# Patient Record
Sex: FEMALE | Race: WHITE | Employment: OTHER | URBAN - METROPOLITAN AREA
[De-identification: names, ages, dates, MRNs, and addresses within clinical notes are randomized per-mention and may not be internally consistent; named-entity substitution may affect disease eponyms.]

---

## 2017-01-04 ENCOUNTER — APPOINTMENT (OUTPATIENT)
Dept: GENERAL RADIOLOGY | Age: 82
End: 2017-01-04
Attending: FAMILY MEDICINE

## 2017-01-04 ENCOUNTER — HOSPITAL ENCOUNTER (EMERGENCY)
Age: 82
Discharge: HOME | End: 2017-01-04
Attending: FAMILY MEDICINE

## 2017-01-04 VITALS
DIASTOLIC BLOOD PRESSURE: 75 MMHG | HEIGHT: 62 IN | WEIGHT: 137 LBS | RESPIRATION RATE: 20 BRPM | BODY MASS INDEX: 25.21 KG/M2 | OXYGEN SATURATION: 97 % | HEART RATE: 90 BPM | SYSTOLIC BLOOD PRESSURE: 141 MMHG | TEMPERATURE: 97.9 F

## 2017-01-04 DIAGNOSIS — K59.00 CONSTIPATION, UNSPECIFIED CONSTIPATION TYPE: ICD-10-CM

## 2017-01-04 DIAGNOSIS — R14.0 ABDOMINAL DISTENSION: Primary | ICD-10-CM

## 2017-01-04 RX ORDER — AMOXICILLIN 250 MG
1 CAPSULE ORAL
Qty: 30 TAB | Refills: 0 | Status: SHIPPED | OUTPATIENT
Start: 2017-01-04 | End: 2017-08-28 | Stop reason: ALTCHOICE

## 2017-01-04 NOTE — UC PROVIDER NOTE
Patient is a 80 y.o. female presenting with abdominal pain. The history is provided by the patient. Abdominal Pain    This is a recurrent problem. The current episode started more than 1 week ago. The problem occurs daily. The problem has not changed since onset. Associated with: poor bowel habit  Pain location: generalized. The pain is mild. Associated symptoms include constipation. Pertinent negatives include no belching, no nausea and no vomiting. Associated symptoms comments: Weight gain . Her past medical history does not include GERD, irritable bowel syndrome or diverticulitis. Past Medical History   Diagnosis Date    Anxiety state, unspecified     CKD (chronic kidney disease) stage 3, GFR 30-59 ml/min 4/6/2016    Dementia 6/12     Landa/npysch    Generalized osteoarthrosis, unspecified site     Lumbar compression fracture (HCC) 4/08     epidurals x 2  Umang/os    Osteopenia     Other and unspecified hyperlipidemia     Other and unspecified hyperlipidemia     Psoriasis     Unspecified essential hypertension     Unspecified hypothyroidism      thyroid irradiated i131    Unspecified hypothyroidism         Past Surgical History   Procedure Laterality Date    Pr total knee arthroplasty  1992     left    Pr total knee arthroplasty  1992     right    Colonoscopy  10/10     [place]/gi         Family History   Problem Relation Age of Onset    Cancer Mother      stomach tumors    Cancer Brother      prostate        Social History     Social History    Marital status:      Spouse name: N/A    Number of children: N/A    Years of education: N/A     Occupational History    Not on file.      Social History Main Topics    Smoking status: Former Smoker     Packs/day: 0.25     Years: 60.00     Types: Cigarettes    Smokeless tobacco: Never Used    Alcohol use No    Drug use: No    Sexual activity: No     Other Topics Concern    Exercise Yes     enjoys working in the yard     Social History Narrative                ALLERGIES: Keflex [cephalexin]; Pcn [penicillins]; and Sulfa (sulfonamide antibiotics)    Review of Systems   Gastrointestinal: Positive for abdominal pain and constipation. Negative for nausea and vomiting. Vitals:    01/04/17 1656   BP: 141/75   Pulse: 90   Resp: 20   Temp: 97.9 °F (36.6 °C)   SpO2: 97%   Weight: 62.1 kg (137 lb)   Height: 5' 2\" (1.575 m)       Physical Exam   Constitutional: No distress. HENT:   Mouth/Throat: No oropharyngeal exudate. Eyes: No scleral icterus. Abdominal: Soft. Bowel sounds are normal. She exhibits distension. She exhibits no mass. There is no tenderness. There is no rebound and no guarding. Diffuse fullness with palpable bowel loop   Skin: No rash noted. Nursing note and vitals reviewed. MDM     Differential Diagnosis; Clinical Impression; Plan:     CLINICAL IMPRESSION:  Abdominal distension  (primary encounter diagnosis)  Constipation, unspecified constipation type      DDX    Plan:    KUB- moderate to severe constipation - large fecal retention   Fleets enema  pericolace x 2 weeks followed by colace 3/ day    Follow with GI    May need manual disimpaction   Amount and/or Complexity of Data Reviewed:   Tests in the radiology section of CPT®:  Ordered and reviewed   Review and summarize past medical records:  Yes  Risk of Significant Complications, Morbidity, and/or Mortality:   Presenting problems: Moderate  Diagnostic procedures: Moderate  Management options:   Moderate  Progress:   Patient progress:  Stable      Procedures

## 2017-01-04 NOTE — DISCHARGE INSTRUCTIONS
Constipation: Care Instructions  Your Care Instructions  Constipation means that you have a hard time passing stools (bowel movements). People pass stools from 3 times a day to once every 3 days. What is normal for you may be different. Constipation may occur with pain in the rectum and cramping. The pain may get worse when you try to pass stools. Sometimes there are small amounts of bright red blood on toilet paper or the surface of stools. This is because of enlarged veins near the rectum (hemorrhoids). A few changes in your diet and lifestyle may help you avoid ongoing constipation. Your doctor may also prescribe medicine to help loosen your stool. Some medicines can cause constipation. These include pain medicines and antidepressants. Tell your doctor about all the medicines you take. Your doctor may want to make a medicine change to ease your symptoms. Follow-up care is a key part of your treatment and safety. Be sure to make and go to all appointments, and call your doctor if you are having problems. It's also a good idea to know your test results and keep a list of the medicines you take. How can you care for yourself at home? · Drink plenty of fluids, enough so that your urine is light yellow or clear like water. If you have kidney, heart, or liver disease and have to limit fluids, talk with your doctor before you increase the amount of fluids you drink. · Include high-fiber foods in your diet each day. These include fruits, vegetables, beans, and whole grains. · Get at least 30 minutes of exercise on most days of the week. Walking is a good choice. You also may want to do other activities, such as running, swimming, cycling, or playing tennis or team sports. · Take a fiber supplement, such as Citrucel or Metamucil, every day. Read and follow all instructions on the label. · Schedule time each day for a bowel movement. A daily routine may help.  Take your time having your bowel movement. · Support your feet with a small step stool when you sit on the toilet. This helps flex your hips and places your pelvis in a squatting position. · Your doctor may recommend an over-the-counter laxative to relieve your constipation. Examples are Milk of Magnesia and MiraLax. Read and follow all instructions on the label. Do not use laxatives on a long-term basis. When should you call for help? Call your doctor now or seek immediate medical care if:  · You have new or worse belly pain. · You have new or worse nausea or vomiting. · You have blood in your stools. Watch closely for changes in your health, and be sure to contact your doctor if:  · Your constipation is getting worse. · You do not get better as expected. Where can you learn more? Go to http://asya-chad.info/. Enter 21 [phone] in the search box to learn more about \"Constipation: Care Instructions. \"  Current as of: May 27, 2016  Content Version: 11.1  © 2914-7345 [employer]. Care instructions adapted under license by [employer] (which disclaims liability or warranty for this information). If you have questions about a medical condition or this instruction, always ask your healthcare professional. [address] any warranty or liability for your use of this information.

## 2017-01-06 ENCOUNTER — PATIENT OUTREACH (OUTPATIENT)
Dept: FAMILY MEDICINE CLINIC | Age: 82
End: 2017-01-06

## 2017-01-06 NOTE — PATIENT INSTRUCTIONS
Constipation: Care Instructions  Your Care Instructions  Constipation means that you have a hard time passing stools (bowel movements). People pass stools from 3 times a day to once every 3 days. What is normal for you may be different. Constipation may occur with pain in the rectum and cramping. The pain may get worse when you try to pass stools. Sometimes there are small amounts of bright red blood on toilet paper or the surface of stools. This is because of enlarged veins near the rectum (hemorrhoids). A few changes in your diet and lifestyle may help you avoid ongoing constipation. Your doctor may also prescribe medicine to help loosen your stool. Some medicines can cause constipation. These include pain medicines and antidepressants. Tell your doctor about all the medicines you take. Your doctor may want to make a medicine change to ease your symptoms. Follow-up care is a key part of your treatment and safety. Be sure to make and go to all appointments, and call your doctor if you are having problems. It's also a good idea to know your test results and keep a list of the medicines you take. How can you care for yourself at home? · Drink plenty of fluids, enough so that your urine is light yellow or clear like water. If you have kidney, heart, or liver disease and have to limit fluids, talk with your doctor before you increase the amount of fluids you drink. · Include high-fiber foods in your diet each day. These include fruits, vegetables, beans, and whole grains. · Get at least 30 minutes of exercise on most days of the week. Walking is a good choice. You also may want to do other activities, such as running, swimming, cycling, or playing tennis or team sports. · Take a fiber supplement, such as Citrucel or Metamucil, every day. Read and follow all instructions on the label. · Schedule time each day for a bowel movement. A daily routine may help.  Take your time having your bowel movement. · Support your feet with a small step stool when you sit on the toilet. This helps flex your hips and places your pelvis in a squatting position. · Your doctor may recommend an over-the-counter laxative to relieve your constipation. Examples are Milk of Magnesia and MiraLax. Read and follow all instructions on the label. Do not use laxatives on a long-term basis. When should you call for help? Call your doctor now or seek immediate medical care if:  · You have new or worse belly pain. · You have new or worse nausea or vomiting. · You have blood in your stools. Watch closely for changes in your health, and be sure to contact your doctor if:  · Your constipation is getting worse. · You do not get better as expected. Where can you learn more? Go to http://asya-chad.info/. Enter 21 [phone] in the search box to learn more about \"Constipation: Care Instructions. \"  Current as of: May 27, 2016  Content Version: 11.1  © 8399-0361 [employer]. Care instructions adapted under license by [employer] (which disclaims liability or warranty for this information). If you have questions about a medical condition or this instruction, always ask your healthcare professional. [address] any warranty or liability for your use of this information.

## 2017-01-06 NOTE — LETTER
1/6/2017 4:24 PM 
 
[phone] [id number] Dear Ms. Concepcionforrest Ely am a Nurse Navigator working with your physician's office. Part of my job is to follow up with patients who have been in the emergency department,urgent care or hospital to see how they are feeling, answer any questions they may have about their visit and also make sure they have a follow-up appointment to see their primary care doctor. I saw that you had gone to the [address] [place] [address] urgent care on 1/4/17 for constipation. If you are not feeling any better, please call our office to schedule an appointment with  or one of our other providers. If you have any questions or concerns, do not hesitate to call me at the number listed above. Thank you for allowing us to participate in your care!  
 
 
Sincerely, 
 
 
Molly Mcleod RN

## 2017-01-06 NOTE — PROGRESS NOTES
Patient seen at [facility] 1/4/17 for constipation. Letter sent advising f/u if no better. Call NN if any questions or concerns.

## 2017-01-11 ENCOUNTER — OFFICE VISIT (OUTPATIENT)
Dept: FAMILY MEDICINE CLINIC | Age: 82
End: 2017-01-11

## 2017-01-11 VITALS
DIASTOLIC BLOOD PRESSURE: 71 MMHG | TEMPERATURE: 97.6 F | BODY MASS INDEX: 25.58 KG/M2 | RESPIRATION RATE: 19 BRPM | WEIGHT: 139 LBS | HEART RATE: 83 BPM | OXYGEN SATURATION: 98 % | HEIGHT: 62 IN | SYSTOLIC BLOOD PRESSURE: 148 MMHG

## 2017-01-11 DIAGNOSIS — J44.1 CHRONIC OBSTRUCTIVE PULMONARY DISEASE WITH ACUTE EXACERBATION (HCC): ICD-10-CM

## 2017-01-11 DIAGNOSIS — K59.09 OTHER CONSTIPATION: ICD-10-CM

## 2017-01-11 DIAGNOSIS — F02.80 LATE ONSET ALZHEIMER'S DISEASE WITHOUT BEHAVIORAL DISTURBANCE (HCC): ICD-10-CM

## 2017-01-11 DIAGNOSIS — N18.30 CKD (CHRONIC KIDNEY DISEASE) STAGE 3, GFR 30-59 ML/MIN (HCC): ICD-10-CM

## 2017-01-11 DIAGNOSIS — R10.13 EPIGASTRIC PAIN: Primary | ICD-10-CM

## 2017-01-11 DIAGNOSIS — I10 ESSENTIAL HYPERTENSION: ICD-10-CM

## 2017-01-11 DIAGNOSIS — G30.1 LATE ONSET ALZHEIMER'S DISEASE WITHOUT BEHAVIORAL DISTURBANCE (HCC): ICD-10-CM

## 2017-01-11 RX ORDER — IPRATROPIUM BROMIDE AND ALBUTEROL SULFATE 2.5; .5 MG/3ML; MG/3ML
3 SOLUTION RESPIRATORY (INHALATION)
Qty: 1 NEBULE | Refills: 1 | Status: SHIPPED | OUTPATIENT
Start: 2017-01-11 | End: 2017-01-11

## 2017-01-11 NOTE — PATIENT INSTRUCTIONS
Constipation: Care Instructions  Your Care Instructions  Constipation means that you have a hard time passing stools (bowel movements). People pass stools from 3 times a day to once every 3 days. What is normal for you may be different. Constipation may occur with pain in the rectum and cramping. The pain may get worse when you try to pass stools. Sometimes there are small amounts of bright red blood on toilet paper or the surface of stools. This is because of enlarged veins near the rectum (hemorrhoids). A few changes in your diet and lifestyle may help you avoid ongoing constipation. Your doctor may also prescribe medicine to help loosen your stool. Some medicines can cause constipation. These include pain medicines and antidepressants. Tell your doctor about all the medicines you take. Your doctor may want to make a medicine change to ease your symptoms. Follow-up care is a key part of your treatment and safety. Be sure to make and go to all appointments, and call your doctor if you are having problems. It's also a good idea to know your test results and keep a list of the medicines you take. How can you care for yourself at home? · Drink plenty of fluids, enough so that your urine is light yellow or clear like water. If you have kidney, heart, or liver disease and have to limit fluids, talk with your doctor before you increase the amount of fluids you drink. · Include high-fiber foods in your diet each day. These include fruits, vegetables, beans, and whole grains. · Get at least 30 minutes of exercise on most days of the week. Walking is a good choice. You also may want to do other activities, such as running, swimming, cycling, or playing tennis or team sports. · Take a fiber supplement, such as Citrucel or Metamucil, every day. Read and follow all instructions on the label. · Schedule time each day for a bowel movement. A daily routine may help.  Take your time having your bowel movement. · Support your feet with a small step stool when you sit on the toilet. This helps flex your hips and places your pelvis in a squatting position. · Your doctor may recommend an over-the-counter laxative to relieve your constipation. Examples are Milk of Magnesia and MiraLax. Read and follow all instructions on the label. Do not use laxatives on a long-term basis. When should you call for help? Call your doctor now or seek immediate medical care if:  · You have new or worse belly pain. · You have new or worse nausea or vomiting. · You have blood in your stools. Watch closely for changes in your health, and be sure to contact your doctor if:  · Your constipation is getting worse. · You do not get better as expected. Where can you learn more? Go to http://asya-chad.info/. Enter 21 [phone] in the search box to learn more about \"Constipation: Care Instructions. \"  Current as of: May 27, 2016  Content Version: 11.1  © 9211-5753 [employer]. Care instructions adapted under license by [employer] (which disclaims liability or warranty for this information). If you have questions about a medical condition or this instruction, always ask your healthcare professional. [address] any warranty or liability for your use of this information.

## 2017-01-11 NOTE — PROGRESS NOTES
Chief Complaint   Patient presents with    Constipation     x 1 month, seen at Urgent Care 1/4/17 started on laxative and stool softener, 1/10/17 [address]     Abdominal Pain     Patients daughter states that her breath has a foul order of stool, and that she has had an distended abdomen and complaining of abdominal pain for over a month   Med list reviewed  1. Have you been to the ER, urgent care clinic since your last visit? Hospitalized since your last visit? Yes When: 1/10/16    2. Have you seen or consulted any other health care providers outside of the [address] [place] [address] since your last visit? Include any pap smears or colon screening.  Yes When: 1/10/16  \"REVIEWED RECORD IN PREPARATION FOR VISIT AND HAVE OBTAINED THE NECESSARY DOCUMENTATION\"

## 2017-01-11 NOTE — PROGRESS NOTES
HISTORY OF PRESENT ILLNESS  Lucho Colon is a 80 y.o. female. HPI  Abdominal Pain  Patient in for follow up of abdominal pain. The pain is described as sharp, stabbing and colicky, and is 4/43 in intensity. Pain is located in the generalized without radiation. Onset was several months ago. Symptoms have been gradually worsening since. Aggravating factors: movement. Alleviating factors: stool softener and laxative. Associated symptoms: weight gain, abdominal distention, anorexia, belching and constipation. The patient denies diarrhea, fever, nausea and vomiting. Patient seen at Urgent Care and ED for symptoms. Weight is up 8 lbs since August.     XR Results (most recent):    Results from Appointment encounter on 01/11/17   XR CHEST PA LAT   Narrative Chest PA and lateral    History: Dyspnea. COPD. Comparison: 9/9/2015    Findings: There is an increased AP diameter of the chest with flattening of the  diaphragm. No focal consolidation, pleural effusion, or pneumothorax. The heart  is normal size. The aorta is tortuous. There is increased kyphosis of the  thoracic spine with moderate multilevel spondylosis. Impression Impression:  No significant change. Upper Respiratory Infection  Patient complains of symptoms of a URI. Symptoms include congestion and cough. Onset of symptoms was 3 days ago, gradually worsening since that time. She also c/o congestion, cough described as productive of clear sputum and wheezing for the past 3 days. She is drinking plenty of fluids. . Evaluation to date: none. Treatment to date: none. Review of Systems   Constitutional: Positive for malaise/fatigue. Negative for chills and fever. HENT: Positive for congestion and sore throat. Negative for ear pain. Respiratory: Positive for cough and sputum production. Negative for shortness of breath and wheezing. Gastrointestinal: Positive for abdominal pain and constipation. Neurological: Negative for headaches. Physical Exam   Constitutional: She is oriented to person, place, and time. She appears well-developed and well-nourished. No distress. HENT:   Right Ear: Tympanic membrane and ear canal normal.   Left Ear: Tympanic membrane and ear canal normal.   Nose: Mucosal edema present. Right sinus exhibits no maxillary sinus tenderness and no frontal sinus tenderness. Left sinus exhibits no maxillary sinus tenderness and no frontal sinus tenderness. Mouth/Throat: Oropharynx is clear and moist.   Cardiovascular: Normal rate and regular rhythm. Murmur heard. Systolic murmur is present with a grade of 2/6   Pulmonary/Chest: Effort normal. She has no decreased breath sounds. She has wheezes (expiratory). She has no rhonchi. Abdominal: Normal appearance. She exhibits distension. Bowel sounds are decreased. There is tenderness in the epigastric area. There is no rigidity and no guarding. Lymphadenopathy:     She has no cervical adenopathy. Neurological: She is alert and oriented to person, place, and time. Psychiatric: She has a normal mood and affect. Her behavior is normal.   Nursing note and vitals reviewed. ASSESSMENT and PLAN  Pricilla Barr was seen today for constipation and abdominal pain. Diagnoses and all orders for this visit:    Epigastric pain  Given weight gain and abdominal distention, will r/o intraabdominal neoplasm.   -     CT ABD PELV WO CONT; Future    Other constipation  Miralax or prune juice PRN  -     CT ABD PELV WO CONT; Future    Chronic obstructive pulmonary disease with acute exacerbation (HCC)  Breathing treated given in clinic with relief. Continue home medications.   -     ALBUTEROL IPRATROP NON-COMP  -     CA INHAL RX, AIRWAY OBST/DX SPUTUM INDUCT  -     albuterol-ipratropium (DUO-NEB) 2.5 mg-0.5 mg/3 ml nebu; 3 mL by Nebulization route now for 1 dose. -     XR CHEST PA LAT;  Future    Essential hypertension  Stable, no changes to current therapy    Late onset Alzheimer'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 behavioral disturbance  No changes    CKD (chronic kidney disease) stage 3, GFR 30-59 ml/min  No CT contrast given kidney function. Managed by nephrology. I have discussed the diagnosis with the patient and the intended plan as seen in the above orders. The patient has received an after-visit summary and questions were answered concerning future plans. I have discussed medication side effects and warnings with the patient as well. Follow-up Disposition:  Return in about 4 months (around 5/11/2017) for fasting labs.

## 2017-01-11 NOTE — MR AVS SNAPSHOT
Visit Information Date & Time Provider Department Dept. Phone Encounter #  
 1/11/2017  5:45 PM Lora Burkitt,  [facility] [address] [phone] [id number] Follow-up Instructions Return in about 4 months (around 5/11/2017) for fasting labs. Your Appointments 3/15/2017 10:15 AM  
ROUTINE CARE with Lida Lock MD  
[facility]) Appt Note: 6 month follow up,  HTN/Cholsterol [address] [phone]  
[phone]  
  
   
 [address] [address] Upcoming Health Maintenance Date Due  
 GLAUCOMA SCREENING Q2Y 8/9/1994 MEDICARE YEARLY EXAM 9/8/2017 DTaP/Tdap/Td series (2 - Td) 9/7/2026 Allergies as of 1/11/2017  Review Complete On: 1/4/2017 By: Rashel Roberts RN Severity Noted Reaction Type Reactions Keflex [Cephalexin]  04/23/2010    Rash Pcn [Penicillins]  04/23/2010    Rash  
 Sulfa (Sulfonamide Antibiotics)  05/31/2011    Rash Current Immunizations  Reviewed on 9/7/2016 Name Date Influenza High Dose Vaccine PF 9/30/2015 Influenza Vaccine 10/16/2013 Pneumococcal Conjugate (PCV-13) 3/2/2016 Pneumococcal Polysaccharide (PPSV-23) 1/29/2014 Tdap 9/7/2016 Zoster Vaccine, Live 10/15/2014 Not reviewed this visit You Were Diagnosed With   
  
 Codes Comments Epigastric pain    -  Primary ICD-10-CM: R10.13 ICD-9-CM: 789.06 Other constipation     ICD-10-CM: K59.09 
ICD-9-CM: 564.09 Chronic obstructive pulmonary disease with acute exacerbation (HCC)     ICD-10-CM: J44.1 ICD-9-CM: 491.21 Essential hypertension     ICD-10-CM: I10 
ICD-9-CM: 401.9 Late onset Alzheimer'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 behavioral disturbance     ICD-10-CM: G30.1, F02.80 ICD-9-CM: 331.0, 294.10 CKD (chronic kidney disease) stage 3, GFR 30-59 ml/min     ICD-10-CM: N18.3 ICD-9-CM: 607. 3 Vitals BP Pulse Temp Resp Height(growth percentile) Weight(growth percentile) 148/71 (BP 1 Location: Left arm, BP Patient Position: Sitting) 83 97.6 °F (36.4 °C) (Oral) 19 5' 2\" (1.575 m) 139 lb (63 kg) SpO2 BMI OB Status Smoking Status 98% 25.42 kg/m2 Postmenopausal Former Smoker BMI and BSA Data Body Mass Index Body Surface Area  
 25.42 kg/m 2 1.66 m 2 Preferred Pharmacy Pharmacy Name Phone [facility] [address], [address] [place] [address] [phone] Your Updated Medication List  
  
   
This list is accurate as of: 1/11/17  7:00 PM.  Always use your most recent med list.  
  
  
  
  
 albuterol 90 mcg/actuation inhaler Commonly known as:  PROVENTIL HFA, VENTOLIN HFA, PROAIR HFA Take 2 Puffs by inhalation every six (6) hours as needed for Wheezing. albuterol-ipratropium 2.5 mg-0.5 mg/3 ml Nebu Commonly known as:  DUO-NEB  
3 mL by Nebulization route now for 1 dose. amLODIPine 5 mg tablet Commonly known as:  [place] Billie TAKE 1 TABLET DAILY FOR BLOOD PRESSURE  
  
 atorvastatin 20 mg tablet Commonly known as:  LIPITOR  
TAKE 1 TABLET DAILY FOR CHOLESTEROL CENTRUM SILVER PO Take  by mouth. clindamycin 300 mg capsule Commonly known as:  CLEOCIN Take 2 caps PO for 1 dose 1 hour before dental procedure. donepezil 10 mg tablet Commonly known as:  ARICEPT  
TAKE 1 TABLET NIGHTLY  
  
 fluticasone-salmeterol 115-21 mcg/actuation inhaler Commonly known as:  ADVAIR HFA Take 2 Puffs by inhalation two (2) times a day. imipramine 25 mg tablet Commonly known as:  TOFRANIL  
TAKE 2 TABLETS NIGHTLY  
  
 ipratropium 0.03 % nasal spray Commonly known as:  ATROVENT  
2 Sprays by Both Nostrils route every twelve (12) hours. lisinopril 10 mg tablet Commonly known as:  PRINIVIL, ZESTRIL  
TAKE 1 TABLET DAILY FOR BLOOD PRESSURE  
  
 memantine 5 mg tablet Commonly known as:  Kunal Canavan  
 TAKE 1 TABLET TWICE A DAY  
  
 senna-docusate 8.6-50 mg per tablet Commonly known as:  Marlyn Vick Take 1 Tab by mouth two (2) times daily as needed for Constipation. SYNTHROID 75 mcg tablet Generic drug:  levothyroxine Take 1 Tab by mouth Daily (before breakfast). VITAMIN D2 50,000 unit capsule Generic drug:  ergocalciferol Take 50,000 Units by mouth every seven (7) days. Prescriptions Sent to Pharmacy Refills  
 albuterol-ipratropium (DUO-NEB) 2.5 mg-0.5 mg/3 ml nebu 1 Sig: 3 mL by Nebulization route now for 1 dose. Class: Normal  
 Pharmacy: [facility] [phone] [facility] Ph #: [phone] Route: Nebulization We Performed the Following ALBUTEROL IPRATROP NON-COMP [id number] HCPCS] AK INHAL RX, AIRWAY OBST/DX SPUTUM INDUCT [id number] CPT(R)] Follow-up Instructions Return in about 4 months (around 5/11/2017) for fasting labs. To-Do List   
 01/11/2017 Imaging:  CT ABD PELV WO CONT Around 01/11/2017 Imaging:  XR CHEST PA LAT Referral Information Referral ID Referred By Referred To  
  
 [id number] Nazanin Martinez Not Available Visits Status Start Date End Date 1 New Request 1/11/17 1/11/18 If your referral has a status of pending review or denied, additional information will be sent to support the outcome of this decision. Patient Instructions Constipation: Care Instructions Your Care Instructions Constipation means that you have a hard time passing stools (bowel movements). People pass stools from 3 times a day to once every 3 days. What is normal for you may be different. Constipation may occur with pain in the rectum and cramping. The pain may get worse when you try to pass stools. Sometimes there are small amounts of bright red blood on toilet paper or the surface of stools. This is because of enlarged veins near the rectum (hemorrhoids). A few changes in your diet and lifestyle may help you avoid ongoing constipation. Your doctor may also prescribe medicine to help loosen your stool. Some medicines can cause constipation. These include pain medicines and antidepressants. Tell your doctor about all the medicines you take. Your doctor may want to make a medicine change to ease your symptoms. Follow-up care is a key part of your treatment and safety. Be sure to make and go to all appointments, and call your doctor if you are having problems. It's also a good idea to know your test results and keep a list of the medicines you take. How can you care for yourself at home? · Drink plenty of fluids, enough so that your urine is light yellow or clear like water. If you have kidney, heart, or liver disease and have to limit fluids, talk with your doctor before you increase the amount of fluids you drink. · Include high-fiber foods in your diet each day. These include fruits, vegetables, beans, and whole grains. · Get at least 30 minutes of exercise on most days of the week. Walking is a good choice. You also may want to do other activities, such as running, swimming, cycling, or playing tennis or team sports. · Take a fiber supplement, such as Citrucel or Metamucil, every day. Read and follow all instructions on the label. · Schedule time each day for a bowel movement. A daily routine may help. Take your time having your bowel movement. · Support your feet with a small step stool when you sit on the toilet. This helps flex your hips and places your pelvis in a squatting position. · Your doctor may recommend an over-the-counter laxative to relieve your constipation. Examples are Milk of Magnesia and MiraLax. Read and follow all instructions on the label. Do not use laxatives on a long-term basis. When should you call for help? Call your doctor now or seek immediate medical care if: 
· You have new or worse belly pain. · You have new or worse nausea or vomiting. · You have blood in your stools. Watch closely for changes in your health, and be sure to contact your doctor if: 
· Your constipation is getting worse. · You do not get better as expected. Where can you learn more? Go to http://asya-chad.info/. Enter 21  in the search box to learn more about \"Constipation: Care Instructions. \" Current as of: May 27, 2016 Content Version: 11.1 © 8497-6042 [employer]!. Care instructions adapted under license by [employer] (which disclaims liability or warranty for this information). If you have questions about a medical condition or this instruction, always ask your healthcare professional. [address] any warranty or liability for your use of this information. Introducing [facility]! Dear Lidya Santiago: Thank you for requesting a [employer] account. Our records indicate that you already have an active [employer] account. You can access your account anytime at [email or web address] Did you know that you can access your hospital and ER discharge instructions at any time in [employer]? You can also review all of your test results from your hospital stay or ER visit. Additional Information If you have questions, please visit the Frequently Asked Questions section of the [employer] website at [email or web address] Remember, [employer] is NOT to be used for urgent needs. For medical emergencies, dial 911. Now available from your iPhone and Android! Please provide this summary of care documentation to your next provider. Your primary care clinician is listed as Ahmet Pedraza. If you have any questions after today's visit, please call [phone].

## 2017-01-12 ENCOUNTER — PATIENT OUTREACH (OUTPATIENT)
Dept: FAMILY MEDICINE CLINIC | Age: 82
End: 2017-01-12

## 2017-01-12 NOTE — PROGRESS NOTES
per [employer]port report of 1/12, pt. in [facility] ED for constipation 1/10. NNTOCED    Pt. Seen in office 1/11 by NP. NN will follow up and print the [facility] EMERGENCY [facility] ED notes. Per Office notes, ordered Miralax and follow up in 4 mths. Call to home, left generic message. Akua Hernandes.  LUCIO Mcqueen ArmN

## 2017-01-17 ENCOUNTER — HOSPITAL ENCOUNTER (OUTPATIENT)
Dept: CT IMAGING | Age: 82
Discharge: HOME | End: 2017-01-17
Payer: MEDICARE

## 2017-01-17 ENCOUNTER — PATIENT OUTREACH (OUTPATIENT)
Dept: FAMILY MEDICINE CLINIC | Age: 82
End: 2017-01-17

## 2017-01-17 DIAGNOSIS — R10.13 EPIGASTRIC PAIN: ICD-10-CM

## 2017-01-17 DIAGNOSIS — K59.09 OTHER CONSTIPATION: ICD-10-CM

## 2017-01-17 PROCEDURE — 74176 CT ABD & PELVIS W/O CONTRAST: CPT

## 2017-01-17 NOTE — LETTER
1/17/2017 10:42 AM 
 
[address] [phone] Dear Ms. Chloé Zafar: 
 
I am a Nurse Navigator working with your physician's office. Part of my job is to follow-up with patients who have been in the emergency department or hospital to see how they are feeling, answer any questions they may have about their visit and also make sure they have a follow-up appointment to see their primary care doctor. I can also provide resources to patient that have other needs. Please call me if you need assistance with affording food, medications, if you need transportation to physician appointments or if there are other needs you might have. Thank you for allowing me to participate in your care. Sincerely, 
 
 
 
Sarah Callahan. Berenice Middleton, LUCION RNC Nurse Navigator Sincerely,

## 2017-01-25 ENCOUNTER — TELEPHONE (OUTPATIENT)
Dept: FAMILY MEDICINE CLINIC | Age: 82
End: 2017-01-25

## 2017-01-25 ENCOUNTER — OFFICE VISIT (OUTPATIENT)
Dept: FAMILY MEDICINE CLINIC | Age: 82
End: 2017-01-25

## 2017-01-25 VITALS
OXYGEN SATURATION: 99 % | RESPIRATION RATE: 14 BRPM | TEMPERATURE: 98.2 F | HEIGHT: 62 IN | HEART RATE: 78 BPM | SYSTOLIC BLOOD PRESSURE: 130 MMHG | WEIGHT: 138.4 LBS | DIASTOLIC BLOOD PRESSURE: 70 MMHG | BODY MASS INDEX: 25.47 KG/M2

## 2017-01-25 DIAGNOSIS — J44.1 COPD WITH EXACERBATION (HCC): Primary | ICD-10-CM

## 2017-01-25 RX ORDER — PREDNISONE 20 MG/1
TABLET ORAL
Qty: 20 TAB | Refills: 0 | Status: SHIPPED | OUTPATIENT
Start: 2017-01-25 | End: 2017-06-06 | Stop reason: ALTCHOICE

## 2017-01-25 RX ORDER — AZITHROMYCIN 250 MG/1
TABLET, FILM COATED ORAL
Qty: 6 TAB | Refills: 0 | Status: SHIPPED | OUTPATIENT
Start: 2017-01-25 | End: 2017-01-30

## 2017-01-25 RX ORDER — IPRATROPIUM BROMIDE AND ALBUTEROL SULFATE 2.5; .5 MG/3ML; MG/3ML
3 SOLUTION RESPIRATORY (INHALATION)
Qty: 1 NEBULE | Refills: 0
Start: 2017-01-25 | End: 2017-01-25 | Stop reason: SDUPTHER

## 2017-01-25 RX ORDER — DOXYCYCLINE 100 MG/1
100 TABLET ORAL 2 TIMES DAILY
Qty: 20 TAB | Refills: 0 | Status: SHIPPED | OUTPATIENT
Start: 2017-01-25 | End: 2017-01-25 | Stop reason: CLARIF

## 2017-01-25 RX ORDER — IPRATROPIUM BROMIDE AND ALBUTEROL SULFATE 2.5; .5 MG/3ML; MG/3ML
3 SOLUTION RESPIRATORY (INHALATION)
Qty: 90 NEBULE | Refills: 3 | Status: SHIPPED | OUTPATIENT
Start: 2017-01-25 | End: 2017-10-15

## 2017-01-25 NOTE — TELEPHONE ENCOUNTER
Received call from pt's daughter Marisol Finnegan who is requesting an appointment with BRITTANY Cerna this afternoon. She states pt \"is not acting like herself\" and she has been \"sleeping all day\". Virginia Postin reports that yesterday pt stumbled while trying to get out of bed, pt did not hit her head but may have injured her arm. Virginia Postin states she is concerned that pt condition has changed/worsened. BRITTANY Reina made aware, states ok to schedule appt at 4:00pm today.

## 2017-01-25 NOTE — PROGRESS NOTES
1. Have you been to the ER, urgent care clinic since your last visit? Hospitalized since your last visit? No    2. Have you seen or consulted any other health care providers outside of the [address] [place] [address] since your last visit? Include any pap smears or colon screening.  No     Chief Complaint   Patient presents with   Jeanna Elvia     has not been herself, slept almost all day- not her self    Fall     got dizzy and fell as soon as she got out of bed

## 2017-01-25 NOTE — PATIENT INSTRUCTIONS
Chronic Obstructive Pulmonary Disease (COPD): Care Instructions  Your Care Instructions    Chronic obstructive pulmonary disease (COPD) is a general term for a group of lung diseases, including emphysema and chronic bronchitis. People with COPD have decreased airflow in and out of the lungs, which makes it hard to breathe. The airways also can get clogged with thick mucus. Cigarette smoking is a major cause of COPD. Although there is no cure for COPD, you can slow its progress. Following your treatment plan and taking care of yourself can help you feel better and live longer. Follow-up care is a key part of your treatment and safety. Be sure to make and go to all appointments, and call your doctor if you are having problems. It's also a good idea to know your test results and keep a list of the medicines you take. How can you care for yourself at home? Staying healthy  · Do not smoke. This is the most important step you can take to prevent more damage to your lungs. If you need help quitting, talk to your doctor about stop-smoking programs and medicines. These can increase your chances of quitting for good. · Avoid colds and flu. Get a pneumococcal vaccine shot. If you have had one before, ask your doctor whether you need a second dose. Get the flu vaccine every fall. If you must be around people with colds or the flu, wash your hands often. · Avoid secondhand smoke, air pollution, and high altitudes. Also avoid cold, dry air and hot, humid air. Stay at home with your windows closed when air pollution is bad. Medicines and oxygen therapy  · Take your medicines exactly as prescribed. Call your doctor if you think you are having a problem with your medicine. · You may be taking medicines such as:  ¨ Bronchodilators. These help open your airways and make breathing easier. Bronchodilators are either short-acting (work for 6 to 9 hours) or long-acting (work for 24 hours).  You inhale most bronchodilators, so they start to act quickly. Always carry your quick-relief inhaler with you in case you need it while you are away from home. ¨ Corticosteroids (prednisone, budesonide). These reduce airway inflammation. They come in pill or inhaled form. You must take these medicines every day for them to work well. · A spacer may help you get more inhaled medicine to your lungs. Ask your doctor or pharmacist if a spacer is right for you. If it is, ask how to use it properly. · Do not take any vitamins, over-the-counter medicine, or herbal products without talking to your doctor first.  · If your doctor prescribed antibiotics, take them as directed. Do not stop taking them just because you feel better. You need to take the full course of antibiotics. · Oxygen therapy boosts the amount of oxygen in your blood and helps you breathe easier. Use the flow rate your doctor has recommended, and do not change it without talking to your doctor first.  Activity  · Get regular exercise. Walking is an easy way to get exercise. Start out slowly, and walk a little more each day. · Pay attention to your breathing. You are exercising too hard if you cannot talk while you are exercising. · Take short rest breaks when doing household chores and other activities. · Learn breathing methodssuch as breathing through pursed lipsto help you become less short of breath. · If your doctor has not set you up with a pulmonary rehabilitation program, talk to him or her about whether rehab is right for you. Rehab includes exercise programs, education about your disease and how to manage it, help with diet and other changes, and emotional support. Diet  · Eat regular, healthy meals. Use bronchodilators about 1 hour before you eat to make it easier to eat. Eat several small meals instead of three large ones. Drink beverages at the end of the meal. Avoid foods that are hard to chew.   · Eat foods that contain protein so that you do not lose muscle mass.  Mental health  · Talk to your family, friends, or a therapist about your feelings. It is normal to feel frightened, angry, hopeless, helpless, and even guilty. Talking openly about bad feelings can help you cope. If these feelings last, talk to your doctor. When should you call for help? Call 911 anytime you think you may need emergency care. For example, call if:  · You have severe trouble breathing. Call your doctor now or seek immediate medical care if:  · You have new or worse trouble breathing. · You cough up blood. · You have a fever. Watch closely for changes in your health, and be sure to contact your doctor if:  · You cough more deeply or more often, especially if you notice more mucus or a change in the color of your mucus. · You have new or worse swelling in your legs or belly. · You are not getting better as expected. Where can you learn more? Go to http://asya-chad.info/. Primitivo Martinez in the search box to learn more about \"Chronic Obstructive Pulmonary Disease (COPD): Care Instructions. \"  Current as of: May 23, 2016  Content Version: 11.1  © 3172-2427 [employer]. Care instructions adapted under license by [employer] (which disclaims liability or warranty for this information). If you have questions about a medical condition or this instruction, always ask your healthcare professional. [address] any warranty or liability for your use of this information.

## 2017-01-25 NOTE — PROGRESS NOTES
HISTORY OF PRESENT ILLNESS  Ethan Cline is a 80 y.o. female. HPI  COPD  Patient complains of dyspnea, cough, wheezing and fatigue. Symptoms began 2 weeks ago. Deneis productive cough or fever. Patient uses 1 pillows at night. Patient can walk 30 feet before resting. Patient using Advair BID and DuoNeb before bed. Daughter reports increased fatigue, confusion and falls with patient. SpO2 79% on initial evaluation but no acute distress noted. Review of Systems   Unable to perform ROS: Dementia       Physical Exam   Constitutional: She is oriented to person, place, and time. She appears well-developed and well-nourished. Neck: Normal range of motion. Neck supple. No JVD present. Carotid bruit is not present. No thyromegaly present. Cardiovascular: Normal rate, regular rhythm and intact distal pulses. Exam reveals no gallop and no friction rub. Murmur heard. Pulmonary/Chest: Effort normal. No respiratory distress. She has wheezes (expiratory throughout). Musculoskeletal: She exhibits no edema. Lymphadenopathy:     She has no cervical adenopathy. Neurological: She is alert and oriented to person, place, and time. Psychiatric: She has a normal mood and affect. Her behavior is normal.   Nursing note and vitals reviewed. XR Results (most recent):    Results from Appointment encounter on 01/25/17   XR CHEST PA LAT   Narrative EXAM:  XR CHEST PA LAT. INDICATION: Hypoxia and bronchitis. COMPARISON: 1/11/2017. FINDINGS:   PA and lateral radiographs of the chest were obtained. Lungs: The lungs are clear of mass, nodule, airspace disease or edema. Pleura: There is no pleural effusion or pneumothorax. Mediastinum: The cardiac and mediastinal contours and pulmonary vascularity are  normal.  The aorta is atherosclerotic and ectatic but unchanged. Bones and soft tissues: There is kyphosis and degenerative change of the spine.          Impression IMPRESSION: No airspace disease or other acute abnormality and no change. ASSESSMENT and PLAN  Yvette Sanchez was seen today for lethargy and fall. Diagnoses and all orders for this visit:    COPD with exacerbation ([address]  Symptoms improved with nebulizer with SpO2 increased to 95%. Will begin steroid taper and cover with Zithromax. Resume nebulizer treatment Q6H PRN. Go to ER for new or worsening symptoms  -     albuterol-ipratropium (DUO-NEB) 2.5 mg-0.5 mg/3 ml nebu; 3 mL by Nebulization route now for 1 dose. -     XR CHEST PA LAT; Future  -     predniSONE (DELTASONE) 20 mg tablet; Take 3 tablets daily x 3 days, then 2 tablets daily x 3 days, the 1 tablet daily x 3 days then 1/2 talbet daily x 4 days  -     albuterol-ipratropium (DUO-NEB) 2.5 mg-0.5 mg/3 ml nebu; 3 mL by Nebulization route every four (4) hours as needed. -     azithromycin (ZITHROMAX) 250 mg tablet; Take 2 tablets today, then take 1 tablet daily      I have discussed the diagnosis with the patient and the intended plan as seen in the above orders. The patient has received an after-visit summary along with patient information handout. I have discussed medication side effects and warnings with the patient as well. Follow-up Disposition:  Return in about 1 week (around 2/1/2017) for COPD.

## 2017-01-25 NOTE — MR AVS SNAPSHOT
Visit Information Date & Time Provider Department Dept. Phone Encounter #  
 1/25/2017  4:00 PM Aquilino Hidalgo  [facility] [address] [phone] [id number] Follow-up Instructions Return in about 1 week (around 2/1/2017) for COPD. Your Appointments 3/15/2017 10:15 AM  
ROUTINE CARE with Humza Smith MD  
[facility] Appt Note: 6 month follow up,  HTN/Cholsterol [address] [phone]  
[phone]  
  
   
 [address] [address] Upcoming Health Maintenance Date Due  
 GLAUCOMA SCREENING Q2Y 8/9/1994 MEDICARE YEARLY EXAM 9/8/2017 DTaP/Tdap/Td series (2 - Td) 9/7/2026 Allergies as of 1/25/2017  Review Complete On: 1/25/2017 By: Aquilino Hidalgo NP Severity Noted Reaction Type Reactions Keflex [Cephalexin]  04/23/2010    Rash Pcn [Penicillins]  04/23/2010    Rash  
 Sulfa (Sulfonamide Antibiotics)  05/31/2011    Rash Current Immunizations  Reviewed on 9/7/2016 Name Date Influenza High Dose Vaccine PF 9/30/2015 Influenza Vaccine 10/16/2013 Pneumococcal Conjugate (PCV-13) 3/2/2016 Pneumococcal Polysaccharide (PPSV-23) 1/29/2014 Tdap 9/7/2016 Zoster Vaccine, Live 10/15/2014 Not reviewed this visit You Were Diagnosed With   
  
 Codes Comments [facility] with exacerbation ([facility]    -  Primary ICD-10-CM: J44.1 ICD-9-CM: 491.21 Vitals BP Pulse Temp Resp Height(growth percentile) Weight(growth percentile) 130/70 (BP 1 Location: Left arm, BP Patient Position: Sitting) 78 98.2 °F (36.8 °C) (Oral) 14 5' 2\" (1.575 m) 138 lb 6.4 oz (62.8 kg) SpO2 BMI OB Status Smoking Status 99% 25.31 kg/m2 Postmenopausal Former Smoker Vitals History BMI and BSA Data Body Mass Index Body Surface Area  
 25.31 kg/m 2 1.66 m 2 Preferred Pharmacy Pharmacy Name Phone [facility] [address] [facility], [address] [facility] [phone] Your Updated Medication List  
  
   
This list is accurate as of: 1/25/17  5:01 PM.  Always use your most recent med list.  
  
  
  
  
 albuterol 90 mcg/actuation inhaler Commonly known as:  PROVENTIL HFA, VENTOLIN HFA, PROAIR HFA Take 2 Puffs by inhalation every six (6) hours as needed for Wheezing. albuterol-ipratropium 2.5 mg-0.5 mg/3 ml Nebu Commonly known as:  DUO-NEB  
3 mL by Nebulization route now for 1 dose. amLODIPine 5 mg tablet Commonly known as:  Sumeet Mend TAKE 1 TABLET DAILY FOR BLOOD PRESSURE  
  
 atorvastatin 20 mg tablet Commonly known as:  LIPITOR  
TAKE 1 TABLET DAILY FOR CHOLESTEROL  
  
 azithromycin 250 mg tablet Commonly known as:  Juwan Pine Take 2 tablets today, then take 1 tablet daily CENTRUM SILVER PO Take  by mouth. clindamycin 300 mg capsule Commonly known as:  CLEOCIN Take 2 caps PO for 1 dose 1 hour before dental procedure. donepezil 10 mg tablet Commonly known as:  ARICEPT  
TAKE 1 TABLET NIGHTLY  
  
 fluticasone-salmeterol 115-21 mcg/actuation inhaler Commonly known as:  ADVAIR HFA Take 2 Puffs by inhalation two (2) times a day. imipramine 25 mg tablet Commonly known as:  TOFRANIL  
TAKE 2 TABLETS NIGHTLY  
  
 ipratropium 0.03 % nasal spray Commonly known as:  ATROVENT  
2 Sprays by Both Nostrils route every twelve (12) hours. lisinopril 10 mg tablet Commonly known as:  PRINIVIL, ZESTRIL  
TAKE 1 TABLET DAILY FOR BLOOD PRESSURE  
  
 memantine 5 mg tablet Commonly known as:  Vik Crawford TAKE 1 TABLET TWICE A DAY  
  
 predniSONE 20 mg tablet Commonly known as:  Damon Stair Take 3 tablets daily x 3 days, then 2 tablets daily x 3 days, the 1 tablet daily x 3 days then 1/2 talbet daily x 4 days  
  
 senna-docusate 8.6-50 mg per tablet Commonly known as:  Hima Machado  
 Take 1 Tab by mouth two (2) times daily as needed for Constipation. SYNTHROID 75 mcg tablet Generic drug:  levothyroxine Take 1 Tab by mouth Daily (before breakfast). VITAMIN D2 50,000 unit capsule Generic drug:  ergocalciferol Take 50,000 Units by mouth every seven (7) days. Prescriptions Sent to Pharmacy Refills  
 predniSONE (DELTASONE) 20 mg tablet 0 Sig: Take 3 tablets daily x 3 days, then 2 tablets daily x 3 days, the 1 tablet daily x 3 days then 1/2 talbet daily x 4 days Class: Normal  
 Pharmacy: [facility] [place] Ph #: [phone]  
 [facility]) 250 mg tablet 0 Sig: Take 2 tablets today, then take 1 tablet daily Class: Normal  
 Pharmacy: [facility] [place], [address] [facility] [address]. [address] [facility] Ph #: [phone] We Performed the Following ALBUTEROL IPRATROP NON-COMP [id number] \[facility]\""] [place] INHAL RX, AIRWAY OBST/DX SPUTUM INDUCT [id number] CPT(R)] Follow-up Instructions Return in about 1 week (around 2/1/2017) for COPD. To-Do List   
 01/25/2017 Imaging:  XR CHEST PA LAT Patient Instructions Chronic Obstructive Pulmonary Disease (COPD): Care Instructions Your Care Instructions Chronic obstructive pulmonary disease (COPD) is a general term for a group of lung diseases, including emphysema and chronic bronchitis. People with COPD have decreased airflow in and out of the lungs, which makes it hard to breathe. The airways also can get clogged with thick mucus. Cigarette smoking is a major cause of COPD. Although there is no cure for COPD, you can slow its progress. Following your treatment plan and taking care of yourself can help you feel better and live longer. Follow-up care is a key part of your treatment and safety.  Be sure to make and go to all appointments, and call your doctor if you are having problems. It's also a good idea to know your test results and keep a list of the medicines you take. How can you care for yourself at home? Staying healthy · Do not smoke. This is the most important step you can take to prevent more damage to your lungs. If you need help quitting, talk to your doctor about stop-smoking programs and medicines. These can increase your chances of quitting for good. · Avoid colds and flu. Get a pneumococcal vaccine shot. If you have had one before, ask your doctor whether you need a second dose. Get the flu vaccine every fall. If you must be around people with colds or the flu, wash your hands often. · Avoid secondhand smoke, air pollution, and high altitudes. Also avoid cold, dry air and hot, humid air. Stay at home with your windows closed when air pollution is bad. Medicines and oxygen therapy · Take your medicines exactly as prescribed. Call your doctor if you think you are having a problem with your medicine. · You may be taking medicines such as: ¨ Bronchodilators. These help open your airways and make breathing easier. Bronchodilators are either short-acting (work for 6 to 9 hours) or long-acting (work for 24 hours). You inhale most bronchodilators, so they start to act quickly. Always carry your quick-relief inhaler with you in case you need it while you are away from home. ¨ Corticosteroids (prednisone, budesonide). These reduce airway inflammation. They come in pill or inhaled form. You must take these medicines every day for them to work well. · A spacer may help you get more inhaled medicine to your lungs. Ask your doctor or pharmacist if a spacer is right for you. If it is, ask how to use it properly. · Do not take any vitamins, over-the-counter medicine, or herbal products without talking to your doctor first. 
· If your doctor prescribed antibiotics, take them as directed. Do not stop taking them just because you feel better.  You need to take the full course of antibiotics. · Oxygen therapy boosts the amount of oxygen in your blood and helps you breathe easier. Use the flow rate your doctor has recommended, and do not change it without talking to your doctor first. 
Activity · Get regular exercise. Walking is an easy way to get exercise. Start out slowly, and walk a little more each day. · Pay attention to your breathing. You are exercising too hard if you cannot talk while you are exercising. · Take short rest breaks when doing household chores and other activities. · Learn breathing methodssuch as breathing through pursed lipsto help you become less short of breath. · If your doctor has not set you up with a pulmonary rehabilitation program, talk to him or her about whether rehab is right for you. Rehab includes exercise programs, education about your disease and how to manage it, help with diet and other changes, and emotional support. Diet · Eat regular, healthy meals. Use bronchodilators about 1 hour before you eat to make it easier to eat. Eat several small meals instead of three large ones. Drink beverages at the end of the meal. Avoid foods that are hard to chew. · Eat foods that contain protein so that you do not lose muscle mass. Mental health · Talk to your family, friends, or a therapist about your feelings. It is normal to feel frightened, angry, hopeless, helpless, and even guilty. Talking openly about bad feelings can help you cope. If these feelings last, talk to your doctor. When should you call for help? Call 911 anytime you think you may need emergency care. For example, call if: 
· You have severe trouble breathing. Call your doctor now or seek immediate medical care if: 
· You have new or worse trouble breathing. · You cough up blood. · You have a fever.  
Watch closely for changes in your health, and be sure to contact your doctor if: 
· You cough more deeply or more often, especially if you notice more mucus or a change in the color of your mucus. · You have new or worse swelling in your legs or belly. · You are not getting better as expected. Where can you learn more? Go to http://asya-chad.info/. Adria Wasserman in the search box to learn more about \"Chronic Obstructive Pulmonary Disease (COPD): Care Instructions. \" Current as of: May 23, 2016 Content Version: 11.1 © 6215-4243 [employer]. Care instructions adapted under license by [employer] (which disclaims liability or warranty for this information). If you have questions about a medical condition or this instruction, always ask your healthcare professional. [address] any warranty or liability for your use of this information. Introducing [facility] & HEALTH SERVICES! Dear Elmira Habermann: Thank you for requesting a [employer] account. Our records indicate that you already have an active Pick[employer] account. You can access your account anytime at [email or web address] [email or web address] Network Did you know that you can access your hospital and ER discharge instructions at any time in Picklive? You can also review all of your test results from your hospital stay or ER visit. Additional Information If you have questions, please visit the Frequently Asked Questions section of the Pick[employer] website at [email or web address] [email or web address]/. Remember, Picklive is NOT to be used for urgent needs. For medical emergencies, dial 911. Now available from your iPhone and Android! Please provide this summary of care documentation to your next provider. Your primary care clinician is listed as Nicholas Rivera. If you have any questions after today's visit, please call [phone].

## 2017-03-12 DIAGNOSIS — E55.9 VITAMIN D DEFICIENCY: ICD-10-CM

## 2017-03-12 RX ORDER — ERGOCALCIFEROL 1.25 MG/1
CAPSULE ORAL
Qty: 12 CAP | Refills: 2 | Status: SHIPPED | OUTPATIENT
Start: 2017-03-12 | End: 2017-11-19 | Stop reason: SDUPTHER

## 2017-03-15 ENCOUNTER — HOSPITAL ENCOUNTER (OUTPATIENT)
Dept: LAB | Age: 82
Discharge: HOME | End: 2017-03-15
Payer: MEDICARE

## 2017-03-15 ENCOUNTER — OFFICE VISIT (OUTPATIENT)
Dept: FAMILY MEDICINE CLINIC | Age: 82
End: 2017-03-15

## 2017-03-15 VITALS
SYSTOLIC BLOOD PRESSURE: 130 MMHG | WEIGHT: 135 LBS | HEART RATE: 50 BPM | DIASTOLIC BLOOD PRESSURE: 80 MMHG | OXYGEN SATURATION: 88 % | TEMPERATURE: 98 F | HEIGHT: 62 IN | BODY MASS INDEX: 24.84 KG/M2 | RESPIRATION RATE: 17 BRPM

## 2017-03-15 DIAGNOSIS — E78.2 MIXED HYPERLIPIDEMIA: Primary | ICD-10-CM

## 2017-03-15 DIAGNOSIS — I10 ESSENTIAL HYPERTENSION: ICD-10-CM

## 2017-03-15 DIAGNOSIS — E03.9 ACQUIRED HYPOTHYROIDISM: ICD-10-CM

## 2017-03-15 PROCEDURE — 84443 ASSAY THYROID STIM HORMONE: CPT

## 2017-03-15 PROCEDURE — 80061 LIPID PANEL: CPT

## 2017-03-15 PROCEDURE — 80053 COMPREHEN METABOLIC PANEL: CPT

## 2017-03-15 PROCEDURE — 85025 COMPLETE CBC W/AUTO DIFF WBC: CPT

## 2017-03-15 PROCEDURE — 84439 ASSAY OF FREE THYROXINE: CPT

## 2017-03-15 PROCEDURE — 36415 COLL VENOUS BLD VENIPUNCTURE: CPT

## 2017-03-15 NOTE — PROGRESS NOTES
Chief Complaint   Patient presents with    Hypertension    Cholesterol Problem       1. Have you been to the ER, urgent care clinic since your last visit? Hospitalized since your last visit? No    2. Have you seen or consulted any other health care providers outside of the [address] [place] [address] since your last visit? Include any pap smears or colon screening. No    Body mass index is 25.31 kg/(m^2).

## 2017-03-15 NOTE — MR AVS SNAPSHOT
Visit Information Date & Time Provider Department Dept. Phone Encounter #  
 3/15/2017 10:15 AM Sam Fritz  [facility] [address] [phone] [id number] Follow-up Instructions Return in about 6 months (around 9/15/2017) for hypertension follow up, hyperlipidemia follow up. Upcoming Health Maintenance Date Due  
 GLAUCOMA SCREENING Q2Y 8/9/1994 MEDICARE YEARLY EXAM 9/8/2017 DTaP/Tdap/Td series (2 - Td) 9/7/2026 Allergies as of 3/15/2017  Review Complete On: 3/15/2017 By: Khadijah Agarwal LPN Severity Noted Reaction Type Reactions Keflex [Cephalexin]  04/23/2010    Rash Pcn [Penicillins]  04/23/2010    Rash  
 Sulfa (Sulfonamide Antibiotics)  05/31/2011    Rash Current Immunizations  Reviewed on 9/7/2016 Name Date Influenza High Dose Vaccine PF 9/30/2015 Influenza Vaccine 10/16/2013 Pneumococcal Conjugate (PCV-13) 3/2/2016 Pneumococcal Polysaccharide (PPSV-23) 1/29/2014 Tdap 9/7/2016 Zoster Vaccine, Live 10/15/2014 Not reviewed this visit You Were Diagnosed With   
  
 Codes Comments Mixed hyperlipidemia    -  Primary ICD-10-CM: G04.6 ICD-9-CM: 272.2 Essential hypertension     ICD-10-CM: I10 
ICD-9-CM: 401.9 Acquired hypothyroidism     ICD-10-CM: E03.9 ICD-9-CM: 191. 9 Vitals BP Pulse Temp Resp Height(growth percentile) Weight(growth percentile) 130/80 (!) 50 98 °F (36.7 °C) (Oral) 17 5' 2\" (1.575 m) 135 lb (61.2 kg) SpO2 BMI OB Status Smoking Status (!) 88% 24.69 kg/m2 Postmenopausal Former Smoker Vitals History BMI and BSA Data Body Mass Index Body Surface Area  
 24.69 kg/m 2 1.64 m 2 Preferred Pharmacy Pharmacy Name Phone [address] [facility] [phone] Your Updated Medication List  
  
   
This list is accurate as of: 3/15/17 11:04 AM.  Always use your most recent med list.  
  
 albuterol 90 mcg/actuation inhaler Commonly known as:  PROVENTIL HFA, VENTOLIN HFA, PROAIR HFA Take 2 Puffs by inhalation every six (6) hours as needed for Wheezing. albuterol-ipratropium 2.5 mg-0.5 mg/3 ml Nebu Commonly known as:  DUO-NEB  
3 mL by Nebulization route every four (4) hours as needed. amLODIPine 5 mg tablet Commonly known as:  Lennis Eagles TAKE 1 TABLET DAILY FOR BLOOD PRESSURE  
  
 atorvastatin 20 mg tablet Commonly known as:  LIPITOR  
TAKE 1 TABLET DAILY FOR CHOLESTEROL CENTRUM SILVER PO Take  by mouth. clindamycin 300 mg capsule Commonly known as:  CLEOCIN Take 2 caps PO for 1 dose 1 hour before dental procedure. donepezil 10 mg tablet Commonly known as:  ARICEPT  
TAKE 1 TABLET NIGHTLY  
  
 fluticasone-salmeterol 115-21 mcg/actuation inhaler Commonly known as:  ADVAIR HFA Take 2 Puffs by inhalation two (2) times a day. imipramine 25 mg tablet Commonly known as:  TOFRANIL  
TAKE 2 TABLETS NIGHTLY  
  
 ipratropium 0.03 % nasal spray Commonly known as:  ATROVENT  
2 Sprays by Both Nostrils route every twelve (12) hours. lisinopril 10 mg tablet Commonly known as:  PRINIVIL, ZESTRIL  
TAKE 1 TABLET DAILY FOR BLOOD PRESSURE  
  
 memantine 5 mg tablet Commonly known as:  Baylee Jorge Alberto TAKE 1 TABLET TWICE A DAY  
  
 predniSONE 20 mg tablet Commonly known as:  Reino Nascimento Take 3 tablets daily x 3 days, then 2 tablets daily x 3 days, the 1 tablet daily x 3 days then 1/2 talbet daily x 4 days  
  
 senna-docusate 8.6-50 mg per tablet Commonly known as:  Leellen Cancel Take 1 Tab by mouth two (2) times daily as needed for Constipation. SYNTHROID 75 mcg tablet Generic drug:  levothyroxine Take 1 Tab by mouth Daily (before breakfast). VITAMIN D2 50,000 unit capsule Generic drug:  ergocalciferol TAKE 1 CAPSULE EVERY 7 DAYS We Performed the Following CBC WITH AUTOMATED DIFF [31299 CPT(R)] LIPID PANEL [50661 CPT(R)] METABOLIC PANEL, COMPREHENSIVE [11306 CPT(R)] T4, FREE [id number] CPT(R)] TSH 3RD GENERATION [45505 CPT(R)] Follow-up Instructions Return in about 6 months (around 9/15/2017) for hypertension follow up, hyperlipidemia follow up. Introducing hospitals & HEALTH SERVICES! Dear Robert Rowe: Thank you for requesting a [employer] account. Our records indicate that you already have an active [employer] account. You can access your account anytime at [email or web address] Did you know that you can access your hospital and ER discharge instructions at any time in [employer]? You can also review all of your test results from your hospital stay or ER visit. Additional Information If you have questions, please visit the Frequently Asked Questions section of the [employer] website at [email or web address] Remember, [employer] is NOT to be used for urgent needs. For medical emergencies, dial 911. Now available from your iPhone and Android! Please provide this summary of care documentation to your next provider. Your primary care clinician is listed as Jadon Mittal. If you have any questions after today's visit, please call [phone].

## 2017-03-15 NOTE — PROGRESS NOTES
HISTORY OF PRESENT ILLNESS  Nader Short is a 80 y.o. female. Pulse (!) 50, temperature 98 °F (36.7 °C), temperature source Oral, resp. rate 17, height 5' 2\" (1.575 m), weight 135 lb (61.2 kg), SpO2 (!) 88 %. Body mass index is 24.69 kg/(m^2). Chief Complaint   Patient presents with    Hypertension    Cholesterol Problem      HPI   Nader Short 80 y.o. female  presents to the office today for a follow up on chronic conditions. Pt presents with daughter at bedside to assist in presenting history. Hypertension: Bp at office today 130/80. Pt continues with Norvasc 5 mg daily and Lisinopril 10 mg daily. Bp control stable, continue current regimen. Hyperlipidemia: Lipid panel on 09/07/16 notable for total cholesterol 221, , HDL 63, and triglycerides 202. Pt continues with Lipitor 20 mg daily. Cholesterol is presumed stable, will assess levels today. Hypothyroidism: Pt continues with Synthroid 75 mcg daily. Last TSH 1.880 and free T4 1.50 per labs on 09/07/16. Thyroid is presumed stable, will assess levels today. Current Outpatient Prescriptions   Medication Sig Dispense Refill    VITAMIN D2 50,000 unit capsule TAKE 1 CAPSULE EVERY 7 DAYS 12 Cap 2    albuterol-ipratropium (DUO-NEB) 2.5 mg-0.5 mg/3 ml nebu 3 mL by Nebulization route every four (4) hours as needed. 90 Nebule 3    clindamycin (CLEOCIN) 300 mg capsule Take 2 caps PO for 1 dose 1 hour before dental procedure. 30 Cap 0    ipratropium (ATROVENT) 0.03 % nasal spray 2 Sprays by Both Nostrils route every twelve (12) hours. 30 mL 5    SYNTHROID 75 mcg tablet Take 1 Tab by mouth Daily (before breakfast).  90 Tab 1    atorvastatin (LIPITOR) 20 mg tablet TAKE 1 TABLET DAILY FOR CHOLESTEROL 90 Tab 1    amLODIPine (NORVASC) 5 mg tablet TAKE 1 TABLET DAILY FOR BLOOD PRESSURE 90 Tab 1    donepezil (ARICEPT) 10 mg tablet TAKE 1 TABLET NIGHTLY 90 Tab 1    memantine (NAMENDA) 5 mg tablet TAKE 1 TABLET TWICE A  Tab 3    fluticasone-salmeterol (ADVAIR HFA) 115-21 mcg/actuation inhaler Take 2 Puffs by inhalation two (2) times a day. 3 Inhaler 4    FOLIC ACID/MULTIVIT-MIN/LUTEIN (CENTRUM SILVER PO) Take  by mouth.  albuterol (PROVENTIL HFA, VENTOLIN HFA, PROAIR HFA) 90 mcg/actuation inhaler Take 2 Puffs by inhalation every six (6) hours as needed for Wheezing. 1 Inhaler 0    imipramine (TOFRANIL) 25 mg tablet TAKE 2 TABLETS NIGHTLY 180 Tab 1    lisinopril (PRINIVIL, ZESTRIL) 10 mg tablet TAKE 1 TABLET DAILY FOR BLOOD PRESSURE 90 Tab 1    predniSONE (DELTASONE) 20 mg tablet Take 3 tablets daily x 3 days, then 2 tablets daily x 3 days, the 1 tablet daily x 3 days then 1/2 talbet daily x 4 days 20 Tab 0    senna-docusate (PERICOLACE) 8.6-50 mg per tablet Take 1 Tab by mouth two (2) times daily as needed for Constipation.  30 Tab 0     Allergies   Allergen Reactions    Keflex [Cephalexin] Rash    Pcn [Penicillins] Rash    Sulfa (Sulfonamide Antibiotics) Rash     Past Medical History:   Diagnosis Date    Anxiety state, unspecified     CKD (chronic kidney disease) stage 3, GFR 30-59 ml/min 4/6/2016    Dementia 6/12    Landa/npysch    Generalized osteoarthrosis, unspecified site     Lumbar compression fracture (HCC) 4/08    epidurals x 2  Umang/os    Osteopenia     Other and unspecified hyperlipidemia     Psoriasis     Unspecified essential hypertension     Unspecified hypothyroidism     thyroid irradiated i131     Past Surgical History:   Procedure Laterality Date    COLONOSCOPY  10/10    [place]/gi    TOTAL KNEE ARTHROPLASTY  1992    left    TOTAL KNEE ARTHROPLASTY  1992    right     Family History   Problem Relation Age of Onset    Cancer Mother      stomach tumors    Cancer Brother      prostate     Social History   Substance Use Topics    Smoking status: Former Smoker     Packs/day: 0.50     Years: 60.00     Types: Cigarettes     Quit date: 2014    Smokeless tobacco: Never Used    Alcohol use No Review of Systems   Constitutional: Negative. Negative for malaise/fatigue. Eyes: Negative for blurred vision. Respiratory: Negative for shortness of breath. Cardiovascular: Negative for chest pain and leg swelling. Musculoskeletal: Negative. Neurological: Negative. Negative for dizziness and headaches. All other systems reviewed and are negative. Physical Exam   Constitutional: She is oriented to person, place, and time. She appears well-developed and well-nourished. No distress. HENT:   Head: Normocephalic and atraumatic. Neck: Carotid bruit is not present. Cardiovascular: Normal rate, regular rhythm, normal heart sounds and intact distal pulses. Exam reveals no gallop and no friction rub. No murmur heard. Pulmonary/Chest: Effort normal and breath sounds normal. No respiratory distress. She has no wheezes. She has no rales. Musculoskeletal: She exhibits no edema. Neurological: She is alert and oriented to person, place, and time. Skin: She is not diaphoretic. Psychiatric: She has a normal mood and affect. Her behavior is normal. Judgment and thought content normal.   Nursing note and vitals reviewed. ASSESSMENT and Bereketjanet Vivas was seen today for hypertension and cholesterol problem. Diagnoses and all orders for this visit:    Mixed hyperlipidemia  -     METABOLIC PANEL, COMPREHENSIVE  -     LIPID PANEL  - Presumed stable, will assess levels today    Essential hypertension  -     CBC WITH AUTOMATED DIFF  -     METABOLIC PANEL, COMPREHENSIVE  - Bp control stable, continue current regimen. Acquired hypothyroidism  -     TSH 3RD GENERATION  -     T4, FREE  - Presumed stable, will assess levels today      Follow-up Disposition:  Return in about 6 months (around 9/15/2017) for hypertension follow up, hyperlipidemia follow up. Medication risks/benefits/costs/interactions/alternatives discussed with patient.   Advised patient to call back or return to office if symptoms worsen/change/persist.  If patient cannot reach us or should anything more severe/urgent arise he/she should proceed directly to the nearest emergency department. Discussed expected course/resolution/complications of diagnosis in detail with patient. Patient given a written after visit summary which includes her diagnoses, current medications and vitals. Patient expressed understanding with the diagnosis and plan. Written by prieto Albarran, as dictated by Suzanne Loo M.D.    I have reviewed and agree with the above note and have made corrections where appropriate, Dr. Corinne Buttery, MD

## 2017-03-16 LAB
ALBUMIN SERPL-MCNC: 4.5 G/DL (ref 3.5–4.7)
ALBUMIN/GLOB SERPL: 1.8 {RATIO} (ref 1.2–2.2)
ALP SERPL-CCNC: 122 IU/L (ref 39–117)
ALT SERPL-CCNC: 18 IU/L (ref 0–32)
AST SERPL-CCNC: 22 IU/L (ref 0–40)
BASOPHILS # BLD AUTO: 0 X10E3/UL (ref 0–0.2)
BASOPHILS NFR BLD AUTO: 0 %
BILIRUB SERPL-MCNC: 0.4 MG/DL (ref 0–1.2)
BUN SERPL-MCNC: 23 MG/DL (ref 8–27)
BUN/CREAT SERPL: 20 (ref 11–26)
CALCIUM SERPL-MCNC: 9.9 MG/DL (ref 8.7–10.3)
CHLORIDE SERPL-SCNC: 102 MMOL/L (ref 96–106)
CHOLEST SERPL-MCNC: 205 MG/DL (ref 100–199)
CO2 SERPL-SCNC: 24 MMOL/L (ref 18–29)
CREAT SERPL-MCNC: 1.16 MG/DL (ref 0.57–1)
EOSINOPHIL # BLD AUTO: 0.1 X10E3/UL (ref 0–0.4)
EOSINOPHIL NFR BLD AUTO: 1 %
ERYTHROCYTE [DISTWIDTH] IN BLOOD BY AUTOMATED COUNT: 15.4 % (ref 12.3–15.4)
GLOBULIN SER CALC-MCNC: 2.5 G/DL (ref 1.5–4.5)
GLUCOSE SERPL-MCNC: 106 MG/DL (ref 65–99)
HCT VFR BLD AUTO: 38.3 % (ref 34–46.6)
HDLC SERPL-MCNC: 61 MG/DL
HGB BLD-MCNC: 12.9 G/DL (ref 11.1–15.9)
IMM GRANULOCYTES # BLD: 0 X10E3/UL (ref 0–0.1)
IMM GRANULOCYTES NFR BLD: 0 %
INTERPRETATION, 910389: NORMAL
INTERPRETATION: NORMAL
LDLC SERPL CALC-MCNC: 114 MG/DL (ref 0–99)
LYMPHOCYTES # BLD AUTO: 2.9 X10E3/UL (ref 0.7–3.1)
LYMPHOCYTES NFR BLD AUTO: 34 %
MCH RBC QN AUTO: 29.2 PG (ref 26.6–33)
MCHC RBC AUTO-ENTMCNC: 33.7 G/DL (ref 31.5–35.7)
MCV RBC AUTO: 87 FL (ref 79–97)
MONOCYTES # BLD AUTO: 0.7 X10E3/UL (ref 0.1–0.9)
MONOCYTES NFR BLD AUTO: 9 %
NEUTROPHILS # BLD AUTO: 4.8 X10E3/UL (ref 1.4–7)
NEUTROPHILS NFR BLD AUTO: 56 %
PDF IMAGE, 910387: NORMAL
PLATELET # BLD AUTO: 233 X10E3/UL (ref 150–379)
POTASSIUM SERPL-SCNC: 4 MMOL/L (ref 3.5–5.2)
PROT SERPL-MCNC: 7 G/DL (ref 6–8.5)
RBC # BLD AUTO: 4.42 X10E6/UL (ref 3.77–5.28)
SODIUM SERPL-SCNC: 144 MMOL/L (ref 134–144)
T4 FREE SERPL-MCNC: 1.55 NG/DL (ref 0.82–1.77)
TRIGL SERPL-MCNC: 149 MG/DL (ref 0–149)
TSH SERPL DL<=0.005 MIU/L-ACNC: 1.19 UIU/ML (ref 0.45–4.5)
VLDLC SERPL CALC-MCNC: 30 MG/DL (ref 5–40)
WBC # BLD AUTO: 8.6 X10E3/UL (ref 3.4–10.8)

## 2017-03-21 ENCOUNTER — PATIENT MESSAGE (OUTPATIENT)
Dept: FAMILY MEDICINE CLINIC | Age: 82
End: 2017-03-21

## 2017-03-21 RX ORDER — DONEPEZIL HYDROCHLORIDE 10 MG/1
TABLET, FILM COATED ORAL
Qty: 90 TAB | Refills: 0 | Status: SHIPPED | OUTPATIENT
Start: 2017-03-21 | End: 2017-06-19 | Stop reason: SDUPTHER

## 2017-03-23 NOTE — TELEPHONE ENCOUNTER
From: Ramon Valencia  To: Humza Smith MD  Sent: 3/21/2017 4:00 PM EDT  Subject: Non-Urgent Medical Question    HI DR. 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 THAT I HAVE MOM IN DAY CARE THREE DAYS A WEEK AND THAT I NEED TO BE HOME WITH HER IN THE EVENINGS. I WORK FULL TIME AND TAKE CARE OF MY MOTHER FULL TIME . MOTHER HAS DEMENTIA AND I LIVE WITH MOM .  [address] YOU SO VERY MUCH FOR YOUR HELP ALWAYS      Mary Kate Mi

## 2017-03-29 NOTE — PROGRESS NOTES
Inform pt to go to my chart to see results and recommendations    Labs look good  Keep up the good work  See you in 6 months

## 2017-03-30 ENCOUNTER — TELEPHONE (OUTPATIENT)
Dept: FAMILY MEDICINE CLINIC | Age: 82
End: 2017-03-30

## 2017-03-30 DIAGNOSIS — I10 ESSENTIAL HYPERTENSION WITH GOAL BLOOD PRESSURE LESS THAN 140/90: ICD-10-CM

## 2017-03-30 DIAGNOSIS — E78.5 HYPERLIPIDEMIA, UNSPECIFIED HYPERLIPIDEMIA TYPE: ICD-10-CM

## 2017-03-30 RX ORDER — ATORVASTATIN CALCIUM 20 MG/1
TABLET, FILM COATED ORAL
Qty: 90 TAB | Refills: 0 | Status: SHIPPED | OUTPATIENT
Start: 2017-03-30 | End: 2017-06-28 | Stop reason: SDUPTHER

## 2017-03-30 RX ORDER — AMLODIPINE BESYLATE 5 MG/1
TABLET ORAL
Qty: 90 TAB | Refills: 0 | Status: SHIPPED | OUTPATIENT
Start: 2017-03-30 | End: 2017-06-28 | Stop reason: SDUPTHER

## 2017-03-30 NOTE — TELEPHONE ENCOUNTER
Maira [facility]     [phone]    -  Need status of letter for her employer-

## 2017-04-08 DIAGNOSIS — E03.9 ACQUIRED HYPOTHYROIDISM: ICD-10-CM

## 2017-04-10 DIAGNOSIS — E03.9 ACQUIRED HYPOTHYROIDISM: ICD-10-CM

## 2017-04-10 NOTE — TELEPHONE ENCOUNTER
From: Eileen Stevenson  To:  Lluvai Michaels MD  Sent: 4/8/2017 8:59 PM EDT  Subject: Medication Renewal Request    Original authorizing provider: MD Eileen Jamil would like a refill of the following medications:  imipramine (TOFRANIL) 25 mg tablet Lluvia Michaels MD]  SYNTHROID 75 mcg tablet Lluvia Michaels MD]    Preferred pharmacy: [facility]:

## 2017-04-11 RX ORDER — LEVOTHYROXINE SODIUM 75 UG/1
TABLET ORAL
Qty: 90 TAB | Refills: 0 | Status: SHIPPED | OUTPATIENT
Start: 2017-04-11 | End: 2017-06-06 | Stop reason: SDUPTHER

## 2017-04-11 RX ORDER — IMIPRAMINE HYDROCHLORIDE 25 MG/1
50 TABLET ORAL
Qty: 180 TAB | Refills: 1 | Status: SHIPPED | OUTPATIENT
Start: 2017-04-11 | End: 2017-09-20 | Stop reason: SDUPTHER

## 2017-04-11 RX ORDER — LEVOTHYROXINE SODIUM 75 UG/1
75 TABLET ORAL
Qty: 90 TAB | Refills: 1 | Status: SHIPPED | OUTPATIENT
Start: 2017-04-11 | End: 2017-08-11 | Stop reason: SDUPTHER

## 2017-04-27 ENCOUNTER — TELEPHONE (OUTPATIENT)
Dept: RHEUMATOLOGY | Age: 82
End: 2017-04-27

## 2017-05-04 ENCOUNTER — TELEPHONE (OUTPATIENT)
Dept: RHEUMATOLOGY | Age: 82
End: 2017-05-04

## 2017-05-04 NOTE — TELEPHONE ENCOUNTER
Prolia letter mailed today with benefit investigation. Also called daughter Evelyn Jefferson (on HIPPA), and left message she can schedule Prolia injection, and Prolia letter will be mailed today.

## 2017-05-04 NOTE — LETTER
5/4/2017 11:06 AM 
 
Ms[address] [phone] Dear Jamin Bhatia Enclosed you will find a copy of your Prolia insurance investigation. Please review this form. To schedule your Prolia injection, and an office visit to see Dr. Tammi Dan at the same time,( due to her last appointment was last June), call our office at [phone]. Please ask the  to have the nurse order the Prolia for your injection. Thank you.  
 
 
 
 
 
Sincerely, 
 
 
Ricky Hernandez MD

## 2017-06-06 ENCOUNTER — OFFICE VISIT (OUTPATIENT)
Dept: RHEUMATOLOGY | Age: 82
End: 2017-06-06

## 2017-06-06 VITALS
TEMPERATURE: 95.5 F | HEART RATE: 83 BPM | WEIGHT: 134 LBS | SYSTOLIC BLOOD PRESSURE: 125 MMHG | RESPIRATION RATE: 18 BRPM | DIASTOLIC BLOOD PRESSURE: 68 MMHG | OXYGEN SATURATION: 98 % | BODY MASS INDEX: 24.66 KG/M2 | HEIGHT: 62 IN

## 2017-06-06 DIAGNOSIS — M81.0 SENILE OSTEOPOROSIS: ICD-10-CM

## 2017-06-06 DIAGNOSIS — M19.041 PRIMARY OSTEOARTHRITIS OF BOTH HANDS: ICD-10-CM

## 2017-06-06 DIAGNOSIS — E55.9 VITAMIN D DEFICIENCY: ICD-10-CM

## 2017-06-06 DIAGNOSIS — S32.000D LUMBAR COMPRESSION FRACTURE, WITH ROUTINE HEALING, SUBSEQUENT ENCOUNTER: ICD-10-CM

## 2017-06-06 DIAGNOSIS — M19.042 PRIMARY OSTEOARTHRITIS OF BOTH HANDS: ICD-10-CM

## 2017-06-06 DIAGNOSIS — M81.0 AGE-RELATED OSTEOPOROSIS WITHOUT CURRENT PATHOLOGICAL FRACTURE: Primary | ICD-10-CM

## 2017-06-06 DIAGNOSIS — N18.30 CKD (CHRONIC KIDNEY DISEASE) STAGE 3, GFR 30-59 ML/MIN (HCC): ICD-10-CM

## 2017-06-06 NOTE — PROGRESS NOTES
Patient received schedule Prolia injection in upper left arm. Patient was accompanied by daughter, Emily VazquezHoda's POA. Patient's tolerated injection. Patient observed for 15 minutes after receiving injection. No adverse reaction noted while patient was being observed. Patient left in the care with daughter, Emily Vazquez. Patient received written and verbal instructions for post injections of Prolia. MD made aware.

## 2017-06-06 NOTE — MR AVS SNAPSHOT
Visit Information Date & Time Provider Department Dept. Phone Encounter #  
 6/6/2017  2:40 PM Barrett Horne MD [facility] [id number] Follow-up Instructions Return in about 6 months (around 12/6/2017). Your Appointments 9/20/2017  8:30 AM  
ROUTINE CARE with Zain Pearson MD  
[facility]) Appt Note: 6 months follow up visit [address] [id number]  
[phone]  
  
   
 [address] [id number] Upcoming Health Maintenance Date Due INFLUENZA AGE 9 TO ADULT 8/1/2017 MEDICARE YEARLY EXAM 9/8/2017 GLAUCOMA SCREENING Q2Y 3/21/2019 DTaP/Tdap/Td series (2 - Td) 9/7/2026 Allergies as of 6/6/2017  Review Complete On: 6/6/2017 By: Kong Sexton RN Severity Noted Reaction Type Reactions Keflex [Cephalexin]  04/23/2010    Rash Pcn [Penicillins]  04/23/2010    Rash  
 Sulfa (Sulfonamide Antibiotics)  05/31/2011    Rash Current Immunizations  Reviewed on 9/7/2016 Name Date Influenza High Dose Vaccine PF 9/30/2015 Influenza Vaccine 10/16/2013 Pneumococcal Conjugate (PCV-13) 3/2/2016 Pneumococcal Polysaccharide (PPSV-23) 1/29/2014 Tdap 9/7/2016 Zoster Vaccine, Live 10/15/2014 Not reviewed this visit You Were Diagnosed With   
  
 Codes Comments Age-related osteoporosis without current pathological fracture    -  Primary ICD-10-CM: M81.0 ICD-9-CM: 733.01 Senile osteoporosis     ICD-10-CM: M81.0 ICD-9-CM: 733.01 Vitals BP Pulse Temp Resp Height(growth percentile) Weight(growth percentile) 125/68 (BP 1 Location: Right arm, BP Patient Position: Sitting) 83 95.5 °F (35.3 °C) 18 5' 2\" (1.575 m) 134 lb (60.8 kg) SpO2 BMI OB Status Smoking Status 98% 24.51 kg/m2 Postmenopausal Former Smoker BMI and BSA Data Body Mass Index Body Surface Area 24.51 kg/m 2 1.63 m 2 Preferred Pharmacy Pharmacy Name Phone [address] [phone] Your Updated Medication List  
  
   
This list is accurate as of: 6/6/17  3:19 PM.  Always use your most recent med list.  
  
  
  
  
 albuterol 90 mcg/actuation inhaler Commonly known as:  PROVENTIL HFA, VENTOLIN HFA, PROAIR HFA Take 2 Puffs by inhalation every six (6) hours as needed for Wheezing. albuterol-ipratropium 2.5 mg-0.5 mg/3 ml Nebu Commonly known as:  DUO-NEB  
3 mL by Nebulization route every four (4) hours as needed. amLODIPine 5 mg tablet Commonly known as:  Analy Million TAKE 1 TABLET DAILY FOR BLOOD PRESSURE  
  
 atorvastatin 20 mg tablet Commonly known as:  LIPITOR  
TAKE 1 TABLET DAILY FOR CHOLESTEROL CENTRUM SILVER PO Take  by mouth. clindamycin 300 mg capsule Commonly known as:  CLEOCIN Take 2 caps PO for 1 dose 1 hour before dental procedure. donepezil 10 mg tablet Commonly known as:  ARICEPT  
TAKE 1 TABLET NIGHTLY  
  
 fluticasone-salmeterol 115-21 mcg/actuation inhaler Commonly known as:  ADVAIR HFA Take 2 Puffs by inhalation two (2) times a day. imipramine 25 mg tablet Commonly known as:  TOFRANIL Take 2 Tabs by mouth nightly. ipratropium 0.03 % nasal spray Commonly known as:  ATROVENT  
2 Sprays by Both Nostrils route every twelve (12) hours. lisinopril 10 mg tablet Commonly known as:  PRINIVIL, ZESTRIL  
TAKE 1 TABLET DAILY FOR BLOOD PRESSURE  
  
 memantine 5 mg tablet Commonly known as:  Hiwot Music TAKE 1 TABLET TWICE A DAY  
  
 * PROLIA 60 mg/mL injection Generic drug:  denosumab 60 mg by SubCUTAneous route every 6 months. * denosumab 60 mg/mL injection Commonly known as:  Eri Balo 1 mL by SubCUTAneous route once for 1 dose. senna-docusate 8.6-50 mg per tablet Commonly known as:  Yaz Bailey  
 Take 1 Tab by mouth two (2) times daily as needed for Constipation. SYNTHROID 75 mcg tablet Generic drug:  levothyroxine Take 1 Tab by mouth Daily (before breakfast). VITAMIN D2 50,000 unit capsule Generic drug:  ergocalciferol TAKE 1 CAPSULE EVERY 7 DAYS * Notice: This list has 2 medication(s) that are the same as other medications prescribed for you. Read the directions carefully, and ask your doctor or other care provider to review them with you. We Performed the Following AR DENOSUMAB INJECTION [ [facility]] AR THER/PROPH/DIAG INJECTION, SUBCUT/IM [id number] CPT(R)] Follow-up Instructions Return in about 6 months (around 12/6/2017). To-Do List   
 06/16/2017 Lab:  RENAL FUNCTION PANEL Introducing [facility]! Dear Mandeep Screws: Thank you for requesting a [employer] account. Our records indicate that you already have an active 9flats account. You can access your account anytime at [email or web address] Did you know that you can access your hospital and ER discharge instructions at any time in 9flats? You can also review all of your test results from your hospital stay or ER visit. Additional Information If you have questions, please visit the Frequently Asked Questions section of the 9flats website at [email or web address] Remember, 9flats is NOT to be used for urgent needs. For medical emergencies, dial 911. Now available from your iPhone and Android! Please provide this summary of care documentation to your next provider. Your primary care clinician is listed as Vidhi Cadena. If you have any questions after today's visit, please call [phone].

## 2017-06-06 NOTE — PROGRESS NOTES
REASON FOR VISIT    This is the a follow up visit for Ms. Cherry Postal for osteoporosis. Osteoporosis Historical Synopsis    Height loss since age 27 (at least two inches): 2 inches  Fracture history includes: yes (multiple compression deformities in upper thoracic spine)  Family history of hip fracture: no  Fall Risk: no    Daily calcium intake is unknown mg  Weekly vitamin D intake is 50,000 IU    Smoking history: none currently, quit 12/2015 smoked 1 pack per day for many years   Alcohol consumption: no  Prednisone history: not longer term (recently was hospitalized for COPD exacerbation and had course of steroids, completed yesterday)    Exercise: yes does walking daily, no weights     Previous work-up for osteoporosis includes the following:  DEXA Scan: 3/02/2016  Vitamin 25OH D level: 30.6  (5/03/2016)  PTH: 49 (5/03/2016)  TSH: 1.190 (3/16/2017)    Therapy History includes:    Current osteoporosis therapy includes: Prolia (6/02/2016)  Prior osteoporosis therapy includes:  alendronate (more than ten years ago)  The following osteoporosis therapy have been ineffective: none  The following osteoporosis therapy were stopped because of side effects:  alendronate (jaw and tooth pain)     Chana Doan      Ms. Agustin Maldonado presents for follow up. She received her Prolia injection today. The patient has not had any interval hospital admissions, infections, or surgeries. REVIEW OF SYSTEMS    A comprehensive review of systems was performed and pertinent results are documented in the HPI, review of systems is otherwise non-contributory. PAST MEDICAL HISTORY    She has a past medical history of Anxiety state, unspecified; CKD (chronic kidney disease) stage 3, GFR 30-59 ml/min (4/6/2016); Dementia (6/12); Generalized osteoarthrosis, unspecified site; Lumbar compression fracture ([address] (4/08); Osteopenia; Other and unspecified hyperlipidemia; Psoriasis;  Unspecified essential hypertension; and Unspecified hypothyroidism. She also has no past medical history of Abuse; Anemia NEC; Arrhythmia; Asthma; Autoimmune disease ([facility] CAD (coronary artery disease); Calculus of kidney; Cancer ([facility] Chronic pain; Congestive heart failure, unspecified; COPD; Depression; Diabetes ([facility] GERD (gastroesophageal reflux disease); Headache; Liver disease; Psychotic disorder; PUD (peptic ulcer disease); Seizures ([facility] Stroke [facility]); Thromboembolus ([facility] or Trauma. FAMILY HISTORY    Her family history includes Cancer in her brother and mother. SOCIAL HISTORY    She reports that she quit smoking about 3 years ago. Her smoking use included Cigarettes. She has a 30.00 pack-year smoking history. She has never used smokeless tobacco. She reports that she does not drink alcohol or use illicit drugs. MEDICATIONS    Current Outpatient Prescriptions   Medication Sig Dispense Refill    denosumab (PROLIA) 60 mg/mL injection 60 mg by SubCUTAneous route every 6 months.  denosumab (PROLIA) 60 mg/mL injection 1 mL by SubCUTAneous route once for 1 dose. 1 mL 0    imipramine (TOFRANIL) 25 mg tablet Take 2 Tabs by mouth nightly. 180 Tab 1    SYNTHROID 75 mcg tablet Take 1 Tab by mouth Daily (before breakfast). 90 Tab 1    atorvastatin (LIPITOR) 20 mg tablet TAKE 1 TABLET DAILY FOR CHOLESTEROL 90 Tab 0    amLODIPine (NORVASC) 5 mg tablet TAKE 1 TABLET DAILY FOR BLOOD PRESSURE 90 Tab 0    donepezil (ARICEPT) 10 mg tablet TAKE 1 TABLET NIGHTLY 90 Tab 0    VITAMIN D2 50,000 unit capsule TAKE 1 CAPSULE EVERY 7 DAYS 12 Cap 2    albuterol-ipratropium (DUO-NEB) 2.5 mg-0.5 mg/3 ml nebu 3 mL by Nebulization route every four (4) hours as needed. 90 Nebule 3    memantine (NAMENDA) 5 mg tablet TAKE 1 TABLET TWICE A  Tab 3    fluticasone-salmeterol (ADVAIR HFA) 115-21 mcg/actuation inhaler Take 2 Puffs by inhalation two (2) times a day. 3 Inhaler 4    FOLIC ACID/MULTIVIT-MIN/LUTEIN (CENTRUM SILVER PO) Take  by mouth.       albuterol (PROVENTIL HFA, VENTOLIN HFA, PROAIR HFA) 90 mcg/actuation inhaler Take 2 Puffs by inhalation every six (6) hours as needed for Wheezing. 1 Inhaler 0    lisinopril (PRINIVIL, ZESTRIL) 10 mg tablet TAKE 1 TABLET DAILY FOR BLOOD PRESSURE 90 Tab 1    senna-docusate (PERICOLACE) 8.6-50 mg per tablet Take 1 Tab by mouth two (2) times daily as needed for Constipation. 30 Tab 0    clindamycin (CLEOCIN) 300 mg capsule Take 2 caps PO for 1 dose 1 hour before dental procedure. 30 Cap 0    ipratropium (ATROVENT) 0.03 % nasal spray 2 Sprays by Both Nostrils route every twelve (12) hours. 30 mL 5       ALLERGIES    Allergies   Allergen Reactions    Keflex [Cephalexin] Rash    Pcn [Penicillins] Rash    Sulfa (Sulfonamide Antibiotics) Rash       PHYSICAL EXAMINATION    Visit Vitals    /68 (BP 1 Location: Right arm, BP Patient Position: Sitting)    Pulse 83    Temp 95.5 °F (35.3 °C)    Resp 18    Ht 5' 2\" (1.575 m)    Wt 134 lb (60.8 kg)    SpO2 98%    BMI 24.51 kg/m2     Body mass index is 24.51 kg/(m^2). General: Patient is alert, oriented x 3, not in acute distress, daughter at bedside    HEENT:   Conjunctiva are not injected and appear moist, oral mucous membranes are moist, there are no ulcers present, there is no alopecia, neck is supple. Salivary glands are normal    Cardiovascular:  Heart is regular rate and rhythm, no murmurs. Chest:  Lungs are clear to auscultation bilaterally. Extremities:  Free of clubbing, cyanosis, edema, extremities well perfused. Neurological exam:  No focal sensory deficits, muscle strength is full in upper and lower extremities     Skin exam:  There are no rashes, no tophi, no psoriasis, no active Raynaud's, no livedo reticularis, no periungual erythema. Musculoskeletal exam:  A comprehensive musculoskeletal exam was performed for all joints of each upper and lower extremity and assessed for swelling, tenderness and range of motion. Chana Penn and Wanda nodes bilaterally in hands     DATA REVIEW    Laboratory    The following laboratory results were reviewed and discussed with the patient:    Office Visit on 03/15/2017   Component Date Value    WBC 03/15/2017 8.6     RBC 03/15/2017 4.42     HGB 03/15/2017 12.9     HCT 03/15/2017 38.3     MCV 03/15/2017 87     MCH 03/15/2017 29.2     MCHC 03/15/2017 33.7     RDW 03/15/2017 15.4     PLATELET 01/53/4087 425     NEUTROPHILS 03/15/2017 56     Lymphocytes 03/15/2017 34     MONOCYTES 03/15/2017 9     EOSINOPHILS 03/15/2017 1     BASOPHILS 03/15/2017 0     ABS. NEUTROPHILS 03/15/2017 4.8     Abs Lymphocytes 03/15/2017 2.9     ABS. MONOCYTES 03/15/2017 0.7     ABS. EOSINOPHILS 03/15/2017 0.1     ABS. BASOPHILS 03/15/2017 0.0     IMMATURE GRANULOCYTES 03/15/2017 0     ABS. IMM. GRANS. 03/15/2017 0.0     Glucose 03/15/2017 106*    BUN 03/15/2017 23     Creatinine 03/15/2017 1.16*    GFR est non-AA 03/15/2017 42*    GFR est AA 03/15/2017 49*    BUN/Creatinine ratio 03/15/2017 20     Sodium 03/15/2017 144     Potassium 03/15/2017 4.0     Chloride 03/15/2017 102     CO2 03/15/2017 24     Calcium 03/15/2017 9.9     Protein, total 03/15/2017 7.0     Albumin 03/15/2017 4.5     GLOBULIN, TOTAL 03/15/2017 2.5     A-G Ratio 03/15/2017 1.8     Bilirubin, total 03/15/2017 0.4     Alk. phosphatase 03/15/2017 122*    AST (SGOT) 03/15/2017 22     ALT (SGPT) 03/15/2017 18     Cholesterol, total 03/15/2017 205*    Triglyceride 03/15/2017 149     HDL Cholesterol 03/15/2017 61     VLDL, calculated 03/15/2017 30     LDL, calculated 03/15/2017 114*    TSH 03/15/2017 1.190     T4, Free 03/15/2017 1.55     INTERPRETATION 03/15/2017 NTAP     PDF IMAGE 93/70/2977 Not applicable     Interpretation 03/15/2017 Note        Imaging    Radiographs    Thoracic Spine 8/31/2015: There is a diffuse prominent kyphosis. There is diffuse prominent osteopenia.  There are multiple minimal to mild compression deformities in the upper thoracic spine which appear remote and show good alignment. The pedicles are visualized. There are also some prominent anterior spurring and disc space narrowing     DXA     DXA 3/02/2016: lumbar spine L1-L4 T score -2.5 (BMD 0.881g/cm2), left femoral neck T score: -2.3 (BMD 0.721 g/cm2), right femoral neck T score: not done and distal one third left radius T score -3.5 (BMD 0.566 g/cm2). FRAX score 14.7 % probability in 10 years for major osteoporotic fracture and 5.4 % 10 year probability of hip fracture. ASSESSMENT AND PLAN    1) Osteoporosis. Her most recent DXA scan showed a T-score of -3.6 in distal radius, -2.5 in the lumbar spine and -2.3 in the left femoral neck. FRAX score 14.7 % probability in 10 years for major osteoporotic fracture and 5.4 % 10 year probability of hip fracture. She received her first dose of Prolia on 6/02/2106 but was then lost to follow up. She received her second dose today. Future labs were given to her.     2) CKD Stage III. creatinine 1.16 mg/dL, eGFR 42.      3) Bilateral Hand Osteoarthritis. This was asymptomatic. 4) Vitamin D Deficiency. She is on ergocalciferol 50,000. The patient voiced understanding of the aforementioned assessment and plan. Summary of plan was provided in the After Visit Summary patient instructions.      TODAY'S ORDERS    Orders Placed This Encounter    RENAL FUNCTION PANEL    OH DENOSUMAB INJECTION - QTY 60    OH THER/PROPH/DIAG INJECTION, SUBCUT/IM    denosumab (PROLIA) 60 mg/mL injection    denosumab (PROLIA) 60 mg/mL injection       Future Appointments  Date Time Provider Machelle Charline   9/20/2017 8:30 AM Joe Marti MD [facility] HERNANDEZ   12/19/2017 9:40 AM MD Viry Coto MD, [facility]    Adult Rheumatology   Musculoskeletal Ultrasound Certified  [address]   [address] [facility], [facility], [address]   Phone [phone]  Fax [phone]

## 2017-06-14 LAB
ALBUMIN SERPL-MCNC: 4.7 G/DL (ref 3.5–4.7)
BUN SERPL-MCNC: 21 MG/DL (ref 8–27)
BUN/CREAT SERPL: 17 (ref 12–28)
CALCIUM SERPL-MCNC: 9.6 MG/DL (ref 8.7–10.3)
CHLORIDE SERPL-SCNC: 102 MMOL/L (ref 96–106)
CO2 SERPL-SCNC: 22 MMOL/L (ref 18–29)
CREAT SERPL-MCNC: 1.23 MG/DL (ref 0.57–1)
GLUCOSE SERPL-MCNC: 115 MG/DL (ref 65–99)
PHOSPHATE SERPL-MCNC: 3.2 MG/DL (ref 2.5–4.5)
POTASSIUM SERPL-SCNC: 4.4 MMOL/L (ref 3.5–5.2)
SODIUM SERPL-SCNC: 143 MMOL/L (ref 134–144)

## 2017-07-03 RX ORDER — MEMANTINE HYDROCHLORIDE 5 MG/1
TABLET ORAL
Qty: 180 TAB | Refills: 2 | Status: SHIPPED | OUTPATIENT
Start: 2017-07-03 | End: 2017-09-20 | Stop reason: DRUGHIGH

## 2017-07-09 RX ORDER — CLINDAMYCIN HYDROCHLORIDE 300 MG/1
CAPSULE ORAL
Qty: 10 CAP | Refills: 0 | Status: SHIPPED | OUTPATIENT
Start: 2017-07-09 | End: 2017-09-20 | Stop reason: ALTCHOICE

## 2017-07-26 ENCOUNTER — TELEPHONE (OUTPATIENT)
Dept: FAMILY MEDICINE CLINIC | Age: 82
End: 2017-07-26

## 2017-07-26 NOTE — TELEPHONE ENCOUNTER
Trupti Friends  [phone]    Samara's daughter, Radha Ballard, is asking if the forms that she dropped off on Monday from [address] [place] [address] are ready for ? She states that her mother needs for forms filled out in order to have her power turned on sooner.

## 2017-07-27 NOTE — TELEPHONE ENCOUNTER
[phone] spoke to [address] [facility] [address] notified forms ready for  at the  and scan

## 2017-08-11 DIAGNOSIS — E03.9 ACQUIRED HYPOTHYROIDISM: ICD-10-CM

## 2017-08-11 DIAGNOSIS — J30.81 ALLERGY TO ANIMAL DANDER: ICD-10-CM

## 2017-08-13 RX ORDER — LEVOTHYROXINE SODIUM 75 UG/1
TABLET ORAL
Qty: 90 TAB | Refills: 1 | Status: SHIPPED | OUTPATIENT
Start: 2017-08-13 | End: 2017-10-11 | Stop reason: DRUGHIGH

## 2017-08-13 RX ORDER — LORATADINE 10 MG/1
TABLET ORAL
Qty: 30 TAB | Refills: 4 | Status: SHIPPED | OUTPATIENT
Start: 2017-08-13 | End: 2018-07-05

## 2017-08-21 ENCOUNTER — PATIENT OUTREACH (OUTPATIENT)
Dept: FAMILY MEDICINE CLINIC | Age: 82
End: 2017-08-21

## 2017-08-21 RX ORDER — PREDNISONE 20 MG/1
60 TABLET ORAL
COMMUNITY
End: 2017-08-28 | Stop reason: ALTCHOICE

## 2017-08-21 RX ORDER — LEVOFLOXACIN 750 MG/1
750 TABLET ORAL DAILY
COMMUNITY
End: 2017-08-28 | Stop reason: ALTCHOICE

## 2017-08-21 RX ORDER — ALBUTEROL SULFATE 0.83 MG/ML
2.5 SOLUTION RESPIRATORY (INHALATION)
COMMUNITY
End: 2017-08-28 | Stop reason: ALTCHOICE

## 2017-08-21 NOTE — PROGRESS NOTES
Mikeal Bloch is a 80 y.o. female   This patient was received as a referral from the [employer]. Medium Risk            16       Total Score        16 Charlson Comorbidity Score (Age + Comorbid Conditions)        Criteria that do not apply:    Has Seen PCP in Last 6 Months (Yes=3, No=0)    . Living with Significant Other. Assisted Living. LTAC. SNF. or   Rehab    Patient Length of Stay (>5 days = 3)    IP Visits Last 12 Months (1-3=4, 4=9, >4=11)    Pt. Coverage (Medicare=5 , Medicaid, or Self-Pay=4)        Summary of patients top three medical problems:     Problem 1: COPD     Problem 2: dementia     Problem 3: CKD,stage 3    Patient's challenges to self management identified:  Dementia, dependent on daughter for care and adl's. Patients motivational level on a scale of 0-10: dementia-unable to assess  Medication Management:  Spoke to daughter today, getting ready to  3 new rxs now. Advance Care Planning:   Patient was offered the opportunity to discuss advance care planning: no   Does patient have an Advance Directive:  yes   If no, did you provide information on Advance Care Planning?  na     Goals      Establish PCP relationships and regularly scheduled appointments.  Knowledge and adherence to medication plan.  new meds promptly and take as directed       Reduce ED Utilization       Called and spoke to daughter today, she is on HIPPA. Gave 2 identifiers. Says mother is still somewhat disoriented. [facility] and dx with acute COPD and bad cough. Did seem better today, daughter took her to the [address] [place] [address] and told them to call her if she doesn't feel well. Getting ready to pick her up now and to p/u her new meds. Was given neb treatment x 4 in hospital, helped a lot. Has a nebulizer at home for her. New rxs- Prednisone 20mg, 3 qd x 4 days, Levaquin 750mg one q d x 5 days and Albuterol Sulfate neb sol- q 4 hours prn wheezing.  NN will check with  for appointment for follow up with BRITTANY Cerna and call daughter back. Called daughter back on 8/21-LM requesting she call me back to schedule f/u appt and complete review of meds. Called daughter again on 8/22- LM requesting she call NN back on my direct line. Will continue to reach out to her. Called patient 8/23- reached her daughter. Scheduled patient for 8/28 at 3:45pm with BRITTANY Cerna. Requested to review meds with her, daughter was getting ready to go to work and unable to talk, did say that she had picked up her new rxs and was taking them as well as others she is already on-reminded to call me with any concerns or issues in meantime. Plan of care- NN will check back with daughter in one week for status update and call prn as well. [employer], Referrals, and Durable Medical Equipment: goes to [facility] one day a week. Has cane and walker, also WC if needed. Follow up appointments:  BRITTANY Cerna 8/28 at 3:45pm.        Patient's daughter verbalized understanding of all information discussed. Patient has this Nurse Navigators contact information for any further questions, concerns, or needs.

## 2017-08-28 ENCOUNTER — OFFICE VISIT (OUTPATIENT)
Dept: FAMILY MEDICINE CLINIC | Age: 82
End: 2017-08-28

## 2017-08-28 VITALS
DIASTOLIC BLOOD PRESSURE: 67 MMHG | HEART RATE: 94 BPM | TEMPERATURE: 98.2 F | RESPIRATION RATE: 12 BRPM | WEIGHT: 132 LBS | OXYGEN SATURATION: 93 % | HEIGHT: 62 IN | SYSTOLIC BLOOD PRESSURE: 116 MMHG | BODY MASS INDEX: 24.29 KG/M2

## 2017-08-28 DIAGNOSIS — J44.1 CHRONIC OBSTRUCTIVE PULMONARY DISEASE WITH ACUTE EXACERBATION (HCC): Primary | ICD-10-CM

## 2017-08-28 DIAGNOSIS — G30.1 LATE ONSET ALZHEIMER'S DISEASE WITHOUT BEHAVIORAL DISTURBANCE (HCC): ICD-10-CM

## 2017-08-28 DIAGNOSIS — N18.30 CKD (CHRONIC KIDNEY DISEASE) STAGE 3, GFR 30-59 ML/MIN (HCC): ICD-10-CM

## 2017-08-28 DIAGNOSIS — I10 ESSENTIAL HYPERTENSION: ICD-10-CM

## 2017-08-28 DIAGNOSIS — F02.80 LATE ONSET ALZHEIMER'S DISEASE WITHOUT BEHAVIORAL DISTURBANCE (HCC): ICD-10-CM

## 2017-08-28 RX ORDER — PREDNISONE 20 MG/1
TABLET ORAL
Qty: 20 TAB | Refills: 0 | Status: SHIPPED | OUTPATIENT
Start: 2017-08-28 | End: 2017-09-20 | Stop reason: ALTCHOICE

## 2017-08-28 NOTE — PATIENT INSTRUCTIONS
Chronic Obstructive Pulmonary Disease (COPD): Care Instructions  Your Care Instructions    Chronic obstructive pulmonary disease (COPD) is a general term for a group of lung diseases, including emphysema and chronic bronchitis. People with COPD have decreased airflow in and out of the lungs, which makes it hard to breathe. The airways also can get clogged with thick mucus. Cigarette smoking is a major cause of COPD. Although there is no cure for COPD, you can slow its progress. Following your treatment plan and taking care of yourself can help you feel better and live longer. Follow-up care is a key part of your treatment and safety. Be sure to make and go to all appointments, and call your doctor if you are having problems. It's also a good idea to know your test results and keep a list of the medicines you take. How can you care for yourself at home? Staying healthy  · Do not smoke. This is the most important step you can take to prevent more damage to your lungs. If you need help quitting, talk to your doctor about stop-smoking programs and medicines. These can increase your chances of quitting for good. · Avoid colds and flu. Get a pneumococcal vaccine shot. If you have had one before, ask your doctor whether you need a second dose. Get the flu vaccine every fall. If you must be around people with colds or the flu, wash your hands often. · Avoid secondhand smoke, air pollution, and high altitudes. Also avoid cold, dry air and hot, humid air. Stay at home with your windows closed when air pollution is bad. Medicines and oxygen therapy  · Take your medicines exactly as prescribed. Call your doctor if you think you are having a problem with your medicine. · You may be taking medicines such as:  ¨ Bronchodilators. These help open your airways and make breathing easier. Bronchodilators are either short-acting (work for 6 to 9 hours) or long-acting (work for 24 hours).  You inhale most bronchodilators, so they start to act quickly. Always carry your quick-relief inhaler with you in case you need it while you are away from home. ¨ Corticosteroids (prednisone, budesonide). These reduce airway inflammation. They come in pill or inhaled form. You must take these medicines every day for them to work well. · A spacer may help you get more inhaled medicine to your lungs. Ask your doctor or pharmacist if a spacer is right for you. If it is, ask how to use it properly. · Do not take any vitamins, over-the-counter medicine, or herbal products without talking to your doctor first.  · If your doctor prescribed antibiotics, take them as directed. Do not stop taking them just because you feel better. You need to take the full course of antibiotics. · Oxygen therapy boosts the amount of oxygen in your blood and helps you breathe easier. Use the flow rate your doctor has recommended, and do not change it without talking to your doctor first.  Activity  · Get regular exercise. Walking is an easy way to get exercise. Start out slowly, and walk a little more each day. · Pay attention to your breathing. You are exercising too hard if you cannot talk while you are exercising. · Take short rest breaks when doing household chores and other activities. · Learn breathing methods--such as breathing through pursed lips--to help you become less short of breath. · If your doctor has not set you up with a pulmonary rehabilitation program, talk to him or her about whether rehab is right for you. Rehab includes exercise programs, education about your disease and how to manage it, help with diet and other changes, and emotional support. Diet  · Eat regular, healthy meals. Use bronchodilators about 1 hour before you eat to make it easier to eat. Eat several small meals instead of three large ones. Drink beverages at the end of the meal. Avoid foods that are hard to chew.   · Eat foods that contain protein so that you do not lose muscle mass.  · Talk with your doctor if you gain too much weight or if you lose weight without trying. Mental health  · Talk to your family, friends, or a therapist about your feelings. It is normal to feel frightened, angry, hopeless, helpless, and even guilty. Talking openly about bad feelings can help you cope. If these feelings last, talk to your doctor. When should you call for help? Call 911 anytime you think you may need emergency care. For example, call if:  · You have severe trouble breathing. Call your doctor now or seek immediate medical care if:  · You have new or worse trouble breathing. · You cough up blood. · You have a fever. Watch closely for changes in your health, and be sure to contact your doctor if:  · You cough more deeply or more often, especially if you notice more mucus or a change in the color of your mucus. · You have new or worse swelling in your legs or belly. · You are not getting better as expected. Where can you learn more? Go to http://asya-chad.info/. Garrett Muller in the search box to learn more about \"Chronic Obstructive Pulmonary Disease (COPD): Care Instructions. \"  Current as of: March 25, 2017  Content Version: 11.3  © 0041-1649 [employer]. Care instructions adapted under license by [employer] (which disclaims liability or warranty for this information). If you have questions about a medical condition or this instruction, always ask your healthcare professional. [place] [address] any warranty or liability for your use of this information.

## 2017-08-28 NOTE — PROGRESS NOTES
1. Have you been to the ER, urgent care clinic since your last visit? Hospitalized since your last visit? [address]- [place]- for a COPD exacerbation- 8/20/17    2. Have you seen or consulted any other health care providers outside of the [address] [place] [address] since your last visit? Include any pap smears or colon screening. Dentist- [facility] a [facility]     Chief Complaint   Patient presents with   [facility] Follow Up     [facility] 8/20/17- for  exacerbation    Medication Evaluation     should be taking 5 mg of amlodipine and 10 mg of lisinopril daily?

## 2017-08-28 NOTE — MR AVS SNAPSHOT
Visit Information Date & Time Provider Department Dept. Phone Encounter #  
 8/28/2017  3:45 PM Belen Moyer  [facility] [phone] [id number] Follow-up Instructions Return in about 2 weeks (around 9/11/2017) for COPD. Your Appointments 9/20/2017  8:30 AM  
ROUTINE CARE with Jennifer Rosenbaum MD  
[facility]) Appt Note: 6 months follow up visit [address] [facility] [address]  
[phone]  
  
   
 [address] [id number]  
  
    
 12/19/2017  9:40 AM  
ESTABLISHED PATIENT with Singh Culp MD  
[address] ([address]) Appt Note: Prolia Injection plus fu -kfb; . ... [address] [facility] [address] [facility] [address]  
[phone]  
  
   
 [address] [facility] [address] [id number] Upcoming Health Maintenance Date Due INFLUENZA AGE 9 TO ADULT 8/1/2017 MEDICARE YEARLY EXAM 9/8/2017 GLAUCOMA SCREENING Q2Y 3/21/2019 DTaP/Tdap/Td series (2 - Td) 9/7/2026 Allergies as of 8/28/2017  Review Complete On: 8/28/2017 By: Juan Treviño LPN Severity Noted Reaction Type Reactions Keflex [Cephalexin]  04/23/2010    Rash Pcn [Penicillins]  04/23/2010    Rash  
 Sulfa (Sulfonamide Antibiotics)  05/31/2011    Rash Current Immunizations  Reviewed on 9/7/2016 Name Date Influenza High Dose Vaccine PF 9/30/2015 Influenza Vaccine 10/16/2013 Pneumococcal Conjugate (PCV-13) 3/2/2016 Pneumococcal Polysaccharide (PPSV-23) 1/29/2014 Tdap 9/7/2016 Zoster Vaccine, Live 10/15/2014 Not reviewed this visit You Were Diagnosed With   
  
 Codes Comments Chronic obstructive pulmonary disease with acute exacerbation (HCC)    -  Primary ICD-10-CM: J44.1 ICD-9-CM: 491.21 Late onset Alzheimer'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 behavioral disturbance     ICD-10-CM: G30.1, F02.80 ICD-9-CM: 331.0, 294.10 Essential hypertension     ICD-10-CM: I10 
ICD-9-CM: 401.9 CKD (chronic kidney disease) stage 3, GFR 30-59 ml/min     ICD-10-CM: N18.3 ICD-9-CM: 511. 3 Vitals BP Pulse Temp Resp Height(growth percentile) Weight(growth percentile) 116/67 (BP 1 Location: Right arm, BP Patient Position: Sitting) 94 98.2 °F (36.8 °C) (Oral) 12 5' 2\" (1.575 m) 132 lb (59.9 kg) SpO2 BMI OB Status Smoking Status 93% 24.14 kg/m2 Postmenopausal Former Smoker BMI and BSA Data Body Mass Index Body Surface Area  
 24.14 kg/m 2 1.62 m 2 Preferred Pharmacy Pharmacy Name Phone [address] , [place] [address] [phone] Your Updated Medication List  
  
   
This list is accurate as of: 8/28/17  4:38 PM.  Always use your most recent med list.  
  
  
  
  
 albuterol 90 mcg/actuation inhaler Commonly known as:  PROVENTIL HFA, VENTOLIN HFA, PROAIR HFA Take 2 Puffs by inhalation every six (6) hours as needed for Wheezing. albuterol-ipratropium 2.5 mg-0.5 mg/3 ml Nebu Commonly known as:  DUO-NEB  
3 mL by Nebulization route every four (4) hours as needed. amLODIPine 5 mg tablet Commonly known as:  Gunjan Lobo TAKE 1 TABLET DAILY FOR BLOOD PRESSURE  
  
 atorvastatin 20 mg tablet Commonly known as:  LIPITOR  
TAKE 1 TABLET DAILY FOR CHOLESTEROL CENTRUM SILVER PO Take  by mouth. clindamycin 300 mg capsule Commonly known as:  CLEOCIN  
TAKE 2 CAPSULES FOR 1 DOSE 1 HOUR BEFORE DENTAL PROCEDURE  
  
 donepezil 10 mg tablet Commonly known as:  ARICEPT  
TAKE 1 TABLET NIGHTLY  
  
 fluticasone-salmeterol 230-21 mcg/actuation inhaler Commonly known as:  ADVAIR HFA Take 2 Puffs by inhalation two (2) times a day. imipramine 25 mg tablet Commonly known as:  TOFRANIL Take 2 Tabs by mouth nightly. loratadine 10 mg tablet Commonly known as:  CLARITIN  
TAKE 1 TABLET DAILY  
  
 memantine 5 mg tablet Commonly known as:  Arminda Barraza TAKE 1 TABLET TWICE A DAY  
  
 predniSONE 20 mg tablet Commonly known as:  Jazmin Varghese Take 3 tablets daily x 3 days, then 2 tablets daily x 3 days, the 1 tablet daily x 3 days then 1/2 talbet daily x 4 days PROLIA 60 mg/mL injection Generic drug:  denosumab 60 mg by SubCUTAneous route every 6 months. SYNTHROID 75 mcg tablet Generic drug:  levothyroxine TAKE 1 TABLET DAILY BEFORE BREAKFAST  
  
 tiotropium 18 mcg inhalation capsule Commonly known as:  [address] Take 1 Cap by inhalation daily. For COPD  
  
 VITAMIN D2 50,000 unit capsule Generic drug:  ergocalciferol TAKE 1 CAPSULE EVERY 7 DAYS Prescriptions Sent to Pharmacy Refills  
 tiotropium (SPIRIVA WITH HANDIHALER) 18 mcg inhalation capsule 3 Sig: Take 1 Cap by inhalation daily. For COPD Class: Normal  
 Pharmacy: [address], [address] Ph #: [phone] Route: Inhalation  
 fluticasone-salmeterol (ADVAIR HFA) 230-21 mcg/actuation inhaler 4 Sig: Take 2 Puffs by inhalation two (2) times a day. Class: Normal  
 Pharmacy: [address], [address] Ph #: [phone] Route: Inhalation  
 predniSONE (DELTASONE) 20 mg tablet 0 Sig: Take 3 tablets daily x 3 days, then 2 tablets daily x 3 days, the 1 tablet daily x 3 days then 1/2 talbet daily x 4 days Class: Normal  
 Pharmacy: [facility] [address] [place] [address] Ph #: [phone] We Performed the Following REFERRAL TO PULMONARY DISEASE [CVN37 Custom] Comments:  
 Please evaluate patient for COPD. Follow-up Instructions Return in about 2 weeks (around 9/11/2017) for COPD. Referral Information Referral ID Referred By Referred To  
  
 [id number] [facility] [address] [facility] [address] [facility], [address] Visits Status Start Date End Date 1 New Request 8/28/17 8/28/18 If your referral has a status of pending review or denied, additional information will be sent to support the outcome of this decision. Patient Instructions Chronic Obstructive Pulmonary Disease (COPD): Care Instructions Your Care Instructions Chronic obstructive pulmonary disease (COPD) is a general term for a group of lung diseases, including emphysema and chronic bronchitis. People with COPD have decreased airflow in and out of the lungs, which makes it hard to breathe. The airways also can get clogged with thick mucus. Cigarette smoking is a major cause of COPD. Although there is no cure for COPD, you can slow its progress. Following your treatment plan and taking care of yourself can help you feel better and live longer. Follow-up care is a key part of your treatment and safety. Be sure to make and go to all appointments, and call your doctor if you are having problems. It's also a good idea to know your test results and keep a list of the medicines you take. How can you care for yourself at home? Staying healthy · Do not smoke. This is the most important step you can take to prevent more damage to your lungs. If you need help quitting, talk to your doctor about stop-smoking programs and medicines. These can increase your chances of quitting for good. · Avoid colds and flu. Get a pneumococcal vaccine shot. If you have had one before, ask your doctor whether you need a second dose. Get the flu vaccine every fall. If you must be around people with colds or the flu, wash your hands often. · Avoid secondhand smoke, air pollution, and high altitudes. Also avoid cold, dry air and hot, humid air. Stay at home with your windows closed when air pollution is bad. Medicines and oxygen therapy · Take your medicines exactly as prescribed.  Call your doctor if you think you are having a problem with your medicine. · You may be taking medicines such as: ¨ Bronchodilators. These help open your airways and make breathing easier. Bronchodilators are either short-acting (work for 6 to 9 hours) or long-acting (work for 24 hours). You inhale most bronchodilators, so they start to act quickly. Always carry your quick-relief inhaler with you in case you need it while you are away from home. ¨ Corticosteroids (prednisone, budesonide). These reduce airway inflammation. They come in pill or inhaled form. You must take these medicines every day for them to work well. · A spacer may help you get more inhaled medicine to your lungs. Ask your doctor or pharmacist if a spacer is right for you. If it is, ask how to use it properly. · Do not take any vitamins, over-the-counter medicine, or herbal products without talking to your doctor first. 
· If your doctor prescribed antibiotics, take them as directed. Do not stop taking them just because you feel better. You need to take the full course of antibiotics. · Oxygen therapy boosts the amount of oxygen in your blood and helps you breathe easier. Use the flow rate your doctor has recommended, and do not change it without talking to your doctor first. 
Activity · Get regular exercise. Walking is an easy way to get exercise. Start out slowly, and walk a little more each day. · Pay attention to your breathing. You are exercising too hard if you cannot talk while you are exercising. · Take short rest breaks when doing household chores and other activities. · Learn breathing methodssuch as breathing through pursed lipsto help you become less short of breath. · If your doctor has not set you up with a pulmonary rehabilitation program, talk to him or her about whether rehab is right for you. Rehab includes exercise programs, education about your disease and how to manage it, help with diet and other changes, and emotional support. Diet · Eat regular, healthy meals. Use bronchodilators about 1 hour before you eat to make it easier to eat. Eat several small meals instead of three large ones. Drink beverages at the end of the meal. Avoid foods that are hard to chew. · Eat foods that contain protein so that you do not lose muscle mass. · Talk with your doctor if you gain too much weight or if you lose weight without trying. Mental health · Talk to your family, friends, or a therapist about your feelings. It is normal to feel frightened, angry, hopeless, helpless, and even guilty. Talking openly about bad feelings can help you cope. If these feelings last, talk to your doctor. When should you call for help? Call 911 anytime you think you may need emergency care. For example, call if: 
· You have severe trouble breathing. Call your doctor now or seek immediate medical care if: 
· You have new or worse trouble breathing. · You cough up blood. · You have a fever. Watch closely for changes in your health, and be sure to contact your doctor if: 
· You cough more deeply or more often, especially if you notice more mucus or a change in the color of your mucus. · You have new or worse swelling in your legs or belly. · You are not getting better as expected. Where can you learn more? Go to http://asya-chad.info/. Marivel Lynn in the search box to learn more about \"Chronic Obstructive Pulmonary Disease (COPD): Care Instructions. \" Current as of: March 25, 2017 Content Version: 11.3 © 3950-3141 [employer]. Care instructions adapted under license by [employer] (which disclaims liability or warranty for this information). If you have questions about a medical condition or this instruction, always ask your healthcare professional. [place] [address] any warranty or liability for your use of this information. Introducing [facility]! Dear Diane Crowder: Thank you for requesting a LocaMap account. Our records indicate that you already have an active LocaMap account. You can access your account anytime at [email or web address] "[email or web address] Did you know that you can access your hospital and ER discharge instructions at any time in LocaMap? You can also review all of your test results from your hospital stay or ER visit. Additional Information If you have questions, please visit the Frequently Asked Questions section of the LocaMap website at [email or web address] "[email or web address]/. Remember, LocaMap is NOT to be used for urgent needs. For medical emergencies, dial 911. Now available from your iPhone and Android! Please provide this summary of care documentation to your next provider. Your primary care clinician is listed as Parmjit Cleaning. If you have any questions after today's visit, please call [phone].

## 2017-08-28 NOTE — PROGRESS NOTES
Giuliano Sanford is a 80 y.o. female who was seen in clinic today (8/28/2017). Subjective:  COPD  Patient complains of dyspnea, cough and cough productive of yellow sputum in moderate amounts. Symptoms began 10 days ago. Patient seen at [facility] ED and treated with Levaquin and Prednisone. Using nebulizer treatments BID and Advair BID. Daughter reports increased fatigue, RINCON and drowsiness. Denies fever. Cardiovascular Review:  The patient has hypertension and CKD  Diet and Lifestyle: generally follows a low fat low cholesterol diet, generally follows a low sodium diet, sedentary, nonsmoker  Home BP Monitoring: is not measured at home. Pertinent ROS: taking medications as instructed, no medication side effects noted, no TIA's, no chest pain on exertion, no dyspnea on exertion, no swelling of ankles. Prior to Admission medications    Medication Sig Start Date End Date Taking? Authorizing Provider   tiotropium (SPIRIVA WITH HANDIHALER) 18 mcg inhalation capsule Take 1 Cap by inhalation daily. For COPD 8/28/17  Yes Sara Felder NP   fluticasone-salmeterol (ADVAIR HFA) 230-21 mcg/actuation inhaler Take 2 Puffs by inhalation two (2) times a day.  8/28/17  Yes Sara Felder NP   predniSONE (DELTASONE) 20 mg tablet Take 3 tablets daily x 3 days, then 2 tablets daily x 3 days, the 1 tablet daily x 3 days then 1/2 talbet daily x 4 days 8/28/17  Yes Sara Felder NP   SYNTHROID 75 mcg tablet TAKE 1 TABLET DAILY BEFORE BREAKFAST 8/13/17  Yes Jolly Wasserman MD   loratadine (CLARITIN) 10 mg tablet TAKE 1 TABLET DAILY 8/13/17  Yes Jolly Wasserman MD   clindamycin (CLEOCIN) 300 mg capsule TAKE 2 CAPSULES FOR 1 DOSE 1 HOUR BEFORE DENTAL PROCEDURE 7/9/17  Yes Ani Calle NP   memantine (NAMENDA) 5 mg tablet TAKE 1 TABLET TWICE A DAY 7/3/17  Yes Sara Felder NP   amLODIPine (NORVASC) 5 mg tablet TAKE 1 TABLET DAILY FOR BLOOD PRESSURE 6/28/17  Yes Sara Felder NP   atorvastatin (LIPITOR) 20 mg tablet TAKE 1 TABLET DAILY FOR CHOLESTEROL 6/28/17  Yes Rebecca Hutchison NP   donepezil (ARICEPT) 10 mg tablet TAKE 1 TABLET NIGHTLY 6/22/17  Yes Anju Baxter MD   denosumab (PROLIA) 60 mg/mL injection 60 mg by SubCUTAneous route every 6 months. Yes Historical Provider   imipramine (TOFRANIL) 25 mg tablet Take 2 Tabs by mouth nightly. 4/11/17  Yes Anju Baxter MD   VITAMIN D2 50,000 unit capsule TAKE 1 CAPSULE EVERY 7 DAYS 3/12/17  Yes Santa Baird MD   albuterol-ipratropium (DUO-NEB) 2.5 mg-0.5 mg/3 ml nebu 3 mL by Nebulization route every four (4) hours as needed. 1/25/17  Yes Rebecca Hutchison NP   FOLIC ACID/MULTIVIT-MIN/LUTEIN (CENTRUM SILVER PO) Take  by mouth. Yes Historical Provider   albuterol (PROVENTIL HFA, VENTOLIN HFA, PROAIR HFA) 90 mcg/actuation inhaler Take 2 Puffs by inhalation every six (6) hours as needed for Wheezing. 4/6/16  Yes Anju Baxter MD          Allergies   Allergen Reactions    Keflex [Cephalexin] Rash    Pcn [Penicillins] Rash    Sulfa (Sulfonamide Antibiotics) Rash           Review of Systems   Unable to perform ROS: Dementia   All other systems reviewed and are negative. Objective:   Physical Exam   Constitutional: She is oriented to person, place, and time. She appears well-developed and well-nourished. Neck: Normal range of motion. Neck supple. No JVD present. Carotid bruit is not present. No thyromegaly present. Cardiovascular: Normal rate, regular rhythm and intact distal pulses. Exam reveals no gallop and no friction rub. Murmur heard. Pulmonary/Chest: Effort normal. No respiratory distress. She has wheezes (expiratory). Musculoskeletal: She exhibits no edema. Lymphadenopathy:     She has no cervical adenopathy. Neurological: She is alert and oriented to person, place, and time. Psychiatric: She has a normal mood and affect. Her behavior is normal.   Nursing note and vitals reviewed.         Visit Vitals    /67 (BP 1 Location: Right arm, BP Patient Position: Sitting)    Pulse 94    Temp 98.2 °F (36.8 °C) (Oral)    Resp 12    Ht 5' 2\" (1.575 m)    Wt 132 lb (59.9 kg)    SpO2 93%    BMI 24.14 kg/m2       Assessment & Plan:  Diagnoses and all orders for this visit:    1. Chronic obstructive pulmonary disease with acute exacerbation (HCC)  Resume Prednisone taper. Add Spiriva and increase Advair. Request pulmonology evaluation.   -     REFERRAL TO PULMONARY DISEASE  -     tiotropium (SPIRIVA WITH HANDIHALER) 18 mcg inhalation capsule; Take 1 Cap by inhalation daily. For COPD  -     fluticasone-salmeterol (ADVAIR HFA) 230-21 mcg/actuation inhaler; Take 2 Puffs by inhalation two (2) times a day. -     predniSONE (DELTASONE) 20 mg tablet; Take 3 tablets daily x 3 days, then 2 tablets daily x 3 days, the 1 tablet daily x 3 days then 1/2 talbet daily x 4 days    2. 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 ACP on file. 3. Essential hypertension  Well controlled, no medication changes. 4. CKD (chronic kidney disease) stage 3, GFR 30-59 ml/min  Stable, no changes to current therapy      I have discussed the diagnosis with the patient and the intended plan as seen in the above orders. The patient has received an after-visit summary along with patient information handout. I have discussed medication side effects and warnings with the patient as well. Follow-up Disposition:  Return in about 2 weeks (around 9/11/2017) for COPD.         Maglay Ragsdale NP

## 2017-09-06 ENCOUNTER — TELEPHONE (OUTPATIENT)
Dept: FAMILY MEDICINE CLINIC | Age: 82
End: 2017-09-06

## 2017-09-06 NOTE — TELEPHONE ENCOUNTER
----- Message from Janay Sanchez sent at 9/6/2017  1:20 PM EDT -----  Regarding: /telephone  Rupali,pt's daughter returning a call to the practice. Best contact number is [phone].

## 2017-09-06 NOTE — TELEPHONE ENCOUNTER
[phone] spoke to [address] [place] [address] advised of how to use Spiriva Inhaler.  Advised as well when patient sees pulmonary needs to discuss with them if patient is good candidate for oxygen Rupali cook

## 2017-09-06 NOTE — TELEPHONE ENCOUNTER
Patient's daughter Maple Myron called to speak with someone about her mom's inhalers that came in the mail yesterday. She is a little confused about how to use them. Her mother is also having shortness of breath and would like to speak with someone about potentially getting her on oxygen. She can be reached at [phone]. Thanks!

## 2017-09-06 NOTE — TELEPHONE ENCOUNTER
[phone] attempted to call Pete Badillo no answer left message to call me back in regards to her message

## 2017-09-12 ENCOUNTER — TELEPHONE (OUTPATIENT)
Dept: FAMILY MEDICINE CLINIC | Age: 82
End: 2017-09-12

## 2017-09-12 ENCOUNTER — PATIENT OUTREACH (OUTPATIENT)
Dept: FAMILY MEDICINE CLINIC | Age: 82
End: 2017-09-12

## 2017-09-12 NOTE — TELEPHONE ENCOUNTER
Taina Caba from [facility], PT, is calling he would like to request that Dr. Asuncion Panda send an order to medical supply home for the patient to have a rolling walker (2 wheeled walker), he is also requesting a verbal order for the OT to come to the home and evaluate and treat the patient for bathing and dressing as the patients primary care giver (daughter) just recently had rotator cuff surgery and is unable to do these things at the time.      Best call back # for Dustin: [phone]

## 2017-09-12 NOTE — TELEPHONE ENCOUNTER
[facility]     -    [phone]  -    Patient will be having Physical Therapy done-  FYI-

## 2017-09-12 NOTE — PROGRESS NOTES
Rafiq Wallace is a 80 y.o. female   This patient was received as a referral from the [employer]. Medium Risk            16       Total Score        16 Charlson Comorbidity Score (Age + Comorbid Conditions)        Criteria that do not apply:    Has Seen PCP in Last 6 Months (Yes=3, No=0)    . Living with Significant Other. Assisted Living. LTAC. SNF. or   Rehab    Patient Length of Stay (>5 days = 3)    IP Visits Last 12 Months (1-3=4, 4=9, >4=11)    Pt. Coverage (Medicare=5 , Medicaid, or Self-Pay=4)        Summary of patients top three medical problems:     Problem 1: CKD, Stage 3     Problem 2: dementia     Problem 3: HTN    Patient's challenges to self management identified:    Patients motivational level on a scale of 0-10:   Medication Management:    Advance Care Planning:   Patient was offered the opportunity to discuss advance care planning: NA   Does patient have an Advance Directive:  yes   If no, did you provide information on Advance Care Planning? NA     [employer], Referrals, and Durable Medical Equipment:     Follow up appointments:    Goals      Establish PCP relationships and regularly scheduled appointments.  Knowledge and adherence to medication plan.  new meds promptly and take as directed       Reduce ED Utilization         Called daughter, Chandana Coto to check on patient. Daughter says there is a nurse from Medicare there going over her meds. Advised I will call back in about an hour. Daughter agreed to that plan. Called daughter back at 4:10pm- for her requesting she call me back on my direct line. Will continue to try and reach. Patient's daughter verbalized understanding of all information discussed. Patient has this Nurse Navigators contact information for any further questions, concerns, or needs.

## 2017-09-12 NOTE — TELEPHONE ENCOUNTER
[phone] spoke to Violette Crowley per Violette Crowley need to faxed prescription to [facility] and notified him verbal ok for OT per Kaity Thornton     Faxed prescription for walker to [id number] [place] [address]

## 2017-09-14 ENCOUNTER — TELEPHONE (OUTPATIENT)
Dept: FAMILY MEDICINE CLINIC | Age: 82
End: 2017-09-14

## 2017-09-14 NOTE — TELEPHONE ENCOUNTER
Ludmila Brice from [facility] is calling, she is requesting a call back from the nurse, she would like to know if Dr. Dakota Grimes is willing to follow the patient for home health.      Best call back # for Ludmila Brice: [phone]

## 2017-09-14 NOTE — TELEPHONE ENCOUNTER
[phone] spoke to Ludmila Brice notified her Per Dr Dakota Grimes he will follow patient for home health

## 2017-09-20 ENCOUNTER — HOSPITAL ENCOUNTER (OUTPATIENT)
Dept: LAB | Age: 82
Discharge: HOME | End: 2017-09-20
Payer: MEDICARE

## 2017-09-20 ENCOUNTER — OFFICE VISIT (OUTPATIENT)
Dept: FAMILY MEDICINE CLINIC | Age: 82
End: 2017-09-20

## 2017-09-20 VITALS
TEMPERATURE: 96.5 F | SYSTOLIC BLOOD PRESSURE: 138 MMHG | HEART RATE: 70 BPM | OXYGEN SATURATION: 96 % | DIASTOLIC BLOOD PRESSURE: 80 MMHG | BODY MASS INDEX: 24.73 KG/M2 | RESPIRATION RATE: 18 BRPM | HEIGHT: 62 IN | WEIGHT: 134.4 LBS

## 2017-09-20 DIAGNOSIS — J44.9 CHRONIC OBSTRUCTIVE PULMONARY DISEASE, UNSPECIFIED COPD TYPE (HCC): ICD-10-CM

## 2017-09-20 DIAGNOSIS — R73.9 ELEVATED BLOOD SUGAR LEVEL: ICD-10-CM

## 2017-09-20 DIAGNOSIS — G30.1 LATE ONSET ALZHEIMER'S DISEASE WITHOUT BEHAVIORAL DISTURBANCE (HCC): ICD-10-CM

## 2017-09-20 DIAGNOSIS — Z71.89 ADVANCED CARE PLANNING/COUNSELING DISCUSSION: ICD-10-CM

## 2017-09-20 DIAGNOSIS — I10 ESSENTIAL HYPERTENSION: ICD-10-CM

## 2017-09-20 DIAGNOSIS — E78.2 MIXED HYPERLIPIDEMIA: Primary | ICD-10-CM

## 2017-09-20 DIAGNOSIS — E03.9 ACQUIRED HYPOTHYROIDISM: ICD-10-CM

## 2017-09-20 DIAGNOSIS — Z00.00 ENCOUNTER FOR MEDICARE ANNUAL WELLNESS EXAM: ICD-10-CM

## 2017-09-20 DIAGNOSIS — E55.9 VITAMIN D DEFICIENCY: ICD-10-CM

## 2017-09-20 DIAGNOSIS — Z23 ENCOUNTER FOR IMMUNIZATION: ICD-10-CM

## 2017-09-20 DIAGNOSIS — Z13.31 SCREENING FOR DEPRESSION: ICD-10-CM

## 2017-09-20 DIAGNOSIS — F02.80 LATE ONSET ALZHEIMER'S DISEASE WITHOUT BEHAVIORAL DISTURBANCE (HCC): ICD-10-CM

## 2017-09-20 PROBLEM — F32.A DEPRESSION: Status: ACTIVE | Noted: 2017-09-20

## 2017-09-20 LAB — HBA1C MFR BLD HPLC: 6.4 %

## 2017-09-20 PROCEDURE — 80061 LIPID PANEL: CPT

## 2017-09-20 PROCEDURE — 82306 VITAMIN D 25 HYDROXY: CPT

## 2017-09-20 PROCEDURE — 36415 COLL VENOUS BLD VENIPUNCTURE: CPT

## 2017-09-20 PROCEDURE — 85025 COMPLETE CBC W/AUTO DIFF WBC: CPT

## 2017-09-20 PROCEDURE — 80053 COMPREHEN METABOLIC PANEL: CPT

## 2017-09-20 PROCEDURE — 84443 ASSAY THYROID STIM HORMONE: CPT

## 2017-09-20 RX ORDER — MEMANTINE HYDROCHLORIDE 10 MG/1
10 TABLET ORAL 2 TIMES DAILY
Qty: 180 TAB | Refills: 3 | Status: SHIPPED | OUTPATIENT
Start: 2017-09-20 | End: 2018-01-14 | Stop reason: SDUPTHER

## 2017-09-20 RX ORDER — IMIPRAMINE HYDROCHLORIDE 25 MG/1
25 TABLET ORAL
Qty: 180 TAB | Refills: 1
Start: 2017-09-20 | End: 2018-01-10

## 2017-09-20 NOTE — PROGRESS NOTES
Chief Complaint   Patient presents with    Hypertension    Cholesterol Problem     fasting        Reviewed Record in preparation for visit and have obtained necessary documentation. Identified pt with two pt identifiers (Name @ )    Health Maintenance Due   Topic    INFLUENZA AGE 9 TO ADULT     MEDICARE YEARLY EXAM          1. Have you been to the ER, urgent care clinic since your last visit? Hospitalized since your last visit? No    2. Have you seen or consulted any other health care providers outside of the [facility] since your last visit? Include any pap smears or colon screening.  No

## 2017-09-20 NOTE — PROGRESS NOTES
This is a Subsequent Medicare Annual Wellness Exam (AWV) (Performed 12 months after IPPE or effective date of Medicare Part B enrollment, Once in a lifetime)    I have reviewed the patient's medical history in detail and updated the computerized patient record. History     Past Medical History:   Diagnosis Date    Anxiety state, unspecified     CKD (chronic kidney disease) stage 3, GFR 30-59 ml/min 4/6/2016    Dementia 6/12    Landa/npysch    Generalized osteoarthrosis, unspecified site     Lumbar compression fracture (HCC) 4/08    epidurals x 2  Umang/os    Osteopenia     Other and unspecified hyperlipidemia     Psoriasis     Unspecified essential hypertension     Unspecified hypothyroidism     thyroid irradiated i131      Past Surgical History:   Procedure Laterality Date    COLONOSCOPY  10/10    [facility]/gi    TOTAL KNEE ARTHROPLASTY  1992    left    TOTAL KNEE ARTHROPLASTY  1992    right     Current Outpatient Prescriptions   Medication Sig Dispense Refill    tiotropium (SPIRIVA WITH HANDIHALER) 18 mcg inhalation capsule Take 1 Cap by inhalation daily. For COPD 90 Cap 3    fluticasone-salmeterol (ADVAIR HFA) 230-21 mcg/actuation inhaler Take 2 Puffs by inhalation two (2) times a day. 3 Each 4    SYNTHROID 75 mcg tablet TAKE 1 TABLET DAILY BEFORE BREAKFAST 90 Tab 1    loratadine (CLARITIN) 10 mg tablet TAKE 1 TABLET DAILY 30 Tab 4    clindamycin (CLEOCIN) 300 mg capsule TAKE 2 CAPSULES FOR 1 DOSE 1 HOUR BEFORE DENTAL PROCEDURE 10 Cap 0    memantine (NAMENDA) 5 mg tablet TAKE 1 TABLET TWICE A  Tab 2    amLODIPine (NORVASC) 5 mg tablet TAKE 1 TABLET DAILY FOR BLOOD PRESSURE 90 Tab 1    atorvastatin (LIPITOR) 20 mg tablet TAKE 1 TABLET DAILY FOR CHOLESTEROL 90 Tab 1    donepezil (ARICEPT) 10 mg tablet TAKE 1 TABLET NIGHTLY 90 Tab 1    denosumab (PROLIA) 60 mg/mL injection 60 mg by SubCUTAneous route every 6 months.       imipramine (TOFRANIL) 25 mg tablet Take 2 Tabs by mouth nightly. 180 Tab 1    VITAMIN D2 50,000 unit capsule TAKE 1 CAPSULE EVERY 7 DAYS 12 Cap 2    albuterol-ipratropium (DUO-NEB) 2.5 mg-0.5 mg/3 ml nebu 3 mL by Nebulization route every four (4) hours as needed. 90 Nebule 3    FOLIC ACID/MULTIVIT-MIN/LUTEIN (CENTRUM SILVER PO) Take  by mouth.  albuterol (PROVENTIL HFA, VENTOLIN HFA, PROAIR HFA) 90 mcg/actuation inhaler Take 2 Puffs by inhalation every six (6) hours as needed for Wheezing. 1 Inhaler 0     Allergies   Allergen Reactions    Keflex [Cephalexin] Rash    Pcn [Penicillins] Rash    Sulfa (Sulfonamide Antibiotics) Rash     Family History   Problem Relation Age of Onset    Cancer Mother      stomach tumors    Cancer Brother      prostate     Social History   Substance Use Topics    Smoking status: Former Smoker     Packs/day: 0.50     Years: 60.00     Types: Cigarettes     Quit date: 2014    Smokeless tobacco: Never Used    Alcohol use No     Patient Active Problem List   Diagnosis Code    Hypothyroidism E03.9    Essential hypertension I10    Anxiety state, unspecified F41.1    Generalized osteoarthrosis, unspecified site M15.9    Lumbar compression fracture (HCC) S32.000A    Hyperlipidemia E78.5    Psoriasis L40.9    Adverse reaction to NSAIDs; acute renal failure T39.395A    Dementia F03.90    Impaired mobility and ADLs Z74.09    Incontinence in female R32    CKD (chronic kidney disease) stage 3, GFR 30-59 ml/min N18.3    Osteoporosis M81.0    Primary osteoarthritis of both hands M19.041, M19.042    Advanced care planning/counseling discussion Z70.80    Late onset Alzheimer'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 behavioral disturbance G30.1, F02.80    Vitamin D deficiency E55.9       Depression Risk Factor Screening:     PHQ over the last two weeks 9/20/2017   PHQ Not Done -   Little interest or pleasure in doing things Not at all   Feeling down, depressed or hopeless Not at all   Total Score PHQ 2 0     Alcohol Risk Factor Screening:    You do not drink alcohol or very rarely. Functional Ability and Level of Safety:   Hearing Loss  Hearing is good. Activities of Daily Living  The home contains: handrails  Patient needs help with:  phone, transportation, shopping, preparing meals, laundry, housework, managing medications, managing money, dressing, bathing, hygiene, bathroom needs and walking    Fall RiskFall Risk Assessment, last 12 mths 9/20/2017   Able to walk? Yes   Fall in past 12 months? No   Fall with injury? -   Number of falls in past 12 months -   Fall Risk Score -       Abuse Screen  Patient is not abused    Cognitive Screening   Evaluation of Cognitive Function:  Has your family/caregiver stated any concerns about your memory: yes  Abnormal    Patient Care Team   Patient Care Team:  Santos Michelle MD as PCP - General (Family Practice)  Santos Michelle MD (Family Practice)  Sylvester Manjarrez RN as Ambulatory Care Navigator    Assessment/Plan   Education and counseling provided:  Are appropriate based on today's review and evaluation  Influenza Vaccine    Diagnoses and all orders for this visit:    1. Encounter for immunization  -     Influenza virus vaccine ([address]) 72 years and older    2.  Elevated blood sugar level  -     AMB POC HEMOGLOBIN A1C        Health Maintenance Due   Topic Date Due    MEDICARE YEARLY EXAM  09/08/2017

## 2017-09-20 NOTE — PATIENT INSTRUCTIONS

## 2017-09-20 NOTE — PROGRESS NOTES
HISTORY OF PRESENT ILLNESS  Claudia Ruvalcaba is a 80 y.o. female. Blood pressure 138/80, pulse 70, temperature 96.5 °F (35.8 °C), temperature source Oral, resp. rate 18, height 5' 2\" (1.575 m), weight 134 lb 6.4 oz (61 kg), SpO2 96 %. Body mass index is 24.58 kg/(m^2). Chief Complaint   Patient presents with    Hypertension    Cholesterol Problem     fasting     Immunization/Injection        HPI  Claudia Ruvalcaba 80 y.o. female  presents to the office today for follow up on chronic conditions. Pt presents with daughter at bedside to assist in presenting history. COPD: Pt's daughter reports that she is breathing okay, but had to go to the hospital for 4 days a couple weeks ago. Daughter states that she has been giving the pt Spiriva twice a day and it has been helping her a lot. I advised pt to only give the medication once a day because it is a steroid. I advised pt to follow up with Dr. Tammy Ruiz (Pulmonology) to see if there is a stronger medication, like Incruse, she can take. Daughter reports that they are seeing Dr. Jose Hardy next week. Hypertension: Bp at office today 138/80 by manual arm cuff recheck. Pt continues with amlodipine 5mg daily. Daughter reports that she checks pt's bp at home and it usually is around 135 and the bottom number is low. Bp control stable, continue current regimen. Hyperlipidemia: Lipid panel on 03/15/17 notable for total cholesterol 205, , HDL 61, and triglycerides 149. Pt continues with Atorvastatin 20mg daily. Presumed stable, will assess levels today. Elevated blood sugar level: A1c per POC today 6.6%, elevated from A1c of 6.4% on 04/06/16. I advised daughter to keep sweets and carbohydrates away. I will continue to monitor. Late onset Alzheimer'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 behavioral disturbance: Daughter reports that pt has been doing well. Daughter states that sometimes pt can be aggravated and is more tired than usual, but otherwise is doing well.  I advised pt that we will check pt's thyroid levels today. Daughter denies any signs of depression. Daughter states that she does not feel like the Donepezil 10mg daily and Memantine 5mg BID are doing much. I advised daughter that we increase pt's dosage of the Memantine. I advised daughter to give her 2 tablets of the Memantine 5mg BID, but keep the Donepezil the same. Advised pt to follow up in 1 month for Alzheimer's follow up. Vitamin-D Deficiency: Vitamin D levels were 30.6 on 07/18/16. Pt continues with 50,000 units of vitamin D once a week. Presumed stable, will assess levels today. Health maintenance: Daughter reports that they both got the influenza vaccine today. Pt has an ACP on file. Daughter reports that pt will see her nephrologist next month. I have asked pt a series of questions related to [employer]. Current Outpatient Prescriptions   Medication Sig Dispense Refill    imipramine (TOFRANIL) 25 mg tablet Take 1 Tab by mouth nightly. 180 Tab 1    umeclidinium (INCRUSE ELLIPTA) 62.5 mcg/actuation inhaler Take 1 Puff by inhalation daily. 1 Inhaler 0    memantine (NAMENDA) 10 mg tablet Take 1 Tab by mouth two (2) times a day. 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 3 Each 4    SYNTHROID 75 mcg tablet TAKE 1 TABLET DAILY BEFORE BREAKFAST 90 Tab 1    loratadine (CLARITIN) 10 mg tablet TAKE 1 TABLET DAILY 30 Tab 4    amLODIPine (NORVASC) 5 mg tablet TAKE 1 TABLET DAILY FOR BLOOD PRESSURE 90 Tab 1    atorvastatin (LIPITOR) 20 mg tablet TAKE 1 TABLET DAILY FOR CHOLESTEROL 90 Tab 1    donepezil (ARICEPT) 10 mg tablet TAKE 1 TABLET NIGHTLY 90 Tab 1    denosumab (PROLIA) 60 mg/mL injection 60 mg by SubCUTAneous route every 6 months.       VITAMIN D2 50,000 unit capsule TAKE 1 CAPSULE EVERY 7 DAYS 12 Cap 2    albuterol-ipratropium (DUO-NEB) 2.5 mg-0.5 mg/3 ml nebu 3 mL by Nebulization route every four (4) hours as needed. 90 Nebule 3    FOLIC ACID/MULTIVIT-MIN/LUTEIN (CENTRUM SILVER PO) Take  by mouth.  albuterol (PROVENTIL HFA, VENTOLIN HFA, PROAIR HFA) 90 mcg/actuation inhaler Take 2 Puffs by inhalation every six (6) hours as needed for Wheezing. 1 Inhaler 0     Allergies   Allergen Reactions    Keflex [Cephalexin] Rash    Pcn [Penicillins] Rash    Sulfa (Sulfonamide Antibiotics) Rash     Past Medical History:   Diagnosis Date    Anxiety state, unspecified     CKD (chronic kidney disease) stage 3, GFR 30-59 ml/min 4/6/2016    Dementia 6/12    Landa/npysch    Depression 9/20/2017    Generalized osteoarthrosis, unspecified site     Lumbar compression fracture ([facility] [address] 4/08    epidurals x 2  Umang/os    Osteopenia     Other and unspecified hyperlipidemia     Psoriasis     Unspecified essential hypertension     Unspecified hypothyroidism     thyroid irradiated i131     Past Surgical History:   Procedure Laterality Date    COLONOSCOPY  10/10    [facility]/gi    TOTAL KNEE ARTHROPLASTY  1992    left    TOTAL KNEE ARTHROPLASTY  1992    right     Family History   Problem Relation Age of Onset    Cancer Mother      stomach tumors    Cancer Brother      prostate     Social History   Substance Use Topics    Smoking status: Former Smoker     Packs/day: 0.50     Years: 60.00     Types: Cigarettes     Quit date: 2014    Smokeless tobacco: Never Used    Alcohol use No        Review of Systems   Unable to perform ROS: Dementia       Physical Exam   Constitutional: She is oriented to person, place, and time. She appears well-developed and well-nourished. No distress. HENT:   Head: Normocephalic and atraumatic. Neck: Carotid bruit is not present. Cardiovascular: Normal rate, regular rhythm, normal heart sounds and intact distal pulses. Exam reveals no gallop and no friction rub. No murmur heard.   Pulmonary/Chest: Effort normal and breath sounds normal. No respiratory distress. She has no wheezes. She has no rales. Musculoskeletal: She exhibits no edema. Neurological: She is alert and oriented to person, place, and time. Skin: She is not diaphoretic. Psychiatric: She has a normal mood and affect. Her behavior is normal. Judgment and thought content normal.   Nursing note and vitals reviewed. ASSESSMENT and PLAN  Diagnoses and all orders for this visit:    1. Mixed hyperlipidemia  -     METABOLIC PANEL, COMPREHENSIVE  -     LIPID PANEL    2. Elevated blood sugar level  -     AMB POC HEMOGLOBIN R6W  -     METABOLIC PANEL, COMPREHENSIVE  - A1c per POC today 6.6%, elevated from A1c of 6.4% on 04/06/16. - I advised daughter to keep sweets and carbohydrates away. I will continue to monitor. 3. Essential hypertension  -     METABOLIC PANEL, COMPREHENSIVE  -     CBC WITH AUTOMATED DIFF  - Bp control stable, continue current regimen. 4. Screening for depression  -     Depression Screen Annual    5. Encounter for Medicare annual wellness exam        - I have asked pt a series of questions related to [facility]. 6. Advanced care planning/counseling discussion        - Pt has an ACP on file. 7. Late onset Alzheimer'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 behavioral disturbance  -     imipramine (TOFRANIL) 25 mg tablet; Take 1 Tab by mouth nightly. -     memantine (NAMENDA) 10 mg tablet; Take 1 Tab by mouth two (2) times a day. Indications: MODERATE TO SEVERE ALZHEIMER'S TYPE DEMENTIA  - I advised daughter that we increase pt's dosage of the Memantine. I advised daughter to give her 2 tablets of the Memantine 5mg BID, but keep the Donepezil the same.   - Advised pt to follow up in 1 month for Alzheimer's follow up. 8. Chronic obstructive pulmonary disease, unspecified COPD type (HCC)  -     umeclidinium (INCRUSE ELLIPTA) 62.5 mcg/actuation inhaler;  Take 1 Puff by inhalation daily.        - Pt has appointment with Dr. Pastor Ruelas (Pulmonology). 9. Vitamin D deficiency  -     VITAMIN D, 25 HYDROXY  - Vitamin D levels were 30.6 on 07/18/16. Presumed stable, will assess levels today. 10. Acquired hypothyroidism  -     TSH 3RD GENERATION  - Will check TSH levels to rule out any thyroid problems. 11. Encounter for immunization  -     Influenza virus vaccine ([address]) 65 years and older      Follow-up Disposition:  Return in about 1 month (around 10/20/2017) for alzheimers follow up. Medication risks/benefits/costs/interactions/alternatives discussed with patient. Advised patient to call back or return to office if symptoms worsen/change/persist.  If patient cannot reach us or should anything more severe/urgent arise he/she should proceed directly to the nearest emergency department. Discussed expected course/resolution/complications of diagnosis in detail with patient. Patient given a written after visit summary which includes her diagnoses, current medications and vitals. Patient expressed understanding with the diagnosis and plan.   Written by prieto Patel, as dictated by, Dr. Tuan Oconnell MD.   I have reviewed and agree with the above note and have made corrections where appropriate, Dr. Tuan Oconnell MD

## 2017-09-20 NOTE — MR AVS SNAPSHOT
Visit Information Date & Time Provider Department Dept. Phone Encounter #  
 9/20/2017  8:30 AM Renee Edouard MD [address] [place] [address] [phone] [id number] Follow-up Instructions Return in about 1 month (around 10/20/2017) for alzheimers follow up. Your Appointments 12/19/2017  9:40 AM  
ESTABLISHED PATIENT with Yasmine Kothari MD  
[id number] ([facility]) Appt Note: Prolia Injection plus fu -kfb; . ... [address] [facility] [address]  
[phone]  
  
   
 [address] [facility] [address] [address] Upcoming Health Maintenance Date Due  
 MEDICARE YEARLY EXAM 9/8/2017 GLAUCOMA SCREENING Q2Y 3/21/2019 DTaP/Tdap/Td series (2 - Td) 9/7/2026 Allergies as of 9/20/2017  Review Complete On: 9/20/2017 By: Renee Edouard MD  
  
 Severity Noted Reaction Type Reactions Keflex [Cephalexin]  04/23/2010    Rash Pcn [Penicillins]  04/23/2010    Rash  
 Sulfa (Sulfonamide Antibiotics)  05/31/2011    Rash Current Immunizations  Reviewed on 9/7/2016 Name Date Influenza High Dose Vaccine PF 9/20/2017, 9/30/2015 Influenza Vaccine 10/16/2013 Pneumococcal Conjugate (PCV-13) 3/2/2016 Pneumococcal Polysaccharide (PPSV-23) 1/29/2014 Tdap 9/7/2016 Zoster Vaccine, Live 10/15/2014 Not reviewed this visit You Were Diagnosed With   
  
 Codes Comments Mixed hyperlipidemia    -  Primary ICD-10-CM: W68.1 ICD-9-CM: 272.2 Elevated blood sugar level     ICD-10-CM: R73.9 ICD-9-CM: 790.29 Essential hypertension     ICD-10-CM: I10 
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 ICD-10-CM: G30.1, F02.80 ICD-9-CM: 331.0, 294.10 Encounter for immunization     ICD-10-CM: A43 ICD-9-CM: V03.89 Chronic obstructive pulmonary disease, unspecified COPD type ([facility]     ICD-10-CM: J44.9 ICD-9-CM: 038 Vitals BP Pulse Temp Resp Height(growth percentile) Weight(growth percentile) 138/80 70 96.5 °F (35.8 °C) (Oral) 18 5' 2\" (1.575 m) 134 lb 6.4 oz (61 kg) SpO2 BMI OB Status Smoking Status 96% 24.58 kg/m2 Postmenopausal Former Smoker Vitals History BMI and BSA Data Body Mass Index Body Surface Area 24.58 kg/m 2 1.63 m 2 Preferred Pharmacy [facility] [address] , [address] [facility] [phone] Your Updated Medication List  
  
   
This list is accurate as of: 9/20/17  9:16 AM.  Always use your most recent med list.  
  
  
  
  
 albuterol 90 mcg/actuation inhaler Commonly known as:  PROVENTIL HFA, VENTOLIN HFA, PROAIR HFA Take 2 Puffs by inhalation every six (6) hours as needed for Wheezing. albuterol-ipratropium 2.5 mg-0.5 mg/3 ml Nebu Commonly known as:  DUO-NEB  
3 mL by Nebulization route every four (4) hours as needed. amLODIPine 5 mg tablet Commonly known as:  Nilson Asiya TAKE 1 TABLET DAILY FOR BLOOD PRESSURE  
  
 atorvastatin 20 mg tablet Commonly known as:  LIPITOR  
TAKE 1 TABLET DAILY FOR CHOLESTEROL CENTRUM SILVER PO Take  by mouth. donepezil 10 mg tablet Commonly known as:  ARICEPT  
TAKE 1 TABLET NIGHTLY  
  
 fluticasone-salmeterol 230-21 mcg/actuation inhaler Commonly known as:  ADVAIR HFA Take 2 Puffs by inhalation two (2) times a day. imipramine 25 mg tablet Commonly known as:  TOFRANIL Take 1 Tab by mouth nightly. loratadine 10 mg tablet Commonly known as:  CLARITIN  
TAKE 1 TABLET DAILY  
  
 memantine 10 mg tablet Commonly known as:  Poly Leonard  
 Take 1 Tab by mouth two (2) times a day. Indications: MODERATE TO SEVERE ALZHEIMER'S TYPE DEMENTIA PROLIA 60 mg/mL injection Generic drug:  denosumab 60 mg by SubCUTAneous route every 6 months. SYNTHROID 75 mcg tablet Generic drug:  levothyroxine TAKE 1 TABLET DAILY BEFORE BREAKFAST  
  
 umeclidinium 62.5 mcg/actuation inhaler Commonly known as:  INCRUSE ELLIPTA Take 1 Puff by inhalation daily. VITAMIN D2 50,000 unit capsule Generic drug:  ergocalciferol TAKE 1 CAPSULE EVERY 7 DAYS Prescriptions Sent to Pharmacy Refills  
 umeclidinium (INCRUSE ELLIPTA) 62.5 mcg/actuation inhaler 0 Sig: Take 1 Puff by inhalation daily. Class: Normal  
 Pharmacy: [facility] [address] Ph #: [phone] Route: Inhalation  
 memantine (NAMENDA) 10 mg tablet 3 Sig: Take 1 Tab by mouth two (2) times a day. Indications: MODERATE TO SEVERE ALZHEIMER'S TYPE DEMENTIA Class: Normal  
 Pharmacy: [place] [email or web address] [place], [address] Ph #: [phone] Route: Oral  
  
We Performed the Following AMB POC HEMOGLOBIN A1C [56257 CPT(R)] [address] [[id number] hospitals] INFLUENZA VIRUS VACCINE, HIGH DOSE SEASONAL, PRESERVATIVE FREE [19288 CPT(R)] Follow-up Instructions Return in about 1 month (around 10/20/2017) for alzheimers follow up. Patient Instructions Medicare Wellness Visit, Female The best way to live healthy is to have a healthy lifestyle by eating a well-balanced diet, exercising regularly, limiting alcohol and stopping smoking. Regular physical exams and screening tests are another way to keep healthy. Preventive exams provided by your health care provider can find health problems before they become diseases or illnesses. Preventive services including immunizations, screening tests, monitoring and exams can help you take care of your own health. All people over age 72 should have a pneumovax  and and a prevnar shot to prevent pneumonia. These are once in a lifetime unless you and your provider decide differently. All people over 65 should have a yearly flu shot and a tetanus vaccine every 10 years. A bone mass density to screen for osteoporosis or thinning of the bones should be done every 2 years after 65. Screening for diabetes mellitus with a blood sugar test should be done every year. Glaucoma is a disease of the eye due to increased ocular pressure that can lead to blindness and it should be done every year by an eye professional. 
 
Cardiovascular screening tests that check for elevated lipids (fatty part of blood) which can lead to heart disease and strokes should be done every 5 years. Colorectal screening that evaluates for blood or polyps in your colon should be done yearly as a stool test or every five years as a flexible sigmoidoscope or every 10 years as a colonoscopy up to age 76. Breast cancer screening with a mammogram is recommended biennially  for women age 54-69. Screening for cervical cancer with a pap smear and pelvic exam is recommended for women after age 72 years every 2 years up to age 79 or when the provider and patient decide to stop. If there is a history of cervical abnormalities or other increased risk for cancer then the test is recommended yearly. Hepatitis C screening is also recommended for anyone born between [birth year] through [address]. A shingles vaccine is also recommended once in a lifetime after age 61. Your Medicare Wellness Exam is recommended annually. Here is a list of your current Health Maintenance items with a due date: 
Health Maintenance Due Topic Date Due  
 Annual Well Visit  09/08/2017 Medicare Wellness Visit, Female The best way to live healthy is to have a healthy lifestyle by eating a well-balanced diet, exercising regularly, limiting alcohol and stopping smoking. Regular physical exams and screening tests are another way to keep healthy. Preventive exams provided by your health care provider can find health problems before they become diseases or illnesses. Preventive services including immunizations, screening tests, monitoring and exams can help you take care of your own health. All people over age 72 should have a pneumovax  and and a prevnar shot to prevent pneumonia. These are once in a lifetime unless you and your provider decide differently. All people over 65 should have a yearly flu shot and a tetanus vaccine every 10 years. A bone mass density to screen for osteoporosis or thinning of the bones should be done every 2 years after 65. Screening for diabetes mellitus with a blood sugar test should be done every year. Glaucoma is a disease of the eye due to increased ocular pressure that can lead to blindness and it should be done every year by an eye professional. 
 
Cardiovascular screening tests that check for elevated lipids (fatty part of blood) which can lead to heart disease and strokes should be done every 5 years. Colorectal screening that evaluates for blood or polyps in your colon should be done yearly as a stool test or every five years as a flexible sigmoidoscope or every 10 years as a colonoscopy up to age 76. Breast cancer screening with a mammogram is recommended biennially  for women age 54-69. Screening for cervical cancer with a pap smear and pelvic exam is recommended for women after age 72 years every 2 years up to age 79 or when the provider and patient decide to stop. If there is a history of cervical abnormalities or other increased risk for cancer then the test is recommended yearly. Hepatitis C screening is also recommended for anyone born between [birth year] [address]. A shingles vaccine is also recommended once in a lifetime after age 61. Your Medicare Wellness Exam is recommended annually. Here is a list of your current Health Maintenance items with a due date: 
Health Maintenance Due Topic Date Due  
 Annual Well Visit  09/08/2017 [facility]! Dear Nydia Short: Thank you for requesting a [employer] account. Our records indicate that you already have an active Kibin account. You can access your account anytime at [email or web address] Did you know that you can access your hospital and ER discharge instructions at any time in Kibin? You can also review all of your test results from your hospital stay or ER visit. Additional Information If you have questions, please visit the Frequently Asked Questions section of the [employer] website at [email or web address] Remember, [employer]in is NOT to be used for urgent needs. For medical emergencies, dial 911. Now available from your iPhone and Android! Please provide this summary of care documentation to your next provider. Your primary care clinician is listed as Louise Albright. If you have any questions after today's visit, please call [phone].

## 2017-09-21 ENCOUNTER — TELEPHONE (OUTPATIENT)
Dept: FAMILY MEDICINE CLINIC | Age: 82
End: 2017-09-21

## 2017-09-21 LAB
25(OH)D3+25(OH)D2 SERPL-MCNC: 59.8 NG/ML (ref 30–100)
ALBUMIN SERPL-MCNC: 4.2 G/DL (ref 3.5–4.7)
ALBUMIN/GLOB SERPL: 1.6 {RATIO} (ref 1.2–2.2)
ALP SERPL-CCNC: 101 IU/L (ref 39–117)
ALT SERPL-CCNC: 31 IU/L (ref 0–32)
AST SERPL-CCNC: 31 IU/L (ref 0–40)
BASOPHILS # BLD AUTO: 0 X10E3/UL (ref 0–0.2)
BASOPHILS NFR BLD AUTO: 0 %
BILIRUB SERPL-MCNC: 0.5 MG/DL (ref 0–1.2)
BUN SERPL-MCNC: 17 MG/DL (ref 8–27)
BUN/CREAT SERPL: 14 (ref 12–28)
CALCIUM SERPL-MCNC: 9.2 MG/DL (ref 8.7–10.3)
CHLORIDE SERPL-SCNC: 102 MMOL/L (ref 96–106)
CHOLEST SERPL-MCNC: 266 MG/DL (ref 100–199)
CO2 SERPL-SCNC: 24 MMOL/L (ref 18–29)
CREAT SERPL-MCNC: 1.23 MG/DL (ref 0.57–1)
EOSINOPHIL # BLD AUTO: 0.1 X10E3/UL (ref 0–0.4)
EOSINOPHIL NFR BLD AUTO: 2 %
ERYTHROCYTE [DISTWIDTH] IN BLOOD BY AUTOMATED COUNT: 16 % (ref 12.3–15.4)
GLOBULIN SER CALC-MCNC: 2.7 G/DL (ref 1.5–4.5)
GLUCOSE SERPL-MCNC: 112 MG/DL (ref 65–99)
HCT VFR BLD AUTO: 36.6 % (ref 34–46.6)
HDLC SERPL-MCNC: 70 MG/DL
HGB BLD-MCNC: 11.9 G/DL (ref 11.1–15.9)
IMM GRANULOCYTES # BLD: 0 X10E3/UL (ref 0–0.1)
IMM GRANULOCYTES NFR BLD: 0 %
INTERPRETATION, 910389: NORMAL
INTERPRETATION: NORMAL
LDLC SERPL CALC-MCNC: 156 MG/DL (ref 0–99)
LYMPHOCYTES # BLD AUTO: 2.3 X10E3/UL (ref 0.7–3.1)
LYMPHOCYTES NFR BLD AUTO: 44 %
MCH RBC QN AUTO: 28.9 PG (ref 26.6–33)
MCHC RBC AUTO-ENTMCNC: 32.5 G/DL (ref 31.5–35.7)
MCV RBC AUTO: 89 FL (ref 79–97)
MONOCYTES # BLD AUTO: 0.5 X10E3/UL (ref 0.1–0.9)
MONOCYTES NFR BLD AUTO: 9 %
NEUTROPHILS # BLD AUTO: 2.3 X10E3/UL (ref 1.4–7)
NEUTROPHILS NFR BLD AUTO: 45 %
PDF IMAGE, 910387: NORMAL
PLATELET # BLD AUTO: 232 X10E3/UL (ref 150–379)
POTASSIUM SERPL-SCNC: 4.2 MMOL/L (ref 3.5–5.2)
PROT SERPL-MCNC: 6.9 G/DL (ref 6–8.5)
RBC # BLD AUTO: 4.12 X10E6/UL (ref 3.77–5.28)
SODIUM SERPL-SCNC: 143 MMOL/L (ref 134–144)
TRIGL SERPL-MCNC: 198 MG/DL (ref 0–149)
TSH SERPL DL<=0.005 MIU/L-ACNC: 20.2 UIU/ML (ref 0.45–4.5)
VLDLC SERPL CALC-MCNC: 40 MG/DL (ref 5–40)
WBC # BLD AUTO: 5.2 X10E3/UL (ref 3.4–10.8)

## 2017-09-21 NOTE — TELEPHONE ENCOUNTER
[phone] attempted to call Shaji Mac no answer left message to call me back in regards to patient walker    Patient request walker

## 2017-09-21 NOTE — TELEPHONE ENCOUNTER
Phone#[phone]    Edson Jones is calling from [facility] statin that he is returning the nurse's call.

## 2017-09-26 ENCOUNTER — PATIENT OUTREACH (OUTPATIENT)
Dept: FAMILY MEDICINE CLINIC | Age: 82
End: 2017-09-26

## 2017-09-26 NOTE — PROGRESS NOTES
NN called daughter Chris Perry to check on mother as f/u to her [facility] 9/9-9/11 hospitalization for copd and weakness. LM requesting Rupali call me back on my direct number.  Will continue to try to reach by phone and will send My Chart message via Portal.

## 2017-09-26 NOTE — TELEPHONE ENCOUNTER
Got a call from [facility] [phone] in regards to patient walker per Hollie need to faxed prescription again to Jono notify her that its been faxed Carter Weaver understand

## 2017-10-01 NOTE — PROGRESS NOTES
Needs an OV with daughter   A lot going on with her labs.   Please call her and work her in this week

## 2017-10-05 NOTE — PROGRESS NOTES
[phone] Esperanza Motley spoke to her notified needs office visit to discuss labs patient has appointment 10/11/2017 at 4:45P

## 2017-10-09 ENCOUNTER — HOSPITAL ENCOUNTER (OUTPATIENT)
Dept: LAB | Age: 82
Discharge: HOME | End: 2017-10-09
Payer: MEDICARE

## 2017-10-09 DIAGNOSIS — E03.9 ACQUIRED HYPOTHYROIDISM: Primary | ICD-10-CM

## 2017-10-09 PROCEDURE — 36415 COLL VENOUS BLD VENIPUNCTURE: CPT

## 2017-10-09 PROCEDURE — 84443 ASSAY THYROID STIM HORMONE: CPT

## 2017-10-10 LAB — TSH SERPL DL<=0.005 MIU/L-ACNC: 4.72 UIU/ML (ref 0.45–4.5)

## 2017-10-11 ENCOUNTER — OFFICE VISIT (OUTPATIENT)
Dept: FAMILY MEDICINE CLINIC | Age: 82
End: 2017-10-11

## 2017-10-11 VITALS
DIASTOLIC BLOOD PRESSURE: 76 MMHG | RESPIRATION RATE: 18 BRPM | WEIGHT: 134 LBS | BODY MASS INDEX: 24.66 KG/M2 | OXYGEN SATURATION: 95 % | HEART RATE: 70 BPM | TEMPERATURE: 97.8 F | HEIGHT: 62 IN | SYSTOLIC BLOOD PRESSURE: 135 MMHG

## 2017-10-11 DIAGNOSIS — E78.5 HYPERLIPIDEMIA, UNSPECIFIED HYPERLIPIDEMIA TYPE: ICD-10-CM

## 2017-10-11 DIAGNOSIS — J06.9 UPPER RESPIRATORY TRACT INFECTION, UNSPECIFIED TYPE: ICD-10-CM

## 2017-10-11 DIAGNOSIS — I10 ESSENTIAL HYPERTENSION: ICD-10-CM

## 2017-10-11 DIAGNOSIS — K42.9 UMBILICAL HERNIA WITHOUT OBSTRUCTION AND WITHOUT GANGRENE: ICD-10-CM

## 2017-10-11 DIAGNOSIS — E03.9 ACQUIRED HYPOTHYROIDISM: Primary | ICD-10-CM

## 2017-10-11 DIAGNOSIS — E78.2 MIXED HYPERLIPIDEMIA: ICD-10-CM

## 2017-10-11 RX ORDER — ATORVASTATIN CALCIUM 40 MG/1
40 TABLET, FILM COATED ORAL DAILY
Qty: 30 TAB | Refills: 2 | Status: SHIPPED | OUTPATIENT
Start: 2017-10-11 | End: 2017-12-05 | Stop reason: SDUPTHER

## 2017-10-11 RX ORDER — LEVOTHYROXINE SODIUM 88 UG/1
88 TABLET ORAL
Qty: 30 TAB | Refills: 2 | Status: SHIPPED | OUTPATIENT
Start: 2017-10-11 | End: 2017-10-28 | Stop reason: SDUPTHER

## 2017-10-11 RX ORDER — TIOTROPIUM BROMIDE INHALATION SPRAY 3.12 UG/1
SPRAY, METERED RESPIRATORY (INHALATION)
COMMUNITY
Start: 2017-09-26 | End: 2018-01-10 | Stop reason: ALTCHOICE

## 2017-10-11 RX ORDER — ARFORMOTEROL TARTRATE 15 UG/2ML
SOLUTION RESPIRATORY (INHALATION)
COMMUNITY
Start: 2017-09-26 | End: 2018-07-05 | Stop reason: ALTCHOICE

## 2017-10-11 RX ORDER — BUDESONIDE 0.5 MG/2ML
INHALANT ORAL
COMMUNITY
Start: 2017-09-26 | End: 2018-07-05 | Stop reason: ALTCHOICE

## 2017-10-11 RX ORDER — DOXYCYCLINE 100 MG/1
100 TABLET ORAL 2 TIMES DAILY
Qty: 20 TAB | Refills: 0 | Status: SHIPPED | OUTPATIENT
Start: 2017-10-11 | End: 2017-10-21

## 2017-10-11 NOTE — PROGRESS NOTES
HISTORY OF PRESENT ILLNESS  Shanta Bryant is a 80 y.o. female. Blood pressure 135/76, pulse 70, temperature 97.8 °F (36.6 °C), temperature source Oral, resp. rate 18, height 5' 2\" (1.575 m), weight 134 lb (60.8 kg), SpO2 95 %. Body mass index is 24.51 kg/(m^2). Chief Complaint   Patient presents with    Abnormal Lab Results     follow up        HPI  Shanta Bryant 80 y.o. female  presents to the office today for abnormal lab results. Pt presents with daughter at bedside. Hypertension: Bp at office today 135/76. Pt continues with Amlodipine 5mg daily. Bp control stable, continue current regimen. Hypothryoidism: Pt's TSH levels were 4.720 on 10/09/17. Pt continues with Synthroid 75mcg daily. I advised pt that we can increase pt's dosage to 88mcg. I advised pt to follow up in 6 weeks to reassess. Hyperlipidemia: Lipid panel on 09/20/17 notable for total cholesterol 266, , HDL 70, and triglycerides 198. Pt continues with Atorvastatin 20mg daily. I advised pt that her levels are elevated and asked if pt has been taking her Atorvastatin. Pt's daughter states that pt has been taking her medication every day and diet has not changed. I advised pt to take the Atorvastatin 20mg BID. Vitamin D deficiency: Pt's vitamin D levels were 59.8 on 09/21/17. Pt continues with Vitamin D2 50,000 units every 7 days. Stable, continue current regimen. Umbilical hernia: Pt's daughter states that pt is developing a hernia. I advised pt that it is an umbilical hernia. I advised pt if it starts to hurt or continues to grow to follow up and I will refer to Dr. Margareth Markham. Lonny (General surgery) . Health maintenance: Pt's daughter states that pt had the influenza vaccine 3 weeks ago. Pt's daughter states that pt's feet are swollen. I advised pt's daughter to elevate pt's feet and have pt wear a compression sock. Pt's daughter asked for antibiotics as a preventative measure for the flu.  I advised pt's daughter that we can give her Doxycycline 100mg. Pt has an ACP on file. Current Outpatient Prescriptions   Medication Sig Dispense Refill    BROVANA 15 mcg/2 mL nebu neb solution       budesonide (PULMICORT) 0.5 mg/2 mL nbsp       SPIRIVA RESPIMAT 2.5 mcg/actuation inhaler       levothyroxine (SYNTHROID) 88 mcg tablet Take 1 Tab by mouth Daily (before breakfast). 30 Tab 2    doxycycline (ADOXA) 100 mg tablet Take 1 Tab by mouth two (2) times a day for 10 days. 20 Tab 0    atorvastatin (LIPITOR) 40 mg tablet Take 1 Tab by mouth daily. 30 Tab 2    imipramine (TOFRANIL) 25 mg tablet Take 1 Tab by mouth nightly. 180 Tab 1    umeclidinium (INCRUSE ELLIPTA) 62.5 mcg/actuation inhaler Take 1 Puff by inhalation daily. 1 Inhaler 0    memantine (NAMENDA) 10 mg tablet Take 1 Tab by mouth two (2) times a day. 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 3 Each 4    loratadine (CLARITIN) 10 mg tablet TAKE 1 TABLET DAILY 30 Tab 4    amLODIPine (NORVASC) 5 mg tablet TAKE 1 TABLET DAILY FOR BLOOD PRESSURE 90 Tab 1    donepezil (ARICEPT) 10 mg tablet TAKE 1 TABLET NIGHTLY 90 Tab 1    denosumab (PROLIA) 60 mg/mL injection 60 mg by SubCUTAneous route every 6 months.  VITAMIN D2 50,000 unit capsule TAKE 1 CAPSULE EVERY 7 DAYS 12 Cap 2    FOLIC ACID/MULTIVIT-MIN/LUTEIN (CENTRUM SILVER PO) Take  by mouth.  albuterol-ipratropium (DUO-NEB) 2.5 mg-0.5 mg/3 ml nebu 3 mL by Nebulization route every four (4) hours as needed. 90 Nebule 3    albuterol (PROVENTIL HFA, VENTOLIN HFA, PROAIR HFA) 90 mcg/actuation inhaler Take 2 Puffs by inhalation every six (6) hours as needed for Wheezing.  1 Inhaler 0     Allergies   Allergen Reactions    Keflex [Cephalexin] Rash    Pcn [Penicillins] Rash    Sulfa (Sulfonamide Antibiotics) Rash     Past Medical History:   Diagnosis Date    Anxiety state, unspecified     CKD (chronic kidney disease) stage 3, GFR 30-59 ml/min 4/6/2016    Dementia 6/12    Landa/npysch    Depression 9/20/2017    Generalized osteoarthrosis, unspecified site     Lumbar compression fracture ([address] 4/08    epidurals x 2  Umang/os    Osteopenia     Other and unspecified hyperlipidemia     Psoriasis     Unspecified essential hypertension     Unspecified hypothyroidism     thyroid irradiated i131     Past Surgical History:   Procedure Laterality Date    COLONOSCOPY  10/10    [place]    TOTAL KNEE ARTHROPLASTY  1992    left    TOTAL KNEE ARTHROPLASTY  1992    right     Family History   Problem Relation Age of Onset    Cancer Mother      stomach tumors    Cancer Brother      prostate     Social History   Substance Use Topics    Smoking status: Former Smoker     Packs/day: 0.50     Years: 60.00     Types: Cigarettes     Quit date: 2014    Smokeless tobacco: Never Used    Alcohol use No        Review of Systems   Constitutional: Negative. Negative for malaise/fatigue. Eyes: Negative for blurred vision. Respiratory: Negative for shortness of breath. Cardiovascular: Negative for chest pain and leg swelling. Musculoskeletal: Negative. Neurological: Negative. Negative for dizziness and headaches. All other systems reviewed and are negative. Physical Exam   Constitutional: She is oriented to person, place, and time. She appears well-developed and well-nourished. No distress. HENT:   Head: Normocephalic and atraumatic. Neck: Carotid bruit is not present. Cardiovascular: Normal rate, regular rhythm, normal heart sounds and intact distal pulses. Exam reveals no gallop and no friction rub. No murmur heard. Pulmonary/Chest: Effort normal and breath sounds normal. No respiratory distress. She has no wheezes. She has no rales. Abdominal: A hernia is present. Musculoskeletal: She exhibits no edema. Trace edema bilaterally in feet.    Neurological: She is alert and oriented to person, place, and time. Skin: She is not diaphoretic. Psychiatric: She has a normal mood and affect. Her behavior is normal. Judgment and thought content normal.   Nursing note and vitals reviewed. ASSESSMENT and PLAN  Diagnoses and all orders for this visit:    1. Acquired hypothyroidism  -     levothyroxine (SYNTHROID) 88 mcg tablet; Take 1 Tab by mouth Daily (before breakfast). - Pt's TSH levels were 4.720 on 10/09/17. I advised pt that we can increase pt's dosage to 88mcg. I advised pt to follow up in 6 weeks to reassess. 2. Essential hypertension        - Bp at office today 135/76. Pt continues with Amlodipine 5mg daily. Bp control stable, continue current regimen. 3. Mixed hyperlipidemia  -     atorvastatin (LIPITOR) 40 mg tablet; Take 1 Tab by mouth daily.   - Lipid panel on 09/20/17 notable for total cholesterol 266, , HDL 70, and triglycerides 198. Pt continues with Atorvastatin 20mg daily. I advised pt to take the Atorvastatin 20mg BID. 4. Upper respiratory tract infection, unspecified type  -     doxycycline (ADOXA) 100 mg tablet; Take 1 Tab by mouth two (2) times a day for 10 days. 5. Umbilical hernia without obstruction and without gangrene        - I advised pt that it is an umbilical hernia. I advised pt if it starts to hurt or continues to grow to follow up and I will refer to Dr. Chelsey Carrasco. Lonny (General surgery) . Follow-up Disposition:  Return in about 3 months (around 1/11/2018) for hyperlipidemia follow up. Medication risks/benefits/costs/interactions/alternatives discussed with patient. Advised patient to call back or return to office if symptoms worsen/change/persist.  If patient cannot reach us or should anything more severe/urgent arise he/she should proceed directly to the nearest emergency department. Discussed expected course/resolution/complications of diagnosis in detail with patient.   Patient given a written after visit summary which includes her diagnoses, current medications and vitals. Patient expressed understanding with the diagnosis and plan. Written by prieto Moffett, as dictated by Wilber Block M.D.     I have reviewed and agree with the above note and have made corrections where appropriate, Dr. Manju Summers MD

## 2017-10-11 NOTE — MR AVS SNAPSHOT
Visit Information Date & Time Provider Department Dept. Phone Encounter #  
 10/11/2017  4:45 PM Annmarie Day  [facility] [address] [phone] [id number] Follow-up Instructions Return in about 3 months (around 1/11/2018) for hyperlipidemia follow up. Your Appointments 10/18/2017  3:45 PM  
SAME DAY with Annmarie Day MD  
[facility] ([facility]) Appt Note: 1 month follow up visit alzheimers [address] [facility] [address]  
[phone]  
  
   
 Rebecca Arias [address] [id number]  
  
    
 12/19/2017  9:40 AM  
ESTABLISHED PATIENT with Zaheer Torres MD  
[address] ([facility]) Appt Note: Prolia Injection plus fu -kfb; . ... [facility] [address]  
[phone]  
  
   
 [facility] [address] Upcoming Health Maintenance Date Due  
 MEDICARE YEARLY EXAM 9/21/2018 GLAUCOMA SCREENING Q2Y 3/21/2019 DTaP/Tdap/Td series (2 - Td) 9/7/2026 Allergies as of 10/11/2017  Review Complete On: 10/11/2017 By: Annmarie Day MD  
  
 Severity Noted Reaction Type Reactions Keflex [Cephalexin]  04/23/2010    Rash Pcn [Penicillins]  04/23/2010    Rash  
 Sulfa (Sulfonamide Antibiotics)  05/31/2011    Rash Current Immunizations  Reviewed on 9/7/2016 Name Date Influenza High Dose Vaccine PF 9/20/2017, 9/30/2015 Influenza Vaccine 10/16/2013 Pneumococcal Conjugate (PCV-13) 3/2/2016 Pneumococcal Polysaccharide (PPSV-23) 1/29/2014 Tdap 9/7/2016 Zoster Vaccine, Live 10/15/2014 Not reviewed this visit You Were Diagnosed With   
  
 Codes Comments Acquired hypothyroidism    -  Primary ICD-10-CM: E03.9 ICD-9-CM: 244.9 Essential hypertension     ICD-10-CM: I10 
ICD-9-CM: 401.9 Mixed hyperlipidemia     ICD-10-CM: E78.2 ICD-9-CM: 272.2 Upper respiratory tract infection, unspecified type     ICD-10-CM: J06.9 ICD-9-CM: 465.9 Umbilical hernia without obstruction and without gangrene     ICD-10-CM: K42.9 ICD-9-CM: 553.1 Hyperlipidemia, unspecified hyperlipidemia type     ICD-10-CM: E78.5 ICD-9-CM: 272.4 Vitals BP Pulse Temp Resp Height(growth percentile) Weight(growth percentile) 135/76 (BP 1 Location: Left arm, BP Patient Position: Sitting) 70 97.8 °F (36.6 °C) (Oral) 18 5' 2\" (1.575 m) 134 lb (60.8 kg) SpO2 BMI OB Status Smoking Status 95% 24.51 kg/m2 Postmenopausal Former Smoker Vitals History BMI and BSA Data Body Mass Index Body Surface Area 24.51 kg/m 2 1.63 m 2 Preferred Pharmacy Pharmacy Name Phone Diane Jimenez [address] [phone] Your Updated Medication List  
  
   
This list is accurate as of: 10/11/17  5:52 PM.  Always use your most recent med list.  
  
  
  
  
 albuterol 90 mcg/actuation inhaler Commonly known as:  PROVENTIL HFA, VENTOLIN HFA, PROAIR HFA Take 2 Puffs by inhalation every six (6) hours as needed for Wheezing. albuterol-ipratropium 2.5 mg-0.5 mg/3 ml Nebu Commonly known as:  DUO-NEB  
3 mL by Nebulization route every four (4) hours as needed. amLODIPine 5 mg tablet Commonly known as:  Arlyss Eliud TAKE 1 TABLET DAILY FOR BLOOD PRESSURE  
  
 atorvastatin 40 mg tablet Commonly known as:  LIPITOR Take 1 Tab by mouth daily. BROVANA 15 mcg/2 mL Nebu neb solution Generic drug:  arformoterol  
  
 budesonide 0.5 mg/2 mL Nbsp Commonly known as:  PULMICORT  
  
 CENTRUM SILVER PO Take  by mouth. donepezil 10 mg tablet Commonly known as:  ARICEPT  
TAKE 1 TABLET NIGHTLY  
  
 doxycycline 100 mg tablet Commonly known as:  ADOXA Take 1 Tab by mouth two (2) times a day for 10 days. fluticasone-salmeterol 230-21 mcg/actuation inhaler Commonly known as:  ADVAIR HFA Take 2 Puffs by inhalation two (2) times a day. imipramine 25 mg tablet Commonly known as:  TOFRANIL Take 1 Tab by mouth nightly. levothyroxine 88 mcg tablet Commonly known as:  SYNTHROID Take 1 Tab by mouth Daily (before breakfast). loratadine 10 mg tablet Commonly known as:  CLARITIN  
TAKE 1 TABLET DAILY  
  
 memantine 10 mg tablet Commonly known as:  Earla Phy Take 1 Tab by mouth two (2) times a day. Indications: MODERATE TO SEVERE ALZHEIMER'S TYPE DEMENTIA PROLIA 60 mg/mL injection Generic drug:  denosumab 60 mg by SubCUTAneous route every 6 months. SPIRIVA RESPIMAT 2.5 mcg/actuation inhaler Generic drug:  tiotropium bromide  
  
 umeclidinium 62.5 mcg/actuation inhaler Commonly known as:  INCRUSE ELLIPTA Take 1 Puff by inhalation daily. VITAMIN D2 50,000 unit capsule Generic drug:  ergocalciferol TAKE 1 CAPSULE EVERY 7 DAYS Prescriptions Sent to Pharmacy Refills  
 levothyroxine (SYNTHROID) 88 mcg tablet 2 Sig: Take 1 Tab by mouth Daily (before breakfast). Class: Normal  
 Pharmacy: [place] [address] Ph #: [phone] Route: Oral  
 doxycycline (ADOXA) 100 mg tablet 0 Sig: Take 1 Tab by mouth two (2) times a day for 10 days. Class: Normal  
 Pharmacy: [place] [address] Ph #: [phone] Route: Oral  
 atorvastatin (LIPITOR) 40 mg tablet 2 Sig: Take 1 Tab by mouth daily. Class: Normal  
 Pharmacy: [place] [address] Ph #: [phone] Route: Oral  
  
Follow-up Instructions Return in about 3 months (around 1/11/2018) for hyperlipidemia follow up. Introducing [facility] & HEALTH SERVICES! Dear Nicholas Ghotra: Thank you for requesting a [employer] account.   Our records indicate that you already have an active [employer] account. You can access your account anytime at [email or web address] Did you know that you can access your hospital and ER discharge instructions at any time in [employer]? You can also review all of your test results from your hospital stay or ER visit. Additional Information If you have questions, please visit the Frequently Asked Questions section of the [employer] website at [email or web address] Remember, [employer] is NOT to be used for urgent needs. For medical emergencies, dial 911. Now available from your iPhone and Android! Please provide this summary of care documentation to your next provider. Your primary care clinician is listed as Nicholas Rivera. If you have any questions after today's visit, please call [phone].

## 2017-10-11 NOTE — PROGRESS NOTES
Chief Complaint   Patient presents with    Abnormal Lab Results     follow up     1. Have you been to the ER, urgent care clinic since your last visit? Hospitalized since your last visit? No    2. Have you seen or consulted any other health care providers outside of the [address] [place] [address] since your last visit? Include any pap smears or colon screening.  No

## 2017-10-16 ENCOUNTER — TELEPHONE (OUTPATIENT)
Dept: RHEUMATOLOGY | Age: 82
End: 2017-10-16

## 2017-10-16 NOTE — TELEPHONE ENCOUNTER
Received notification from Alden Wang that insurance verification request was received and is currently being processed.

## 2017-10-23 RX ORDER — IMIPRAMINE HYDROCHLORIDE 25 MG/1
TABLET ORAL
Qty: 180 TAB | Refills: 1 | Status: SHIPPED | OUTPATIENT
Start: 2017-10-23 | End: 2019-10-04

## 2017-10-23 NOTE — PROGRESS NOTES
Increase synthroid to 100 mcg Monday, Wednesday, and Friday and 88 mcg rest of the week  Recheck TSH in 4 weeks    Synthroid 100 mcg #15 tabs 5 reiflls

## 2017-10-24 ENCOUNTER — TELEPHONE (OUTPATIENT)
Dept: FAMILY MEDICINE CLINIC | Age: 82
End: 2017-10-24

## 2017-10-24 RX ORDER — LEVOTHYROXINE SODIUM 100 UG/1
TABLET ORAL
Qty: 15 TAB | Refills: 5 | Status: SHIPPED | OUTPATIENT
Start: 2017-10-24 | End: 2017-10-28 | Stop reason: SDUPTHER

## 2017-10-24 NOTE — TELEPHONE ENCOUNTER
Kenya Hess  -  [phone]    (  Daughter )         Need  Patient's lab results for 10- visit-  Can leave voice mail message if she is not avail-

## 2017-10-24 NOTE — PROGRESS NOTES
Called and left voice message on Dtr's phone as requested re lab results. Also informed of new rx at pharmacy and need for repeat tsh in 4 weeks. To call if questions.

## 2017-10-28 DIAGNOSIS — E03.9 ACQUIRED HYPOTHYROIDISM: ICD-10-CM

## 2017-10-28 DIAGNOSIS — J44.9 CHRONIC OBSTRUCTIVE PULMONARY DISEASE, UNSPECIFIED COPD TYPE (HCC): ICD-10-CM

## 2017-10-30 NOTE — TELEPHONE ENCOUNTER
From: Genna Elliott  To: Lida Lock MD  Sent: 10/28/2017 10:25 PM EDT  Subject: Medication Renewal Request    Original authorizing provider: MD Leonela West. Jose Quinones would like a refill of the following medications:  levothyroxine (SYNTHROID) 88 mcg tablet Lida Lock MD]  levothyroxine (SYNTHROID) 100 mcg tablet Lida Lock MD]    Preferred pharmacy: [address] [place] [address]    Comment:   ,PLEASE REFILL MOMS INCRUSE ELLIPTA 62.5 MCG UMECLIDINIUM INHALTION POWER MOM IS JUST A BOUGHT OUT . NEEDS TO MAIL ODER PLEASE . [address] YOU SO MUCH Dora Euceda

## 2017-10-31 RX ORDER — LEVOTHYROXINE SODIUM 100 UG/1
TABLET ORAL
Qty: 45 TAB | Refills: 3 | Status: SHIPPED | OUTPATIENT
Start: 2017-10-31 | End: 2017-12-05 | Stop reason: SDUPTHER

## 2017-10-31 RX ORDER — LEVOTHYROXINE SODIUM 88 UG/1
88 TABLET ORAL
Qty: 48 TAB | Refills: 3 | Status: SHIPPED | OUTPATIENT
Start: 2017-10-31 | End: 2017-12-05 | Stop reason: SDUPTHER

## 2017-11-07 ENCOUNTER — OFFICE VISIT (OUTPATIENT)
Dept: SURGERY | Age: 82
End: 2017-11-07

## 2017-11-07 VITALS
WEIGHT: 134 LBS | DIASTOLIC BLOOD PRESSURE: 62 MMHG | SYSTOLIC BLOOD PRESSURE: 118 MMHG | BODY MASS INDEX: 24.66 KG/M2 | HEART RATE: 82 BPM | TEMPERATURE: 98 F | RESPIRATION RATE: 16 BRPM | HEIGHT: 62 IN | OXYGEN SATURATION: 98 %

## 2017-11-07 DIAGNOSIS — K42.9 UMBILICAL HERNIA WITHOUT OBSTRUCTION AND WITHOUT GANGRENE: Primary | ICD-10-CM

## 2017-11-07 NOTE — PROGRESS NOTES
HISTORY OF PRESENT ILLNESS  Arnold Walker is a 80 y.o. female who is referred by Dr. Jeffrey Edouard for further evaluation of an umbilical hernia. HPI Comments: Ms. Robert Tabares tells me that she noted a bulge at her umbilicus approx. 2 months ago. No significant change in size of the bulge. Denies abdominal pain. Found to have an umbilical hernia. She has otherwise been in her usual state of health. Past Medical History:  No date: Anxiety state, unspecified  4/6/2016: CKD (chronic kidney disease) stage 3, GFR 30-5*  6/12: Dementia      Comment: Landa/npysch  9/20/2017: Depression  No date: Generalized osteoarthrosis, unspecified site  4/08: Lumbar compression fracture (HCC)      Comment: epidurals x 2  Umang/os  No date: Osteopenia  No date: Other and unspecified hyperlipidemia  No date: Psoriasis  No date: Unspecified essential hypertension  No date: Unspecified hypothyroidism      Comment: thyroid irradiated i131    Past Surgical History:  10/10: COLONOSCOPY      Comment: [place]/gi  1992: TOTAL KNEE ARTHROPLASTY      Comment: left  1992: TOTAL KNEE ARTHROPLASTY      Comment: right    Review of patient's family history indicates:    Cancer                         Mother                      Comment: stomach tumors    Cancer                         Brother                     Comment: prostate    Social History: Employment - Retired. Tobacco - Denies. EtOH - Denies. Review of systems negative except as noted. Review of Systems   Respiratory: Positive for shortness of breath. Gastrointestinal: Negative for abdominal pain, nausea and vomiting. Psychiatric/Behavioral: Positive for depression. Physical Exam   Constitutional: She appears well-developed and well-nourished. No distress. HENT:   Head: Normocephalic and atraumatic. Eyes: No scleral icterus. Neck: Neck supple. Cardiovascular: Normal rate and regular rhythm.     Pulmonary/Chest: Effort normal and breath sounds normal.   Abdominal: Soft. She exhibits no distension. There is no tenderness. There is no rebound and no guarding. A hernia is present. Hernia confirmed positive in the ventral area (At umbilicus. Reducible. ). Musculoskeletal: Normal range of motion. Lymphadenopathy:     She has no cervical adenopathy. Neurological: She is alert. Vitals reviewed. ASSESSMENT and PLAN  Ms. Mami Denton is a 80 y.o. female with an umbilical hernia. I discussed umbilical hernia repair, possibly with mesh, with her and her family today including the potential risks of bleeding, infection and hernia recurrence. At this point in time, Ms. Mami Denton does not wish to proceed with surgery as the hernia is asymptomatic. Asked her to follow up with Dr. Bakari Preston as scheduled. Will see as needed.         CC: Paulette Meraz MD

## 2017-11-07 NOTE — MR AVS SNAPSHOT
Visit Information Date & Time Provider Department Dept. Phone Encounter #  
 11/7/2017  2:00 PM MD Indio [phone] [id number] Your Appointments 12/19/2017  9:40 AM  
ESTABLISHED PATIENT with Bobbi Valdez MD  
[address] ([address]) Appt Note: Prolia Injection plus fu -kfb; . ... [id number] [facility] [address]  
[phone]  
  
   
 [address] [place] [address]  
  
    
 1/10/2018  4:15 PM  
ROUTINE CARE with Jyothi Ge MD  
[facility] ([address]) Appt Note: follow up  
 [address] [address]  
[phone]  
  
   
 [address] [address] Upcoming Health Maintenance Date Due  
 MEDICARE YEARLY EXAM 9/21/2018 GLAUCOMA SCREENING Q2Y 3/21/2019 DTaP/Tdap/Td series (2 - Td) 9/7/2026 Allergies as of 11/7/2017  Review Complete On: 11/7/2017 By: Georges Holloway LPN Severity Noted Reaction Type Reactions Keflex [Cephalexin]  04/23/2010    Rash Pcn [Penicillins]  04/23/2010    Rash  
 Sulfa (Sulfonamide Antibiotics)  05/31/2011    Rash Current Immunizations  Reviewed on 9/7/2016 Name Date Influenza High Dose Vaccine PF 9/20/2017, 9/30/2015 Influenza Vaccine 10/16/2013 Pneumococcal Conjugate (PCV-13) 3/2/2016 Pneumococcal Polysaccharide (PPSV-23) 1/29/2014 Tdap 9/7/2016 Zoster Vaccine, Live 10/15/2014 Not reviewed this visit Vitals BP Pulse Temp Resp Height(growth percentile) Weight(growth percentile)  
 118/62 (BP 1 Location: Right arm, BP Patient Position: Sitting) 82 98 °F (36.7 °C) (Oral) 16 5' 2\" (1.575 m) 134 lb (60.8 kg) SpO2 BMI OB Status Smoking Status 98% 24.51 kg/m2 Postmenopausal Former Smoker BMI and BSA Data Body Mass Index Body Surface Area 24.51 kg/m 2 1.63 m 2 Preferred Pharmacy Pharmacy Name Phone [address], [facility] [phone] Your Updated Medication List  
  
   
This list is accurate as of: 11/7/17  2:05 PM.  Always use your most recent med list. amLODIPine 5 mg tablet Commonly known as:  Arlyss [place] TAKE 1 TABLET DAILY FOR BLOOD PRESSURE  
  
 atorvastatin 40 mg tablet Commonly known as:  LIPITOR Take 1 Tab by mouth daily. BROVANA 15 mcg/2 mL Nebu neb solution Generic drug:  arformoterol  
  
 budesonide 0.5 mg/2 mL Nbsp Commonly known as:  PULMICORT  
  
 CENTRUM SILVER PO Take  by mouth. donepezil 10 mg tablet Commonly known as:  ARICEPT  
TAKE 1 TABLET NIGHTLY  
  
 fluticasone-salmeterol 230-21 mcg/actuation inhaler Commonly known as:  ADVAIR HFA Take 2 Puffs by inhalation two (2) times a day. * imipramine 25 mg tablet Commonly known as:  TOFRANIL Take 1 Tab by mouth nightly. * imipramine 25 mg tablet Commonly known as:  TOFRANIL  
TAKE 2 TABLETS NIGHTLY * levothyroxine 88 mcg tablet Commonly known as:  SYNTHROID Take 1 Tab by mouth Daily (before breakfast). * levothyroxine 100 mcg tablet Commonly known as:  SYNTHROID Take 1 tab po q Mon, Wed, and Fri , 88 mcg all other days of week  
  
 loratadine 10 mg tablet Commonly known as:  CLARITIN  
TAKE 1 TABLET DAILY  
  
 memantine 10 mg tablet Commonly known as:  Mimi Grandchild Take 1 Tab by mouth two (2) times a day. Indications: MODERATE TO SEVERE ALZHEIMER'S TYPE DEMENTIA PROLIA 60 mg/mL injection Generic drug:  denosumab 60 mg by SubCUTAneous route every 6 months. SPIRIVA RESPIMAT 2.5 mcg/actuation inhaler Generic drug:  tiotropium bromide  
  
 umeclidinium 62.5 mcg/actuation inhaler Commonly known as:  INCRUSE ELLIPTA Take 1 Puff by inhalation daily. VITAMIN D2 50,000 unit capsule Generic drug:  ergocalciferol TAKE 1 CAPSULE EVERY 7 DAYS * Notice: This list has 4 medication(s) that are the same as other medications prescribed for you. Read the directions carefully, and ask your doctor or other care provider to review them with you. Introducing [facility] SERVICES! Dear Yogesh Gaspar: Thank you for requesting a [employer] account. Our records indicate that you already have an active [employer] account. You can access your account anytime at [email or web address]) Did you know that you can access your hospital and ER discharge instructions at any time in [employer]? You can also review all of your test results from your hospital stay or ER visit. Additional Information If you have questions, please visit the Frequently Asked Questions section of the [employer] website at [email or web address]. Remember, [employer] is NOT to be used for urgent needs. For medical emergencies, dial 911. Now available from your iPhone and Android! Please provide this summary of care documentation to your next provider. Your primary care clinician is listed as Samina Granados. If you have any questions after today's visit, please call [phone].

## 2017-11-14 ENCOUNTER — TELEPHONE (OUTPATIENT)
Dept: RHEUMATOLOGY | Age: 82
End: 2017-11-14

## 2017-11-14 NOTE — LETTER
11/14/2017 8:16 AM 
 
[address] [phone] Dear Gracie Perez Enclosed you will find a copy of your Prolia insurance investigation. Please review this form. To schedule your Prolia injection after 12/8/2017 call our office at [phone]. Please ask the  to have the nurse order the Prolia for your injection. Thank you.  
 
 
 
 
 
Sincerely, 
 
 
Asher Lepe MD

## 2017-11-27 ENCOUNTER — HOSPITAL ENCOUNTER (OUTPATIENT)
Dept: LAB | Age: 82
Discharge: HOME | End: 2017-11-27
Payer: MEDICARE

## 2017-11-27 DIAGNOSIS — E03.9 ACQUIRED HYPOTHYROIDISM: ICD-10-CM

## 2017-11-27 PROCEDURE — 84443 ASSAY THYROID STIM HORMONE: CPT

## 2017-11-27 PROCEDURE — 36415 COLL VENOUS BLD VENIPUNCTURE: CPT

## 2017-11-28 LAB — TSH SERPL DL<=0.005 MIU/L-ACNC: 0.54 UIU/ML (ref 0.45–4.5)

## 2017-11-29 ENCOUNTER — TELEPHONE (OUTPATIENT)
Dept: FAMILY MEDICINE CLINIC | Age: 82
End: 2017-11-29

## 2017-11-29 NOTE — TELEPHONE ENCOUNTER
[phone] spoke to [facility] and notified of patients labs  Per Dr Leatha malone to order [facility] for patient

## 2017-11-29 NOTE — PROGRESS NOTES
[phone] attempted to call [facility] no answer left message to call me back in regards to patient

## 2017-11-29 NOTE — PROGRESS NOTES
[phone] spoke to [facility] and notified of patients labs  Per Dr Nhung malone to order [facility] for patient

## 2017-12-05 DIAGNOSIS — E78.2 MIXED HYPERLIPIDEMIA: ICD-10-CM

## 2017-12-05 DIAGNOSIS — E03.9 ACQUIRED HYPOTHYROIDISM: ICD-10-CM

## 2017-12-05 RX ORDER — LEVOTHYROXINE SODIUM 100 UG/1
TABLET ORAL
Qty: 45 TAB | Refills: 3 | Status: SHIPPED | OUTPATIENT
Start: 2017-12-05 | End: 2018-02-02 | Stop reason: ALTCHOICE

## 2017-12-05 RX ORDER — ATORVASTATIN CALCIUM 40 MG/1
40 TABLET, FILM COATED ORAL DAILY
Qty: 90 TAB | Refills: 1 | Status: SHIPPED | OUTPATIENT
Start: 2017-12-05 | End: 2018-04-23 | Stop reason: SDUPTHER

## 2017-12-05 RX ORDER — DONEPEZIL HYDROCHLORIDE 10 MG/1
10 TABLET, FILM COATED ORAL
Qty: 90 TAB | Refills: 1 | Status: SHIPPED | OUTPATIENT
Start: 2017-12-05 | End: 2018-05-12 | Stop reason: SDUPTHER

## 2017-12-05 RX ORDER — LEVOTHYROXINE SODIUM 88 UG/1
88 TABLET ORAL
Qty: 48 TAB | Refills: 3 | Status: SHIPPED | OUTPATIENT
Start: 2017-12-05 | End: 2018-06-17 | Stop reason: SDUPTHER

## 2017-12-05 NOTE — TELEPHONE ENCOUNTER
From: Manford Favre  To: Annmarie Day MD  Sent: 12/5/2017 9:44 AM EST  Subject: Medication Renewal Request    Original authorizing provider: MD Priyanka Hanson.  Christine Lucas would like a refill of the following medications:  donepezil (ARICEPT) 10 mg tablet [Phill Manjarrez MD]  atorvastatin (LIPITOR) 40 mg tablet Annmarie Day MD]  levothyroxine (SYNTHROID) 88 mcg tablet Annmarie Day MD]  levothyroxine (SYNTHROID) 100 mcg tablet Annmarie Day MD]    Preferred pharmacy: [facility]:  also need lisinopril tabs 10 mg blood pressure

## 2017-12-19 ENCOUNTER — OFFICE VISIT (OUTPATIENT)
Dept: RHEUMATOLOGY | Age: 82
End: 2017-12-19

## 2017-12-19 VITALS
SYSTOLIC BLOOD PRESSURE: 129 MMHG | HEART RATE: 79 BPM | WEIGHT: 132 LBS | BODY MASS INDEX: 24.29 KG/M2 | TEMPERATURE: 97.7 F | DIASTOLIC BLOOD PRESSURE: 80 MMHG | RESPIRATION RATE: 18 BRPM | HEIGHT: 62 IN

## 2017-12-19 DIAGNOSIS — M81.0 AGE-RELATED OSTEOPOROSIS WITHOUT CURRENT PATHOLOGICAL FRACTURE: Primary | ICD-10-CM

## 2017-12-19 DIAGNOSIS — N18.30 CKD (CHRONIC KIDNEY DISEASE) STAGE 3, GFR 30-59 ML/MIN (HCC): ICD-10-CM

## 2017-12-19 DIAGNOSIS — M19.041 PRIMARY OSTEOARTHRITIS OF BOTH HANDS: ICD-10-CM

## 2017-12-19 DIAGNOSIS — E55.9 VITAMIN D DEFICIENCY: ICD-10-CM

## 2017-12-19 DIAGNOSIS — M19.042 PRIMARY OSTEOARTHRITIS OF BOTH HANDS: ICD-10-CM

## 2017-12-19 NOTE — PROGRESS NOTES
REASON FOR VISIT    This is the a follow up visit for Ms. Nicho Engel for Age-Related Osteoporosis. Osteoporosis Historical Synopsis    Height loss since age 27 (at least two inches): 2 inches  Fracture history includes: yes (multiple compression deformities in upper thoracic spine)  Family history of hip fracture: no  Fall Risk: no    Daily calcium intake is unknown mg  Weekly vitamin D intake is 50,000 IU    Smoking history: none currently, quit 12/2015 smoked 1 pack per day for many years   Alcohol consumption: no  Prednisone history: not longer term (recently was hospitalized for COPD exacerbation and had course of steroids, completed yesterday)    Exercise: yes does walking daily, no weights     Previous work-up for osteoporosis includes the following:  DEXA Scan: 3/02/2016  Vitamin 25OH D level: 30.6  (5/03/2016)  PTH: 49 (5/03/2016)  TSH: 0.541 (11/27/2017)    Therapy History includes:    Current osteoporosis therapy includes: Prolia (6/02/2016)  Prior osteoporosis therapy includes:  alendronate (more than ten years ago)  The following osteoporosis therapy have been ineffective: none  The following osteoporosis therapy were stopped because of side effects:  alendronate (jaw and tooth pain)     [facility]      Ms. Howard Garcia presents for follow up. Today, she presents for follow up. She has been doing well on Prolia. Her last dose was 6/06/2017. She denies interval falls or fractures. The patient has not had any interval hospital admissions, infections, or surgeries. REVIEW OF SYSTEMS    A comprehensive review of systems was performed and pertinent results are documented in the HPI, review of systems is otherwise non-contributory. PAST MEDICAL HISTORY    She has a past medical history of Anxiety state, unspecified; CKD (chronic kidney disease) stage 3, GFR 30-59 ml/min (4/6/2016); Dementia (6/12); Depression (9/20/2017);  Generalized osteoarthrosis, unspecified site; Lumbar compression fracture ([facility] (4/08); Osteopenia; Other and unspecified hyperlipidemia; Psoriasis; Unspecified essential hypertension; and Unspecified hypothyroidism. She also has no past medical history of Abuse; Anemia NEC; Arrhythmia; Asthma; Autoimmune disease ([facility] CAD (coronary artery disease); Calculus of kidney; Cancer ([facility] Chronic pain; Congestive heart failure, unspecified; COPD; Diabetes ([facility] GERD (gastroesophageal reflux disease); Headache(784.0); Liver disease; Psychotic disorder; PUD (peptic ulcer disease); Seizures ([facility] Stroke [facility]); Thromboembolus ([facility] or Trauma. FAMILY HISTORY    Her family history includes Cancer in her brother and mother. SOCIAL HISTORY    She reports that she quit smoking about 3 years ago. Her smoking use included Cigarettes. She has a 30.00 pack-year smoking history. She has never used smokeless tobacco. She reports that she does not drink alcohol or use illicit drugs. MEDICATIONS    Current Outpatient Prescriptions   Medication Sig Dispense Refill    denosumab (PROLIA) 60 mg/mL injection 1 mL by SubCUTAneous route once for 1 dose. 1 Syringe 0    donepezil (ARICEPT) 10 mg tablet Take 1 Tab by mouth nightly. 90 Tab 1    atorvastatin (LIPITOR) 40 mg tablet Take 1 Tab by mouth daily. 90 Tab 1    levothyroxine (SYNTHROID) 88 mcg tablet Take 1 Tab by mouth Daily (before breakfast). 48 Tab 3    levothyroxine (SYNTHROID) 100 mcg tablet Take 1 tab po q Mon, Wed, and Fri , 88 mcg all other days of week 45 Tab 3    ergocalciferol (VITAMIN D2) 50,000 unit capsule Take 1 Cap by mouth every fourteen (14) days for 90 days. 6 Cap 4    umeclidinium (INCRUSE ELLIPTA) 62.5 mcg/actuation inhaler Take 1 Puff by inhalation daily. 3 Inhaler 3    BROVANA 15 mcg/2 mL nebu neb solution       budesonide (PULMICORT) 0.5 mg/2 mL nbsp       SPIRIVA RESPIMAT 2.5 mcg/actuation inhaler       imipramine (TOFRANIL) 25 mg tablet Take 1 Tab by mouth nightly.  180 Tab 1    memantine (NAMENDA) 10 mg tablet Take 1 Tab by mouth two (2) times a day. 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 3 Each 4    loratadine (CLARITIN) 10 mg tablet TAKE 1 TABLET DAILY 30 Tab 4    amLODIPine (NORVASC) 5 mg tablet TAKE 1 TABLET DAILY FOR BLOOD PRESSURE 90 Tab 1    FOLIC ACID/MULTIVIT-MIN/LUTEIN (CENTRUM SILVER PO) Take  by mouth.  imipramine (TOFRANIL) 25 mg tablet TAKE 2 TABLETS NIGHTLY 180 Tab 1       ALLERGIES    Allergies   Allergen Reactions    Keflex [Cephalexin] Rash    Pcn [Penicillins] Rash    Sulfa (Sulfonamide Antibiotics) Rash       PHYSICAL EXAMINATION    Visit Vitals    /80 (BP 1 Location: Left arm, BP Patient Position: Sitting)    Pulse 79    Temp 97.7 °F (36.5 °C)    Resp 18    Ht 5' 2\" (1.575 m)    Wt 132 lb (59.9 kg)    BMI 24.14 kg/m2     Body mass index is 24.14 kg/(m^2). General: Patient is alert, oriented x 3, not in acute distress, daughter at bedside    HEENT:   Conjunctiva are not injected and appear moist, oral mucous membranes are moist, there are no ulcers present, there is no alopecia, neck is supple. Salivary glands are normal    Cardiovascular:  Heart is regular rate and rhythm, no murmurs. Chest:  Lungs are clear to auscultation bilaterally. Extremities:  Free of clubbing, cyanosis, edema, extremities well perfused. Neurological exam:  No focal sensory deficits, muscle strength is full in upper and lower extremities     Skin exam:  There are no rashes, no tophi, no psoriasis, no active Raynaud's, no livedo reticularis, no periungual erythema. Musculoskeletal exam:  A comprehensive musculoskeletal exam was performed for all joints of each upper and lower extremity and assessed for swelling, tenderness and range of motion.      Kypohotic  Heberdens and Bouchards nodes bilaterally in hands     DATA REVIEW    Laboratory    The following laboratory results were reviewed and discussed with the patient:    Orders Only on 11/27/2017   Component Date Value    TSH 11/27/2017 0.541    Orders Only on 10/09/2017   Component Date Value    TSH 10/09/2017 4.720*   Office Visit on 09/20/2017   Component Date Value    Hemoglobin A1c (POC) 09/20/2017 6.4     Glucose 09/20/2017 112*    BUN 09/20/2017 17     Creatinine 09/20/2017 1.23*    GFR est non-AA 09/20/2017 39*    GFR est AA 09/20/2017 45*    BUN/Creatinine ratio 09/20/2017 14     Sodium 09/20/2017 143     Potassium 09/20/2017 4.2     Chloride 09/20/2017 102     CO2 09/20/2017 24     Calcium 09/20/2017 9.2     Protein, total 09/20/2017 6.9     Albumin 09/20/2017 4.2     GLOBULIN, TOTAL 09/20/2017 2.7     A-G Ratio 09/20/2017 1.6     Bilirubin, total 09/20/2017 0.5     Alk. phosphatase 09/20/2017 101     AST (SGOT) 09/20/2017 31     ALT (SGPT) 09/20/2017 31     WBC 09/20/2017 5.2     RBC 09/20/2017 4.12     HGB 09/20/2017 11.9     HCT 09/20/2017 36.6     MCV 09/20/2017 89     MCH 09/20/2017 28.9     MCHC 09/20/2017 32.5     RDW 09/20/2017 16.0*    PLATELET 23/64/0944 564     NEUTROPHILS 09/20/2017 45     Lymphocytes 09/20/2017 44     MONOCYTES 09/20/2017 9     EOSINOPHILS 09/20/2017 2     BASOPHILS 09/20/2017 0     ABS. NEUTROPHILS 09/20/2017 2.3     Abs Lymphocytes 09/20/2017 2.3     ABS. MONOCYTES 09/20/2017 0.5     ABS. EOSINOPHILS 09/20/2017 0.1     ABS. BASOPHILS 09/20/2017 0.0     IMMATURE GRANULOCYTES 09/20/2017 0     ABS. IMM.  GRANS. 09/20/2017 0.0     Cholesterol, total 09/20/2017 266*    Triglyceride 09/20/2017 198*    HDL Cholesterol 09/20/2017 70     VLDL, calculated 09/20/2017 40     LDL, calculated 09/20/2017 156*    TSH 09/20/2017 20.200*    VITAMIN D, 25-HYDROXY 09/20/2017 59.8     INTERPRETATION 09/20/2017 NTAP     PDF IMAGE 99/53/6608 Not applicable     Interpretation 09/20/2017 Note Imaging    Radiographs    Thoracic Spine 8/31/2015: There is a diffuse prominent kyphosis. There is diffuse prominent osteopenia. There are multiple minimal to mild compression deformities in the upper thoracic spine which appear remote and show good alignment. The pedicles are visualized. There are also some prominent anterior spurring and disc space narrowing     DXA     DXA 3/02/2016: lumbar spine L1-L4 T score -2.5 (BMD 0.881g/cm2), left femoral neck T score: -2.3 (BMD 0.721 g/cm2), right femoral neck T score: not done and distal one third left radius T score -3.5 (BMD 0.566 g/cm2). FRAX score 14.7 % probability in 10 years for major osteoporotic fracture and 5.4 % 10 year probability of hip fracture. ASSESSMENT AND PLAN    1) Age-Related Osteoporosis. Her most recent DXA scan showed a T-score of -3.6 in distal radius, -2.5 in the lumbar spine and -2.3 in the left femoral neck. FRAX score 14.7 % probability in 10 years for major osteoporotic fracture and 5.4 % 10 year probability of hip fracture. She received her first dose of Prolia on 6/02/2016 but was then lost to follow up but then received her secondy dose 6/06/2017. She received her third dose today. Future labs were given to her. I ordered a future DXA along with her next dose.     2) CKD Stage III. creatinine 1.23 mg/dL, eGFR 29.      3) Bilateral Hand Osteoarthritis. This was asymptomatic. 4) Vitamin D Deficiency. She is on ergocalciferol 50,000. The patient voiced understanding of the aforementioned assessment and plan. Summary of plan was provided in the After Visit Summary patient instructions.      TODAY'S ORDERS    Orders Placed This Encounter    DEXA BONE DENSITY STUDY AXIAL    RENAL FUNCTION PANEL    DE DENOSUMAB INJECTION (Qty 1)    DE THER/PROPH/DIAG INJECTION, SUBCUT/IM    denosumab (PROLIA) 60 mg/mL injection       Future Appointments  Date Time Provider Machelle Charline   1/10/2018 4:15 PM Hans Vivas MD PA[facility] Carissa Eldridge MD, [address] [facility]    Adult Rheumatology   Musculoskeletal Ultrasound Certified  [address] [facility] [place], [address]   Phone [phone]  Fax [phone]

## 2017-12-19 NOTE — MR AVS SNAPSHOT
Visit Information Date & Time Provider Department Dept. Phone Encounter #  
 12/19/2017  9:40 AM Deyanira Castro MD [address] [id number] Your Appointments 1/10/2018  4:15 PM  
ROUTINE CARE with Alejandro Guzman MD  
[facility]) Appt Note: follow up  
 [address] [phone]  
[phone]  
  
   
 [address] [phone] Upcoming Health Maintenance Date Due  
 MEDICARE YEARLY EXAM 9/21/2018 GLAUCOMA SCREENING Q2Y 3/21/2019 DTaP/Tdap/Td series (2 - Td) 9/7/2026 Allergies as of 12/19/2017  Review Complete On: 12/19/2017 By: Deyanira Castro MD  
  
 Severity Noted Reaction Type Reactions Keflex [Cephalexin]  04/23/2010    Rash Pcn [Penicillins]  04/23/2010    Rash  
 Sulfa (Sulfonamide Antibiotics)  05/31/2011    Rash Current Immunizations  Reviewed on 9/7/2016 Name Date Influenza High Dose Vaccine PF 9/20/2017, 9/30/2015 Influenza Vaccine 10/16/2013 Pneumococcal Conjugate (PCV-13) 3/2/2016 Pneumococcal Polysaccharide (PPSV-23) 1/29/2014 Tdap 9/7/2016 Zoster Vaccine, Live 10/15/2014 Not reviewed this visit You Were Diagnosed With   
  
 Codes Comments Age-related osteoporosis without current pathological fracture    -  Primary ICD-10-CM: M81.0 ICD-9-CM: 733.01 Vitals BP Pulse Temp Resp Height(growth percentile) Weight(growth percentile) 129/80 (BP 1 Location: Left arm, BP Patient Position: Sitting) 79 97.7 °F (36.5 °C) 18 5' 2\" (1.575 m) 132 lb (59.9 kg) BMI OB Status Smoking Status 24.14 kg/m2 Postmenopausal Former Smoker BMI and BSA Data Body Mass Index Body Surface Area  
 24.14 kg/m 2 1.62 m 2 Preferred Pharmacy Pharmacy Name Phone [address] [facility] [phone] Your Updated Medication List  
  
   
This list is accurate as of: 12/19/17 10:15 AM.  Always use your most recent med list. amLODIPine 5 mg tablet Commonly known as:  Merom Billie TAKE 1 TABLET DAILY FOR BLOOD PRESSURE  
  
 atorvastatin 40 mg tablet Commonly known as:  LIPITOR Take 1 Tab by mouth daily. BROVANA 15 mcg/2 mL Nebu neb solution Generic drug:  arformoterol  
  
 budesonide 0.5 mg/2 mL Nbsp Commonly known as:  PULMICORT  
  
 CENTRUM SILVER PO Take  by mouth. denosumab 60 mg/mL injection Commonly known as:  Otf Buttner 1 mL by SubCUTAneous route once for 1 dose. donepezil 10 mg tablet Commonly known as:  ARICEPT Take 1 Tab by mouth nightly.  
  
 ergocalciferol 50,000 unit capsule Commonly known as:  VITAMIN D2 Take 1 Cap by mouth every fourteen (14) days for 90 days. fluticasone-salmeterol 230-21 mcg/actuation inhaler Commonly known as:  ADVAIR HFA Take 2 Puffs by inhalation two (2) times a day. * imipramine 25 mg tablet Commonly known as:  TOFRANIL Take 1 Tab by mouth nightly. * imipramine 25 mg tablet Commonly known as:  TOFRANIL  
TAKE 2 TABLETS NIGHTLY * levothyroxine 88 mcg tablet Commonly known as:  SYNTHROID Take 1 Tab by mouth Daily (before breakfast). * levothyroxine 100 mcg tablet Commonly known as:  SYNTHROID Take 1 tab po q Mon, Wed, and Fri , 88 mcg all other days of week  
  
 loratadine 10 mg tablet Commonly known as:  CLARITIN  
TAKE 1 TABLET DAILY  
  
 memantine 10 mg tablet Commonly known as:  Kunal Canavan Take 1 Tab by mouth two (2) times a day. 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  
  
 umeclidinium 62.5 mcg/actuation inhaler Commonly known as:  INCRUSE ELLIPTA Take 1 Puff by inhalation daily. * Notice:   This list has 4 medication(s) that are the same as other medications prescribed for you. Read the directions carefully, and ask your doctor or other care provider to review them with you. We Performed the Following MD DENOSUMAB INJECTION [ [facility]] MD THER/PROPH/DIAG INJECTION, SUBCUT/IM [id number] CPT(R)] To-Do List   
 12/29/2017 Lab:  RENAL FUNCTION PANEL   
  
 03/05/2018 Imaging:  DEXA BONE DENSITY STUDY AXIAL Patient Instructions Please call the Patient Care Scheduling Team [phone] to schedule your test (DXA) on 3/05/2018. Introducing [facility]! Dear Rhonda Rodriguez: Thank you for requesting a [employer] account. Our records indicate that you already have an active [employer] account. You can access your account anytime at [email or web address] Did you know that you can access your hospital and ER discharge instructions at any time in [employer]? You can also review all of your test results from your hospital stay or ER visit. Additional Information If you have questions, please visit the Frequently Asked Questions section of the [employer] website at [email or web address] Remember, [employer] is NOT to be used for urgent needs. For medical emergencies, dial 911. Now available from your iPhone and Android! Please provide this summary of care documentation to your next provider. Your primary care clinician is listed as Raffi López. If you have any questions after today's visit, please call [phone].

## 2017-12-19 NOTE — PROGRESS NOTES
Prolia injection given in right deltoid. Pt tolerated well. Instructed pt and daughter to come back in 10 days for labs to be drawn. Lap slips given to pt's daughter. She stated an understanding.     Prolia 60mg  Lot # [id number]   [employer]  Exp: 4/20  [address]

## 2017-12-31 RX ORDER — LISINOPRIL 10 MG/1
TABLET ORAL
Qty: 90 TAB | Refills: 1 | Status: SHIPPED | OUTPATIENT
Start: 2017-12-31 | End: 2018-06-17 | Stop reason: SDUPTHER

## 2018-01-10 ENCOUNTER — OFFICE VISIT (OUTPATIENT)
Dept: FAMILY MEDICINE CLINIC | Age: 83
End: 2018-01-10

## 2018-01-10 VITALS
SYSTOLIC BLOOD PRESSURE: 134 MMHG | WEIGHT: 136.4 LBS | HEIGHT: 62 IN | BODY MASS INDEX: 25.1 KG/M2 | HEART RATE: 88 BPM | RESPIRATION RATE: 18 BRPM | OXYGEN SATURATION: 98 % | DIASTOLIC BLOOD PRESSURE: 82 MMHG | TEMPERATURE: 97.4 F

## 2018-01-10 DIAGNOSIS — F02.80 LATE ONSET ALZHEIMER'S DISEASE WITHOUT BEHAVIORAL DISTURBANCE (HCC): ICD-10-CM

## 2018-01-10 DIAGNOSIS — R79.89 ELEVATED SERUM CREATININE: ICD-10-CM

## 2018-01-10 DIAGNOSIS — E78.2 MIXED HYPERLIPIDEMIA: ICD-10-CM

## 2018-01-10 DIAGNOSIS — I10 ESSENTIAL HYPERTENSION: ICD-10-CM

## 2018-01-10 DIAGNOSIS — G30.1 LATE ONSET ALZHEIMER'S DISEASE WITHOUT BEHAVIORAL DISTURBANCE (HCC): ICD-10-CM

## 2018-01-10 DIAGNOSIS — E03.9 ACQUIRED HYPOTHYROIDISM: Primary | ICD-10-CM

## 2018-01-10 DIAGNOSIS — R73.09 ELEVATED GLUCOSE: ICD-10-CM

## 2018-01-10 LAB — HBA1C MFR BLD HPLC: 6 %

## 2018-01-10 NOTE — PROGRESS NOTES
Chief Complaint   Patient presents with    Cholesterol Problem    Other     forms to be filled out       Reviewed Record in preparation for visit and have obtained necessary documentation. Identified pt with two pt identifiers (Name @ )    There are no preventive care reminders to display for this patient. 1. Have you been to the ER, urgent care clinic since your last visit? Hospitalized since your last visit? No    2. Have you seen or consulted any other health care providers outside of the [address] [place] [address] since your last visit? Include any pap smears or colon screening.  No

## 2018-01-10 NOTE — PATIENT INSTRUCTIONS
Helping A Person With Dementia: Care Instructions  Your Care Instructions    Dementia is a loss of mental skills that affects daily life. It is different from mild memory loss that occurs with aging. Dementia can cause problems with memory, thinking clearly, and planning. It is different for everyone. But it usually gets worse slowly. Some people who have dementia can function well for a long time. But at some point it may become hard for the person to care for himself or herself. It can be upsetting to learn that a loved one has this condition. You may be afraid and worried about what will happen. You may wonder how you will care for the person. There is no cure for dementia. But medicine may be able to slow memory loss and improve thinking for a while. Other medicines may help with sleep, depression, and behavior changes. Dementia is different for everyone. In some cases, people can function well for a long time. You can help your loved one by making his or her home life easier and safer. You also need to take care of yourself. Caregiving can be stressful. But support is available to help you and give you a break when you need it. The Alzheimer's Association offers good information and support. If you are caring for someone with dementia, you can help make life safer and more comfortable. You can also help your loved one make decisions about future care. You may also want to bring up legal and financial issues. These are hard but important conversations to have. Follow-up care is a key part of your loved one's treatment and safety. Be sure to make and go to all appointments, and call your doctor if your loved one is having problems. It's also a good idea to know your loved one's test results and keep a list of the medicines he or she takes. How can you care for your loved one at home? Taking care of the person  · If the person takes medicine for dementia, help him or her take it exactly as prescribed. Call the doctor if you notice any problems with the medicine. · Make a list of the person's medicines. Review it with all of his or her doctors. · Help the person eat a balanced diet. Serve plenty of whole grains, fruits, and vegetables every day. If the person is not hungry at mealtimes, give snacks at midmorning and in the afternoon. Offer drinks such as Boost, Ensure, or Sustacal if the person is losing weight. · Encourage exercise. Walking and other activities may slow the decline of mental ability. Help the person stay active mentally with reading, crossword puzzles, or other hobbies. · Take steps to help if the person is sundowning. This is the restless behavior and trouble with sleeping that may occur in late afternoon and at night. Try not to let the person nap during the day. Offer a glass of warm milk or caffeine-free tea before bedtime. · Develop a routine. Your loved one will feel less frustrated or confused with a clear, simple plan of what to do every day. · Be patient. A task may take the person longer than it used to. · For as long as he or she is able, allow your loved one to make decisions about activities, food, clothing, and other choices. Let him or her be independent, even if tasks take more time or are not done perfectly. Tailor tasks to the person's abilities. For example, if cooking is no longer safe, ask for other help. Your loved one can help set the table, or make simple dishes such as a salad. When the person needs help, offer it gently. Staying safe  · Make your home (or your loved one's home) safe. Tack down rugs, and put no-slip tape in the tub. Install handrails, and put safety switches on stoves and appliances. Keep rooms free of clutter. Make sure walkways around furniture are clear. Do not move furniture around, because the person may become confused. · Use locks on doors and cupboards.  Lock up knives, scissors, medicines, cleaning supplies, and other dangerous things. · Do not let the person drive or cook if he or she can't do it safely. A person with dementia should not drive unless he or she is able to pass an on-road driving test. Your state 's license bureau can do a driving test if there is any question. · Get medical alert jewelry for the person so that you can be contacted if he or she wanders away. If possible, provide a safe place for wandering, such as an enclosed yard or garden. Taking care of yourself  · Ask your doctor about support groups and other resources in your area. · Take care of your health. Be sure to eat healthy foods and get enough rest and exercise. · Take time for yourself. Respite services provide someone to stay with the person for a short time while you get out of the house for a few hours. · Make time for an activity that you enjoy. Read, listen to music, paint, do crafts, or play an instrument, even if it's only for a few minutes a day. · Spend time with family, friends, and others in your support system. When should you call for help? Call 911 anytime you think the person may need emergency care. For example, call if:  ? · The person who has dementia wanders away and you can't find him or her. ? · The person who has dementia is seriously injured. ?Call the doctor now or seek immediate medical care if:  ? · The person suddenly sees things that are not there (hallucinates). ? · The person has a sudden change in his or her behavior. ? Watch closely for changes in the person's health, and be sure to contact the doctor if:  ? · The person has symptoms that could cause injury. ? · The person has problems with his or her medicine. ? · You need more information to care for a person with dementia. ? · You need respite care so you can take a break. Where can you learn more? Go to http://asya-chad.info/.   Enter Y566 in the search box to learn more about \"Helping A Person With Dementia: Care Instructions. \"  Current as of: May 12, 2017  Content Version: 11.4  © 3363-2616 Healthwise, [facility]. Care instructions adapted under license by [employer] (which disclaims liability or warranty for this information). If you have questions about a medical condition or this instruction, always ask your healthcare professional. [place] [address] any warranty or liability for your use of this information.

## 2018-01-10 NOTE — PROGRESS NOTES
HISTORY OF PRESENT ILLNESS  Cesia Recinos is a 80 y.o. female. Blood pressure 134/82, pulse 88, temperature 97.4 °F (36.3 °C), temperature source Oral, resp. rate 18, height 5' 2\" (1.575 m), weight 136 lb 6.4 oz (61.9 kg), SpO2 98 %. Body mass index is 24.95 kg/(m^2). Chief Complaint   Patient presents with    Cholesterol Problem    Other     forms to be filled out        HPI  Cesia Recinos 80 y.o. female  presents to the office today for cholesterol follow up. Pt presents with daughter at bedside. Hypertension: Bp at office today 134/82. Pt continues with Lisinopril 10mg daily and Amlodipine 5mg daily. Bp control stable, continue current regimen. Hyperlipidemia: Lipid panel on 09/20/17 notable for total cholesterol 266, , HDL 70, and triglycerides 198. Pt continues with Atorvastatin 40mg daily. Presumed stable, will assess levels today. Hypothyroidism: Pt's TSH levels were 0.541 on 11/27/17. Pt continues with Levothyroxine 88mcg daily and 100mcg MWF. Vitamin D deficiency: Pt's vitamin D levels were 59.8 on 09/20/17. Pt continues with Vitamin D 50,000units weekly. Health maintenance: Pt's daughter states that Dr. Katia Monique (Pulmonology) has discontinued pt's spiriva and started her on Umeclidinium. Pt's daughter states that pt has been \"out of it\" lately and has asked if it is caused by any of her medications. Discussed with pt's daughter that pt's Tofranil causes interactions with several of the medications that pt is currently taking. Advised pt's daughter to start giving pt the Tofranil every other night for a week, then every third night for a week for a week and then stop the medication. Pt's daughter states that pt's hernia is getting worse. Will put in a referral for Dr. Joby Barclay. Jairo (General Surgery).      Current Outpatient Prescriptions   Medication Sig Dispense Refill    lisinopril (PRINIVIL, ZESTRIL) 10 mg tablet TAKE 1 TABLET DAILY FOR BLOOD PRESSURE 90 Tab 1    donepezil (ARICEPT) 10 mg tablet Take 1 Tab by mouth nightly. 90 Tab 1    atorvastatin (LIPITOR) 40 mg tablet Take 1 Tab by mouth daily. 90 Tab 1    levothyroxine (SYNTHROID) 88 mcg tablet Take 1 Tab by mouth Daily (before breakfast). 48 Tab 3    levothyroxine (SYNTHROID) 100 mcg tablet Take 1 tab po q Mon, Wed, and Fri , 88 mcg all other days of week 45 Tab 3    ergocalciferol (VITAMIN D2) 50,000 unit capsule Take 1 Cap by mouth every fourteen (14) days for 90 days. 6 Cap 4    umeclidinium (INCRUSE ELLIPTA) 62.5 mcg/actuation inhaler Take 1 Puff by inhalation daily. 3 Inhaler 3    BROVANA 15 mcg/2 mL nebu neb solution       budesonide (PULMICORT) 0.5 mg/2 mL nbsp       loratadine (CLARITIN) 10 mg tablet TAKE 1 TABLET DAILY 30 Tab 4    amLODIPine (NORVASC) 5 mg tablet TAKE 1 TABLET DAILY FOR BLOOD PRESSURE 90 Tab 1    FOLIC ACID/MULTIVIT-MIN/LUTEIN (CENTRUM SILVER PO) Take  by mouth.  imipramine (TOFRANIL) 25 mg tablet TAKE 2 TABLETS NIGHTLY 180 Tab 1    memantine (NAMENDA) 10 mg tablet Take 1 Tab by mouth two (2) times a day.  Indications: MODERATE TO SEVERE ALZHEIMER'S TYPE DEMENTIA 180 Tab 3     Allergies   Allergen Reactions    Keflex [Cephalexin] Rash    Pcn [Penicillins] Rash    Sulfa (Sulfonamide Antibiotics) Rash     Past Medical History:   Diagnosis Date    Anxiety state, unspecified     CKD (chronic kidney disease) stage 3, GFR 30-59 ml/min 4/6/2016    Dementia 6/12    Landa/npysch    Depression 9/20/2017    Generalized osteoarthrosis, unspecified site     Lumbar compression fracture ([address] 4/08    epidurals x 2  Umang/os    Osteopenia     Other and unspecified hyperlipidemia     Psoriasis     Unspecified essential hypertension     Unspecified hypothyroidism     thyroid irradiated i131     Past Surgical History:   Procedure Laterality Date    COLONOSCOPY  10/10    [place]    TOTAL KNEE ARTHROPLASTY  1992    left    TOTAL KNEE ARTHROPLASTY  1992    right     Family History Problem Relation Age of Onset    Cancer Mother      stomach tumors    Cancer Brother      prostate     Social History   Substance Use Topics    Smoking status: Former Smoker     Packs/day: 0.50     Years: 60.00     Types: Cigarettes     Quit date: 2014    Smokeless tobacco: Never Used    Alcohol use No        Review of Systems   Constitutional: Negative. Negative for malaise/fatigue. Eyes: Negative for blurred vision. Respiratory: Negative for shortness of breath. Cardiovascular: Negative for chest pain and leg swelling. Musculoskeletal: Negative. Neurological: Negative. Negative for dizziness and headaches. All other systems reviewed and are negative. Physical Exam   Constitutional: She is oriented to person, place, and time. She appears well-developed and well-nourished. No distress. HENT:   Head: Normocephalic and atraumatic. Neck: Carotid bruit is not present. Cardiovascular: Normal rate, regular rhythm, normal heart sounds and intact distal pulses. Exam reveals no gallop and no friction rub. No murmur heard. Pulmonary/Chest: Effort normal and breath sounds normal. No respiratory distress. She has no wheezes. She has no rales. Abdominal: She exhibits distension. There is tenderness in the periumbilical area. There is no rigidity and no guarding. A hernia is present. Musculoskeletal: She exhibits no edema. Neurological: She is alert and oriented to person, place, and time. Skin: She is not diaphoretic. Psychiatric: She has a normal mood and affect. Her behavior is normal. Judgment and thought content normal.   Nursing note and vitals reviewed. ASSESSMENT and PLAN  Diagnoses and all orders for this visit:    1. Acquired hypothyroidism  -     TSH 3RD GENERATION  - Presumed stable, will assess levels today. 2. Essential hypertension        - Bp at office today 134/82. Pt continues with Lisinopril 10mg daily and Amlodipine 5mg daily.  Bp control stable, continue current regimen. 3. Mixed hyperlipidemia  -     LIPID PANEL  - Lipid panel on 09/20/17 notable for total cholesterol 266, , HDL 70, and triglycerides 198. Pt continues with Atorvastatin 40mg daily. Presumed stable, will assess levels today. 4. Elevated glucose  -     AMB POC HEMOGLOBIN A1C  - Stable, continue current regimen. 5. Late onset Alzheimer'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 behavioral disturbance        - Stable, continue current regimen. Follow-up Disposition:  Return in about 3 months (around 4/10/2018). Medication risks/benefits/costs/interactions/alternatives discussed with patient. Advised patient to call back or return to office if symptoms worsen/change/persist.  If patient cannot reach us or should anything more severe/urgent arise he/she should proceed directly to the nearest emergency department. Discussed expected course/resolution/complications of diagnosis in detail with patient. Patient given a written after visit summary which includes her diagnoses, current medications and vitals. Patient expressed understanding with the diagnosis and plan. Written by prieto Dalal, as dictated by Elsy Munoz M.D.   I have reviewed and agree with the above note and have made corrections where appropriate, Dr. Torsten Regan MD

## 2018-01-10 NOTE — MR AVS SNAPSHOT
Visit Information Date & Time Provider Department Dept. Phone Encounter #  
 1/10/2018  4:15 PM Jessica Layne  [facility] [phone] [id number] Follow-up Instructions Return in about 3 months (around 4/10/2018). Upcoming Health Maintenance Date Due  
 MEDICARE YEARLY EXAM 9/21/2018 GLAUCOMA SCREENING Q2Y 3/21/2019 DTaP/Tdap/Td series (2 - Td) 9/7/2026 Allergies as of 1/10/2018  Review Complete On: 1/10/2018 By: Jessica Layen MD  
  
 Severity Noted Reaction Type Reactions Keflex [Cephalexin]  04/23/2010    Rash Pcn [Penicillins]  04/23/2010    Rash  
 Sulfa (Sulfonamide Antibiotics)  05/31/2011    Rash Current Immunizations  Reviewed on 9/7/2016 Name Date Influenza High Dose Vaccine PF 9/20/2017, 9/30/2015 Influenza Vaccine 10/16/2013 Pneumococcal Conjugate (PCV-13) 3/2/2016 Pneumococcal Polysaccharide (PPSV-23) 1/29/2014 Tdap 9/7/2016 Zoster Vaccine, Live 10/15/2014 Not reviewed this visit You Were Diagnosed With   
  
 Codes Comments Acquired hypothyroidism    -  Primary ICD-10-CM: E03.9 ICD-9-CM: 244.9 Essential hypertension     ICD-10-CM: I10 
ICD-9-CM: 401.9 Mixed hyperlipidemia     ICD-10-CM: E78.2 ICD-9-CM: 272.2 Elevated glucose     ICD-10-CM: R73.09 
ICD-9-CM: 790.29 Late onset Alzheimer'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 behavioral disturbance     ICD-10-CM: G30.1, F02.80 ICD-9-CM: 331.0, 294.10 Vitals BP Pulse Temp Resp Height(growth percentile) Weight(growth percentile) 134/82 (BP 1 Location: Right arm, BP Patient Position: Sitting) 88 97.4 °F (36.3 °C) (Oral) 18 5' 2\" (1.575 m) 136 lb 6.4 oz (61.9 kg) SpO2 BMI OB Status Smoking Status 98% 24.95 kg/m2 Postmenopausal Former Smoker Vitals History BMI and BSA Data Body Mass Index Body Surface Area 24.95 kg/m 2 1.65 m 2 [phone] Your Updated Medication List  
  
   
This list is accurate as of: 1/10/18  5:47 PM.  Always use your most recent med list. amLODIPine 5 mg tablet Commonly known as:  Rosaura Peach TAKE 1 TABLET DAILY FOR BLOOD PRESSURE  
  
 atorvastatin 40 mg tablet Commonly known as:  LIPITOR Take 1 Tab by mouth daily. BROVANA 15 mcg/2 mL Nebu neb solution Generic drug:  arformoterol  
  
 budesonide 0.5 mg/2 mL Nbsp Commonly known as:  PULMICORT  
  
 CENTRUM SILVER PO Take  by mouth. donepezil 10 mg tablet Commonly known as:  ARICEPT Take 1 Tab by mouth nightly.  
  
 ergocalciferol 50,000 unit capsule Commonly known as:  VITAMIN D2 Take 1 Cap by mouth every fourteen (14) days for 90 days. imipramine 25 mg tablet Commonly known as:  TOFRANIL  
TAKE 2 TABLETS NIGHTLY * levothyroxine 88 mcg tablet Commonly known as:  SYNTHROID Take 1 Tab by mouth Daily (before breakfast). * levothyroxine 100 mcg tablet Commonly known as:  SYNTHROID Take 1 tab po q Mon, Wed, and Fri , 88 mcg all other days of week  
  
 lisinopril 10 mg tablet Commonly known as:  PRINIVIL, ZESTRIL  
TAKE 1 TABLET DAILY FOR BLOOD PRESSURE  
  
 loratadine 10 mg tablet Commonly known as:  CLARITIN  
TAKE 1 TABLET DAILY  
  
 memantine 10 mg tablet Commonly known as:  Dianah Gang Take 1 Tab by mouth two (2) times a day. 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  
  
 umeclidinium 62.5 mcg/actuation inhaler Commonly known as:  INCRUSE ELLIPTA Take 1 Puff by inhalation daily. * Notice: This list has 2 medication(s) that are the same as other medications prescribed for you. Read the directions carefully, and ask your doctor or other care provider to review them with you. We Performed the Following AMB POC HEMOGLOBIN A1C [49523 CPT(R)] LIPID PANEL [35684 CPT(R)] METABOLIC PANEL, COMPREHENSIVE [24831 CPT(R)] TSH 3RD GENERATION [31815 CPT(R)] Follow-up Instructions Return in about 3 months (around 4/10/2018). To-Do List   
 03/06/2018 10:30 AM  
  Appointment with [address] [place] [address] DEXA 1 at [facility] [address] ([phone]) Please, no calcium supplements or antacids that coat the stomach (ex: Tums, Mylanta) 24 hours prior to procedure. Maintain normal diet and medications. Dairy products are allowed. Wear an outfit with an elastic waistband (no zipper or metal snaps). Check in at registration 15min before your appointment time unless you were instructed to do otherwise. Patient Instructions Helping A Person With Dementia: Care Instructions Your Care Instructions Dementia is a loss of mental skills that affects daily life. It is different from mild memory loss that occurs with aging. Dementia can cause problems with memory, thinking clearly, and planning. It is different for everyone. But it usually gets worse slowly. Some people who have dementia can function well for a long time. But at some point it may become hard for the person to care for himself or herself. It can be upsetting to learn that a loved one has this condition. You may be afraid and worried about what will happen. You may wonder how you will care for the person. There is no cure for dementia. But medicine may be able to slow memory loss and improve thinking for a while. Other medicines may help with sleep, depression, and behavior changes. Dementia is different for everyone. In some cases, people can function well for a long time. You can help your loved one by making his or her home life easier and safer. You also need to take care of yourself. Caregiving can be stressful. But support is available to help you and give you a break when you need it. The Alzheimer's Association offers good information and support.  If you are caring for someone with dementia, you can help make life safer and more comfortable. You can also help your loved one make decisions about future care. You may also want to bring up legal and financial issues. These are hard but important conversations to have. Follow-up care is a key part of your loved one's treatment and safety. Be sure to make and go to all appointments, and call your doctor if your loved one is having problems. It's also a good idea to know your loved one's test results and keep a list of the medicines he or she takes. How can you care for your loved one at home? Taking care of the person · If the person takes medicine for dementia, help him or her take it exactly as prescribed. Call the doctor if you notice any problems with the medicine. · Make a list of the person's medicines. Review it with all of his or her doctors. · Help the person eat a balanced diet. Serve plenty of whole grains, fruits, and vegetables every day. If the person is not hungry at mealtimes, give snacks at midmorning and in the afternoon. Offer drinks such as Boost, Ensure, or Sustacal if the person is losing weight. · Encourage exercise. Walking and other activities may slow the decline of mental ability. Help the person stay active mentally with reading, crossword puzzles, or other hobbies. · Take steps to help if the person is sundowning. This is the restless behavior and trouble with sleeping that may occur in late afternoon and at night. Try not to let the person nap during the day. Offer a glass of warm milk or caffeine-free tea before bedtime. · Develop a routine. Your loved one will feel less frustrated or confused with a clear, simple plan of what to do every day. · Be patient. A task may take the person longer than it used to. · For as long as he or she is able, allow your loved one to make decisions about activities, food, clothing, and other choices.  Let him or her be independent, even if tasks take more time or are not done perfectly. Tailor tasks to the person's abilities. For example, if cooking is no longer safe, ask for other help. Your loved one can help set the table, or make simple dishes such as a salad. When the person needs help, offer it gently. Staying safe · Make your home (or your loved one's home) safe. Tack down rugs, and put no-slip tape in the tub. Install handrails, and put safety switches on stoves and appliances. Keep rooms free of clutter. Make sure walkways around furniture are clear. Do not move furniture around, because the person may become confused. · Use locks on doors and cupboards. Lock up knives, scissors, medicines, cleaning supplies, and other dangerous things. · Do not let the person drive or cook if he or she can't do it safely. A person with dementia should not drive unless he or she is able to pass an on-road driving test. Your state 's license bureau can do a driving test if there is any question. · Get medical alert jewelry for the person so that you can be contacted if he or she wanders away. If possible, provide a safe place for wandering, such as an enclosed yard or garden. Taking care of yourself · Ask your doctor about support groups and other resources in your area. · Take care of your health. Be sure to eat healthy foods and get enough rest and exercise. · Take time for yourself. Respite services provide someone to stay with the person for a short time while you get out of the house for a few hours. · Make time for an activity that you enjoy. Read, listen to music, paint, do crafts, or play an instrument, even if it's only for a few minutes a day. · Spend time with family, friends, and others in your support system. When should you call for help? Call 911 anytime you think the person may need emergency care. For example, call if: 
? · The person who has dementia wanders away and you can't find him or her. ? · The person who has dementia is seriously injured. ?Call the doctor now or seek immediate medical care if: 
? · The person suddenly sees things that are not there (hallucinates). ? · The person has a sudden change in his or her behavior. ? Watch closely for changes in the person's health, and be sure to contact the doctor if: 
? · The person has symptoms that could cause injury. ? · The person has problems with his or her medicine. ? · You need more information to care for a person with dementia. ? · You need respite care so you can take a break. Where can you learn more? Go to http://asya-chad.info/. Enter Q498 in the search box to learn more about \"Helping A Person With Dementia: Care Instructions. \" Current as of: May 12, 2017 Content Version: 11.4 © 2407-6201 [employer]. Care instructions adapted under license by [employer] (which disclaims liability or warranty for this information). If you have questions about a medical condition or this instruction, always ask your healthcare professional. [place] [address] any warranty or liability for your use of this information. Introducing [address]! Dear Christ Homans: Thank you for requesting a [employer] account. Our records indicate that you already have an active [employer] account. You can access your account anytime at [email or web address] Did you know that you can access your hospital and ER discharge instructions at any time in [employer]? You can also review all of your test results from your hospital stay or ER visit. Additional Information If you have questions, please visit the Frequently Asked Questions section of the [employer] website at [email or web address] Remember, [employer] is NOT to be used for urgent needs. For medical emergencies, dial 911. Now available from your iPhone and Android! Please provide this summary of care documentation to your next provider. Your primary care clinician is listed as Alok Chan. If you have any questions after today's visit, please call [phone].

## 2018-01-12 ENCOUNTER — HOSPITAL ENCOUNTER (OUTPATIENT)
Dept: LAB | Age: 83
Discharge: HOME | End: 2018-01-12
Payer: MEDICARE

## 2018-01-12 ENCOUNTER — LAB ONLY (OUTPATIENT)
Dept: FAMILY MEDICINE CLINIC | Age: 83
End: 2018-01-12

## 2018-01-12 DIAGNOSIS — E03.9 ACQUIRED HYPOTHYROIDISM: ICD-10-CM

## 2018-01-12 DIAGNOSIS — M81.0 AGE-RELATED OSTEOPOROSIS WITHOUT CURRENT PATHOLOGICAL FRACTURE: ICD-10-CM

## 2018-01-12 PROCEDURE — 80069 RENAL FUNCTION PANEL: CPT

## 2018-01-12 PROCEDURE — 84443 ASSAY THYROID STIM HORMONE: CPT

## 2018-01-13 LAB
ALBUMIN SERPL-MCNC: 4.2 G/DL (ref 3.5–4.7)
BUN SERPL-MCNC: 27 MG/DL (ref 8–27)
BUN/CREAT SERPL: 23 (ref 12–28)
CALCIUM SERPL-MCNC: 9 MG/DL (ref 8.7–10.3)
CHLORIDE SERPL-SCNC: 104 MMOL/L (ref 96–106)
CO2 SERPL-SCNC: 19 MMOL/L (ref 18–29)
CREAT SERPL-MCNC: 1.2 MG/DL (ref 0.57–1)
GLUCOSE SERPL-MCNC: 129 MG/DL (ref 65–99)
INTERPRETATION: NORMAL
PHOSPHATE SERPL-MCNC: 3.5 MG/DL (ref 2.5–4.5)
POTASSIUM SERPL-SCNC: 4.5 MMOL/L (ref 3.5–5.2)
SODIUM SERPL-SCNC: 145 MMOL/L (ref 134–144)
TSH SERPL DL<=0.005 MIU/L-ACNC: 0.12 UIU/ML (ref 0.45–4.5)

## 2018-01-14 DIAGNOSIS — G30.1 LATE ONSET ALZHEIMER'S DISEASE WITHOUT BEHAVIORAL DISTURBANCE (HCC): ICD-10-CM

## 2018-01-14 DIAGNOSIS — F02.80 LATE ONSET ALZHEIMER'S DISEASE WITHOUT BEHAVIORAL DISTURBANCE (HCC): ICD-10-CM

## 2018-01-15 NOTE — TELEPHONE ENCOUNTER
From: Carolann Cornea  To: Heber Lincoln MD  Sent: 1/14/2018 8:37 AM EST  Subject: Medication Renewal Request    Original authorizing provider: MD Tray Naylor.  Asuncion Urbano would like a refill of the following medications:  memantine (NAMENDA) 10 mg tablet Heber Lincoln MD]    Preferred pharmacy: [facility]:

## 2018-01-15 NOTE — PROGRESS NOTES
Thyroid function slightly over suppressed  continue current dose but recheck TSH in 2 weeks.     Any questions let me know    Please place order for TSH (future)

## 2018-01-16 RX ORDER — MEMANTINE HYDROCHLORIDE 10 MG/1
10 TABLET ORAL 2 TIMES DAILY
Qty: 180 TAB | Refills: 1 | Status: SHIPPED | OUTPATIENT
Start: 2018-01-16 | End: 2018-06-17 | Stop reason: SDUPTHER

## 2018-01-18 NOTE — PROGRESS NOTES
[phone] spoke to [facility] verified  and notified of patients labs

## 2018-01-25 ENCOUNTER — HOSPITAL ENCOUNTER (OUTPATIENT)
Dept: LAB | Age: 83
Discharge: HOME | End: 2018-01-25
Payer: MEDICARE

## 2018-01-25 PROCEDURE — 84443 ASSAY THYROID STIM HORMONE: CPT

## 2018-01-25 PROCEDURE — 80053 COMPREHEN METABOLIC PANEL: CPT

## 2018-01-25 PROCEDURE — 80061 LIPID PANEL: CPT

## 2018-01-26 LAB
ALBUMIN SERPL-MCNC: 4.1 G/DL (ref 3.5–4.7)
ALBUMIN/GLOB SERPL: 1.5 {RATIO} (ref 1.2–2.2)
ALP SERPL-CCNC: 109 IU/L (ref 39–117)
ALT SERPL-CCNC: 20 IU/L (ref 0–32)
AST SERPL-CCNC: 21 IU/L (ref 0–40)
BILIRUB SERPL-MCNC: 0.3 MG/DL (ref 0–1.2)
BUN SERPL-MCNC: 29 MG/DL (ref 8–27)
BUN/CREAT SERPL: 21 (ref 12–28)
CALCIUM SERPL-MCNC: 9.3 MG/DL (ref 8.7–10.3)
CHLORIDE SERPL-SCNC: 106 MMOL/L (ref 96–106)
CHOLEST SERPL-MCNC: 198 MG/DL (ref 100–199)
CO2 SERPL-SCNC: 23 MMOL/L (ref 18–29)
CREAT SERPL-MCNC: 1.35 MG/DL (ref 0.57–1)
GFR SERPLBLD CREATININE-BSD FMLA CKD-EPI: 35 ML/MIN/1.73
GFR SERPLBLD CREATININE-BSD FMLA CKD-EPI: 40 ML/MIN/1.73
GLOBULIN SER CALC-MCNC: 2.7 G/DL (ref 1.5–4.5)
GLUCOSE SERPL-MCNC: 123 MG/DL (ref 65–99)
HDLC SERPL-MCNC: 48 MG/DL
INTERPRETATION, 910389: NORMAL
INTERPRETATION: NORMAL
LDLC SERPL CALC-MCNC: 101 MG/DL (ref 0–99)
PDF IMAGE, 910387: NORMAL
POTASSIUM SERPL-SCNC: 4.5 MMOL/L (ref 3.5–5.2)
PROT SERPL-MCNC: 6.8 G/DL (ref 6–8.5)
SODIUM SERPL-SCNC: 146 MMOL/L (ref 134–144)
TRIGL SERPL-MCNC: 246 MG/DL (ref 0–149)
TSH SERPL DL<=0.005 MIU/L-ACNC: 0.07 UIU/ML (ref 0.45–4.5)
VLDLC SERPL CALC-MCNC: 49 MG/DL (ref 5–40)

## 2018-01-29 NOTE — PROGRESS NOTES
Please call and inform her daughter      1) reduce her synthroid to 88 mcg daily now  Thyroid is over suppressed  We need to recheck in 6 weeks a TSH place standing order. Glucose elevated add A1C please. We need to recheck bmp in 4 weeks ask her to be well hydrated place order for bmp.     Lipids are at goal    Any question let me know

## 2018-02-01 NOTE — PROGRESS NOTES
[phone] attempted to call Lexy Do no answer left message to call me back in regards to patient lab results

## 2018-02-02 ENCOUNTER — TELEPHONE (OUTPATIENT)
Dept: FAMILY MEDICINE CLINIC | Age: 83
End: 2018-02-02

## 2018-02-02 NOTE — TELEPHONE ENCOUNTER
Patients daughter Guy Lopez is calling in regards to a missed call from [facility], tried contacting nurse, no success.     Best call back # for Rupali: [phone]

## 2018-02-02 NOTE — PROGRESS NOTES
[phone] spoke to [address] [place] [address] verified  and notified patient labs Rupali understand   Ok to order BMP and TSH per Dr Lali Downs

## 2018-02-02 NOTE — PROGRESS NOTES
[phone] attempted to call Jv Sanford no answer left message to call me back in regards to patient lab results

## 2018-02-02 NOTE — TELEPHONE ENCOUNTER
[phone] attempted to call Sudhakar Shields no answer left message to call me back in regards to patient lab results

## 2018-02-02 NOTE — TELEPHONE ENCOUNTER
----- Message from Solo Galarza sent at 2/2/2018  8:15 AM EST -----  Regarding: Dr. Alisha Rajan  Patient returned call from practice on yesterday. Best contact number is [phone].

## 2018-02-02 NOTE — TELEPHONE ENCOUNTER
[phone] spoke to [address] [place] [address] verified  and notified patient labs Rupali cook

## 2018-02-05 ENCOUNTER — OFFICE VISIT (OUTPATIENT)
Dept: FAMILY MEDICINE CLINIC | Age: 83
End: 2018-02-05

## 2018-02-05 VITALS
BODY MASS INDEX: 23.45 KG/M2 | WEIGHT: 127.4 LBS | RESPIRATION RATE: 18 BRPM | SYSTOLIC BLOOD PRESSURE: 123 MMHG | TEMPERATURE: 99.1 F | HEART RATE: 87 BPM | OXYGEN SATURATION: 93 % | DIASTOLIC BLOOD PRESSURE: 59 MMHG | HEIGHT: 62 IN

## 2018-02-05 DIAGNOSIS — M25.561 ACUTE PAIN OF RIGHT KNEE: ICD-10-CM

## 2018-02-05 DIAGNOSIS — J44.9 CHRONIC OBSTRUCTIVE PULMONARY DISEASE, UNSPECIFIED COPD TYPE (HCC): Primary | ICD-10-CM

## 2018-02-05 RX ORDER — ALBUTEROL SULFATE 90 UG/1
2 AEROSOL, METERED RESPIRATORY (INHALATION)
Qty: 1 INHALER | Refills: 0 | Status: SHIPPED | OUTPATIENT
Start: 2018-02-05 | End: 2020-01-22

## 2018-02-05 RX ORDER — GUAIFENESIN 600 MG/1
600 TABLET, EXTENDED RELEASE ORAL
Qty: 30 TAB | Refills: 1 | Status: SHIPPED | OUTPATIENT
Start: 2018-02-05 | End: 2018-04-30

## 2018-02-05 NOTE — PATIENT INSTRUCTIONS
Chronic Obstructive Pulmonary Disease (COPD): Care Instructions  Your Care Instructions    Chronic obstructive pulmonary disease (COPD) is a general term for a group of lung diseases, including emphysema and chronic bronchitis. People with COPD have decreased airflow in and out of the lungs, which makes it hard to breathe. The airways also can get clogged with thick mucus. Cigarette smoking is a major cause of COPD. Although there is no cure for COPD, you can slow its progress. Following your treatment plan and taking care of yourself can help you feel better and live longer. Follow-up care is a key part of your treatment and safety. Be sure to make and go to all appointments, and call your doctor if you are having problems. It's also a good idea to know your test results and keep a list of the medicines you take. How can you care for yourself at home? ?Staying healthy  ? · Do not smoke. This is the most important step you can take to prevent more damage to your lungs. If you need help quitting, talk to your doctor about stop-smoking programs and medicines. These can increase your chances of quitting for good. ? · Avoid colds and flu. Get a pneumococcal vaccine shot. If you have had one before, ask your doctor whether you need a second dose. Get the flu vaccine every fall. If you must be around people with colds or the flu, wash your hands often. ? · Avoid secondhand smoke, air pollution, and high altitudes. Also avoid cold, dry air and hot, humid air. Stay at home with your windows closed when air pollution is bad. ?Medicines and oxygen therapy  ? · Take your medicines exactly as prescribed. Call your doctor if you think you are having a problem with your medicine. ? · You may be taking medicines such as:  ¨ Bronchodilators. These help open your airways and make breathing easier. Bronchodilators are either short-acting (work for 6 to 9 hours) or long-acting (work for 24 hours).  You inhale most bronchodilators, so they start to act quickly. Always carry your quick-relief inhaler with you in case you need it while you are away from home. ¨ Corticosteroids (prednisone, budesonide). These reduce airway inflammation. They come in pill or inhaled form. You must take these medicines every day for them to work well. ? · A spacer may help you get more inhaled medicine to your lungs. Ask your doctor or pharmacist if a spacer is right for you. If it is, ask how to use it properly. ? · Do not take any vitamins, over-the-counter medicine, or herbal products without talking to your doctor first.   ? · If your doctor prescribed antibiotics, take them as directed. Do not stop taking them just because you feel better. You need to take the full course of antibiotics. ? · Oxygen therapy boosts the amount of oxygen in your blood and helps you breathe easier. Use the flow rate your doctor has recommended, and do not change it without talking to your doctor first.   Activity  ? · Get regular exercise. Walking is an easy way to get exercise. Start out slowly, and walk a little more each day. ? · Pay attention to your breathing. You are exercising too hard if you cannot talk while you are exercising. ? · Take short rest breaks when doing household chores and other activities. ? · Learn breathing methods-such as breathing through pursed lips-to help you become less short of breath. ? · If your doctor has not set you up with a pulmonary rehabilitation program, talk to him or her about whether rehab is right for you. Rehab includes exercise programs, education about your disease and how to manage it, help with diet and other changes, and emotional support. Diet  ? · Eat regular, healthy meals. Use bronchodilators about 1 hour before you eat to make it easier to eat. Eat several small meals instead of three large ones. Drink beverages at the end of the meal. Avoid foods that are hard to chew.    ? · Eat foods that contain protein so that you do not lose muscle mass. ? · Talk with your doctor if you gain too much weight or if you lose weight without trying. ?Mental health  ? · Talk to your family, friends, or a therapist about your feelings. It is normal to feel frightened, angry, hopeless, helpless, and even guilty. Talking openly about bad feelings can help you cope. If these feelings last, talk to your doctor. When should you call for help? Call 911 anytime you think you may need emergency care. For example, call if:  ? · You have severe trouble breathing. ?Call your doctor now or seek immediate medical care if:  ? · You have new or worse trouble breathing. ? · You cough up blood. ? · You have a fever. ? Watch closely for changes in your health, and be sure to contact your doctor if:  ? · You cough more deeply or more often, especially if you notice more mucus or a change in the color of your mucus. ? · You have new or worse swelling in your legs or belly. ? · You are not getting better as expected. Where can you learn more? Go to http://asya-chad.info/. Dayron Ng in the search box to learn more about \"Chronic Obstructive Pulmonary Disease (COPD): Care Instructions. \"  Current as of: May 12, 2017  Content Version: 11.4  © 6169-1994 [employer]. Care instructions adapted under license by [employer] (which disclaims liability or warranty for this information). If you have questions about a medical condition or this instruction, always ask your healthcare professional. [address] any warranty or liability for your use of this information.

## 2018-02-05 NOTE — PROGRESS NOTES
Kendrick Arroyo is a 80 y.o. female     Chief Complaint   Patient presents with    Chest Congestion    Cough     Visit Vitals    /59 (BP 1 Location: Left arm, BP Patient Position: Sitting)    Pulse 87    Temp 99.1 °F (37.3 °C) (Oral)    Resp 18    Ht 5' 2\" (1.575 m)    Wt 127 lb 6.4 oz (57.8 kg)    SpO2 93%    BMI 23.3 kg/m2     1. Have you been to the ER, urgent care clinic since your last visit? Hospitalized since your last visit?  no    2. Have you seen or consulted any other health care providers outside of the [facility] since your last visit? Include any pap smears or colon screening.  no

## 2018-02-05 NOTE — PROGRESS NOTES
Assessment/Plan:     Diagnoses and all orders for this visit:    1. Chronic obstructive pulmonary disease, unspecified COPD type (HCC)  -     albuterol (PROVENTIL HFA, VENTOLIN HFA, PROAIR HFA) 90 mcg/actuation inhaler; Take 2 Puffs by inhalation every four (4) hours as needed for Wheezing. Please give with spacer.  -     inhalational spacing device; 1 Each by Does Not Apply route as needed.  -     guaiFENesin ER (MUCINEX) 600 mg ER tablet; Take 1 Tab by mouth two (2) times daily as needed for Congestion. Treatment as above. She has routine follow up with pulmonology tomorrow. 2. Acute pain of right knee  Unclear. Discussed a trial of Tylenol otc. Appears to bother her occasionally but exam is unremarkable. Patient refuses assistive devices. Follow-up Disposition:  Return if symptoms worsen or fail to improve. Discussed expected course/resolution/complications of diagnosis in detail with patient.    Medication risks/benefits/costs/interactions/alternatives discussed with patient.    Pt was given after visit summary which includes diagnoses, current medications & vitals. Pt expressed understanding with the diagnosis and plan          Subjective: Douglas Hector is a 80 y.o. female who presents for had concerns including Chest Congestion and Cough. Upper Respiratory Infection  Patient complains of symptoms of a URI. Symptoms include cough. Onset of symptoms was 1 day ago, unchanged since that time. She also c/o low grade fever for the past 1 day . She is drinking plenty of fluids. . Evaluation to date: none. Treatment to date: none. Is accompanied by her daughter today. Also reports some recent limping of the right leg. Has reported pain recently, although this is inconsistent due to her memory disorder. History of bilateral knee replacements. No recent pain medications. No recent falls.        Current Outpatient Prescriptions   Medication Sig Dispense Refill    albuterol (PROVENTIL HFA, VENTOLIN HFA, PROAIR HFA) 90 mcg/actuation inhaler Take 2 Puffs by inhalation every four (4) hours as needed for Wheezing. Please give with spacer. 1 Inhaler 0    inhalational spacing device 1 Each by Does Not Apply route as needed. 1 Device 0    guaiFENesin ER (MUCINEX) 600 mg ER tablet Take 1 Tab by mouth two (2) times daily as needed for Congestion. 30 Tab 1    lisinopril (PRINIVIL, ZESTRIL) 10 mg tablet TAKE 1 TABLET DAILY FOR BLOOD PRESSURE 90 Tab 1    donepezil (ARICEPT) 10 mg tablet Take 1 Tab by mouth nightly. 90 Tab 1    atorvastatin (LIPITOR) 40 mg tablet Take 1 Tab by mouth daily. 90 Tab 1    levothyroxine (SYNTHROID) 88 mcg tablet Take 1 Tab by mouth Daily (before breakfast). (Patient taking differently: Take 88 mcg by mouth daily.) 48 Tab 3    ergocalciferol (VITAMIN D2) 50,000 unit capsule Take 1 Cap by mouth every fourteen (14) days for 90 days. 6 Cap 4    umeclidinium (INCRUSE ELLIPTA) 62.5 mcg/actuation inhaler Take 1 Puff by inhalation daily. 3 Inhaler 3    imipramine (TOFRANIL) 25 mg tablet TAKE 2 TABLETS NIGHTLY 180 Tab 1    BROVANA 15 mcg/2 mL nebu neb solution       budesonide (PULMICORT) 0.5 mg/2 mL nbsp       loratadine (CLARITIN) 10 mg tablet TAKE 1 TABLET DAILY 30 Tab 4    amLODIPine (NORVASC) 5 mg tablet TAKE 1 TABLET DAILY FOR BLOOD PRESSURE 90 Tab 1    FOLIC ACID/MULTIVIT-MIN/LUTEIN (CENTRUM SILVER PO) Take  by mouth.  memantine (NAMENDA) 10 mg tablet Take 1 Tab by mouth two (2) times a day. 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     Objective:     Visit Vitals    /59 (BP 1 Location: Left arm, BP Patient Position: Sitting)    Pulse 87    Temp 99.1 °F (37.3 °C) (Oral)    Resp 18    Ht 5' 2\" (1.575 m)  Wt 127 lb 6.4 oz (57.8 kg)    SpO2 93%    BMI 23.3 kg/m2       Vitals and Nurse Documentation reviewed. Physical Exam   Constitutional: No distress. HENT:   Right Ear: Tympanic membrane is not erythematous and not bulging. No middle ear effusion. Left Ear: Tympanic membrane is not erythematous and not bulging. No middle ear effusion. Nose: No rhinorrhea. Right sinus exhibits no maxillary sinus tenderness and no frontal sinus tenderness. Left sinus exhibits no maxillary sinus tenderness and no frontal sinus tenderness. Mouth/Throat: No oropharyngeal exudate or posterior oropharyngeal erythema. Cardiovascular: S1 normal and S2 normal.  Exam reveals no gallop and no friction rub. No murmur heard. Pulmonary/Chest: Breath sounds normal. She has no wheezes. Abdominal:   Cough is mildly rhoncherous. Musculoskeletal:        Right knee: She exhibits decreased range of motion. She exhibits no swelling, no effusion, no ecchymosis and no deformity. No tenderness found. Gait is cautious but stable, without full extension of the knee. Strength is age appropriate and symmetric. Lymphadenopathy:     She has no cervical adenopathy.    Psychiatric: Mood and affect normal.

## 2018-02-05 NOTE — MR AVS SNAPSHOT
[address] [facility], [place] 
 
 
 [address] [place] [address] 
[phone] Patient: Sg Reynoso MRN: [id number] Regan Ferrer Visit Information Date & Time Provider Department Dept. Phone Encounter #  
 2/5/2018  4:00 PM Maribeth Robles  [facility] [phone] [id number] Follow-up Instructions Return if symptoms worsen or fail to improve. Your Appointments 4/30/2018  4:00 PM  
ROUTINE CARE with Sunny Crawley MD  
[facility]) Appt Note: Follow up for thyroid; Follow up for thyroid [address] [id number]  
[phone]  
  
   
 [address] [address] Upcoming Health Maintenance Date Due  
 MEDICARE YEARLY EXAM 9/21/2018 GLAUCOMA SCREENING Q2Y 3/21/2019 DTaP/Tdap/Td series (2 - Td) 9/7/2026 Allergies as of 2/5/2018  Review Complete On: 2/5/2018 By: Joshua Goncalves LPN Severity Noted Reaction Type Reactions Keflex [Cephalexin]  04/23/2010    Rash Pcn [Penicillins]  04/23/2010    Rash  
 Sulfa (Sulfonamide Antibiotics)  05/31/2011    Rash Current Immunizations  Reviewed on 9/7/2016 Name Date Influenza High Dose Vaccine PF 9/20/2017, 9/30/2015 Influenza Vaccine 10/16/2013 Pneumococcal Conjugate (PCV-13) 3/2/2016 Pneumococcal Polysaccharide (PPSV-23) 1/29/2014 Tdap 9/7/2016 Zoster Vaccine, Live 10/15/2014 Not reviewed this visit You Were Diagnosed With   
  
 Codes Comments Chronic obstructive pulmonary disease, unspecified COPD type ([facility]    -  Primary ICD-10-CM: J44.9 ICD-9-CM: 888 Acute pain of right knee     ICD-10-CM: M25.561 ICD-9-CM: 719.46 Vitals BP Pulse Temp Resp Height(growth percentile) Weight(growth percentile)  123/59 (BP 1 Location: Left arm, BP Patient Position: Sitting) 87 99.1 °F (37.3 °C) (Oral) 18 5' 2\" (1.575 m) 127 lb 6.4 oz (57.8 kg) SpO2 BMI OB Status Smoking Status 93% 23.3 kg/m2 Postmenopausal Former Smoker BMI and BSA Data Body Mass Index Body Surface Area  
 23.3 kg/m 2 1.59 m 2 Preferred Pharmacy Pharmacy Name Phone [facility] [phone] Your Updated Medication List  
  
   
This list is accurate as of: 2/5/18  4:52 PM.  Always use your most recent med list.  
  
  
  
  
 albuterol 90 mcg/actuation inhaler Commonly known as:  PROVENTIL HFA, VENTOLIN HFA, PROAIR HFA Take 2 Puffs by inhalation every four (4) hours as needed for Wheezing. Please give with spacer. amLODIPine 5 mg tablet Commonly known as:  Rena Porter TAKE 1 TABLET DAILY FOR BLOOD PRESSURE  
  
 atorvastatin 40 mg tablet Commonly known as:  LIPITOR Take 1 Tab by mouth daily. BROVANA 15 mcg/2 mL Nebu neb solution Generic drug:  arformoterol  
  
 budesonide 0.5 mg/2 mL Nbsp Commonly known as:  PULMICORT  
  
 CENTRUM SILVER PO Take  by mouth. donepezil 10 mg tablet Commonly known as:  ARICEPT Take 1 Tab by mouth nightly.  
  
 ergocalciferol 50,000 unit capsule Commonly known as:  VITAMIN D2 Take 1 Cap by mouth every fourteen (14) days for 90 days. guaiFENesin  mg ER tablet Commonly known as:  [address] Take 1 Tab by mouth two (2) times daily as needed for Congestion. imipramine 25 mg tablet Commonly known as:  TOFRANIL  
TAKE 2 TABLETS NIGHTLY  
  
 inhalational spacing device 1 Each by Does Not Apply route as needed. levothyroxine 88 mcg tablet Commonly known as:  SYNTHROID Take 1 Tab by mouth Daily (before breakfast). lisinopril 10 mg tablet Commonly known as:  PRINIVIL, ZESTRIL  
TAKE 1 TABLET DAILY FOR BLOOD PRESSURE  
  
 loratadine 10 mg tablet Commonly known as:  CLARITIN  
TAKE 1 TABLET DAILY  
  
 memantine 10 mg tablet Commonly known as:  Regi Paz Take 1 Tab by mouth two (2) times a day. 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  
  
 umeclidinium 62.5 mcg/actuation inhaler Commonly known as:  INCRUSE ELLIPTA Take 1 Puff by inhalation daily. Prescriptions Sent to Pharmacy Refills  
 albuterol (PROVENTIL HFA, VENTOLIN HFA, PROAIR HFA) 90 mcg/actuation inhaler 0 Sig: Take 2 Puffs by inhalation every four (4) hours as needed for Wheezing. Please give with spacer. Class: Normal  
 Pharmacy: [address], [address] [place] [address] Ph #: [phone] Route: Inhalation  
 inhalational spacing device 0 Si Each by Does Not Apply route as needed. Class: Normal  
 Pharmacy: [address], [address] [place] [address] Ph #: [phone] Route: Does Not Apply  
 guaiFENesin ER (MUCINEX) 600 mg ER tablet 1 Sig: Take 1 Tab by mouth two (2) times daily as needed for Congestion. Class: Normal  
 Pharmacy: [address], [address] [place] [address] Ph #: [phone] Route: Oral  
  
Follow-up Instructions Return if symptoms worsen or fail to improve. To-Do List   
 2018 10:30 AM  
  Appointment with [facility] at [address] [place] [address] ([phone]) Please, no calcium supplements or antacids that coat the stomach (ex: Tums, Mylanta) 24 hours prior to procedure. Maintain normal diet and medications. Dairy products are allowed. Wear an outfit with an elastic waistband (no zipper or metal snaps). Check in at registration 15min before your appointment time unless you were instructed to do otherwise. Patient Instructions Chronic Obstructive Pulmonary Disease (COPD): Care Instructions Your Care Instructions Chronic obstructive pulmonary disease (COPD) is a general term for a group of lung diseases, including emphysema and chronic bronchitis.  People with COPD have decreased airflow in and out of the lungs, which makes it hard to breathe. The airways also can get clogged with thick mucus. Cigarette smoking is a major cause of COPD. Although there is no cure for COPD, you can slow its progress. Following your treatment plan and taking care of yourself can help you feel better and live longer. Follow-up care is a key part of your treatment and safety. Be sure to make and go to all appointments, and call your doctor if you are having problems. It's also a good idea to know your test results and keep a list of the medicines you take. How can you care for yourself at home? ?Staying healthy ? · Do not smoke. This is the most important step you can take to prevent more damage to your lungs. If you need help quitting, talk to your doctor about stop-smoking programs and medicines. These can increase your chances of quitting for good. ? · Avoid colds and flu. Get a pneumococcal vaccine shot. If you have had one before, ask your doctor whether you need a second dose. Get the flu vaccine every fall. If you must be around people with colds or the flu, wash your hands often. ? · Avoid secondhand smoke, air pollution, and high altitudes. Also avoid cold, dry air and hot, humid air. Stay at home with your windows closed when air pollution is bad. ?Medicines and oxygen therapy ? · Take your medicines exactly as prescribed. Call your doctor if you think you are having a problem with your medicine. ? · You may be taking medicines such as: ¨ Bronchodilators. These help open your airways and make breathing easier. Bronchodilators are either short-acting (work for 6 to 9 hours) or long-acting (work for 24 hours). You inhale most bronchodilators, so they start to act quickly. Always carry your quick-relief inhaler with you in case you need it while you are away from home. ¨ Corticosteroids (prednisone, budesonide).  These reduce airway inflammation. They come in pill or inhaled form. You must take these medicines every day for them to work well. ? · A spacer may help you get more inhaled medicine to your lungs. Ask your doctor or pharmacist if a spacer is right for you. If it is, ask how to use it properly. ? · Do not take any vitamins, over-the-counter medicine, or herbal products without talking to your doctor first.  
? · If your doctor prescribed antibiotics, take them as directed. Do not stop taking them just because you feel better. You need to take the full course of antibiotics. ? · Oxygen therapy boosts the amount of oxygen in your blood and helps you breathe easier. Use the flow rate your doctor has recommended, and do not change it without talking to your doctor first.  
Activity ? · Get regular exercise. Walking is an easy way to get exercise. Start out slowly, and walk a little more each day. ? · Pay attention to your breathing. You are exercising too hard if you cannot talk while you are exercising. ? · Take short rest breaks when doing household chores and other activities. ? · Learn breathing methods-such as breathing through pursed lips-to help you become less short of breath. ? · If your doctor has not set you up with a pulmonary rehabilitation program, talk to him or her about whether rehab is right for you. Rehab includes exercise programs, education about your disease and how to manage it, help with diet and other changes, and emotional support. Diet ? · Eat regular, healthy meals. Use bronchodilators about 1 hour before you eat to make it easier to eat. Eat several small meals instead of three large ones. Drink beverages at the end of the meal. Avoid foods that are hard to chew. ? · Eat foods that contain protein so that you do not lose muscle mass. ? · Talk with your doctor if you gain too much weight or if you lose weight without trying. ?Mental health ? · Talk to your family, friends, or a therapist about your feelings. It is normal to feel frightened, angry, hopeless, helpless, and even guilty. Talking openly about bad feelings can help you cope. If these feelings last, talk to your doctor. When should you call for help? Call 911 anytime you think you may need emergency care. For example, call if: 
? · You have severe trouble breathing. ?Call your doctor now or seek immediate medical care if: 
? · You have new or worse trouble breathing. ? · You cough up blood. ? · You have a fever. ? Watch closely for changes in your health, and be sure to contact your doctor if: 
? · You cough more deeply or more often, especially if you notice more mucus or a change in the color of your mucus. ? · You have new or worse swelling in your legs or belly. ? · You are not getting better as expected. Where can you learn more? Go to http://asya-chad.info/. Dayron Ng in the search box to learn more about \"Chronic Obstructive Pulmonary Disease (COPD): Care Instructions. \" Current as of: May 12, 2017 Content Version: 11.4 © 8446-2637 [employer]. Care instructions adapted under license by [employer] (which disclaims liability or warranty for this information). If you have questions about a medical condition or this instruction, always ask your healthcare professional. [address] any warranty or liability for your use of this information. Introducing [facility] & HE[facility]! Dear Serena Nielsen: Thank you for requesting a [employer] account. Our records indicate that you already have an active [employer]al account. You can access your account anytime at [email or web address] Did you know that you can access your hospital and ER discharge instructions at any time in KoalaDeal? You can also review all of your test results from your hospital stay or ER visit. Additional Information If you have questions, please visit the Frequently Asked Questions section of the [employer] website at [email or web address] Remember, [employer] is NOT to be used for urgent needs. For medical emergencies, dial 911. Now available from your iPhone and Android! Please provide this summary of care documentation to your next provider. Your primary care clinician is listed as Dulce Burdick. If you have any questions after today's visit, please call [phone].

## 2018-02-07 ENCOUNTER — TELEPHONE (OUTPATIENT)
Dept: FAMILY MEDICINE CLINIC | Age: 83
End: 2018-02-07

## 2018-02-07 NOTE — TELEPHONE ENCOUNTER
Call to patient's daughter on phi form.  verified. She states patient is really congestion and has productive cough. Daughter states she is worse than she was at last visit. She is requesting antibiotic for patient.      Denies ear pain and headaches

## 2018-02-07 NOTE — TELEPHONE ENCOUNTER
Please call patient. She was just here on Monday. The doctor said she didn't need an antibiotic, but she worse and would like something sent in.      [phone] or [phone].

## 2018-02-08 ENCOUNTER — TELEPHONE (OUTPATIENT)
Dept: FAMILY MEDICINE CLINIC | Age: 83
End: 2018-02-08

## 2018-02-08 DIAGNOSIS — R05.3 PERSISTENT COUGH: Primary | ICD-10-CM

## 2018-02-08 NOTE — TELEPHONE ENCOUNTER
[phone] spoke to [address] [place] [address]  patient was seen by BRITTANY Martinez and she is not getting any better so made appointment to see Dr Lashawn Jacob but they won't make her appointment till she get xray.    Rupali requesting order for chest xray   LOV 2/5/2018

## 2018-02-08 NOTE — TELEPHONE ENCOUNTER
Im a little confused. She has seen pulmonology for follow up the day after she saw me. So they have evaluated her with this same illness. However, a chest xray is a good idea so I will order.

## 2018-02-08 NOTE — TELEPHONE ENCOUNTER
Please call patient's Daughter Chilo De La Rosa. She said she needs an xray ordered so it can be sent over to Dr. Oleh Sandhoff office. [phone] or [phone].

## 2018-02-09 NOTE — TELEPHONE ENCOUNTER
Incoming call from patient's daughter. Informed her of provider's recommendations. And that I called Dr. Blanco Krishna office and he has placed an order. Kristi Jara also placed an order. She states she will get xray done at this facility.  Advised Dr. Gagandeep Barraza needs to review the xray we will send him results and patient will also obtain CD to bring to his office

## 2018-02-09 NOTE — TELEPHONE ENCOUNTER
Call to Dr. Navarro Cervantes office. They stated that was patient has said is not true. They have schedule an appointment for her yesterday. They also told her they ordered her xray and patient's daughter states she would call back to let them know what imaging center they are going to get the xray done at so they can fax over the order. Attempted to reach patient's daughter x2. Left message to call back office.

## 2018-03-06 ENCOUNTER — HOSPITAL ENCOUNTER (OUTPATIENT)
Dept: MAMMOGRAPHY | Age: 83
Discharge: HOME | End: 2018-03-06
Attending: INTERNAL MEDICINE
Payer: MEDICARE

## 2018-03-06 DIAGNOSIS — M81.0 AGE-RELATED OSTEOPOROSIS WITHOUT CURRENT PATHOLOGICAL FRACTURE: ICD-10-CM

## 2018-03-06 PROCEDURE — 77080 DXA BONE DENSITY AXIAL: CPT

## 2018-03-13 ENCOUNTER — TELEPHONE (OUTPATIENT)
Dept: RHEUMATOLOGY | Age: 83
End: 2018-03-13

## 2018-03-13 NOTE — TELEPHONE ENCOUNTER
Returned [employer] (on [employer]) call regarding Prolia injection for her mother. Informed Prolia is due 6/20/2018. Instructed daughter to call at the beginning of June to get order for Prolia faxed to the [facility] or arrange for Prolia to be given in the office.

## 2018-03-13 NOTE — TELEPHONE ENCOUNTER
Patient's daughter calling to see when her mother will need to schedule her next Prolia injection. Her phone is [phone].

## 2018-04-23 DIAGNOSIS — E78.2 MIXED HYPERLIPIDEMIA: ICD-10-CM

## 2018-04-24 RX ORDER — ATORVASTATIN CALCIUM 40 MG/1
TABLET, FILM COATED ORAL
Qty: 90 TAB | Refills: 1 | Status: SHIPPED | OUTPATIENT
Start: 2018-04-24 | End: 2018-11-17 | Stop reason: SDUPTHER

## 2018-04-30 ENCOUNTER — OFFICE VISIT (OUTPATIENT)
Dept: FAMILY MEDICINE CLINIC | Age: 83
End: 2018-04-30

## 2018-04-30 VITALS
OXYGEN SATURATION: 99 % | BODY MASS INDEX: 25.03 KG/M2 | DIASTOLIC BLOOD PRESSURE: 69 MMHG | HEART RATE: 59 BPM | WEIGHT: 136 LBS | SYSTOLIC BLOOD PRESSURE: 139 MMHG | TEMPERATURE: 98 F | RESPIRATION RATE: 16 BRPM | HEIGHT: 62 IN

## 2018-04-30 DIAGNOSIS — R74.8 ELEVATED ALKALINE PHOSPHATASE LEVEL: ICD-10-CM

## 2018-04-30 DIAGNOSIS — E78.2 MIXED HYPERLIPIDEMIA: ICD-10-CM

## 2018-04-30 DIAGNOSIS — I10 ESSENTIAL HYPERTENSION: ICD-10-CM

## 2018-04-30 DIAGNOSIS — N18.30 CKD (CHRONIC KIDNEY DISEASE) STAGE 3, GFR 30-59 ML/MIN (HCC): ICD-10-CM

## 2018-04-30 DIAGNOSIS — E03.9 ACQUIRED HYPOTHYROIDISM: ICD-10-CM

## 2018-04-30 DIAGNOSIS — R73.9 ELEVATED BLOOD SUGAR: Primary | ICD-10-CM

## 2018-04-30 LAB — HBA1C MFR BLD HPLC: 6 %

## 2018-04-30 RX ORDER — LEVOTHYROXINE SODIUM 100 UG/1
TABLET ORAL
COMMUNITY
Start: 2018-03-30 | End: 2018-05-29 | Stop reason: SDUPTHER

## 2018-04-30 RX ORDER — ERGOCALCIFEROL 1.25 MG/1
CAPSULE ORAL
COMMUNITY
Start: 2018-04-07 | End: 2018-05-29 | Stop reason: SDUPTHER

## 2018-04-30 RX ORDER — ALBUTEROL SULFATE 0.83 MG/ML
SOLUTION RESPIRATORY (INHALATION)
COMMUNITY
Start: 2018-02-13 | End: 2018-07-05 | Stop reason: ALTCHOICE

## 2018-04-30 NOTE — PROGRESS NOTES
HISTORY OF PRESENT ILLNESS  Dahiana Kovacs is a 80 y.o. female. Blood pressure 139/69, pulse (!) 59, temperature 98 °F (36.7 °C), temperature source Oral, resp. rate 16, height 5' 2\" (1.575 m), weight 136 lb (61.7 kg), SpO2 99 %. Body mass index is 24.87 kg/(m^2). Chief Complaint   Patient presents with    Thyroid Problem     6 month follow up        HPI  Dahiana Kovacs 80 y.o. female  presents to the office today for thyroid follow up. Pt presents with daughter at bedside. Hypertension: Bp at office today 139/69. Pt continues with Lisinopril 10mg daily and Amlodipine 5mg daily. Bp control stable, continue current regimen. Hyperlipidemia: Lipid panel on 01/25/18 notable for total cholesterol 198, , HDL 48, and triglycerides 246. Pt continues with Atorvastatin 40mg daily. Presumed stable, will assess levels when pt returns for fasting labs. Elevated blood sugar: A1c per POC today 6.0%, lowered from A1c of 6.4% on 09/20/17. Pt continues with diet monitoring. Advised pt to continue to work on her diet and exercise. Hypothyroidism: Pt's TSH level was 0.073 on 01/25/18. Pt continues with Synthroid 88mcg daily. Presumed stable, will assess levels when pt returns for labs. Health maintenance: Advised pt that her GFR was slightly down and she should make sure she is drinking enough water. Will reassess when pt returns for lab work. Pt's daughter states that pt has been having shortness of breath with exertion. Current Outpatient Prescriptions   Medication Sig Dispense Refill    albuterol (PROVENTIL VENTOLIN) 2.5 mg /3 mL (0.083 %) nebulizer solution       ergocalciferol (ERGOCALCIFEROL) 50,000 unit capsule       atorvastatin (LIPITOR) 40 mg tablet TAKE 1 TABLET DAILY 90 Tab 1    albuterol (PROVENTIL HFA, VENTOLIN HFA, PROAIR HFA) 90 mcg/actuation inhaler Take 2 Puffs by inhalation every four (4) hours as needed for Wheezing. Please give with spacer.  1 Inhaler 0    inhalational spacing device 1 Each by Does Not Apply route as needed. 1 Device 0    memantine (NAMENDA) 10 mg tablet Take 1 Tab by mouth two (2) times a day. 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 90 Tab 1    levothyroxine (SYNTHROID) 88 mcg tablet Take 1 Tab by mouth Daily (before breakfast). (Patient taking differently: Take 88 mcg by mouth daily.) 48 Tab 3    umeclidinium (INCRUSE ELLIPTA) 62.5 mcg/actuation inhaler Take 1 Puff by inhalation daily. 3 Inhaler 3    imipramine (TOFRANIL) 25 mg tablet TAKE 2 TABLETS NIGHTLY 180 Tab 1    BROVANA 15 mcg/2 mL nebu neb solution       budesonide (PULMICORT) 0.5 mg/2 mL nbsp       loratadine (CLARITIN) 10 mg tablet TAKE 1 TABLET DAILY 30 Tab 4    amLODIPine (NORVASC) 5 mg tablet TAKE 1 TABLET DAILY FOR BLOOD PRESSURE 90 Tab 1    FOLIC ACID/MULTIVIT-MIN/LUTEIN (CENTRUM SILVER PO) Take  by mouth.  levothyroxine (SYNTHROID) 100 mcg tablet       guaiFENesin ER (MUCINEX) 600 mg ER tablet Take 1 Tab by mouth two (2) times daily as needed for Congestion.  30 Tab 1     Allergies   Allergen Reactions    Keflex [Cephalexin] Rash    Pcn [Penicillins] Rash    Sulfa (Sulfonamide Antibiotics) Rash     Past Medical History:   Diagnosis Date    Anxiety state, unspecified     CKD (chronic kidney disease) stage 3, GFR 30-59 ml/min 4/6/2016    Dementia 6/12    Landa/npysch    Depression 9/20/2017    Generalized osteoarthrosis, unspecified site     Lumbar compression fracture ([facility] [address] 4/08    epidurals x 2  Umang/os    Osteopenia     Other and unspecified hyperlipidemia     Psoriasis     Unspecified essential hypertension     Unspecified hypothyroidism     thyroid irradiated i131     Past Surgical History:   Procedure Laterality Date    COLONOSCOPY  10/10    [place]erson/gi    TOTAL KNEE ARTHROPLASTY  1992    left    TOTAL KNEE ARTHROPLASTY  1992    right     Family History   Problem Relation Age of Onset    Cancer Mother      stomach tumors    Cancer Brother      prostate     Social History   Substance Use Topics    Smoking status: Former Smoker     Packs/day: 0.50     Years: 60.00     Types: Cigarettes     Quit date: 2014    Smokeless tobacco: Never Used    Alcohol use No        Review of Systems   Constitutional: Negative. Negative for malaise/fatigue. Eyes: Negative for blurred vision. Respiratory: Negative for shortness of breath. Cardiovascular: Negative for chest pain and leg swelling. Musculoskeletal: Negative. Neurological: Negative. Negative for dizziness and headaches. All other systems reviewed and are negative. Physical Exam   Constitutional: She is oriented to person, place, and time. She appears well-developed and well-nourished. No distress. HENT:   Head: Normocephalic and atraumatic. Neck: Carotid bruit is not present. Cardiovascular: Normal rate, regular rhythm, normal heart sounds and intact distal pulses. Exam reveals no gallop and no friction rub. No murmur heard. Pulmonary/Chest: Effort normal and breath sounds normal. No respiratory distress. She has no wheezes. She has no rales. Musculoskeletal: She exhibits no edema. Neurological: She is alert and oriented to person, place, and time. Skin: She is not diaphoretic. Psychiatric: She has a normal mood and affect. Her behavior is normal. Judgment and thought content normal.   Nursing note and vitals reviewed. ASSESSMENT and PLAN  Diagnoses and all orders for this visit:    1. Elevated blood sugar  -     AMB POC HEMOGLOBIN A1C  - A1c per POC today 6.0%, lowered from A1c of 6.4% on 09/20/17. Pt continues with diet monitoring. Advised pt to continue to work on her diet and exercise.       2. CKD (chronic kidney disease) stage 3, GFR 30-59 ml/min  -     METABOLIC PANEL, COMPREHENSIVE  - Advised pt that her GFR was slightly down and she should make sure she is drinking enough water. Will reassess when pt returns for lab work. 3. Acquired hypothyroidism  -     TSH 3RD GENERATION  - Pt's TSH level was 0.073 on 01/25/18. Pt continues with Synthroid 88mcg daily. Presumed stable, will assess levels when pt returns for labs. 4. Essential hypertension  -     METABOLIC PANEL, COMPREHENSIVE  - Bp at office today 139/69. Pt continues with Lisinopril 10mg daily and Amlodipine 5mg daily. Bp control stable, continue current regimen. 5. Mixed hyperlipidemia  -     METABOLIC PANEL, COMPREHENSIVE  -     LIPID PANEL  - Lipid panel on 01/25/18 notable for total cholesterol 198, , HDL 48, and triglycerides 246. Pt continues with Atorvastatin 40mg daily. Presumed stable, will assess levels when pt returns for fasting labs. Follow-up Disposition:  Return in about 6 months (around 10/30/2018) for hypertension follow up, hypothyroidism follow up. Medication risks/benefits/costs/interactions/alternatives discussed with patient. Advised patient to call back or return to office if symptoms worsen/change/persist.  If patient cannot reach us or should anything more severe/urgent arise he/she should proceed directly to the nearest emergency department. Discussed expected course/resolution/complications of diagnosis in detail with patient. Patient given a written after visit summary which includes her diagnoses, current medications and vitals. Patient expressed understanding with the diagnosis and plan. Written by prieto Arredondo, as dictated by Sorin Vigil M.D.   I have reviewed and agree with the above note and have made corrections where appropriate, Dr. Ethan Joiner MD

## 2018-04-30 NOTE — PROGRESS NOTES
Chief Complaint   Patient presents with    Thyroid Problem     6 month follow up     1. Have you been to the ER, urgent care clinic since your last visit? Hospitalized since your last visit? No    2. Have you seen or consulted any other health care providers outside of the [address] [place] [address] since your last visit? Include any pap smears or colon screening.  No

## 2018-04-30 NOTE — MR AVS SNAPSHOT
[address] [place] [address] 
[phone] Patient: Christophe Vaughn MRN: [id number] Jaison Manisha Visit Information Date & Time Provider Department Dept. Phone Encounter #  
 4/30/2018  4:00 PM Lai Pantoja, [address] [phone] [id number] Follow-up Instructions Return in about 6 months (around 10/30/2018) for hypertension follow up, hypothyroidism follow up. Upcoming Health Maintenance Date Due Influenza Age 5 to Adult 8/1/2018 MEDICARE YEARLY EXAM 9/21/2018 GLAUCOMA SCREENING Q2Y 3/21/2019 DTaP/Tdap/Td series (2 - Td) 9/7/2026 Allergies as of 4/30/2018  Review Complete On: 4/30/2018 By: Jazmyn Chambers LPN Severity Noted Reaction Type Reactions Keflex [Cephalexin]  04/23/2010    Rash Pcn [Penicillins]  04/23/2010    Rash  
 Sulfa (Sulfonamide Antibiotics)  05/31/2011    Rash Current Immunizations  Reviewed on 9/7/2016 Name Date Influenza High Dose Vaccine PF 9/20/2017, 9/30/2015 Influenza Vaccine 10/16/2013 Pneumococcal Conjugate (PCV-13) 3/2/2016 Pneumococcal Polysaccharide (PPSV-23) 1/29/2014 Tdap 9/7/2016 Zoster Vaccine, Live 10/15/2014 Not reviewed this visit You Were Diagnosed With   
  
 Codes Comments Elevated blood sugar    -  Primary ICD-10-CM: R73.9 ICD-9-CM: 790.29 CKD (chronic kidney disease) stage 3, GFR 30-59 ml/min     ICD-10-CM: N18.3 ICD-9-CM: 965. 3 Acquired hypothyroidism     ICD-10-CM: E03.9 ICD-9-CM: 244.9 Essential hypertension     ICD-10-CM: I10 
ICD-9-CM: 401.9 Mixed hyperlipidemia     ICD-10-CM: E78.2 ICD-9-CM: 272.2 Vitals BP Pulse Temp Resp Height(growth percentile) Weight(growth percentile) 139/69 (BP 1 Location: Right arm, BP Patient Position: Sitting) (!) 59 98 °F (36.7 °C) (Oral) 16 5' 2\" (1.575 m) 136 lb (61.7 kg) SpO2 BMI OB Status Smoking Status 99% 24.87 kg/m2 Postmenopausal Former Smoker Vitals History BMI and BSA Data Body Mass Index Body Surface Area  
 24.87 kg/m 2 1.64 m 2 Preferred Pharmacy Pharmacy Name Phone Palak Esparza, [facility] [phone] Your Updated Medication List  
  
   
This list is accurate as of 4/30/18  5:04 PM.  Always use your most recent med list.  
  
  
  
  
 * albuterol 90 mcg/actuation inhaler Commonly known as:  PROVENTIL HFA, VENTOLIN HFA, PROAIR HFA Take 2 Puffs by inhalation every four (4) hours as needed for Wheezing. Please give with spacer. * albuterol 2.5 mg /3 mL (0.083 %) nebulizer solution Commonly known as:  PROVENTIL VENTOLIN  
  
 amLODIPine 5 mg tablet Commonly known as:  Deonte Rings TAKE 1 TABLET DAILY FOR BLOOD PRESSURE  
  
 atorvastatin 40 mg tablet Commonly known as:  LIPITOR  
TAKE 1 TABLET DAILY  
  
 BROVANA 15 mcg/2 mL Nebu neb solution Generic drug:  arformoterol  
  
 budesonide 0.5 mg/2 mL Nbsp Commonly known as:  PULMICORT  
  
 CENTRUM SILVER PO Take  by mouth. donepezil 10 mg tablet Commonly known as:  ARICEPT Take 1 Tab by mouth nightly.  
  
 ergocalciferol 50,000 unit capsule Commonly known as:  ERGOCALCIFEROL  
  
 guaiFENesin  mg ER tablet Commonly known as:  [address] Take 1 Tab by mouth two (2) times daily as needed for Congestion. imipramine 25 mg tablet Commonly known as:  TOFRANIL  
TAKE 2 TABLETS NIGHTLY  
  
 inhalational spacing device 1 Each by Does Not Apply route as needed. * levothyroxine 88 mcg tablet Commonly known as:  SYNTHROID Take 1 Tab by mouth Daily (before breakfast). * levothyroxine 100 mcg tablet Commonly known as:  SYNTHROID  
  
 lisinopril 10 mg tablet Commonly known as:  PRINIVIL, ZESTRIL  
TAKE 1 TABLET DAILY FOR BLOOD PRESSURE  
  
 loratadine 10 mg tablet Commonly known as:  Celeste Jiménez  
 TAKE 1 TABLET DAILY  
  
 memantine 10 mg tablet Commonly known as:  Edelmira Pea Take 1 Tab by mouth two (2) times a day. 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  
  
 umeclidinium 62.5 mcg/actuation inhaler Commonly known as:  INCRUSE ELLIPTA Take 1 Puff by inhalation daily. * Notice: This list has 4 medication(s) that are the same as other medications prescribed for you. Read the directions carefully, and ask your doctor or other care provider to review them with you. We Performed the Following AMB POC HEMOGLOBIN A1C [26769 CPT(R)] LIPID PANEL [86284 CPT(R)] METABOLIC PANEL, COMPREHENSIVE [57087 CPT(R)] TSH 3RD GENERATION [26372 CPT(R)] Follow-up Instructions Return in about 6 months (around 10/30/2018) for hypertension follow up, hypothyroidism follow up. Patient Instructions DASH Diet: Care Instructions Your Care Instructions The DASH diet is an eating plan that can help lower your blood pressure. DASH stands for Dietary Approaches to Stop Hypertension. Hypertension is high blood pressure. The DASH diet focuses on eating foods that are high in calcium, potassium, and magnesium. These nutrients can lower blood pressure. The foods that are highest in these nutrients are fruits, vegetables, low-fat dairy products, nuts, seeds, and legumes. But taking calcium, potassium, and magnesium supplements instead of eating foods that are high in those nutrients does not have the same effect. The DASH diet also includes whole grains, fish, and poultry. The DASH diet is one of several lifestyle changes your doctor may recommend to lower your high blood pressure. Your doctor may also want you to decrease the amount of sodium in your diet. Lowering sodium while following the DASH diet can lower blood pressure even further than just the DASH diet alone. Follow-up care is a key part of your treatment and safety.  Be sure to make and go to all appointments, and call your doctor if you are having problems. It's also a good idea to know your test results and keep a list of the medicines you take. How can you care for yourself at home? Following the DASH diet · Eat 4 to 5 servings of fruit each day. A serving is 1 medium-sized piece of fruit, ½ cup chopped or canned fruit, 1/4 cup dried fruit, or 4 ounces (½ cup) of fruit juice. Choose fruit more often than fruit juice. · Eat 4 to 5 servings of vegetables each day. A serving is 1 cup of lettuce or raw leafy vegetables, ½ cup of chopped or cooked vegetables, or 4 ounces (½ cup) of vegetable juice. Choose vegetables more often than vegetable juice. · Get 2 to 3 servings of low-fat and fat-free dairy each day. A serving is 8 ounces of milk, 1 cup of yogurt, or 1 ½ ounces of cheese. · Eat 6 to 8 servings of grains each day. A serving is 1 slice of bread, 1 ounce of dry cereal, or ½ cup of cooked rice, pasta, or cooked cereal. Try to choose whole-grain products as much as possible. · Limit lean meat, poultry, and fish to 2 servings each day. A serving is 3 ounces, about the size of a deck of cards. · Eat 4 to 5 servings of nuts, seeds, and legumes (cooked dried beans, lentils, and split peas) each week. A serving is 1/3 cup of nuts, 2 tablespoons of seeds, or ½ cup of cooked beans or peas. · Limit fats and oils to 2 to 3 servings each day. A serving is 1 teaspoon of vegetable oil or 2 tablespoons of salad dressing. · Limit sweets and added sugars to 5 servings or less a week. A serving is 1 tablespoon jelly or jam, ½ cup sorbet, or 1 cup of lemonade. · Eat less than 2,300 milligrams (mg) of sodium a day. If you limit your sodium to 1,500 mg a day, you can lower your blood pressure even more. Tips for success · Start small. Do not try to make dramatic changes to your diet all at once.  You might feel that you are missing out on your favorite foods and then be more likely to not follow the plan. Make small changes, and stick with them. Once those changes become habit, add a few more changes. · Try some of the following: ¨ Make it a goal to eat a fruit or vegetable at every meal and at snacks. This will make it easy to get the recommended amount of fruits and vegetables each day. ¨ Try yogurt topped with fruit and nuts for a snack or healthy dessert. ¨ Add lettuce, tomato, cucumber, and onion to sandwiches. ¨ Combine a ready-made pizza crust with low-fat mozzarella cheese and lots of vegetable toppings. Try using tomatoes, squash, spinach, broccoli, carrots, cauliflower, and onions. ¨ Have a variety of cut-up vegetables with a low-fat dip as an appetizer instead of chips and dip. ¨ Sprinkle sunflower seeds or chopped almonds over salads. Or try adding chopped walnuts or almonds to cooked vegetables. ¨ Try some vegetarian meals using beans and peas. Add garbanzo or kidney beans to salads. Make burritos and tacos with mashed johnson beans or black beans. Where can you learn more? Go to http://asya-chad.info/. Enter O681 in the search box to learn more about \"DASH Diet: Care Instructions. \" Current as of: September 21, 2016 Content Version: 11.4 © 2148-6307 [employer]. Care instructions adapted under license by [employer] (which disclaims liability or warranty for this information). If you have questions about a medical condition or this instruction, always ask your healthcare professional. [place] [address] any warranty or liability for your use of this information. Introducing [facility]! Dear Sean Calvillo: Thank you for requesting a [employer] account. Our records indicate that you already have an active [employer] account. You can access your account anytime at [email or web address] Did you know that you can access your hospital and ER discharge instructions at any time in [employer]? You can also review all of your test results from your hospital stay or ER visit. Additional Information If you have questions, please visit the Frequently Asked Questions section of the [employer] website at [email or web address] Remember, [employer] is NOT to be used for urgent needs. For medical emergencies, dial 911. Now available from your iPhone and Android! Please provide this summary of care documentation to your next provider. Your primary care clinician is listed as Rito Townsend. If you have any questions after today's visit, please call [phone].

## 2018-04-30 NOTE — PATIENT INSTRUCTIONS

## 2018-05-01 ENCOUNTER — HOSPITAL ENCOUNTER (OUTPATIENT)
Dept: LAB | Age: 83
Discharge: HOME | End: 2018-05-01
Payer: MEDICARE

## 2018-05-01 PROCEDURE — 84443 ASSAY THYROID STIM HORMONE: CPT

## 2018-05-01 PROCEDURE — 80053 COMPREHEN METABOLIC PANEL: CPT

## 2018-05-01 PROCEDURE — 36415 COLL VENOUS BLD VENIPUNCTURE: CPT

## 2018-05-01 PROCEDURE — 80061 LIPID PANEL: CPT

## 2018-05-02 LAB
ALBUMIN SERPL-MCNC: 4.1 G/DL (ref 3.5–4.7)
ALBUMIN/GLOB SERPL: 1.6 {RATIO} (ref 1.2–2.2)
ALP SERPL-CCNC: 207 IU/L (ref 39–117)
ALT SERPL-CCNC: 41 IU/L (ref 0–32)
AST SERPL-CCNC: 36 IU/L (ref 0–40)
BILIRUB SERPL-MCNC: 0.3 MG/DL (ref 0–1.2)
BUN SERPL-MCNC: 35 MG/DL (ref 8–27)
BUN/CREAT SERPL: 28 (ref 12–28)
CALCIUM SERPL-MCNC: 9.6 MG/DL (ref 8.7–10.3)
CHLORIDE SERPL-SCNC: 105 MMOL/L (ref 96–106)
CHOLEST SERPL-MCNC: 198 MG/DL (ref 100–199)
CO2 SERPL-SCNC: 24 MMOL/L (ref 18–29)
CREAT SERPL-MCNC: 1.25 MG/DL (ref 0.57–1)
GFR SERPLBLD CREATININE-BSD FMLA CKD-EPI: 38 ML/MIN/1.73
GFR SERPLBLD CREATININE-BSD FMLA CKD-EPI: 44 ML/MIN/1.73
GLOBULIN SER CALC-MCNC: 2.6 G/DL (ref 1.5–4.5)
GLUCOSE SERPL-MCNC: 102 MG/DL (ref 65–99)
HDLC SERPL-MCNC: 58 MG/DL
INTERPRETATION, 910389: NORMAL
INTERPRETATION: NORMAL
LDLC SERPL CALC-MCNC: 94 MG/DL (ref 0–99)
PDF IMAGE, 910387: NORMAL
POTASSIUM SERPL-SCNC: 4.4 MMOL/L (ref 3.5–5.2)
PROT SERPL-MCNC: 6.7 G/DL (ref 6–8.5)
SODIUM SERPL-SCNC: 144 MMOL/L (ref 134–144)
TRIGL SERPL-MCNC: 230 MG/DL (ref 0–149)
TSH SERPL DL<=0.005 MIU/L-ACNC: 0.26 UIU/ML (ref 0.45–4.5)
VLDLC SERPL CALC-MCNC: 46 MG/DL (ref 5–40)

## 2018-05-12 RX ORDER — DONEPEZIL HYDROCHLORIDE 10 MG/1
TABLET, FILM COATED ORAL
Qty: 90 TAB | Refills: 1 | Status: SHIPPED | OUTPATIENT
Start: 2018-05-12 | End: 2018-11-17 | Stop reason: SDUPTHER

## 2018-05-13 NOTE — PROGRESS NOTES
Thyroid function is over supressed  We need to decrease the synthroid to 75 mcg daily #30 tabs 1 refills  And recheck TSH in 6 weeks. Alk phos is also elevated we need to check an alk phos isoenzyme lab test.    Please place orders for these test and inform her daughter this week. Rest of there labs are stable.  If daughter has any questions let me know

## 2018-05-25 RX ORDER — LEVOTHYROXINE SODIUM 75 UG/1
75 TABLET ORAL
Qty: 30 TAB | Refills: 1 | OUTPATIENT
Start: 2018-05-25 | End: 2018-05-29 | Stop reason: DRUGHIGH

## 2018-05-25 NOTE — PROGRESS NOTES
Called and lm for [address] [place] [address] patient dtr. to cb re' lab results. Lab orders placed. Synthroid rx called to pharmacy.

## 2018-05-29 ENCOUNTER — OFFICE VISIT (OUTPATIENT)
Dept: FAMILY MEDICINE CLINIC | Age: 83
End: 2018-05-29

## 2018-05-29 ENCOUNTER — HOSPITAL ENCOUNTER (OUTPATIENT)
Dept: LAB | Age: 83
Discharge: HOME | End: 2018-05-29
Payer: MEDICARE

## 2018-05-29 VITALS
WEIGHT: 132 LBS | RESPIRATION RATE: 18 BRPM | HEART RATE: 99 BPM | HEIGHT: 62 IN | OXYGEN SATURATION: 95 % | SYSTOLIC BLOOD PRESSURE: 122 MMHG | DIASTOLIC BLOOD PRESSURE: 82 MMHG | BODY MASS INDEX: 24.29 KG/M2 | TEMPERATURE: 98.5 F

## 2018-05-29 DIAGNOSIS — R79.89 ELEVATED SERUM CREATININE: ICD-10-CM

## 2018-05-29 DIAGNOSIS — B96.89 ACUTE BACTERIAL BRONCHITIS: Primary | ICD-10-CM

## 2018-05-29 DIAGNOSIS — J20.8 ACUTE BACTERIAL BRONCHITIS: Primary | ICD-10-CM

## 2018-05-29 DIAGNOSIS — F05 ACUTE CONFUSION DUE TO INFECTION: ICD-10-CM

## 2018-05-29 DIAGNOSIS — F02.80 LATE ONSET ALZHEIMER'S DISEASE WITHOUT BEHAVIORAL DISTURBANCE (HCC): ICD-10-CM

## 2018-05-29 DIAGNOSIS — G30.1 LATE ONSET ALZHEIMER'S DISEASE WITHOUT BEHAVIORAL DISTURBANCE (HCC): ICD-10-CM

## 2018-05-29 DIAGNOSIS — E03.9 ACQUIRED HYPOTHYROIDISM: ICD-10-CM

## 2018-05-29 DIAGNOSIS — I10 ESSENTIAL HYPERTENSION: ICD-10-CM

## 2018-05-29 DIAGNOSIS — E55.9 VITAMIN D DEFICIENCY: ICD-10-CM

## 2018-05-29 DIAGNOSIS — R74.8 ELEVATED ALKALINE PHOSPHATASE LEVEL: ICD-10-CM

## 2018-05-29 PROCEDURE — 84443 ASSAY THYROID STIM HORMONE: CPT

## 2018-05-29 PROCEDURE — 36415 COLL VENOUS BLD VENIPUNCTURE: CPT

## 2018-05-29 PROCEDURE — 80048 BASIC METABOLIC PNL TOTAL CA: CPT

## 2018-05-29 PROCEDURE — 84080 ASSAY ALKALINE PHOSPHATASES: CPT

## 2018-05-29 RX ORDER — AZITHROMYCIN 250 MG/1
TABLET, FILM COATED ORAL
Qty: 6 TAB | Refills: 0 | Status: SHIPPED | OUTPATIENT
Start: 2018-05-29 | End: 2018-06-03

## 2018-05-29 RX ORDER — ERGOCALCIFEROL 1.25 MG/1
50000 CAPSULE ORAL
Qty: 13 CAP | Refills: 3 | Status: SHIPPED | OUTPATIENT
Start: 2018-05-29 | End: 2018-11-17 | Stop reason: SDUPTHER

## 2018-05-29 NOTE — PROGRESS NOTES
HISTORY OF PRESENT ILLNESS  HPI  Alisha Wray is a 80 y.o. Female with a history of HTN, hypothyroidism, dementia, osteoporosis, CKD-III, anxiety, hyperlipidemia, vitamin D deficiency and depression, who presents at the office today with her daughter for a cough. Pt presents with a wheelchair. Pt has been falling asleep recently and has difficulty walking. Pt woke up with a cough this morning and seems confused. Pt is taking Allegra with no relief. Past Medical History:   Diagnosis Date    Anxiety state, unspecified     CKD (chronic kidney disease) stage 3, GFR 30-59 ml/min 4/6/2016    Dementia 6/12    Landa/npysch    Depression 9/20/2017    Generalized osteoarthrosis, unspecified site     Lumbar compression fracture (HCC) 4/08    epidurals x 2  Umang/os    Osteopenia     Other and unspecified hyperlipidemia     Psoriasis     Unspecified essential hypertension     Unspecified hypothyroidism     thyroid irradiated i131     Past Surgical History:   Procedure Laterality Date    COLONOSCOPY  10/10    [facility]    TOTAL KNEE ARTHROPLASTY  1992    left    TOTAL KNEE ARTHROPLASTY  1992    right     Current Outpatient Prescriptions on File Prior to Visit   Medication Sig Dispense Refill    donepezil (ARICEPT) 10 mg tablet TAKE 1 TABLET NIGHTLY 90 Tab 1    albuterol (PROVENTIL VENTOLIN) 2.5 mg /3 mL (0.083 %) nebulizer solution       atorvastatin (LIPITOR) 40 mg tablet TAKE 1 TABLET DAILY 90 Tab 1    albuterol (PROVENTIL HFA, VENTOLIN HFA, PROAIR HFA) 90 mcg/actuation inhaler Take 2 Puffs by inhalation every four (4) hours as needed for Wheezing. Please give with spacer. 1 Inhaler 0    inhalational spacing device 1 Each by Does Not Apply route as needed. 1 Device 0    lisinopril (PRINIVIL, ZESTRIL) 10 mg tablet TAKE 1 TABLET DAILY FOR BLOOD PRESSURE 90 Tab 1    levothyroxine (SYNTHROID) 88 mcg tablet Take 1 Tab by mouth Daily (before breakfast).  (Patient taking differently: Take 88 mcg by mouth daily.) 48 Tab 3    umeclidinium (INCRUSE ELLIPTA) 62.5 mcg/actuation inhaler Take 1 Puff by inhalation daily. 3 Inhaler 3    imipramine (TOFRANIL) 25 mg tablet TAKE 2 TABLETS NIGHTLY 180 Tab 1    BROVANA 15 mcg/2 mL nebu neb solution       budesonide (PULMICORT) 0.5 mg/2 mL nbsp       loratadine (CLARITIN) 10 mg tablet TAKE 1 TABLET DAILY 30 Tab 4    amLODIPine (NORVASC) 5 mg tablet TAKE 1 TABLET DAILY FOR BLOOD PRESSURE 90 Tab 1    FOLIC ACID/MULTIVIT-MIN/LUTEIN (CENTRUM SILVER PO) Take  by mouth.  memantine (NAMENDA) 10 mg tablet Take 1 Tab by mouth two (2) times a day. Indications: MODERATE TO SEVERE ALZHEIMER'S TYPE DEMENTIA 180 Tab 1     No current facility-administered medications on file prior to visit. Allergies   Allergen Reactions    Keflex [Cephalexin] Rash    Pcn [Penicillins] Rash    Sulfa (Sulfonamide Antibiotics) Rash     Family History   Problem Relation Age of Onset    Cancer Mother      stomach tumors    Cancer Brother      prostate     Social History     Social History    Marital status:      Spouse name: N/A    Number of children: N/A    Years of education: N/A     Social History Main Topics    Smoking status: Former Smoker     Packs/day: 0.50     Years: 60.00     Types: Cigarettes     Quit date: 2014    Smokeless tobacco: Never Used    Alcohol use No    Drug use: No    Sexual activity: No     Other Topics Concern    Exercise Yes     enjoys working in the yard     Social History Narrative             Review of Systems   Constitutional: Positive for malaise/fatigue (falling asleep). Negative for chills, diaphoresis, fever and weight loss. Eyes: Negative for blurred vision, double vision, pain and redness. Respiratory: Positive for cough (wet sounding cough). Negative for shortness of breath and wheezing. Cardiovascular: Negative for chest pain, palpitations, orthopnea, claudication, leg swelling and PND. Skin: Negative for itching and rash. Neurological: Negative for dizziness, tingling, tremors, sensory change, speech change, focal weakness, seizures, loss of consciousness, weakness and headaches. Confusion     Results for orders placed or performed in visit on 77/98/90   METABOLIC PANEL, COMPREHENSIVE   Result Value Ref Range    Glucose 102 (H) 65 - 99 mg/dL    BUN 35 (H) 8 - 27 mg/dL    Creatinine 1.25 (H) 0.57 - 1.00 mg/dL    GFR est non-AA 38 (L) >59 mL/min/1.73    GFR est AA 44 (L) >59 mL/min/1.73    BUN/Creatinine ratio 28 12 - 28    Sodium 144 134 - 144 mmol/L    Potassium 4.4 3.5 - 5.2 mmol/L    Chloride 105 96 - 106 mmol/L    CO2 24 18 - 29 mmol/L    Calcium 9.6 8.7 - 10.3 mg/dL    Protein, total 6.7 6.0 - 8.5 g/dL    Albumin 4.1 3.5 - 4.7 g/dL    GLOBULIN, TOTAL 2.6 1.5 - 4.5 g/dL    A-G Ratio 1.6 1.2 - 2.2    Bilirubin, total 0.3 0.0 - 1.2 mg/dL    Alk. phosphatase 207 (H) 39 - 117 IU/L    AST (SGOT) 36 0 - 40 IU/L    ALT (SGPT) 41 (H) 0 - 32 IU/L   TSH 3RD GENERATION   Result Value Ref Range    TSH 0.262 (L) 0.450 - 4.500 uIU/mL   LIPID PANEL   Result Value Ref Range    Cholesterol, total 198 100 - 199 mg/dL    Triglyceride 230 (H) 0 - 149 mg/dL    HDL Cholesterol 58 >39 mg/dL    VLDL, calculated 46 (H) 5 - 40 mg/dL    LDL, calculated 94 0 - 99 mg/dL   CKD REPORT   Result Value Ref Range    Interpretation Note    CVD REPORT   Result Value Ref Range    INTERPRETATION Note     PDF IMAGE Not applicable    AMB POC HEMOGLOBIN A1C   Result Value Ref Range    Hemoglobin A1c (POC) 6.0 %         Physical Exam  Visit Vitals    /82    Pulse 99    Temp 98.5 °F (36.9 °C)    Resp 18    Ht 5' 2\" (1.575 m)    Wt 132 lb (59.9 kg)    SpO2 95%    BMI 24.14 kg/m2     Pt does follow simple commands, but does not answer questions appropriately. Is confused. Coughs intermittently with a very wet sounding cough. She does not look acutely ill.    Neck: supple, symmetrical, trachea midline, no adenopathy, thyroid: not enlarged, symmetric, no tenderness/mass/nodules, no carotid bruit and no JVD  Lungs: clear to auscultation bilaterally. She has rhonchi with coughing, but otherwise has normal respiration. Heart: regular rate and rhythm, S1, S2 normal, no murmur, click, rub or gallop  Neurologic: Grossly normal      ASSESSMENT and PLAN    ICD-10-CM ICD-9-CM    1. Acute bacterial bronchitis J20.8 466.0 azithromycin (ZITHROMAX) 250 mg tablet    B96.89 041.9    2. Vitamin D deficiency E55.9 268.9 ergocalciferol (ERGOCALCIFEROL) 50,000 unit capsule   3. Late onset Alzheimer'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 behavioral disturbance G30.1 331.0     F02.80 294.10    4. Essential hypertension I10 401.9    5. Acute confusion due to infection F05 293.0      Diagnoses and all orders for this visit:    1. Acute bacterial bronchitis  -     azithromycin (ZITHROMAX) 250 mg tablet; Take 2 tablets today, then take 1 tablet daily    2. Vitamin D deficiency  -     ergocalciferol (ERGOCALCIFEROL) 50,000 unit capsule; Take 1 Cap by mouth every seven (7) days. 3. Late onset Alzheimer'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 behavioral disturbance    4. Essential hypertension    5. Acute confusion due to infection      Follow-up Disposition:  Return if symptoms worsen or fail to improve. reviewed medications and side effects in detail  Please call my office if there are any questions- [phone]. Discussed expected course/resolution/complications of diagnosis in detail with patient. Medication risks/benefits/costs/interactions/alternatives discussed with patient. Pt was given an after visit summary which includes diagnoses, current medications & vitals. Pt expressed understanding with the diagnosis and plan. Total 25 minutes,60 % counseling re: Pt has tolerated Zithromax, so gave her that for her bronchitis. Recommended Mucinex or Robitussin DM for her cough. Should call if there is no significant improvement within 48 hours. Refilled her Vit D; should have level checked yearly, last done (/2017. Also, discussed symptoms of concern that were noted today in the note above, treatment options( including doing nothing), when to follow up before recommended time frame. Also, answered all questions. Reviewed symptoms, or lack thereof, of hypertension and elevated cholesterol. Reviewed in detail the proper technique of checking the blood pressure- check it on an average day only, not on a stressful day, sitting, no exercise for at least 1 hour and not experiencing any new pain( chronic pain is OK). Patient encouraged to check BP sitting and standing at least once a month and to report these readings to me if > 140/ 90 on average , or if the standing BP is >  15 points lower than the sitting. This document was written by Donte Ann, as dictated by Micky Julien MD.  I have reviewed and agree with the above note and have made corrections where appropriate Kali Ya M.D.

## 2018-05-29 NOTE — MR AVS SNAPSHOT
[address] [facility] 
 
 
 [address] 
[phone] Patient: Lesa Vaca MRN: [id number] Nelda Gaucher Visit Information Date & Time Provider Department Dept. Phone Encounter #  
 5/29/2018  3:45 PM Shiv Rajan  [facility] [phone] [id number] Your Appointments 10/29/2018  4:00 PM  
ROUTINE CARE with Audra Mesa MD  
[facility]) Appt Note: 6 months follow up visit for HTN  
 [address] [id number]  
[phone]  
  
   
 [address] [id number] Upcoming Health Maintenance Date Due Influenza Age 5 to Adult 8/1/2018 MEDICARE YEARLY EXAM 9/21/2018 GLAUCOMA SCREENING Q2Y 3/21/2019 DTaP/Tdap/Td series (2 - Td) 9/7/2026 Allergies as of 5/29/2018  Review Complete On: 5/29/2018 By: Roc Lopez LPN Severity Noted Reaction Type Reactions Keflex [Cephalexin]  04/23/2010    Rash Pcn [Penicillins]  04/23/2010    Rash  
 Sulfa (Sulfonamide Antibiotics)  05/31/2011    Rash Current Immunizations  Reviewed on 9/7/2016 Name Date Influenza High Dose Vaccine PF 9/20/2017, 9/30/2015 Influenza Vaccine 10/16/2013 Pneumococcal Conjugate (PCV-13) 3/2/2016 Pneumococcal Polysaccharide (PPSV-23) 1/29/2014 Tdap 9/7/2016 Zoster Vaccine, Live 10/15/2014 Not reviewed this visit You Were Diagnosed With   
  
 Codes Comments Acute bacterial bronchitis    -  Primary ICD-10-CM: J20.8, B96.89 
ICD-9-CM: 466.0, 041.9 Vitamin D deficiency     ICD-10-CM: E55.9 ICD-9-CM: 268.9 Late onset Alzheimer'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 behavioral disturbance     ICD-10-CM: G30.1, F02.80 ICD-9-CM: 331.0, 294.10 Essential hypertension     ICD-10-CM: I10 
ICD-9-CM: 401.9 Acute confusion due to infection     ICD-10-CM: F05 ICD-9-CM: 293.0 Vitals BP Pulse Temp Resp Height(growth percentile) Weight(growth percentile) 122/82 99 98.5 °F (36.9 °C) 18 5' 2\" (1.575 m) 132 lb (59.9 kg) SpO2 BMI OB Status Smoking Status 95% 24.14 kg/m2 Postmenopausal Former Smoker BMI and BSA Data Body Mass Index Body Surface Area  
 24.14 kg/m 2 1.62 m 2 Preferred Pharmacy Pharmacy Name Phone [facility] [phone] [address] [phone] Your Updated Medication List  
  
   
This list is accurate as of 5/29/18  4:06 PM.  Always use your most recent med list.  
  
  
  
  
 * albuterol 90 mcg/actuation inhaler Commonly known as:  PROVENTIL HFA, VENTOLIN HFA, PROAIR HFA Take 2 Puffs by inhalation every four (4) hours as needed for Wheezing. Please give with spacer. * albuterol 2.5 mg /3 mL (0.083 %) nebulizer solution Commonly known as:  PROVENTIL VENTOLIN  
  
 amLODIPine 5 mg tablet Commonly known as:  Darin Jose TAKE 1 TABLET DAILY FOR BLOOD PRESSURE  
  
 atorvastatin 40 mg tablet Commonly known as:  LIPITOR  
TAKE 1 TABLET DAILY  
  
 BROVANA 15 mcg/2 mL Nebu neb solution Generic drug:  arformoterol  
  
 budesonide 0.5 mg/2 mL Nbsp Commonly known as:  PULMICORT  
  
 CENTRUM SILVER PO Take  by mouth. donepezil 10 mg tablet Commonly known as:  ARICEPT  
TAKE 1 TABLET NIGHTLY  
  
 ergocalciferol 50,000 unit capsule Commonly known as:  ERGOCALCIFEROL  
  
 imipramine 25 mg tablet Commonly known as:  TOFRANIL  
TAKE 2 TABLETS NIGHTLY  
  
 inhalational spacing device 1 Each by Does Not Apply route as needed. levothyroxine 88 mcg tablet Commonly known as:  SYNTHROID Take 1 Tab by mouth Daily (before breakfast). lisinopril 10 mg tablet Commonly known as:  PRINIVIL, ZESTRIL  
TAKE 1 TABLET DAILY FOR BLOOD PRESSURE  
  
 loratadine 10 mg tablet Commonly known as:  CLARITIN  
TAKE 1 TABLET DAILY  
  
 memantine 10 mg tablet Commonly known as:  Arminda Barraza Take 1 Tab by mouth two (2) times a day. 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  
  
 umeclidinium 62.5 mcg/actuation inhaler Commonly known as:  INCRUSE ELLIPTA Take 1 Puff by inhalation daily. * Notice: This list has 2 medication(s) that are the same as other medications prescribed for you. Read the directions carefully, and ask your doctor or other care provider to review them with you. Introducing [address]! Dear Tom Sepulveda: Thank you for requesting a [employer] account. Our records indicate that you already have an active [employer] account. You can access your account anytime at [email or web address] Did you know that you can access your hospital and ER discharge instructions at any time in [employer]? You can also review all of your test results from your hospital stay or ER visit. Additional Information If you have questions, please visit the Frequently Asked Questions section of the [employer] website at [email or web address] Remember, [employer] is NOT to be used for urgent needs. For medical emergencies, dial 911. Now available from your iPhone and Android! Please provide this summary of care documentation to your next provider. Your primary care clinician is listed as Milly Haley. If you have any questions after today's visit, please call [phone].

## 2018-05-30 LAB
ALP BONE CFR SERPL: 18 % (ref 14–68)
ALP INTEST CFR SERPL: 0 % (ref 0–18)
ALP LIVER CFR SERPL: 82 % (ref 18–85)
ALP SERPL-CCNC: 228 IU/L (ref 39–117)
BUN SERPL-MCNC: 22 MG/DL (ref 8–27)
BUN/CREAT SERPL: 17 (ref 12–28)
CALCIUM SERPL-MCNC: 9.5 MG/DL (ref 8.7–10.3)
CHLORIDE SERPL-SCNC: 105 MMOL/L (ref 96–106)
CO2 SERPL-SCNC: 19 MMOL/L (ref 18–29)
CREAT SERPL-MCNC: 1.26 MG/DL (ref 0.57–1)
GFR SERPLBLD CREATININE-BSD FMLA CKD-EPI: 38 ML/MIN/1.73
GFR SERPLBLD CREATININE-BSD FMLA CKD-EPI: 44 ML/MIN/1.73
GLUCOSE SERPL-MCNC: 115 MG/DL (ref 65–99)
INTERPRETATION: NORMAL
POTASSIUM SERPL-SCNC: 4.3 MMOL/L (ref 3.5–5.2)
SODIUM SERPL-SCNC: 145 MMOL/L (ref 134–144)
TSH SERPL DL<=0.005 MIU/L-ACNC: 0.2 UIU/ML (ref 0.45–4.5)

## 2018-06-12 ENCOUNTER — HOSPITAL ENCOUNTER (OUTPATIENT)
Dept: LAB | Age: 83
Discharge: HOME | End: 2018-06-12
Payer: MEDICARE

## 2018-06-12 ENCOUNTER — OFFICE VISIT (OUTPATIENT)
Dept: FAMILY MEDICINE CLINIC | Age: 83
End: 2018-06-12

## 2018-06-12 ENCOUNTER — TELEPHONE (OUTPATIENT)
Dept: FAMILY MEDICINE CLINIC | Age: 83
End: 2018-06-12

## 2018-06-12 VITALS
HEIGHT: 62 IN | SYSTOLIC BLOOD PRESSURE: 110 MMHG | HEART RATE: 71 BPM | WEIGHT: 137.2 LBS | BODY MASS INDEX: 25.25 KG/M2 | RESPIRATION RATE: 18 BRPM | TEMPERATURE: 97.2 F | OXYGEN SATURATION: 97 % | DIASTOLIC BLOOD PRESSURE: 68 MMHG

## 2018-06-12 DIAGNOSIS — R05.9 COUGH: Primary | ICD-10-CM

## 2018-06-12 DIAGNOSIS — J44.9 CHRONIC OBSTRUCTIVE PULMONARY DISEASE, UNSPECIFIED COPD TYPE (HCC): ICD-10-CM

## 2018-06-12 DIAGNOSIS — R06.89 DECREASED BREATH SOUNDS AT RIGHT LUNG BASE: ICD-10-CM

## 2018-06-12 PROCEDURE — 36415 COLL VENOUS BLD VENIPUNCTURE: CPT

## 2018-06-12 PROCEDURE — 85025 COMPLETE CBC W/AUTO DIFF WBC: CPT

## 2018-06-12 NOTE — TELEPHONE ENCOUNTER
----- Message from Hosea George sent at 6/12/2018  5:44 PM EDT -----  Regarding: Dr. Stevie Wilder, pt's daughter is returning a call from the practice.  Best contact number: [phone]

## 2018-06-12 NOTE — MR AVS SNAPSHOT
[facility] 
 
 
 [address] [place] [address] 
[phone] Patient: Marsha Patient MRN: [id number] Elaine Montenegro Visit Information Date & Time Provider Department Dept. Phone Encounter #  
 6/12/2018 10:00 AM Sharona Parker MD [address] [place] [address] [phone] [id number] Follow-up Instructions Return if symptoms worsen or fail to improve, for Follow Up. Your Appointments 10/29/2018  4:00 PM  
ROUTINE CARE with Leonia Canavan, MD  
[facility]) Appt Note: 6 months follow up visit for HTN  
 [address] [address]  
[phone]  
  
   
 [address] [address] Upcoming Health Maintenance Date Due Influenza Age 5 to Adult 8/1/2018 MEDICARE YEARLY EXAM 9/21/2018 GLAUCOMA SCREENING Q2Y 3/21/2019 DTaP/Tdap/Td series (2 - Td) 9/7/2026 Allergies as of 6/12/2018  Review Complete On: 6/12/2018 By: Loc Kim LPN Severity Noted Reaction Type Reactions Keflex [Cephalexin]  04/23/2010    Rash Pcn [Penicillins]  04/23/2010    Rash  
 Sulfa (Sulfonamide Antibiotics)  05/31/2011    Rash Current Immunizations  Reviewed on 9/7/2016 Name Date Influenza High Dose Vaccine PF 9/20/2017, 9/30/2015 Influenza Vaccine 10/16/2013 Pneumococcal Conjugate (PCV-13) 3/2/2016 Pneumococcal Polysaccharide (PPSV-23) 1/29/2014 Tdap 9/7/2016 Zoster Vaccine, Live 10/15/2014 Not reviewed this visit You Were Diagnosed With   
  
 Codes Comments Cough    -  Primary ICD-10-CM: C65 ICD-9-CM: 461. 2 Chronic obstructive pulmonary disease, unspecified COPD type ([facility]     ICD-10-CM: J44.9 ICD-9-CM: 151 Decreased breath sounds at right lung base     ICD-10-CM: R09.89 ICD-9-CM: 609. 7 Vitals BP Pulse Temp Resp Height(growth percentile) Weight(growth percentile) 110/68 (BP 1 Location: Left arm, BP Patient Position: Sitting) 71 97.2 °F (36.2 °C) (Oral) 18 5' 2\" (1.575 m) 137 lb 3.2 oz (62.2 kg) SpO2 BMI OB Status Smoking Status 97% 25.09 kg/m2 Postmenopausal Former Smoker Vitals History BMI and BSA Data Body Mass Index Body Surface Area 25.09 kg/m 2 1.65 m 2 Preferred Pharmacy [facility]acy Name [facility] [phone] Your Updated Medication List  
  
   
This list is accurate as of 6/12/18 10:25 AM.  Always use your most recent med list.  
  
  
  
  
 * albuterol 90 mcg/actuation inhaler Commonly known as:  PROVENTIL HFA, VENTOLIN HFA, PROAIR HFA Take 2 Puffs by inhalation every four (4) hours as needed for Wheezing. Please give with spacer. * albuterol 2.5 mg /3 mL (0.083 %) nebulizer solution Commonly known as:  PROVENTIL VENTOLIN  
  
 amLODIPine 5 mg tablet Commonly known as:  Ellen Chimes TAKE 1 TABLET DAILY FOR BLOOD PRESSURE  
  
 atorvastatin 40 mg tablet Commonly known as:  LIPITOR  
TAKE 1 TABLET DAILY  
  
 BROVANA 15 mcg/2 mL Nebu neb solution Generic drug:  arformoterol  
  
 budesonide 0.5 mg/2 mL Nbsp Commonly known as:  PULMICORT  
  
 CENTRUM SILVER PO Take  by mouth. donepezil 10 mg tablet Commonly known as:  ARICEPT  
TAKE 1 TABLET NIGHTLY  
  
 ergocalciferol 50,000 unit capsule Commonly known as:  ERGOCALCIFEROL Take 1 Cap by mouth every seven (7) days. imipramine 25 mg tablet Commonly known as:  TOFRANIL  
TAKE 2 TABLETS NIGHTLY  
  
 inhalational spacing device 1 Each by Does Not Apply route as needed. levothyroxine 88 mcg tablet Commonly known as:  SYNTHROID Take 1 Tab by mouth Daily (before breakfast). lisinopril 10 mg tablet Commonly known as:  PRINIVIL, ZESTRIL  
TAKE 1 TABLET DAILY FOR BLOOD PRESSURE  
  
 loratadine 10 mg tablet Commonly known as:  Faheem Wetzel  
 TAKE 1 TABLET DAILY  
  
 memantine 10 mg tablet Commonly known as:  Morris Vyas Take 1 Tab by mouth two (2) times a day. 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  
  
 umeclidinium 62.5 mcg/actuation inhaler Commonly known as:  INCRUSE ELLIPTA Take 1 Puff by inhalation daily. * Notice: This list has 2 medication(s) that are the same as other medications prescribed for you. Read the directions carefully, and ask your doctor or other care provider to review them with you. We Performed the Following CBC WITH AUTOMATED DIFF [58926 CPT(R)] Follow-up Instructions Return if symptoms worsen or fail to improve, for Follow Up. To-Do List   
 06/12/2018 Imaging:  XR CHEST PA LAT Patient Instructions We will get a chest xray to evaluate for pneumonia. If negative will trial po short steroid burst given history of . Chronic Obstructive Pulmonary Disease (COPD): Care Instructions Your Care Instructions Chronic obstructive pulmonary disease (COPD) is a general term for a group of lung diseases, including emphysema and chronic bronchitis. People with COPD have decreased airflow in and out of the lungs, which makes it hard to breathe. The airways also can get clogged with thick mucus. Cigarette smoking is a major cause of COPD. Although there is no cure for COPD, you can slow its progress. Following your treatment plan and taking care of yourself can help you feel better and live longer. Follow-up care is a key part of your treatment and safety. Be sure to make and go to all appointments, and call your doctor if you are having problems. It's also a good idea to know your test results and keep a list of the medicines you take. How can you care for yourself at home? ?Staying healthy ? · Do not smoke. This is the most important step you can take to prevent more damage to your lungs.  If you need help quitting, talk to your doctor about stop-smoking programs and medicines. These can increase your chances of quitting for good. ? · Avoid colds and flu. Get a pneumococcal vaccine shot. If you have had one before, ask your doctor whether you need a second dose. Get the flu vaccine every fall. If you must be around people with colds or the flu, wash your hands often. ? · Avoid secondhand smoke, air pollution, and high altitudes. Also avoid cold, dry air and hot, humid air. Stay at home with your windows closed when air pollution is bad. ?Medicines and oxygen therapy ? · Take your medicines exactly as prescribed. Call your doctor if you think you are having a problem with your medicine. ? · You may be taking medicines such as: ¨ Bronchodilators. These help open your airways and make breathing easier. Bronchodilators are either short-acting (work for 6 to 9 hours) or long-acting (work for 24 hours). You inhale most bronchodilators, so they start to act quickly. Always carry your quick-relief inhaler with you in case you need it while you are away from home. ¨ Corticosteroids (prednisone, budesonide). These reduce airway inflammation. They come in pill or inhaled form. You must take these medicines every day for them to work well. ? · A spacer may help you get more inhaled medicine to your lungs. Ask your doctor or pharmacist if a spacer is right for you. If it is, ask how to use it properly. ? · Do not take any vitamins, over-the-counter medicine, or herbal products without talking to your doctor first.  
? · If your doctor prescribed antibiotics, take them as directed. Do not stop taking them just because you feel better. You need to take the full course of antibiotics. ? · Oxygen therapy boosts the amount of oxygen in your blood and helps you breathe easier. Use the flow rate your doctor has recommended, and do not change it without talking to your doctor first.  
Activity ? · Get regular exercise. Walking is an easy way to get exercise. Start out slowly, and walk a little more each day. ? · Pay attention to your breathing. You are exercising too hard if you cannot talk while you are exercising. ? · Take short rest breaks when doing household chores and other activities. ? · Learn breathing methods-such as breathing through pursed lips-to help you become less short of breath. ? · If your doctor has not set you up with a pulmonary rehabilitation program, talk to him or her about whether rehab is right for you. Rehab includes exercise programs, education about your disease and how to manage it, help with diet and other changes, and emotional support. Diet ? · Eat regular, healthy meals. Use bronchodilators about 1 hour before you eat to make it easier to eat. Eat several small meals instead of three large ones. Drink beverages at the end of the meal. Avoid foods that are hard to chew. ? · Eat foods that contain protein so that you do not lose muscle mass. ? · Talk with your doctor if you gain too much weight or if you lose weight without trying. ?Mental health ? · Talk to your family, friends, or a therapist about your feelings. It is normal to feel frightened, angry, hopeless, helpless, and even guilty. Talking openly about bad feelings can help you cope. If these feelings last, talk to your doctor. When should you call for help? Call 911 anytime you think you may need emergency care. For example, call if: 
? · You have severe trouble breathing. ?Call your doctor now or seek immediate medical care if: 
? · You have new or worse trouble breathing. ? · You cough up blood. ? · You have a fever. ? Watch closely for changes in your health, and be sure to contact your doctor if: 
? · You cough more deeply or more often, especially if you notice more mucus or a change in the color of your mucus. ? · You have new or worse swelling in your legs or belly. ? · You are not getting better as expected. Where can you learn more? Go to http://asya-chad.info/. Mery George in the search box to learn more about \"Chronic Obstructive Pulmonary Disease (COPD): Care Instructions. \" Current as of: May 12, 2017 Content Version: 11.4 © 3035-5430 [employer]. Care instructions adapted under license by "[employer]" (which disclaims liability or warranty for this information). If you have questions about a medical condition or this instruction, always ask your healthcare professional. [address] any warranty or liability for your use of this information. Cough: Care Instructions Your Care Instructions A cough is your body's response to something that bothers your throat or airways. Many things can cause a cough. You might cough because of a cold or the flu, bronchitis, or asthma. Smoking, postnasal drip, allergies, and stomach acid that backs up into your throat also can cause coughs. A cough is a symptom, not a disease. Most coughs stop when the cause, such as a cold, goes away. You can take a few steps at home to cough less and feel better. Follow-up care is a key part of your treatment and safety. Be sure to make and go to all appointments, and call your doctor if you are having problems. It's also a good idea to know your test results and keep a list of the medicines you take. How can you care for yourself at home? · Drink lots of water and other fluids. This helps thin the mucus and soothes a dry or sore throat. Honey or lemon juice in hot water or tea may ease a dry cough. · Take cough medicine as directed by your doctor. · Prop up your head on pillows to help you breathe and ease a dry cough. · Try cough drops to soothe a dry or sore throat. Cough drops don't stop a cough. Medicine-flavored cough drops are no better than candy-flavored drops or hard candy. · Do not smoke. Avoid secondhand smoke. If you need help quitting, talk to your doctor about stop-smoking programs and medicines. These can increase your chances of quitting for good. When should you call for help? Call 911 anytime you think you may need emergency care. For example, call if: 
? · You have severe trouble breathing. ?Call your doctor now or seek immediate medical care if: 
? · You cough up blood. ? · You have new or worse trouble breathing. ? · You have a new or higher fever. ? · You have a new rash. ? Watch closely for changes in your health, and be sure to contact your doctor if: 
? · You cough more deeply or more often, especially if you notice more mucus or a change in the color of your mucus. ? · You have new symptoms, such as a sore throat, an earache, or sinus pain. ? · You do not get better as expected. Where can you learn more? Go to http://asya-chad.info/. Enter D279 in the search box to learn more about \"Cough: Care Instructions. \" Current as of: May 12, 2017 Content Version: 11.4 © 6811-7734 [employer]. Care instructions adapted under license by [employer] (which disclaims liability or warranty for this information). If you have questions about a medical condition or this instruction, always ask your healthcare professional. [address] any warranty or liability for your use of this information. Introducing [facility] & HEALTH SERVICES! Dear Mik Gardner: Thank you for requesting a [employer] account. Our records indicate that you already have an active [employer] account. You can access your account anytime at [email or web address] Did you know that you can access your hospital and ER discharge instructions at any time in Aktana? You can also review all of your test results from your hospital stay or ER visit. Additional Information If you have questions, please visit the Frequently Asked Questions section of the [email or web address] website at [email or web address] Remember, [employer] is NOT to be used for urgent needs. For medical emergencies, dial 911. Now available from your iPhone and Android! Please provide this summary of care documentation to your next provider. Your primary care clinician is listed as Rollo Lombard. If you have any questions after today's visit, please call [phone].

## 2018-06-12 NOTE — PROGRESS NOTES
Chief Complaint   Patient presents with    Other     Patient in office today with Daughter. Patients daughter states that bronchitis isn't getting better and would like to have it checked out. 1. Have you been to the ER, urgent care clinic since your last visit? Hospitalized since your last visit? No    2. Have you seen or consulted any other health care providers outside of the [address] [place] [address] since your last visit? Include any pap smears or colon screening.  No

## 2018-06-12 NOTE — PROGRESS NOTES
[facility] Note      Subjective:     Chief Complaint   Patient presents with    Other     Patient in office today with Daughter. Patients daughter states that bronchitis isn't getting better and would like to have it checked out. César Cano is a 80y.o. year old female who presents for evaluation of the following:    Coughing:  More sleepy than usual  Onset  2 weeks ago   Associated sore throat  Tx: Mucinex DM  -Competed azithromycin   Endorses loose stools- chronic since before coughing spell  Denies dysuria, falls, pain in chest    Known dementia lives with her daughter. ROS limited. History provided by daughter. Review of Systems   Pertinent positives and negative per HPI. All other systems  reviewed are negative for a Comprehensive ROS (10+). Past Medical History:   Diagnosis Date    Anxiety state, unspecified     CKD (chronic kidney disease) stage 3, GFR 30-59 ml/min 4/6/2016    Dementia 6/12    Landa/npysch    Depression 9/20/2017    Generalized osteoarthrosis, unspecified site     Lumbar compression fracture ([facility] 4/08    epidurals x 2  Umang/os    Osteopenia     Other and unspecified hyperlipidemia     Psoriasis     Unspecified essential hypertension     Unspecified hypothyroidism     thyroid irradiated i131        Social History     Social History    Marital status:      Spouse name: N/A    Number of children: N/A    Years of education: N/A     Occupational History    Not on file.      Social History Main Topics    Smoking status: Former Smoker     Packs/day: 0.50     Years: 60.00     Types: Cigarettes     Quit date: 2014    Smokeless tobacco: Never Used    Alcohol use No    Drug use: No    Sexual activity: No     Other Topics Concern    Exercise Yes     enjoys working in the yard     Social History Narrative       Current Outpatient Prescriptions   Medication Sig    ergocalciferol (ERGOCALCIFEROL) 50,000 unit capsule Take 1 Cap by mouth every seven (7) days.  donepezil (ARICEPT) 10 mg tablet TAKE 1 TABLET NIGHTLY    albuterol (PROVENTIL VENTOLIN) 2.5 mg /3 mL (0.083 %) nebulizer solution     atorvastatin (LIPITOR) 40 mg tablet TAKE 1 TABLET DAILY    albuterol (PROVENTIL HFA, VENTOLIN HFA, PROAIR HFA) 90 mcg/actuation inhaler Take 2 Puffs by inhalation every four (4) hours as needed for Wheezing. Please give with spacer.  inhalational spacing device 1 Each by Does Not Apply route as needed.  memantine (NAMENDA) 10 mg tablet Take 1 Tab by mouth two (2) times a day. 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 (Patient taking differently: Take 88 mcg by mouth daily.)    umeclidinium (INCRUSE ELLIPTA) 62.5 mcg/actuation inhaler Take 1 Puff by inhalation daily.  imipramine (TOFRANIL) 25 mg tablet TAKE 2 TABLETS NIGHTLY    BROVANA 15 mcg/2 mL nebu neb solution     budesonide (PULMICORT) 0.5 mg/2 mL nbsp     loratadine (CLARITIN) 10 mg tablet TAKE 1 TABLET DAILY    amLODIPine (NORVASC) 5 mg tablet TAKE 1 TABLET DAILY FOR BLOOD PRESSURE    FOLIC ACID/MULTIVIT-MIN/LUTEIN (CENTRUM SILVER PO) Take  by mouth. No current facility-administered medications for this visit. Objective:     Vitals:    06/12/18 0956   BP: 110/68   Pulse: 71   Resp: 18   Temp: 97.2 °F (36.2 °C)   TempSrc: Oral   SpO2: 97%   Weight: 137 lb 3.2 oz (62.2 kg)   Height: 5' 2\" (1.575 m)       Physical Examination:  General: Alert, cooperative, no distress, appears stated age. Eyes: Conjunctivae clear. PERRL, EOMs intact. Ears: Normal external ear canals both ears. Nose: Nares normal.   Mouth/Throat: Lips, mucosa, and tongue normal. .  Neck: Supple, symmetrical, trachea midline, no adenopathy.  No thyroid enlargement/tenderness/nodules  Back: Symmetric, no curvature. ROM normal. No CVA tenderness. Lungs: Decreased breath sounds in RLL , scattered rhonchi. Heart: Regular rate and rhythm, S1, S2 normal, no murmur, click, rub or gallop. Abdomen: Soft, non-tender. Bowel sounds normal. No masses or organomegaly. Extremities: Extremities normal, atraumatic, no cyanosis or edema. Pulses: 2+ and symmetric all extremities. Skin: Skin color, texture, turgor normal. No rashes or lesions on exposed skin. Lymph nodes: Cervical, supraclavicular nodes normal.  Neurologic: CNII-XII intact. Strength 5/5 grossly. Sensation and reflexes normal throughout. Office Visit on 04/30/2018   Component Date Value Ref Range Status    Hemoglobin A1c (POC) 04/30/2018 6.0  % Final    Glucose 05/01/2018 102* 65 - 99 mg/dL Final    BUN 05/01/2018 35* 8 - 27 mg/dL Final    Creatinine 05/01/2018 1.25* 0.57 - 1.00 mg/dL Final    GFR est non-AA 05/01/2018 38* >59 mL/min/1.73 Final    GFR est AA 05/01/2018 44* >59 mL/min/1.73 Final    BUN/Creatinine ratio 05/01/2018 28  12 - 28 Final    Sodium 05/01/2018 144  134 - 144 mmol/L Final    Potassium 05/01/2018 4.4  3.5 - 5.2 mmol/L Final    Chloride 05/01/2018 105  96 - 106 mmol/L Final    CO2 05/01/2018 24  18 - 29 mmol/L Final    Calcium 05/01/2018 9.6  8.7 - 10.3 mg/dL Final    Protein, total 05/01/2018 6.7  6.0 - 8.5 g/dL Final    Albumin 05/01/2018 4.1  3.5 - 4.7 g/dL Final    GLOBULIN, TOTAL 05/01/2018 2.6  1.5 - 4.5 g/dL Final    A-G Ratio 05/01/2018 1.6  1.2 - 2.2 Final    Bilirubin, total 05/01/2018 0.3  0.0 - 1.2 mg/dL Final    Alk.  phosphatase 05/01/2018 207* 39 - 117 IU/L Final    AST (SGOT) 05/01/2018 36  0 - 40 IU/L Final    ALT (SGPT) 05/01/2018 41* 0 - 32 IU/L Final    TSH 05/01/2018 0.262* 0.450 - 4.500 uIU/mL Final    Cholesterol, total 05/01/2018 198  100 - 199 mg/dL Final    Triglyceride 05/01/2018 230* 0 - 149 mg/dL Final    HDL Cholesterol 05/01/2018 58  >39 mg/dL Final    VLDL, calculated 05/01/2018 46* 5 - 40 mg/dL Final    LDL, calculated 05/01/2018 94  0 - 99 mg/dL Final    Interpretation 05/01/2018 Note   Final    Supplemental report is available.  INTERPRETATION 05/01/2018 Note   Final    Supplemental report is available.  PDF IMAGE 07/41/8485 Not applicable   Final    Alk. phosphatase 05/29/2018 228* 39 - 117 IU/L Final    Liver fraction 05/29/2018 82  18 - 85 % Final    Bone fraction 05/29/2018 18  14 - 68 % Final    Intestinal fraction 05/29/2018 0  0 - 18 % Final    TSH 05/29/2018 0.203* 0.450 - 4.500 uIU/mL Final    Glucose 05/29/2018 115* 65 - 99 mg/dL Final    BUN 05/29/2018 22  8 - 27 mg/dL Final    Creatinine 05/29/2018 1.26* 0.57 - 1.00 mg/dL Final    GFR est non-AA 05/29/2018 38* >59 mL/min/1.73 Final    GFR est AA 05/29/2018 44* >59 mL/min/1.73 Final    BUN/Creatinine ratio 05/29/2018 17  12 - 28 Final    Sodium 05/29/2018 145* 134 - 144 mmol/L Final    Potassium 05/29/2018 4.3  3.5 - 5.2 mmol/L Final    Chloride 05/29/2018 105  96 - 106 mmol/L Final    CO2 05/29/2018 19  18 - 29 mmol/L Final    Comment: **Effective June 11, 2018 Carbon Dioxide, Total**    reference interval will be changing to: Age                  Male          Female       0 days   - 30 days         12 - 34        16 - 34      31 days   -  1 year         15 - 22        15 - 25       2 years  -  5 years        16 - 34        14 - 32       6 years  - 12 years        23 - 32        22 - 32                 >12 years        21 - 32        18 - 34      Calcium 05/29/2018 9.5  8.7 - 10.3 mg/dL Final    Interpretation 05/29/2018 Note   Final    Supplemental report is available. Assessment/ Plan:   Diagnoses and all orders for this visit:    1. Cough  -     CBC WITH AUTOMATED DIFF  -     XR CHEST PA LAT; Future    2. Chronic obstructive pulmonary disease, unspecified COPD type ([facility]  -     CBC WITH AUTOMATED DIFF  -     XR CHEST PA LAT; Future    3.  Decreased breath sounds at right lung base  -     XR CHEST PA LAT; Future      Acute on chronic cough in setting of COPD. Not improved with trial of abx. No SIRS. Appreciated decreased breath sounds RLL. XRay and CBC to rule out PNA vs infection. Likely flare of COPD. If no PNA will trial short burst steroid for cough. I have discussed the diagnosis with the patient and the intended plan as seen in the above orders. The patient has received an after-visit summary and questions were answered concerning future plans. I have discussed medication side effects and warnings with the patient as well. Follow-up Disposition:  Return if symptoms worsen or fail to improve, for Follow Up. Signed,    Gonzalo Turner MD  6/12/2018    This note will not be viewable in [address]

## 2018-06-12 NOTE — PATIENT INSTRUCTIONS
We will get a chest xray to evaluate for pneumonia. If negative will trial po short steroid burst given history of . Chronic Obstructive Pulmonary Disease (COPD): Care Instructions  Your Care Instructions    Chronic obstructive pulmonary disease (COPD) is a general term for a group of lung diseases, including emphysema and chronic bronchitis. People with COPD have decreased airflow in and out of the lungs, which makes it hard to breathe. The airways also can get clogged with thick mucus. Cigarette smoking is a major cause of COPD. Although there is no cure for COPD, you can slow its progress. Following your treatment plan and taking care of yourself can help you feel better and live longer. Follow-up care is a key part of your treatment and safety. Be sure to make and go to all appointments, and call your doctor if you are having problems. It's also a good idea to know your test results and keep a list of the medicines you take. How can you care for yourself at home? ?Staying healthy  ? · Do not smoke. This is the most important step you can take to prevent more damage to your lungs. If you need help quitting, talk to your doctor about stop-smoking programs and medicines. These can increase your chances of quitting for good. ? · Avoid colds and flu. Get a pneumococcal vaccine shot. If you have had one before, ask your doctor whether you need a second dose. Get the flu vaccine every fall. If you must be around people with colds or the flu, wash your hands often. ? · Avoid secondhand smoke, air pollution, and high altitudes. Also avoid cold, dry air and hot, humid air. Stay at home with your windows closed when air pollution is bad. ?Medicines and oxygen therapy  ? · Take your medicines exactly as prescribed. Call your doctor if you think you are having a problem with your medicine. ? · You may be taking medicines such as:  ¨ Bronchodilators.  These help open your airways and make breathing easier. Bronchodilators are either short-acting (work for 6 to 9 hours) or long-acting (work for 24 hours). You inhale most bronchodilators, so they start to act quickly. Always carry your quick-relief inhaler with you in case you need it while you are away from home. ¨ Corticosteroids (prednisone, budesonide). These reduce airway inflammation. They come in pill or inhaled form. You must take these medicines every day for them to work well. ? · A spacer may help you get more inhaled medicine to your lungs. Ask your doctor or pharmacist if a spacer is right for you. If it is, ask how to use it properly. ? · Do not take any vitamins, over-the-counter medicine, or herbal products without talking to your doctor first.   ? · If your doctor prescribed antibiotics, take them as directed. Do not stop taking them just because you feel better. You need to take the full course of antibiotics. ? · Oxygen therapy boosts the amount of oxygen in your blood and helps you breathe easier. Use the flow rate your doctor has recommended, and do not change it without talking to your doctor first.   Activity  ? · Get regular exercise. Walking is an easy way to get exercise. Start out slowly, and walk a little more each day. ? · Pay attention to your breathing. You are exercising too hard if you cannot talk while you are exercising. ? · Take short rest breaks when doing household chores and other activities. ? · Learn breathing methods-such as breathing through pursed lips-to help you become less short of breath. ? · If your doctor has not set you up with a pulmonary rehabilitation program, talk to him or her about whether rehab is right for you. Rehab includes exercise programs, education about your disease and how to manage it, help with diet and other changes, and emotional support. Diet  ? · Eat regular, healthy meals. Use bronchodilators about 1 hour before you eat to make it easier to eat.  Eat several small meals instead of three large ones. Drink beverages at the end of the meal. Avoid foods that are hard to chew. ? · Eat foods that contain protein so that you do not lose muscle mass. ? · Talk with your doctor if you gain too much weight or if you lose weight without trying. ?Mental health  ? · Talk to your family, friends, or a therapist about your feelings. It is normal to feel frightened, angry, hopeless, helpless, and even guilty. Talking openly about bad feelings can help you cope. If these feelings last, talk to your doctor. When should you call for help? Call 911 anytime you think you may need emergency care. For example, call if:  ? · You have severe trouble breathing. ?Call your doctor now or seek immediate medical care if:  ? · You have new or worse trouble breathing. ? · You cough up blood. ? · You have a fever. ? Watch closely for changes in your health, and be sure to contact your doctor if:  ? · You cough more deeply or more often, especially if you notice more mucus or a change in the color of your mucus. ? · You have new or worse swelling in your legs or belly. ? · You are not getting better as expected. Where can you learn more? Go to http://asya-chad.info/. Kalyani Gone in the search box to learn more about \"Chronic Obstructive Pulmonary Disease (COPD): Care Instructions. \"  Current as of: May 12, 2017  Content Version: 11.4  © 7436-5605 [employer]. Care instructions adapted under license by [employer] (which disclaims liability or warranty for this information). If you have questions about a medical condition or this instruction, always ask your healthcare professional. [place] [address] any warranty or liability for your use of this information. Cough: Care Instructions  Your Care Instructions    A cough is your body's response to something that bothers your throat or airways. Many things can cause a cough.  You might cough because of a cold or the flu, bronchitis, or asthma. Smoking, postnasal drip, allergies, and stomach acid that backs up into your throat also can cause coughs. A cough is a symptom, not a disease. Most coughs stop when the cause, such as a cold, goes away. You can take a few steps at home to cough less and feel better. Follow-up care is a key part of your treatment and safety. Be sure to make and go to all appointments, and call your doctor if you are having problems. It's also a good idea to know your test results and keep a list of the medicines you take. How can you care for yourself at home? · Drink lots of water and other fluids. This helps thin the mucus and soothes a dry or sore throat. Honey or lemon juice in hot water or tea may ease a dry cough. · Take cough medicine as directed by your doctor. · Prop up your head on pillows to help you breathe and ease a dry cough. · Try cough drops to soothe a dry or sore throat. Cough drops don't stop a cough. Medicine-flavored cough drops are no better than candy-flavored drops or hard candy. · Do not smoke. Avoid secondhand smoke. If you need help quitting, talk to your doctor about stop-smoking programs and medicines. These can increase your chances of quitting for good. When should you call for help? Call 911 anytime you think you may need emergency care. For example, call if:  ? · You have severe trouble breathing. ?Call your doctor now or seek immediate medical care if:  ? · You cough up blood. ? · You have new or worse trouble breathing. ? · You have a new or higher fever. ? · You have a new rash. ? Watch closely for changes in your health, and be sure to contact your doctor if:  ? · You cough more deeply or more often, especially if you notice more mucus or a change in the color of your mucus. ? · You have new symptoms, such as a sore throat, an earache, or sinus pain. ? · You do not get better as expected.    Where can you learn more? Go to http://asya-chad.info/. Enter D279 in the search box to learn more about \"Cough: Care Instructions. \"  Current as of: May 12, 2017  Content Version: 11.4  © 8813-2704 [employer]. Care instructions adapted under license by [employer] (which disclaims liability or warranty for this information). If you have questions about a medical condition or this instruction, always ask your healthcare professional. [address] any warranty or liability for your use of this information.

## 2018-06-13 LAB
BASOPHILS # BLD AUTO: 0 X10E3/UL (ref 0–0.2)
BASOPHILS NFR BLD AUTO: 0 %
EOSINOPHIL # BLD AUTO: 0.1 X10E3/UL (ref 0–0.4)
EOSINOPHIL NFR BLD AUTO: 1 %
ERYTHROCYTE [DISTWIDTH] IN BLOOD BY AUTOMATED COUNT: 14.6 % (ref 12.3–15.4)
HCT VFR BLD AUTO: 39.9 % (ref 34–46.6)
HGB BLD-MCNC: 12.9 G/DL (ref 11.1–15.9)
IMM GRANULOCYTES # BLD: 0 X10E3/UL (ref 0–0.1)
IMM GRANULOCYTES NFR BLD: 0 %
LYMPHOCYTES # BLD AUTO: 2.6 X10E3/UL (ref 0.7–3.1)
LYMPHOCYTES NFR BLD AUTO: 33 %
MCH RBC QN AUTO: 28.8 PG (ref 26.6–33)
MCHC RBC AUTO-ENTMCNC: 32.3 G/DL (ref 31.5–35.7)
MCV RBC AUTO: 89 FL (ref 79–97)
MONOCYTES # BLD AUTO: 0.6 X10E3/UL (ref 0.1–0.9)
MONOCYTES NFR BLD AUTO: 7 %
NEUTROPHILS # BLD AUTO: 4.6 X10E3/UL (ref 1.4–7)
NEUTROPHILS NFR BLD AUTO: 59 %
PLATELET # BLD AUTO: 215 X10E3/UL (ref 150–379)
RBC # BLD AUTO: 4.48 X10E6/UL (ref 3.77–5.28)
WBC # BLD AUTO: 7.9 X10E3/UL (ref 3.4–10.8)

## 2018-06-13 NOTE — TELEPHONE ENCOUNTER
Outbound call to patients daughter. Patients name and  verified. Reviewed recent CXR results with patients daughter. Advised of steroid rx that was called in. Verbalized understanding.

## 2018-06-14 ENCOUNTER — TELEPHONE (OUTPATIENT)
Dept: FAMILY MEDICINE CLINIC | Age: 83
End: 2018-06-14

## 2018-06-14 DIAGNOSIS — R05.9 COUGH: ICD-10-CM

## 2018-06-14 DIAGNOSIS — J44.9 CHRONIC OBSTRUCTIVE PULMONARY DISEASE, UNSPECIFIED COPD TYPE (HCC): ICD-10-CM

## 2018-06-14 RX ORDER — PREDNISONE 20 MG/1
40 TABLET ORAL
Qty: 10 TAB | Refills: 0 | Status: CANCELLED | OUTPATIENT
Start: 2018-06-14 | End: 2018-06-19

## 2018-06-14 NOTE — TELEPHONE ENCOUNTER
----- Message from Sarah Whitt sent at 6/13/2018  7:27 PM EDT -----  Regarding: DrJonathan Regan, daughter, states the pharmacy never received the prescription for the \"Prednisone\". [facility] [place]. [phone]. Best contact number is [phone].     Thanks,  Diann Lemos

## 2018-06-14 NOTE — TELEPHONE ENCOUNTER
Prescription has been called in to [facility] on [address]. On 6/14/2018 @ 8:33am. Patients daughter who is on PHI has been notified.

## 2018-06-14 NOTE — TELEPHONE ENCOUNTER
Per Dr. Johnny Anna:  Notify Patient/ POA    Your chest xray did not show any pneumonia.  I will send a trial of steroids to help with her cough for now.  Return to clinic if no improvement after the steroids.

## 2018-06-14 NOTE — TELEPHONE ENCOUNTER
Requested Prescriptions     Pending Prescriptions Disp Refills    predniSONE (DELTASONE) 20 mg tablet 10 Tab 0     Sig: Take 2 Tabs by mouth daily (with breakfast) for 5 days.      Pharmacy on file verified

## 2018-06-17 DIAGNOSIS — G30.1 LATE ONSET ALZHEIMER'S DISEASE WITHOUT BEHAVIORAL DISTURBANCE (HCC): ICD-10-CM

## 2018-06-17 DIAGNOSIS — F02.80 LATE ONSET ALZHEIMER'S DISEASE WITHOUT BEHAVIORAL DISTURBANCE (HCC): ICD-10-CM

## 2018-06-17 DIAGNOSIS — E03.9 ACQUIRED HYPOTHYROIDISM: ICD-10-CM

## 2018-06-18 ENCOUNTER — TELEPHONE (OUTPATIENT)
Dept: FAMILY MEDICINE CLINIC | Age: 83
End: 2018-06-18

## 2018-06-18 NOTE — TELEPHONE ENCOUNTER
Outbound call to patients daughter. Patients name and  verified. Reviewed recent lab results with verbalized understanding. Daughter stated that patient had 3 loose stools at  today and they were requesting her to be tested for Cdiff.  Patient has appt 18

## 2018-06-18 NOTE — TELEPHONE ENCOUNTER
From: Ying [place]  To: Elysia Shirley NP  Sent: 6/17/2018 9:17 PM EDT  Subject: Medication Renewal Request    Original authorizing provider: BRITTANY Mohr Moody Edwards would like a refill of the following medications:  lisinopril (PRINIVIL, ZESTRIL) 10 mg tablet [Dash Morris NP]    Preferred pharmacy: [address]    Comment:  HI DR. MANJARREZ, WOULD YOU PLEASE REFILL MOMS RX FOR 90 DAYS EACH . [address]    Medication renewals requested in this message routed to other providers:  levothyroxine (SYNTHROID) 88 mcg tablet [Phill Manjarrez MD]  memantine (NAMENDA) 10 mg tablet Marisol Alvarado MD]

## 2018-06-18 NOTE — TELEPHONE ENCOUNTER
From: Claudia Ruvalcaba  To: Anju Baxter MD  Sent: 6/17/2018 9:17 PM EDT  Subject: Medication Renewal Request    Original authorizing provider: MD Yusef Murillo. Vainney Florez would like a refill of the following medications:  levothyroxine (SYNTHROID) 88 mcg tablet [Phill Manjarrez MD]  memantine (NAMENDA) 10 mg tablet Anju Baxter MD]    Preferred pharmacy: [address]    Comment:  HI DR. MANJARREZ, WOULD YOU PLEASE REFILL MOMS RX FOR 90 DAYS EACH . [address]    Medication renewals requested in this message routed to other providers:  lisinopril (PRINIVIL, ZESTRIL) 10 mg tablet [Dash Clark NP]

## 2018-06-19 ENCOUNTER — OFFICE VISIT (OUTPATIENT)
Dept: FAMILY MEDICINE CLINIC | Age: 83
End: 2018-06-19

## 2018-06-19 VITALS
HEART RATE: 73 BPM | TEMPERATURE: 97.7 F | HEIGHT: 62 IN | WEIGHT: 136.4 LBS | DIASTOLIC BLOOD PRESSURE: 68 MMHG | BODY MASS INDEX: 25.1 KG/M2 | RESPIRATION RATE: 18 BRPM | SYSTOLIC BLOOD PRESSURE: 122 MMHG | OXYGEN SATURATION: 98 %

## 2018-06-19 DIAGNOSIS — R19.7 DIARRHEA, UNSPECIFIED TYPE: Primary | ICD-10-CM

## 2018-06-19 NOTE — LETTER
NOTIFICATION RETURN TO WORK / SCHOOL 
 
6/19/2018 8:44 AM 
 
Ms. Madyson Henson [facility] [phone] To Whom It May Concern: Madyson Henson is currently under the care of REYNA Wisdom. She is being evaluated for stool infections. I do not have a high suspicion that she has a contagious stool infection. If there are questions or concerns please have the patient contact our office. Sincerely, Eugene Jara MD

## 2018-06-19 NOTE — PROGRESS NOTES
Chief Complaint   Patient presents with    Diarrhea     Patient is having some loose stools and needs to be checked for C-Diff.      1. Have you been to the ER, urgent care clinic since your last visit? Hospitalized since your last visit? No    2. Have you seen or consulted any other health care providers outside of the [facility] since your last visit? Include any pap smears or colon screening. No     Patient has had two loose stools this morning.

## 2018-06-19 NOTE — PROGRESS NOTES
[facility] Note      Subjective:     Chief Complaint   Patient presents with    Diarrhea     Patient is having some loose stools and needs to be checked for C-Diff. Jose Christianson is a 80y.o. year old female who presents for evaluation of the following:    Diarrhea/ Loose Stools   Onset 1 month ago  Loose stools  5 BM in past 24 hours. Endorses   - recent abx use in past 1 month  - sick contact at  with C diff  Denies blood in stool, history of c diff infection, pain with defecation, abdominal pain, vomiting, recent travel, change in diet. Cough:   Unchanged  Completed course of prednisone and azithromycin. Tx: mucinex      Review of Systems   Pertinent positives and negative per HPI. All other systems  reviewed are negative for a Comprehensive ROS (10+). Past Medical History:   Diagnosis Date    Anxiety state, unspecified     CKD (chronic kidney disease) stage 3, GFR 30-59 ml/min 4/6/2016    Dementia 6/12    Landa/npysch    Depression 9/20/2017    Generalized osteoarthrosis, unspecified site     Lumbar compression fracture ([facility] [address] 4/08    epidurals x 2  Umang/os    Osteopenia     Other and unspecified hyperlipidemia     Psoriasis     Unspecified essential hypertension     Unspecified hypothyroidism     thyroid irradiated i131        Social History     Social History    Marital status:      Spouse name: N/A    Number of children: N/A    Years of education: N/A     Occupational History    Not on file.      Social History Main Topics    Smoking status: Former Smoker     Packs/day: 0.50     Years: 60.00     Types: Cigarettes     Quit date: 2014    Smokeless tobacco: Never Used    Alcohol use No    Drug use: No    Sexual activity: No     Other Topics Concern    Exercise Yes     enjoys working in the yard     Social History Narrative       Current Outpatient Prescriptions   Medication Sig    ergocalciferol (ERGOCALCIFEROL) 50,000 unit capsule Take 1 Cap by mouth every seven (7) days.  donepezil (ARICEPT) 10 mg tablet TAKE 1 TABLET NIGHTLY    albuterol (PROVENTIL VENTOLIN) 2.5 mg /3 mL (0.083 %) nebulizer solution     atorvastatin (LIPITOR) 40 mg tablet TAKE 1 TABLET DAILY    albuterol (PROVENTIL HFA, VENTOLIN HFA, PROAIR HFA) 90 mcg/actuation inhaler Take 2 Puffs by inhalation every four (4) hours as needed for Wheezing. Please give with spacer.  inhalational spacing device 1 Each by Does Not Apply route as needed.  memantine (NAMENDA) 10 mg tablet Take 1 Tab by mouth two (2) times a day. 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 (Patient taking differently: Take 88 mcg by mouth daily.)    umeclidinium (INCRUSE ELLIPTA) 62.5 mcg/actuation inhaler Take 1 Puff by inhalation daily.  imipramine (TOFRANIL) 25 mg tablet TAKE 2 TABLETS NIGHTLY    BROVANA 15 mcg/2 mL nebu neb solution     budesonide (PULMICORT) 0.5 mg/2 mL nbsp     loratadine (CLARITIN) 10 mg tablet TAKE 1 TABLET DAILY    amLODIPine (NORVASC) 5 mg tablet TAKE 1 TABLET DAILY FOR BLOOD PRESSURE    FOLIC ACID/MULTIVIT-MIN/LUTEIN (CENTRUM SILVER PO) Take  by mouth. No current facility-administered medications for this visit. Objective:     Vitals:    06/19/18 0812   BP: 122/68   Pulse: 73   Resp: 18   Temp: 97.7 °F (36.5 °C)   TempSrc: Oral   SpO2: 98%   Weight: 136 lb 6.4 oz (61.9 kg)   Height: 5' 2\" (1.575 m)       Physical Examination:  General: Alert, cooperative, no distress, appears stated age. Smiles  Eyes: Conjunctivae clear. PERRL, EOMs intact. Ears: Normal external ear canals both ears. Nose: Nares normal.   Mouth/Throat: Lips, mucosa, and tongue normal. Teeth and gums normal.  Neck: Supple, symmetrical, trachea midline, no adenopathy.  No thyroid enlargement/tenderness/nodules  Back: Symmetric, no curvature. ROM normal. No CVA tenderness. Lungs: Clear to auscultation bilaterally. Normal inspiratory and expiratory ratio. - no cough during exam  Heart: Regular rate and rhythm, S1, S2 normal, no murmur, click, rub or gallop. Abdomen: Soft, non-tender. Bowel sounds normal. No masses or organomegaly. Extremities: Extremities normal, atraumatic, no cyanosis or edema. Pulses: 2+ and symmetric all extremities. Skin: Skin color, texture, turgor normal. No rashes or lesions on exposed skin. Lymph nodes: Cervical, supraclavicular nodes normal.  Neurologic: CNII-XII intact. Strength 5/5 grossly. Sensation and reflexes normal throughout. Office Visit on 06/12/2018   Component Date Value Ref Range Status    WBC 06/12/2018 7.9  3.4 - 10.8 x10E3/uL Final    RBC 06/12/2018 4.48  3.77 - 5.28 x10E6/uL Final    HGB 06/12/2018 12.9  11.1 - 15.9 g/dL Final    HCT 06/12/2018 39.9  34.0 - 46.6 % Final    MCV 06/12/2018 89  79 - 97 fL Final    MCH 06/12/2018 28.8  26.6 - 33.0 pg Final    MCHC 06/12/2018 32.3  31.5 - 35.7 g/dL Final    RDW 06/12/2018 14.6  12.3 - 15.4 % Final    PLATELET 69/69/5348 150  150 - 379 x10E3/uL Final    NEUTROPHILS 06/12/2018 59  Not Estab. % Final    Lymphocytes 06/12/2018 33  Not Estab. % Final    MONOCYTES 06/12/2018 7  Not Estab. % Final    EOSINOPHILS 06/12/2018 1  Not Estab. % Final    BASOPHILS 06/12/2018 0  Not Estab. % Final    ABS. NEUTROPHILS 06/12/2018 4.6  1.4 - 7.0 x10E3/uL Final    Abs Lymphocytes 06/12/2018 2.6  0.7 - 3.1 x10E3/uL Final    ABS. MONOCYTES 06/12/2018 0.6  0.1 - 0.9 x10E3/uL Final    ABS. EOSINOPHILS 06/12/2018 0.1  0.0 - 0.4 x10E3/uL Final    ABS. BASOPHILS 06/12/2018 0.0  0.0 - 0.2 x10E3/uL Final    IMMATURE GRANULOCYTES 06/12/2018 0  Not Estab. % Final    ABS. IMM.  GRANS. 06/12/2018 0.0  0.0 - 0.1 x10E3/uL Final   Office Visit on 04/30/2018   Component Date Value Ref Range Status    Hemoglobin A1c (POC) 04/30/2018 6.0  % Final    Glucose 05/01/2018 102* 65 - 99 mg/dL Final    BUN 05/01/2018 35* 8 - 27 mg/dL Final    Creatinine 05/01/2018 1.25* 0.57 - 1.00 mg/dL Final    GFR est non-AA 05/01/2018 38* >59 mL/min/1.73 Final    GFR est AA 05/01/2018 44* >59 mL/min/1.73 Final    BUN/Creatinine ratio 05/01/2018 28  12 - 28 Final    Sodium 05/01/2018 144  134 - 144 mmol/L Final    Potassium 05/01/2018 4.4  3.5 - 5.2 mmol/L Final    Chloride 05/01/2018 105  96 - 106 mmol/L Final    CO2 05/01/2018 24  18 - 29 mmol/L Final    Calcium 05/01/2018 9.6  8.7 - 10.3 mg/dL Final    Protein, total 05/01/2018 6.7  6.0 - 8.5 g/dL Final    Albumin 05/01/2018 4.1  3.5 - 4.7 g/dL Final    GLOBULIN, TOTAL 05/01/2018 2.6  1.5 - 4.5 g/dL Final    A-G Ratio 05/01/2018 1.6  1.2 - 2.2 Final    Bilirubin, total 05/01/2018 0.3  0.0 - 1.2 mg/dL Final    Alk. phosphatase 05/01/2018 207* 39 - 117 IU/L Final    AST (SGOT) 05/01/2018 36  0 - 40 IU/L Final    ALT (SGPT) 05/01/2018 41* 0 - 32 IU/L Final    TSH 05/01/2018 0.262* 0.450 - 4.500 uIU/mL Final    Cholesterol, total 05/01/2018 198  100 - 199 mg/dL Final    Triglyceride 05/01/2018 230* 0 - 149 mg/dL Final    HDL Cholesterol 05/01/2018 58  >39 mg/dL Final    VLDL, calculated 05/01/2018 46* 5 - 40 mg/dL Final    LDL, calculated 05/01/2018 94  0 - 99 mg/dL Final    Interpretation 05/01/2018 Note   Final    Supplemental report is available.  INTERPRETATION 05/01/2018 Note   Final    Supplemental report is available.  PDF IMAGE 71/98/2091 Not applicable   Final    Alk.  phosphatase 05/29/2018 228* 39 - 117 IU/L Final    Liver fraction 05/29/2018 82  18 - 85 % Final    Bone fraction 05/29/2018 18  14 - 68 % Final    Intestinal fraction 05/29/2018 0  0 - 18 % Final    TSH 05/29/2018 0.203* 0.450 - 4.500 uIU/mL Final    Glucose 05/29/2018 115* 65 - 99 mg/dL Final    BUN 05/29/2018 22  8 - 27 mg/dL Final    Creatinine 05/29/2018 1.26* 0.57 - 1.00 mg/dL Final    GFR est non-AA 05/29/2018 38* >59 mL/min/1.73 Final    GFR est AA 05/29/2018 44* >59 mL/min/1.73 Final    BUN/Creatinine ratio 05/29/2018 17  12 - 28 Final    Sodium 05/29/2018 145* 134 - 144 mmol/L Final    Potassium 05/29/2018 4.3  3.5 - 5.2 mmol/L Final    Chloride 05/29/2018 105  96 - 106 mmol/L Final    CO2 05/29/2018 19  18 - 29 mmol/L Final    Comment: **Effective June 11, 2018 Carbon Dioxide, Total**    reference interval will be changing to: Age                  Male          Female       0 days   - 30 days         12 - 34        16 - 34      31 days   -  1 year         15 - 22        15 - 25       2 years  -  5 years        16 - 34        14 - 32       6 years  - 12 years        23 - 32        22 - 32                 >12 years        21 - 32        18 - 34      Calcium 05/29/2018 9.5  8.7 - 10.3 mg/dL Final    Interpretation 05/29/2018 Note   Final    Supplemental report is available. Assessment/ Plan:   Diagnoses and all orders for this visit:    1. Diarrhea, unspecified type  -     C DIFFICILE TOXIN A & B BY EIA  -     OVA & PARASITES, STOOL  -     WBC, STOOL    Likely related to diet given prolonged course. Coudl be worsened recently by recent antibiotic use but given potential C. diff exposure will test for this. For now advised daughter focus on po hydration. Reviewed recent CMP and CBC drawn during course of diarrhea without significant abnormal results. I have discussed the diagnosis with the patient and the intended plan as seen in the above orders. The patient has received an after-visit summary and questions were answered concerning future plans. I have discussed medication side effects and warnings with the patient as well. Follow-up Disposition:  Return if symptoms worsen or fail to improve, for Follow Up.       Signed,    Shanon Cook MD  6/19/2018

## 2018-06-19 NOTE — LETTER
6/25/2018 10:36 AM 
 
Ms. Rosa Maria [address] [phone] Dear Rosa Maria Aguila: 
 
Please find your most recent results below. Resulted Orders C DIFFICILE TOXIN A & B BY EIA Result Value Ref Range C. difficile Toxins A+B Negative Negative Narrative Performed at:  [address]  [id number] : Lucy Greenberg MD, Phone:  [phone] RECOMMENDATIONS: 
Your stool infection testing for C. Difficile was negative. Please call me if you have any questions: [phone] Sincerely, Sindhu Gutiérrez MD

## 2018-06-19 NOTE — MR AVS SNAPSHOT
Lex Chaidez 
 
 
 [address] [facility] [address] 
[phone] Patient: Alisha Wray MRN: [id number] Matilda Oscoda Visit Information Date & Time Provider Department Dept. Phone Encounter #  
 6/19/2018  8:15 AM Silvia Lang  [facility] [address] [phone] [id number] Follow-up Instructions Return if symptoms worsen or fail to improve, for Follow Up. Your Appointments 10/29/2018  4:00 PM  
ROUTINE CARE with Daniel Hamilton MD  
[facility]) Appt Note: 6 months follow up visit for HTN  
 [address] [id number]  
[phone]  
  
   
 [address] [address] Upcoming Health Maintenance Date Due Influenza Age 5 to Adult 8/1/2018 MEDICARE YEARLY EXAM 9/21/2018 GLAUCOMA SCREENING Q2Y 3/21/2019 DTaP/Tdap/Td series (2 - Td) 9/7/2026 Allergies as of 6/19/2018  Review Complete On: 6/19/2018 By: Silvia Lang MD  
  
 Severity Noted Reaction Type Reactions Keflex [Cephalexin]  04/23/2010    Rash Pcn [Penicillins]  04/23/2010    Rash  
 Sulfa (Sulfonamide Antibiotics)  05/31/2011    Rash Current Immunizations  Reviewed on 9/7/2016 Name Date Influenza High Dose Vaccine PF 9/20/2017, 9/30/2015 Influenza Vaccine 10/16/2013 Pneumococcal Conjugate (PCV-13) 3/2/2016 Pneumococcal Polysaccharide (PPSV-23) 1/29/2014 Tdap 9/7/2016 Zoster Vaccine, Live 10/15/2014 Not reviewed this visit You Were Diagnosed With   
  
 Codes Comments Diarrhea, unspecified type    -  Primary ICD-10-CM: R19.7 ICD-9-CM: 787.91 Vitals BP Pulse Temp Resp Height(growth percentile) Weight(growth percentile) 122/68 (BP 1 Location: Left arm, BP Patient Position: Sitting) 73 97.7 °F (36.5 °C) (Oral) 18 5' 2\" (1.575 m) 136 lb 6.4 oz (61.9 kg) SpO2 BMI OB Status Smoking Status 98% 24.95 kg/m2 Postmenopausal Former Smoker BMI and BSA Data Body Mass Index Body Surface Area 24.95 kg/m 2 1.65 m 2 Preferred Pharmacy Pharmacy Name Phone [facility] [address] [place] [address] [phone] Your Updated Medication List  
  
   
This list is accurate as of 6/19/18  8:29 AM.  Always use your most recent med list.  
  
  
  
  
 * albuterol 90 mcg/actuation inhaler Commonly known as:  PROVENTIL HFA, VENTOLIN HFA, PROAIR HFA Take 2 Puffs by inhalation every four (4) hours as needed for Wheezing. Please give with spacer. * albuterol 2.5 mg /3 mL (0.083 %) nebulizer solution Commonly known as:  PROVENTIL VENTOLIN  
  
 amLODIPine 5 mg tablet Commonly known as:  Unknown Zahl TAKE 1 TABLET DAILY FOR BLOOD PRESSURE  
  
 atorvastatin 40 mg tablet Commonly known as:  LIPITOR  
TAKE 1 TABLET DAILY  
  
 BROVANA 15 mcg/2 mL Nebu neb solution Generic drug:  arformoterol  
  
 budesonide 0.5 mg/2 mL Nbsp Commonly known as:  PULMICORT  
  
 CENTRUM SILVER PO Take  by mouth. donepezil 10 mg tablet Commonly known as:  ARICEPT  
TAKE 1 TABLET NIGHTLY  
  
 ergocalciferol 50,000 unit capsule Commonly known as:  ERGOCALCIFEROL Take 1 Cap by mouth every seven (7) days. imipramine 25 mg tablet Commonly known as:  TOFRANIL  
TAKE 2 TABLETS NIGHTLY  
  
 inhalational spacing device 1 Each by Does Not Apply route as needed. levothyroxine 88 mcg tablet Commonly known as:  SYNTHROID Take 1 Tab by mouth Daily (before breakfast). lisinopril 10 mg tablet Commonly known as:  PRINIVIL, ZESTRIL  
TAKE 1 TABLET DAILY FOR BLOOD PRESSURE  
  
 loratadine 10 mg tablet Commonly known as:  CLARITIN  
TAKE 1 TABLET DAILY  
  
 memantine 10 mg tablet Commonly known as:  Stephanie Croft Take 1 Tab by mouth two (2) times a day. 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 * Notice: This list has 2 medication(s) that are the same as other medications prescribed for you. Read the directions carefully, and ask your doctor or other care provider to review them with you. We Performed the Following C DIFFICILE TOXIN A & B BY EIA [24593 CPT(R)] OVA & PARASITES, STOOL [id number] CPT(R)] WBC, STOOL [14610 CPT(R)] Follow-up Instructions Return if symptoms worsen or fail to improve, for Follow Up. Introducing [facility]! Dear Jessica Durbin: Thank you for requesting a [employer] account. Our records indicate that you already have an active [employer] account. You can access your account anytime at [email or web address] Did you know that you can access your hospital and ER discharge instructions at any time in [employer]? You can also review all of your test results from your hospital stay or ER visit. Additional Information If you have questions, please visit the Frequently Asked Questions section of the [employer] website at [email or web address] Remember, [employer] is NOT to be used for urgent needs. For medical emergencies, dial 911. Now available from your iPhone and Android! Please provide this summary of care documentation to your next provider. Your primary care clinician is listed as Adaifeanyi Conklin. If you have any questions after today's visit, please call [phone].

## 2018-06-20 RX ORDER — MEMANTINE HYDROCHLORIDE 10 MG/1
10 TABLET ORAL 2 TIMES DAILY
Qty: 180 TAB | Refills: 1 | Status: SHIPPED | OUTPATIENT
Start: 2018-06-20 | End: 2018-06-28 | Stop reason: SDUPTHER

## 2018-06-20 RX ORDER — LEVOTHYROXINE SODIUM 88 UG/1
88 TABLET ORAL
Qty: 48 TAB | Refills: 3 | Status: SHIPPED | OUTPATIENT
Start: 2018-06-20 | End: 2018-06-26 | Stop reason: SDUPTHER

## 2018-06-20 RX ORDER — LISINOPRIL 10 MG/1
10 TABLET ORAL DAILY
Qty: 90 TAB | Refills: 1 | Status: SHIPPED | OUTPATIENT
Start: 2018-06-20 | End: 2018-06-28 | Stop reason: SDUPTHER

## 2018-06-21 ENCOUNTER — HOSPITAL ENCOUNTER (OUTPATIENT)
Dept: LAB | Age: 83
Discharge: HOME | End: 2018-06-21
Payer: MEDICARE

## 2018-06-21 PROCEDURE — 87324 CLOSTRIDIUM AG IA: CPT

## 2018-06-21 PROCEDURE — 87209 SMEAR COMPLEX STAIN: CPT

## 2018-06-21 PROCEDURE — 89055 LEUKOCYTE ASSESSMENT FECAL: CPT

## 2018-06-22 LAB — C DIFF TOX A+B STL QL IA: NEGATIVE

## 2018-06-25 NOTE — PROGRESS NOTES
Outbound call to patients daughter Adam Delaney who is on PHI. No answer left message for her to return call regarding patients labs.

## 2018-06-25 NOTE — PROGRESS NOTES
Inbound call from patients daughter returning nurse call. Patients name and  verified. Notified daughter on negative cdiff results. She verbalized understanding. Letter printed with results.

## 2018-06-26 RX ORDER — LEVOTHYROXINE SODIUM 75 UG/1
TABLET ORAL
COMMUNITY
End: 2018-06-26 | Stop reason: SDUPTHER

## 2018-06-26 NOTE — TELEPHONE ENCOUNTER
[phone] spoke to [address] [place] [address] advised her that when patient was here 4/30/2018 Dr Aundrea Kay did tell her to do Synthroid 88 mcg daily but when we received the labs Dr Aundrea Kay decided to decrease it Synthroid 75 mcg daily so patient supposed to take 75mcg not 88mcg Rupali understand    [address] [place] [address] wants medication refill for patients Synthroid 75 mcg daily and also needs new nebulizer since the old one broke I advised will send message to Dr Kylah Navarrete [address] [place] [address] understand

## 2018-06-26 NOTE — TELEPHONE ENCOUNTER
----- Message from Kimberlee Zhang sent at 6/25/2018  7:56 PM EDT -----  Regarding: Dr. Karl Ricci / Rx refill  Contact: [phone]  Pt's daughter Alisson Mandujano requested a Rx for a new nebulizer machine sent to [facility] @ [phone].

## 2018-06-26 NOTE — TELEPHONE ENCOUNTER
----- Message from Franchesca Weber sent at 6/25/2018  5:47 PM EDT -----  Regarding: Dr. Nisreen Daly: [phone]  GYPF, pt daughter, stated she is unable to refill mother rx because the pharmacy states its to early. Per conversation with provider she thought he wanted pt to take thyroid medication daily which is a 90 day supply. Pharmacist still has it in system as pt only taking Rx Mon, Wed, Fri and Saturday. They will need new Rx with new directions  sent to [address].

## 2018-06-27 RX ORDER — LEVOTHYROXINE SODIUM 75 UG/1
75 TABLET ORAL
Qty: 90 TAB | Refills: 1 | Status: SHIPPED | OUTPATIENT
Start: 2018-06-27 | End: 2018-11-17 | Stop reason: SDUPTHER

## 2018-06-27 NOTE — TELEPHONE ENCOUNTER
Prescription for Nebulizer faxed to [facility] [phone] notified Arleth Vickers prescription for levothyroxine and Nebulizer prescription sent to [facility] via  and to call me back if she has question

## 2018-06-28 DIAGNOSIS — G30.1 LATE ONSET ALZHEIMER'S DISEASE WITHOUT BEHAVIORAL DISTURBANCE (HCC): ICD-10-CM

## 2018-06-28 DIAGNOSIS — F02.80 LATE ONSET ALZHEIMER'S DISEASE WITHOUT BEHAVIORAL DISTURBANCE (HCC): ICD-10-CM

## 2018-06-28 RX ORDER — LISINOPRIL 10 MG/1
10 TABLET ORAL DAILY
Qty: 90 TAB | Refills: 1 | Status: ON HOLD | OUTPATIENT
Start: 2018-06-28 | End: 2019-05-22 | Stop reason: SDUPTHER

## 2018-06-28 RX ORDER — LEVOTHYROXINE SODIUM 75 UG/1
75 TABLET ORAL
Qty: 90 TAB | Refills: 1 | Status: CANCELLED | OUTPATIENT
Start: 2018-06-28

## 2018-06-28 RX ORDER — MEMANTINE HYDROCHLORIDE 10 MG/1
10 TABLET ORAL 2 TIMES DAILY
Qty: 180 TAB | Refills: 1 | Status: SHIPPED | OUTPATIENT
Start: 2018-06-28 | End: 2019-03-23 | Stop reason: SDUPTHER

## 2018-06-28 NOTE — TELEPHONE ENCOUNTER
Refill. Pharm on file. 90 day. Requested Prescriptions     Pending Prescriptions Disp Refills    levothyroxine (SYNTHROID) 75 mcg tablet 90 Tab 1     Sig: Take 1 Tab by mouth Daily (before breakfast).

## 2018-06-28 NOTE — TELEPHONE ENCOUNTER
Levothyroxine was sent 6/27/2018    [facility] requesting 90 days   Lisinopril and Namenda was approved 6/20/2018 sent to [facility] per Dr Marcial malone to send to [facility]  Requested Prescriptions     Signed Prescriptions Disp Refills    lisinopril (PRINIVIL, ZESTRIL) 10 mg tablet 90 Tab 1     Sig: Take 1 Tab by mouth daily. Authorizing Provider: Mitra Juarez     Ordering User: Dilip Lezama    memantine (NAMENDA) 10 mg tablet 180 Tab 1     Sig: Take 1 Tab by mouth two (2) times a day. 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

## 2018-07-02 LAB — O+P SPEC MICRO: NORMAL

## 2018-07-05 ENCOUNTER — OFFICE VISIT (OUTPATIENT)
Dept: FAMILY MEDICINE CLINIC | Age: 83
End: 2018-07-05

## 2018-07-05 VITALS
SYSTOLIC BLOOD PRESSURE: 150 MMHG | DIASTOLIC BLOOD PRESSURE: 61 MMHG | RESPIRATION RATE: 16 BRPM | WEIGHT: 136 LBS | BODY MASS INDEX: 25.03 KG/M2 | HEART RATE: 64 BPM | OXYGEN SATURATION: 100 % | HEIGHT: 62 IN | TEMPERATURE: 98.6 F

## 2018-07-05 DIAGNOSIS — J44.1 COPD WITH ACUTE EXACERBATION (HCC): Primary | ICD-10-CM

## 2018-07-05 RX ORDER — LEVOTHYROXINE SODIUM 88 UG/1
TABLET ORAL
COMMUNITY
Start: 2018-05-11 | End: 2018-07-05

## 2018-07-05 RX ORDER — AZITHROMYCIN 250 MG/1
TABLET, FILM COATED ORAL
Qty: 6 TAB | Refills: 0 | Status: SHIPPED | OUTPATIENT
Start: 2018-07-05 | End: 2018-07-10

## 2018-07-05 RX ORDER — PREDNISONE 20 MG/1
TABLET ORAL
Qty: 21 TAB | Refills: 0 | Status: SHIPPED | OUTPATIENT
Start: 2018-07-05 | End: 2018-10-29 | Stop reason: ALTCHOICE

## 2018-07-05 RX ORDER — IPRATROPIUM BROMIDE AND ALBUTEROL SULFATE 2.5; .5 MG/3ML; MG/3ML
3 SOLUTION RESPIRATORY (INHALATION)
Qty: 1 NEBULE | Refills: 0
Start: 2018-07-05 | End: 2018-07-05 | Stop reason: SDUPTHER

## 2018-07-05 RX ORDER — IPRATROPIUM BROMIDE AND ALBUTEROL SULFATE 2.5; .5 MG/3ML; MG/3ML
3 SOLUTION RESPIRATORY (INHALATION)
Qty: 90 NEBULE | Refills: 1 | Status: SHIPPED | OUTPATIENT
Start: 2018-07-05

## 2018-07-05 NOTE — PATIENT INSTRUCTIONS
Chronic Obstructive Pulmonary Disease (COPD): Care Instructions  Your Care Instructions    Chronic obstructive pulmonary disease (COPD) is a general term for a group of lung diseases, including emphysema and chronic bronchitis. People with COPD have decreased airflow in and out of the lungs, which makes it hard to breathe. The airways also can get clogged with thick mucus. Cigarette smoking is a major cause of COPD. Although there is no cure for COPD, you can slow its progress. Following your treatment plan and taking care of yourself can help you feel better and live longer. Follow-up care is a key part of your treatment and safety. Be sure to make and go to all appointments, and call your doctor if you are having problems. It's also a good idea to know your test results and keep a list of the medicines you take. How can you care for yourself at home? ?Staying healthy  ? · Do not smoke. This is the most important step you can take to prevent more damage to your lungs. If you need help quitting, talk to your doctor about stop-smoking programs and medicines. These can increase your chances of quitting for good. ? · Avoid colds and flu. Get a pneumococcal vaccine shot. If you have had one before, ask your doctor whether you need a second dose. Get the flu vaccine every fall. If you must be around people with colds or the flu, wash your hands often. ? · Avoid secondhand smoke, air pollution, and high altitudes. Also avoid cold, dry air and hot, humid air. Stay at home with your windows closed when air pollution is bad. ?Medicines and oxygen therapy  ? · Take your medicines exactly as prescribed. Call your doctor if you think you are having a problem with your medicine. ? · You may be taking medicines such as:  ¨ Bronchodilators. These help open your airways and make breathing easier. Bronchodilators are either short-acting (work for 6 to 9 hours) or long-acting (work for 24 hours).  You inhale most bronchodilators, so they start to act quickly. Always carry your quick-relief inhaler with you in case you need it while you are away from home. ¨ Corticosteroids (prednisone, budesonide). These reduce airway inflammation. They come in pill or inhaled form. You must take these medicines every day for them to work well. ? · A spacer may help you get more inhaled medicine to your lungs. Ask your doctor or pharmacist if a spacer is right for you. If it is, ask how to use it properly. ? · Do not take any vitamins, over-the-counter medicine, or herbal products without talking to your doctor first.   ? · If your doctor prescribed antibiotics, take them as directed. Do not stop taking them just because you feel better. You need to take the full course of antibiotics. ? · Oxygen therapy boosts the amount of oxygen in your blood and helps you breathe easier. Use the flow rate your doctor has recommended, and do not change it without talking to your doctor first.   Activity  ? · Get regular exercise. Walking is an easy way to get exercise. Start out slowly, and walk a little more each day. ? · Pay attention to your breathing. You are exercising too hard if you cannot talk while you are exercising. ? · Take short rest breaks when doing household chores and other activities. ? · Learn breathing methods-such as breathing through pursed lips-to help you become less short of breath. ? · If your doctor has not set you up with a pulmonary rehabilitation program, talk to him or her about whether rehab is right for you. Rehab includes exercise programs, education about your disease and how to manage it, help with diet and other changes, and emotional support. Diet  ? · Eat regular, healthy meals. Use bronchodilators about 1 hour before you eat to make it easier to eat. Eat several small meals instead of three large ones. Drink beverages at the end of the meal. Avoid foods that are hard to chew.    ? · Eat foods that contain protein so that you do not lose muscle mass. ? · Talk with your doctor if you gain too much weight or if you lose weight without trying. ?Mental health  ? · Talk to your family, friends, or a therapist about your feelings. It is normal to feel frightened, angry, hopeless, helpless, and even guilty. Talking openly about bad feelings can help you cope. If these feelings last, talk to your doctor. When should you call for help? Call 911 anytime you think you may need emergency care. For example, call if:  ? · You have severe trouble breathing. ?Call your doctor now or seek immediate medical care if:  ? · You have new or worse trouble breathing. ? · You cough up blood. ? · You have a fever. ? Watch closely for changes in your health, and be sure to contact your doctor if:  ? · You cough more deeply or more often, especially if you notice more mucus or a change in the color of your mucus. ? · You have new or worse swelling in your legs or belly. ? · You are not getting better as expected. Where can you learn more? Go to http://asya-chad.info/. Shona Benitez in the search box to learn more about \"Chronic Obstructive Pulmonary Disease (COPD): Care Instructions. \"  Current as of: May 12, 2017  Content Version: 11.4  © 7328-1842 [employer]. Care instructions adapted under license by [employer] (which disclaims liability or warranty for this information). If you have questions about a medical condition or this instruction, always ask your healthcare professional. [address] any warranty or liability for your use of this information.

## 2018-07-05 NOTE — MR AVS SNAPSHOT
[facility] 
 
 
 [address] [place] [address] 
[phone] Patient: Lucho Colon MRN: [id number] Deena Councilman Visit Information Date & Time Provider Department Dept. Phone Encounter #  
 7/5/2018  2:45 PM Isabell Burroughs, [facility] [phone] [id number] Follow-up Instructions Return if symptoms worsen or fail to improve. Your Appointments 10/29/2018  4:00 PM  
ROUTINE CARE with Paulette Meraz MD  
[facility]) Appt Note: 6 months follow up visit for HTN  
 [address] [address]  
[phone]  
  
   
 [address] [address] Upcoming Health Maintenance Date Due Influenza Age 5 to Adult 8/1/2018 MEDICARE YEARLY EXAM 9/21/2018 GLAUCOMA SCREENING Q2Y 3/21/2019 DTaP/Tdap/Td series (2 - Td) 9/7/2026 Allergies as of 7/5/2018  Review Complete On: 7/5/2018 By: Rachel Rodgers LPN Severity Noted Reaction Type Reactions Keflex [Cephalexin]  04/23/2010    Rash Pcn [Penicillins]  04/23/2010    Rash  
 Sulfa (Sulfonamide Antibiotics)  05/31/2011    Rash Current Immunizations  Reviewed on 9/7/2016 Name Date Influenza High Dose Vaccine PF 9/20/2017, 9/30/2015 Influenza Vaccine 10/16/2013 Pneumococcal Conjugate (PCV-13) 3/2/2016 Pneumococcal Polysaccharide (PPSV-23) 1/29/2014 Tdap 9/7/2016 Zoster Vaccine, Live 10/15/2014 Not reviewed this visit You Were Diagnosed With   
  
 Codes Comments [facility]    -  Primary ICD-10-CM: J44.1 ICD-9-CM: 491.21 Vitals BP Pulse Temp Resp Height(growth percentile) Weight(growth percentile) 150/61 (BP 1 Location: Right arm, BP Patient Position: Sitting) 64 98.6 °F (37 °C) (Oral) 16 5' 2\" (1.575 m) 136 lb (61.7 kg) SpO2 BMI OB Status Smoking Status 100% 24.87 kg/m2 Postmenopausal Former Smoker Vitals History BMI and BSA Data Body Mass Index Body Surface Area  
 24.87 kg/m 2 1.64 m 2 Preferred Pharmacy Pharmacy Name Phone Gideon Leblanc, [address] [facility] [phone] Your Updated Medication List  
  
   
This list is accurate as of 7/5/18  3:52 PM.  Always use your most recent med list.  
  
  
  
  
 * albuterol 90 mcg/actuation inhaler Commonly known as:  PROVENTIL HFA, VENTOLIN HFA, PROAIR HFA Take 2 Puffs by inhalation every four (4) hours as needed for Wheezing. Please give with spacer. * albuterol 2.5 mg /3 mL (0.083 %) nebulizer solution Commonly known as:  PROVENTIL VENTOLIN  
  
 albuterol-ipratropium 2.5 mg-0.5 mg/3 ml Nebu Commonly known as:  DUO-NEB  
3 mL by Nebulization route now for 1 dose. amLODIPine 5 mg tablet Commonly known as:  Elyn Coffer TAKE 1 TABLET DAILY FOR BLOOD PRESSURE  
  
 atorvastatin 40 mg tablet Commonly known as:  LIPITOR  
TAKE 1 TABLET DAILY  
  
 azithromycin 250 mg tablet Commonly known as:  Sylvie Bunkers Take 2 tablets today, then take 1 tablet daily CENTRUM SILVER PO Take  by mouth. donepezil 10 mg tablet Commonly known as:  ARICEPT  
TAKE 1 TABLET NIGHTLY  
  
 ergocalciferol 50,000 unit capsule Commonly known as:  ERGOCALCIFEROL Take 1 Cap by mouth every seven (7) days. fluticasone-umeclidin-vilanter 100-62.5-25 mcg Dsdv Commonly known as:  Pauletta Becki Take 1 Puff by inhalation daily. imipramine 25 mg tablet Commonly known as:  TOFRANIL  
TAKE 2 TABLETS NIGHTLY  
  
 inhalational spacing device 1 Each by Does Not Apply route as needed. * levothyroxine 88 mcg tablet Commonly known as:  SYNTHROID * levothyroxine 75 mcg tablet Commonly known as:  SYNTHROID Take 1 Tab by mouth Daily (before breakfast). lisinopril 10 mg tablet Commonly known as:  Auna Ginny Take 1 Tab by mouth daily. loratadine 10 mg tablet Commonly known as:  CLARITIN  
TAKE 1 TABLET DAILY  
  
 memantine 10 mg tablet Commonly known as:  Connie Media Take 1 Tab by mouth two (2) times a day. 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 Read the directions carefully, and ask your doctor or other care provider to review them with you. Prescriptions Sent to Pharmacy Refills  
 fluticasone-umeclidin-vilanter (TRELEGY ELLIPTA) 100-62.5-25 mcg dsdv 4 Sig: Take 1 Puff by inhalation daily. Class: Normal  
 Pharmacy: [facility], [address] [place] [address] Ph #: [phone] Route: Inhalation  
 azithromycin (ZITHROMAX) 250 mg tablet 0 Sig: Take 2 tablets today, then take 1 tablet daily Class: Normal  
 Pharmacy: [place] [id number] [facility] Ph #: [phone] We Performed the Following ALBUTEROL IPRATROP NON-COMP [id number] Hospitals in [place]] [place] PRESSURIZED/NONPRESSURIZED INHALATION TREATMENT [id number] CPT(R)] Follow-up Instructions Return if symptoms worsen or fail to improve. Patient Instructions Chronic Obstructive Pulmonary Disease (COPD): Care Instructions Your Care Instructions Chronic obstructive pulmonary disease (COPD) is a general term for a group of lung diseases, including emphysema and chronic bronchitis. People with COPD have decreased airflow in and out of the lungs, which makes it hard to breathe. The airways also can get clogged with thick mucus. Cigarette smoking is a major cause of COPD. Although there is no cure for COPD, you can slow its progress. Following your treatment plan and taking care of yourself can help you feel better and live longer. Follow-up care is a key part of your treatment and safety. Be sure to make and go to all appointments, and call your doctor if you are having problems. It's also a good idea to know your test results and keep a list of the medicines you take. How can you care for yourself at home? ?Staying healthy ? · Do not smoke. This is the most important step you can take to prevent more damage to your lungs. If you need help quitting, talk to your doctor about stop-smoking programs and medicines. These can increase your chances of quitting for good. ? · Avoid colds and flu. Get a pneumococcal vaccine shot. If you have had one before, ask your doctor whether you need a second dose. Get the flu vaccine every fall. If you must be around people with colds or the flu, wash your hands often. ? · Avoid secondhand smoke, air pollution, and high altitudes. Also avoid cold, dry air and hot, humid air. Stay at home with your windows closed when air pollution is bad. ?Medicines and oxygen therapy ? · Take your medicines exactly as prescribed. Call your doctor if you think you are having a problem with your medicine. ? · You may be taking medicines such as: ¨ Bronchodilators. These help open your airways and make breathing easier. Bronchodilators are either short-acting (work for 6 to 9 hours) or long-acting (work for 24 hours). You inhale most bronchodilators, so they start to act quickly. Always carry your quick-relief inhaler with you in case you need it while you are away from home. ¨ Corticosteroids (prednisone, budesonide). These reduce airway inflammation. They come in pill or inhaled form. You must take these medicines every day for them to work well. ? · A spacer may help you get more inhaled medicine to your lungs. Ask your doctor or pharmacist if a spacer is right for you. If it is, ask how to use it properly.   
? · Do not take any vitamins, over-the-counter medicine, or herbal products without talking to your doctor first.  
? · If your doctor prescribed antibiotics, take them as directed. Do not stop taking them just because you feel better. You need to take the full course of antibiotics. ? · Oxygen therapy boosts the amount of oxygen in your blood and helps you breathe easier. Use the flow rate your doctor has recommended, and do not change it without talking to your doctor first.  
Activity ? · Get regular exercise. Walking is an easy way to get exercise. Start out slowly, and walk a little more each day. ? · Pay attention to your breathing. You are exercising too hard if you cannot talk while you are exercising. ? · Take short rest breaks when doing household chores and other activities. ? · Learn breathing methods-such as breathing through pursed lips-to help you become less short of breath. ? · If your doctor has not set you up with a pulmonary rehabilitation program, talk to him or her about whether rehab is right for you. Rehab includes exercise programs, education about your disease and how to manage it, help with diet and other changes, and emotional support. Diet ? · Eat regular, healthy meals. Use bronchodilators about 1 hour before you eat to make it easier to eat. Eat several small meals instead of three large ones. Drink beverages at the end of the meal. Avoid foods that are hard to chew. ? · Eat foods that contain protein so that you do not lose muscle mass. ? · Talk with your doctor if you gain too much weight or if you lose weight without trying. ?Mental health ? · Talk to your family, friends, or a therapist about your feelings. It is normal to feel frightened, angry, hopeless, helpless, and even guilty. Talking openly about bad feelings can help you cope. If these feelings last, talk to your doctor. When should you call for help? Call 911 anytime you think you may need emergency care. For example, call if: ? · You have severe trouble breathing. ?Call your doctor now or seek immediate medical care if: 
? · You have new or worse trouble breathing. ? · You cough up blood. ? · You have a fever. ? Watch closely for changes in your health, and be sure to contact your doctor if: 
? · You cough more deeply or more often, especially if you notice more mucus or a change in the color of your mucus. ? · You have new or worse swelling in your legs or belly. ? · You are not getting better as expected. Where can you learn more? Go to http://asya-chad.info/. Tad Montgomery in the search box to learn more about \"Chronic Obstructive Pulmonary Disease (COPD): Care Instructions. \" Current as of: May 12, 2017 Content Version: 11.4 © 6397-1671 [employer]. Care instructions adapted under license by [employer] (which disclaims liability or warranty for this information). If you have questions about a medical condition or this instruction, always ask your healthcare professional. [address] any warranty or liability for your use of this information. Introducing [facility]! Dear Jaclyn Villanueva: Thank you for requesting a Plair account. Our records indicate that you already have an active Plair account. You can access your account anytime at [email or web address] Did you know that you can access your hospital and ER discharge instructions at any time in Plair? You can also review all of your test results from your hospital stay or ER visit. Additional Information If you have questions, please visit the Frequently Asked Questions section of the Plair website at [email or web address] Remember, Plair is NOT to be used for urgent needs. For medical emergencies, dial 911. Now available from your iPhone and Android! Please provide this summary of care documentation to your next provider. Your primary care clinician is listed as Kenney Vickers. If you have any questions after today's visit, please call [phone].

## 2018-07-05 NOTE — PROGRESS NOTES
Chief Complaint   Patient presents with    Follow-up     follow up on bronchitis- symptoms failed to improve     1. Have you been to the ER, urgent care clinic since your last visit? Hospitalized since your last visit? No    2. Have you seen or consulted any other health care providers outside of the [facility] since your last visit? Include any pap smears or colon screening. No    Albuterol-ipratropium (DUO-Neb) 2.5-0.5/3ml nebulizer adminstered via inhalation.  Patient tolerated nebulizer well with no adverse reactions while here in the office

## 2018-07-05 NOTE — PROGRESS NOTES
[facility] Note    Tasneem Morley is a 80 y.o. female who was seen in clinic today (7/5/2018). Subjective:  Acute Bronchitis  Patient presents for follow up of productive cough with sputum described as clear and wheezing. Symptoms began 4 weeks ago and are unchanged since that time. Past history is significant for occasional episodes of bronchitis and COPD. Patient previously treated with steroid taper without relief. Patient using Brovana and Pulmicort nebulizer once daily only. Not currently using Incruse. Seen by pulmonology routinely with next appointment on 7/10/18. XR Results (most recent):    Results from Appointment encounter on 06/12/18   XR CHEST PA LAT   Narrative CLINICAL HISTORY: COPD and cough   INDICATION: Cough    COMPARISON: 2/9/2018    FINDINGS:   PA and lateral views of the chest are obtained. The cardiopericardial silhouette is within normal limits. There is no pleural  effusion, pneumothorax or focal consolidation present. Kyphosis, osteopenia. Chronic compression deformities. Chronic pleural and parenchymal changes. Impression IMPRESSION: No acute intrathoracic disease. Prior to Admission medications    Medication Sig Start Date End Date Taking? Authorizing Provider   fluticasone-umeclidin-vilanter (TRELEGY ELLIPTA) 100-62.5-25 mcg dsdv Take 1 Puff by inhalation daily. 7/5/18  Yes Donnell Larry NP   azithromycin (ZITHROMAX) 250 mg tablet Take 2 tablets today, then take 1 tablet daily 7/5/18 7/10/18 Yes Donnell Larry NP   predniSONE (DELTASONE) 20 mg tablet Take 3 tablets daily x 3 days, then 2 tablets daily x 3 days, the 1 tablet daily x 3 days then 1/2 talbet daily x 4 days 7/5/18  Yes Donnell Larry NP   albuterol-ipratropium (DUO-NEB) 2.5 mg-0.5 mg/3 ml nebu 3 mL by Nebulization route every four (4) hours as needed.  7/5/18  Yes Donnell Larry NP   lisinopril (PRINIVIL, ZESTRIL) 10 mg tablet Take 1 Tab by mouth daily. 6/28/18  Yes Nessa Mcneill MD   memantine (NAMENDA) 10 mg tablet Take 1 Tab by mouth two (2) times a day. 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 6/27/18  Yes Nessa Mcneill MD   ergocalciferol (ERGOCALCIFEROL) 50,000 unit capsule Take 1 Cap by mouth every seven (7) days. 5/29/18  Yes Korey Maher MD   donepezil (ARICEPT) 10 mg tablet TAKE 1 TABLET NIGHTLY 5/12/18  Yes Nessa Mcneill MD   atorvastatin (LIPITOR) 40 mg tablet TAKE 1 TABLET DAILY 4/24/18  Yes Nessa Mcneill MD   albuterol (PROVENTIL HFA, VENTOLIN HFA, PROAIR HFA) 90 mcg/actuation inhaler Take 2 Puffs by inhalation every four (4) hours as needed for Wheezing. Please give with spacer. 2/5/18  Yes Simón Moe NP   inhalational spacing device 1 Each by Does Not Apply route as needed. 2/5/18  Yes Renetta Martinez NP   imipramine (TOFRANIL) 25 mg tablet TAKE 2 TABLETS NIGHTLY 10/23/17  Yes Nessa Mcneill MD   amLODIPine (NORVASC) 5 mg tablet TAKE 1 TABLET DAILY FOR BLOOD PRESSURE 6/28/17  Yes Governor Annalee NP   FOLIC ACID/MULTIVIT-MIN/LUTEIN (CENTRUM SILVER PO) Take  by mouth. Yes Historical Provider          Allergies   Allergen Reactions    Keflex [Cephalexin] Rash    Pcn [Penicillins] Rash    Sulfa (Sulfonamide Antibiotics) Rash           ROS  See HPI    Objective:   Physical Exam   Constitutional: She is oriented to person, place, and time. She appears well-developed and well-nourished. Cardiovascular: Normal rate, regular rhythm and intact distal pulses. Exam reveals no gallop and no friction rub. Murmur heard. Pulmonary/Chest: Effort normal. No respiratory distress. She has decreased breath sounds. She has wheezes (inspiratory and expiratory). She has rhonchi (scattered). Neurological: She is alert and oriented to person, place, and time.    Psychiatric: She has a normal mood and affect. Her behavior is normal. Judgment and thought content normal.   Nursing note and vitals reviewed. Visit Vitals    /61 (BP 1 Location: Right arm, BP Patient Position: Sitting)    Pulse 64    Temp 98.6 °F (37 °C) (Oral)    Resp 16    Ht 5' 2\" (1.575 m)    Wt 136 lb (61.7 kg)    SpO2 100%    BMI 24.87 kg/m2       Assessment & Plan:  Diagnoses and all orders for this visit:    1. COPD with acute exacerbation ([facility]  DuoNeb given with relief. Given irregular use of Brovana/Pulmicort - switch to Trelegy. Begin DuoNeb PRN. Cover with Zithromax and Prednisone taper. Follow up with pulmonology as scheduled. -     fluticasone-umeclidin-vilanter (TRELEGY ELLIPTA) 100-62.5-25 mcg dsdv; Take 1 Puff by inhalation daily. -     azithromycin (ZITHROMAX) 250 mg tablet; Take 2 tablets today, then take 1 tablet daily  -     predniSONE (DELTASONE) 20 mg tablet; Take 3 tablets daily x 3 days, then 2 tablets daily x 3 days, the 1 tablet daily x 3 days then 1/2 talbet daily x 4 days  -     albuterol-ipratropium (DUO-NEB) 2.5 mg-0.5 mg/3 ml nebu; 3 mL by Nebulization route every four (4) hours as needed. I have discussed the diagnosis with the patient and the intended plan as seen in the above orders. The patient has received an after-visit summary along with patient information handout. I have discussed medication side effects and warnings with the patient as well. Follow-up Disposition:  Return if symptoms worsen or fail to improve.         Kasie Burk NP

## 2018-07-11 ENCOUNTER — TELEPHONE (OUTPATIENT)
Dept: FAMILY MEDICINE CLINIC | Age: 83
End: 2018-07-11

## 2018-07-11 LAB — WBC STL QL MICRO: NORMAL

## 2018-07-11 NOTE — TELEPHONE ENCOUNTER
Elva mendez [facility] day care is calling in reference to the 6 month medication update form, they received today. She states it does not have patients name ,  or Diet Consistency on it. She is requesting it to be faxed again with the above things.     Fax: [phone]  Erika Hogue best call back : [phone]

## 2018-07-13 NOTE — PROGRESS NOTES
Notify Patient:    Another stool test for infection was negative. If your diarrhea has not resolved, return to clinic for reevaluation.

## 2018-07-24 ENCOUNTER — HOSPITAL ENCOUNTER (OUTPATIENT)
Dept: PULMONOLOGY | Age: 83
Discharge: HOME | End: 2018-07-24
Payer: MEDICARE

## 2018-07-24 DIAGNOSIS — J96.11 CHRONIC RESPIRATORY FAILURE WITH HYPOXIA (HCC): ICD-10-CM

## 2018-07-24 LAB
ARTERIAL PATENCY WRIST A: YES
BASE DEFICIT BLD-SCNC: 2 MMOL/L
BDY SITE: ABNORMAL
GAS FLOW.O2 O2 DELIVERY SYS: ABNORMAL L/MIN
HCO3 BLD-SCNC: 22.5 MMOL/L (ref 22–26)
O2/TOTAL GAS SETTING VFR VENT: 21 %
PCO2 BLD: 32.2 MMHG (ref 35–45)
PH BLD: 7.45 [PH] (ref 7.35–7.45)
PO2 BLD: 70 MMHG (ref 80–100)
SAO2 % BLD: 95 % (ref 92–97)
SPECIMEN TYPE: ABNORMAL

## 2018-07-24 PROCEDURE — 36600 WITHDRAWAL OF ARTERIAL BLOOD: CPT

## 2018-07-24 PROCEDURE — 82803 BLOOD GASES ANY COMBINATION: CPT

## 2018-08-13 ENCOUNTER — TELEPHONE (OUTPATIENT)
Dept: FAMILY MEDICINE CLINIC | Age: 83
End: 2018-08-13

## 2018-10-29 ENCOUNTER — OFFICE VISIT (OUTPATIENT)
Dept: FAMILY MEDICINE CLINIC | Age: 83
End: 2018-10-29

## 2018-10-29 ENCOUNTER — HOSPITAL ENCOUNTER (OUTPATIENT)
Dept: LAB | Age: 83
Discharge: HOME | End: 2018-10-29
Payer: MEDICARE

## 2018-10-29 VITALS
SYSTOLIC BLOOD PRESSURE: 119 MMHG | HEART RATE: 82 BPM | BODY MASS INDEX: 25.76 KG/M2 | TEMPERATURE: 98.2 F | RESPIRATION RATE: 18 BRPM | HEIGHT: 62 IN | WEIGHT: 140 LBS | DIASTOLIC BLOOD PRESSURE: 60 MMHG | OXYGEN SATURATION: 97 %

## 2018-10-29 DIAGNOSIS — Z23 ENCOUNTER FOR IMMUNIZATION: ICD-10-CM

## 2018-10-29 DIAGNOSIS — E78.2 MIXED HYPERLIPIDEMIA: ICD-10-CM

## 2018-10-29 DIAGNOSIS — I10 ESSENTIAL HYPERTENSION: ICD-10-CM

## 2018-10-29 DIAGNOSIS — E03.9 ACQUIRED HYPOTHYROIDISM: ICD-10-CM

## 2018-10-29 DIAGNOSIS — J44.1 COPD WITH ACUTE EXACERBATION (HCC): Primary | ICD-10-CM

## 2018-10-29 PROBLEM — F32.A MILD DEPRESSION: Status: ACTIVE | Noted: 2018-10-29

## 2018-10-29 PROCEDURE — 84443 ASSAY THYROID STIM HORMONE: CPT

## 2018-10-29 PROCEDURE — 36415 COLL VENOUS BLD VENIPUNCTURE: CPT

## 2018-10-29 PROCEDURE — 80053 COMPREHEN METABOLIC PANEL: CPT

## 2018-10-29 PROCEDURE — 83704 LIPOPROTEIN BLD QUAN PART: CPT

## 2018-10-29 PROCEDURE — 80061 LIPID PANEL: CPT

## 2018-10-29 NOTE — PROGRESS NOTES
Chief Complaint Patient presents with [address] [place] [address] Annual Wellness Visit  Hypertension 6 month f/u Reviewed Record in preparation for visit and have obtained necessary documentation. Identified pt with two pt identifiers (Name @ ) Health Maintenance Due Topic  Shingrix Vaccine Age 50> (1 of 2)  Influenza Age 5 to Adult  MEDICARE YEARLY EXAM   
 
 
 
1. Have you been to the ER, urgent care clinic since your last visit? Hospitalized since your last visit? In [place] [address] for COPD 2 WEEKS AGO. 2. Have you seen or consulted any other health care providers outside of the [address] [place] [address] since your last visit? Include any pap smears or colon screening.  NO

## 2018-10-29 NOTE — PATIENT INSTRUCTIONS
Chronic Obstructive Pulmonary Disease (COPD): Care Instructions Your Care Instructions Chronic obstructive pulmonary disease (COPD) is a general term for a group of lung diseases, including emphysema and chronic bronchitis. People with COPD have decreased airflow in and out of the lungs, which makes it hard to breathe. The airways also can get clogged with thick mucus. Cigarette smoking is a major cause of COPD. Although there is no cure for COPD, you can slow its progress. Following your treatment plan and taking care of yourself can help you feel better and live longer. Follow-up care is a key part of your treatment and safety. Be sure to make and go to all appointments, and call your doctor if you are having problems. It's also a good idea to know your test results and keep a list of the medicines you take. How can you care for yourself at home? 
 Staying healthy 
  · Do not smoke. This is the most important step you can take to prevent more damage to your lungs. If you need help quitting, talk to your doctor about stop-smoking programs and medicines. These can increase your chances of quitting for good.  
  · Avoid colds and flu. Get a pneumococcal vaccine shot. If you have had one before, ask your doctor whether you need a second dose. Get the flu vaccine every fall. If you must be around people with colds or the flu, wash your hands often.  
  · Avoid secondhand smoke, air pollution, and high altitudes. Also avoid cold, dry air and hot, humid air. Stay at home with your windows closed when air pollution is bad.  
 Medicines and oxygen therapy 
  · Take your medicines exactly as prescribed. Call your doctor if you think you are having a problem with your medicine.  
  · You may be taking medicines such as: 
? Bronchodilators. These help open your airways and make breathing easier.  Bronchodilators are either short-acting (work for 6 to 9 hours) or long-acting (work for 24 hours). You inhale most bronchodilators, so they start to act quickly. Always carry your quick-relief inhaler with you in case you need it while you are away from home. ? Corticosteroids (prednisone, budesonide). These reduce airway inflammation. They come in pill or inhaled form. You must take these medicines every day for them to work well.  
  · A spacer may help you get more inhaled medicine to your lungs. Ask your doctor or pharmacist if a spacer is right for you. If it is, ask how to use it properly.  
  · Do not take any vitamins, over-the-counter medicine, or herbal products without talking to your doctor first.  
  · If your doctor prescribed antibiotics, take them as directed. Do not stop taking them just because you feel better. You need to take the full course of antibiotics.  
  · Oxygen therapy boosts the amount of oxygen in your blood and helps you breathe easier. Use the flow rate your doctor has recommended, and do not change it without talking to your doctor first.  
Activity 
  · Get regular exercise. Walking is an easy way to get exercise. Start out slowly, and walk a little more each day.  
  · Pay attention to your breathing. You are exercising too hard if you cannot talk while you are exercising.  
  · Take short rest breaks when doing household chores and other activities.  
  · Learn breathing methodssuch as breathing through pursed lipsto help you become less short of breath.  
  · If your doctor has not set you up with a pulmonary rehabilitation program, talk to him or her about whether rehab is right for you. Rehab includes exercise programs, education about your disease and how to manage it, help with diet and other changes, and emotional support. Diet 
  · Eat regular, healthy meals. Use bronchodilators about 1 hour before you eat to make it easier to eat.  Eat several small meals instead of three large ones. Drink beverages at the end of the meal. Avoid foods that are hard to chew.  
  · Eat foods that contain protein so that you do not lose muscle mass.  
  · Talk with your doctor if you gain too much weight or if you lose weight without trying.  
 Mental health 
  · Talk to your family, friends, or a therapist about your feelings. It is normal to feel frightened, angry, hopeless, helpless, and even guilty. Talking openly about bad feelings can help you cope. If these feelings last, talk to your doctor. When should you call for help? Call 911 anytime you think you may need emergency care. For example, call if: 
  · You have severe trouble breathing.  
 Call your doctor now or seek immediate medical care if: 
  · You have new or worse trouble breathing.  
  · You cough up blood.  
  · You have a fever.  
 Watch closely for changes in your health, and be sure to contact your doctor if: 
  · You cough more deeply or more often, especially if you notice more mucus or a change in the color of your mucus.  
  · You have new or worse swelling in your legs or belly.  
  · You are not getting better as expected. Where can you learn more? Go to http://asya-chad.info/. Matt Goldstein in the search box to learn more about \"Chronic Obstructive Pulmonary Disease (COPD): Care Instructions. \" Current as of: December 6, 2017 Content Version: 11.8 © 7056-9481 [employer]. Care instructions adapted under license by [employer] (which disclaims liability or warranty for this information). If you have questions about a medical condition or this instruction, always ask your healthcare professional. [address] any warranty or liability for your use of this information.

## 2018-10-29 NOTE — PROGRESS NOTES
HISTORY OF PRESENT ILLNESS Linette Mcneill is a 80 y.o. female who presents today for annual wellness visit, hypertension, and hospital follow up. Pt is accompanied by her daughter today. Blood pressure 119/60, pulse 82, temperature 98.2 °F (36.8 °C), temperature source Oral, resp. rate 18, height 5' 2\" (1.575 m), weight 140 lb (63.5 kg), SpO2 97 %. Body mass index is 25.61 kg/m². Chief Complaint Patient presents with  [facility] Annual Wellness Visit  Hypertension 6 month f/u [facility]. HPI Pt is s/p 2 week follow up for COPD exacerbation, has come for [facility] which is past due, pt continue to have some SOB and lower extremity swelling . Pt has been fatigued. Hypertension: Bp at office today 119/60. Pt continues with 10mg daily of lisinopril and 5mg of norvasc daily. COPD:  Daughter reports that her mom breathes better when being on oxygen tank for an hour every night before bedtime. Pt will follow up with pulmonologist later this month. Health Maintenance: [place] check labs today. Pt received flu shot today in office. Pt has also come into day for lioid panel and thyroid check We discussed getting labs today despite pt not fasting Current Outpatient Medications Medication Sig Dispense Refill  denosumab (PROLIA) 60 mg/mL injection 60 mg.    
 albuterol-ipratropium (DUO-NEB) 2.5 mg-0.5 mg/3 ml nebu 3 mL by Nebulization route every four (4) hours as needed. 90 Nebule 1  
 lisinopril (PRINIVIL, ZESTRIL) 10 mg tablet Take 1 Tab by mouth daily. 90 Tab 1  
 levothyroxine (SYNTHROID) 75 mcg tablet Take 1 Tab by mouth Daily (before breakfast). 90 Tab 1  
 ergocalciferol (ERGOCALCIFEROL) 50,000 unit capsule Take 1 Cap by mouth every seven (7) days.  13 Cap 3  
 donepezil (ARICEPT) 10 mg tablet TAKE 1 TABLET NIGHTLY 90 Tab 1  
 atorvastatin (LIPITOR) 40 mg tablet TAKE 1 TABLET DAILY 90 Tab 1  
  imipramine (TOFRANIL) 25 mg tablet TAKE 2 TABLETS NIGHTLY 180 Tab 1  
 amLODIPine (NORVASC) 5 mg tablet TAKE 1 TABLET DAILY FOR BLOOD PRESSURE 90 Tab 1  
 FOLIC ACID/MULTIVIT-MIN/LUTEIN (CENTRUM SILVER PO) Take  by mouth.  fluticasone-umeclidin-vilanter (TRELEGY ELLIPTA) 100-62.5-25 mcg dsdv Take 1 Puff by inhalation daily. 3 Each 4  
 memantine (NAMENDA) 10 mg tablet Take 1 Tab by mouth two (2) times a day. Indications: MODERATE TO SEVERE ALZHEIMER'S TYPE DEMENTIA 180 Tab 1  
 albuterol (PROVENTIL HFA, VENTOLIN HFA, PROAIR HFA) 90 mcg/actuation inhaler Take 2 Puffs by inhalation every four (4) hours as needed for Wheezing. Please give with spacer. 1 Inhaler 0  
 inhalational spacing device 1 Each by Does Not Apply route as needed. 1 Device 0 Allergies Allergen Reactions  Keflex [Cephalexin] Rash  Pcn [Penicillins] Rash  Sulfa (Sulfonamide Antibiotics) Rash Past Medical History:  
Diagnosis Date  Anxiety state, unspecified  CKD (chronic kidney disease) stage 3, GFR 30-59 ml/min ([facility]) 2016  Dementia  Landa/npysch  Depression 2017  Generalized osteoarthrosis, unspecified site  Lumbar compression fracture ([facility] [address]   
 epidurals x 2  Umang/os  Osteopenia  Other and unspecified hyperlipidemia  Psoriasis  Unspecified essential hypertension  Unspecified hypothyroidism   
 thyroid irradiated i131 Past Surgical History:  
Procedure Laterality Date  COLONOSCOPY  10/10 [place]/gi [facility]  
 left [facility]  
 right Family History Problem Relation Age of Onset  Cancer Mother   
     stomach tumors  Cancer Brother   
     prostate Social History Tobacco Use  Smoking status: Former Smoker Packs/day: 0.50 Years: 60.00 Pack years: 30.00 Types: Cigarettes Last attempt to quit:  Years since quittin.8  Smokeless tobacco: Never Used Substance Use Topics  Alcohol use: No  
  Alcohol/week: 0.0 oz Review of Systems Constitutional: Negative. Negative for malaise/fatigue. Eyes: Negative for blurred vision. Respiratory: Negative for shortness of breath. Cardiovascular: Negative for chest pain and leg swelling. Musculoskeletal: Negative. Neurological: Negative. Negative for dizziness and headaches. All other systems reviewed and are negative. Physical Exam  
Constitutional: She appears well-developed and well-nourished. No distress. HENT:  
Head: Normocephalic and atraumatic. Neck: Carotid bruit is not present. Cardiovascular: Normal rate, regular rhythm, normal heart sounds and intact distal pulses. Exam reveals no gallop and no friction rub. No murmur heard. Pulmonary/Chest: Effort normal and breath sounds normal. No respiratory distress. She has no wheezes. She has no rales. Scattered wheezes and crackles Musculoskeletal: She exhibits no edema. Neurological: She is alert. Skin: She is not diaphoretic. 2+ pitting edema bilaterally Psychiatric: She has a normal mood and affect. Nursing note and vitals reviewed. ASSESSMENT and PLAN Diagnoses and all orders for this visit: 
 
1. COPD with acute exacerbation ([facility] [address] -      Continue to follow up with specialist  
     -      Advised to continue current regimen 2. Essential hypertension -     METABOLIC PANEL, COMPREHENSIVE 
-     Presumed stable, will assess levels today. 3. Acquired hypothyroidism 
-     TSH 3RD GENERATION Will check TSH levels today, past due 4. Mixed hyperlipidemia -     LIPID CASCADE Pt advised to continue to take Lipitor 40 mg daily and will check lipids today 5. Encounter for immunization 
-     INFLUENZA VACCINE INACTIVATED (IIV), SUBUNIT, ADJUVANTED, IM 
-     ADMIN INFLUENZA VIRUS VAC Follow-up Disposition: 
Return in about 4 months (around 2/28/2019) for hypothyroidism follow up, hyperlipidemia follow up. Medication risks/benefits/costs/interactions/alternatives discussed with patient. Advised patient to call back or return to office if symptoms worsen/change/persist.  If patient cannot reach us or should anything more severe/urgent arise he/she should proceed directly to the nearest emergency department. Discussed expected course/resolution/complications of diagnosis in detail with patient. Patient given a written after visit summary which includes her diagnoses, current medications and vitals. Patient expressed understanding with the diagnosis and plan. Written by prieto Jane, as dictated by Bean Salinas M.D. 
5:09 PM - 5:20 PM 
 
Total time spent with the patient 11 minutes, greater than 50% of time spent counseling patient.

## 2018-11-02 LAB
ALBUMIN SERPL-MCNC: 3.9 G/DL (ref 3.5–4.7)
ALBUMIN/GLOB SERPL: 1.6 {RATIO} (ref 1.2–2.2)
ALP SERPL-CCNC: 113 IU/L (ref 39–117)
ALT SERPL-CCNC: 37 IU/L (ref 0–32)
AST SERPL-CCNC: 30 IU/L (ref 0–40)
BILIRUB SERPL-MCNC: 0.3 MG/DL (ref 0–1.2)
BUN SERPL-MCNC: 23 MG/DL (ref 8–27)
BUN/CREAT SERPL: 16 (ref 12–28)
CALCIUM SERPL-MCNC: 9.2 MG/DL (ref 8.7–10.3)
CHLORIDE SERPL-SCNC: 103 MMOL/L (ref 96–106)
CHOLEST SERPL-MCNC: 210 MG/DL (ref 100–199)
CO2 SERPL-SCNC: 23 MMOL/L (ref 20–29)
CREAT SERPL-MCNC: 1.48 MG/DL (ref 0.57–1)
GLOBULIN SER CALC-MCNC: 2.4 G/DL (ref 1.5–4.5)
GLUCOSE SERPL-MCNC: 133 MG/DL (ref 65–99)
HDL SERPL-SCNC: 41.9 UMOL/L
HDLC SERPL-MCNC: 66 MG/DL
INTERPRETATION, 910389: NORMAL
INTERPRETATION: NORMAL
LDL SERPL QN: 20.5 NM
LDL SERPL-SCNC: 1429 NMOL/L
LDL SMALL SERPL-SCNC: 660 NMOL/L
LDLC SERPL CALC-MCNC: 97 MG/DL (ref 0–99)
LDLC/HDLC SERPL: 1.5 RATIO (ref 0–3.2)
LP-IR SCORE SERPL: 53
NONHDLC SERPL-MCNC: 144 MG/DL (ref 0–129)
PDF IMAGE, 910387: NORMAL
POTASSIUM SERPL-SCNC: 4.1 MMOL/L (ref 3.5–5.2)
PROT SERPL-MCNC: 6.3 G/DL (ref 6–8.5)
SODIUM SERPL-SCNC: 142 MMOL/L (ref 134–144)
TRIGL SERPL-MCNC: 236 MG/DL (ref 0–149)
TSH SERPL DL<=0.005 MIU/L-ACNC: 18.99 UIU/ML (ref 0.45–4.5)

## 2018-11-09 ENCOUNTER — TELEPHONE (OUTPATIENT)
Dept: RHEUMATOLOGY | Age: 83
End: 2018-11-09

## 2018-11-09 NOTE — TELEPHONE ENCOUNTER
Left message for Ms. Leone Ip and let her know that I will send over the order for her prolia today, and that [facility] should be giving her a call to set it up. I provided the phone number for her to call to set it up also. Informed to call back with questions.

## 2018-11-09 NOTE — TELEPHONE ENCOUNTER
----- Message from [email or web address] sent at 11/9/2018  7:12 AM EST -----  Regarding: Dr. Eveline Barboza: [phone]  Patient's daughter Inessa Leigh is requesting a call to set up the patient's Prolia injection for a Tuesday.  Mrs. Leisa Mortimer needs a call to discuss what Tuesday is best.     Mrs. Chico Hernandes best contact number is [phone]

## 2018-11-15 ENCOUNTER — TELEPHONE (OUTPATIENT)
Dept: RHEUMATOLOGY | Age: 83
End: 2018-11-15

## 2018-11-15 NOTE — TELEPHONE ENCOUNTER
Spoke with pts daughter and provided the phone number for he to call and schedule the prolia injection. She stated an understanding.

## 2018-11-15 NOTE — TELEPHONE ENCOUNTER
----- Message from Mary Faust sent at 11/15/2018 11:40 AM EST -----  Regarding: Dr. Nell Cruz (caretaker/ daughter) is requesting a call back from Dr. Vira Oliver in reference to polio vaccination. Has appt at [address] [place] [address] on 12/11/18 12noon. Needs medication to receive.      Rupali best contact [phone]

## 2018-11-15 NOTE — TELEPHONE ENCOUNTER
Spoke with pt and made sure she didn't have any other questions since I talked with her earlier? She stated that she did not, and that she will call the number to schedule her injection soon.

## 2018-11-15 NOTE — TELEPHONE ENCOUNTER
Patients daughter called to set up an appointment for prolia shot at [facility]. Best contact is [phone].

## 2018-11-17 DIAGNOSIS — E55.9 VITAMIN D DEFICIENCY: ICD-10-CM

## 2018-11-17 DIAGNOSIS — E78.2 MIXED HYPERLIPIDEMIA: ICD-10-CM

## 2018-11-21 RX ORDER — DONEPEZIL HYDROCHLORIDE 10 MG/1
10 TABLET, FILM COATED ORAL
Qty: 90 TAB | Refills: 1 | Status: SHIPPED | OUTPATIENT
Start: 2018-11-21 | End: 2019-06-23 | Stop reason: SDUPTHER

## 2018-11-21 RX ORDER — ATORVASTATIN CALCIUM 40 MG/1
40 TABLET, FILM COATED ORAL DAILY
Qty: 90 TAB | Refills: 1 | Status: SHIPPED | OUTPATIENT
Start: 2018-11-21 | End: 2019-06-23 | Stop reason: SDUPTHER

## 2018-11-21 RX ORDER — ERGOCALCIFEROL 1.25 MG/1
50000 CAPSULE ORAL
Qty: 13 CAP | Refills: 3 | Status: SHIPPED | OUTPATIENT
Start: 2018-11-21 | End: 2019-05-20

## 2018-11-21 RX ORDER — LEVOTHYROXINE SODIUM 75 UG/1
75 TABLET ORAL
Qty: 90 TAB | Refills: 1 | Status: SHIPPED | OUTPATIENT
Start: 2018-11-21 | End: 2018-12-01

## 2018-12-01 DIAGNOSIS — E03.9 ACQUIRED HYPOTHYROIDISM: ICD-10-CM

## 2018-12-01 DIAGNOSIS — N18.30 CKD (CHRONIC KIDNEY DISEASE) STAGE 3, GFR 30-59 ML/MIN (HCC): Primary | ICD-10-CM

## 2018-12-01 RX ORDER — LEVOTHYROXINE SODIUM 88 UG/1
88 TABLET ORAL
Qty: 30 TAB | Refills: 1 | Status: SHIPPED | OUTPATIENT
Start: 2018-12-01 | End: 2019-01-14 | Stop reason: SDUPTHER

## 2018-12-01 NOTE — PROGRESS NOTES
Micaela Jara Increased synthroid to 88 mcg daily Recheck TSH in 4 weeks Also advised pt be well hydrated will recheck bmp in 4 weeks too Rest of the labs are stable 
trigs are slightly elevated.

## 2018-12-11 ENCOUNTER — HOSPITAL ENCOUNTER (OUTPATIENT)
Dept: INFUSION THERAPY | Age: 83
Discharge: HOME | End: 2018-12-11
Payer: MEDICARE

## 2018-12-11 VITALS
SYSTOLIC BLOOD PRESSURE: 123 MMHG | DIASTOLIC BLOOD PRESSURE: 66 MMHG | HEART RATE: 69 BPM | TEMPERATURE: 97 F | RESPIRATION RATE: 18 BRPM

## 2018-12-11 LAB
ALBUMIN SERPL-MCNC: 3.3 G/DL (ref 3.5–5)
ANION GAP SERPL CALC-SCNC: 8 MMOL/L (ref 5–15)
BUN SERPL-MCNC: 24 MG/DL (ref 6–20)
BUN/CREAT SERPL: 20 (ref 12–20)
CALCIUM SERPL-MCNC: 8.7 MG/DL (ref 8.5–10.1)
CHLORIDE SERPL-SCNC: 106 MMOL/L (ref 97–108)
CO2 SERPL-SCNC: 26 MMOL/L (ref 21–32)
CREAT SERPL-MCNC: 1.19 MG/DL (ref 0.55–1.02)
GLUCOSE SERPL-MCNC: 214 MG/DL (ref 65–100)
MAGNESIUM SERPL-MCNC: 2.1 MG/DL (ref 1.6–2.4)
PHOSPHATE SERPL-MCNC: 3.8 MG/DL (ref 2.6–4.7)
PHOSPHATE SERPL-MCNC: 3.8 MG/DL (ref 2.6–4.7)
POTASSIUM SERPL-SCNC: 4.3 MMOL/L (ref 3.5–5.1)
SODIUM SERPL-SCNC: 140 MMOL/L (ref 136–145)

## 2018-12-11 PROCEDURE — 74011250636 HC RX REV CODE- 250/636: Performed by: INTERNAL MEDICINE

## 2018-12-11 PROCEDURE — 83735 ASSAY OF MAGNESIUM: CPT

## 2018-12-11 PROCEDURE — 80069 RENAL FUNCTION PANEL: CPT

## 2018-12-11 PROCEDURE — 84100 ASSAY OF PHOSPHORUS: CPT

## 2018-12-11 PROCEDURE — 36415 COLL VENOUS BLD VENIPUNCTURE: CPT

## 2018-12-11 PROCEDURE — 96372 THER/PROPH/DIAG INJ SC/IM: CPT

## 2018-12-11 RX ADMIN — DENOSUMAB 60 MG: 60 INJECTION SUBCUTANEOUS at 12:15

## 2018-12-11 NOTE — PROGRESS NOTES
Spiritual Care Partner Volunteer visited patient in [address] [place] [address]  on 12/11/18. Documented by:   Chaplain Wheat MDiv, MACE  [phone])

## 2018-12-11 NOTE — PROGRESS NOTES
Outpatient Infusion Center - Chemotherapy Progress Note    1020 Pt admit to [facility] for Prolia via wheelchair in stable condition accompanied by daughter (caregiver). Assessment completed. Explained [facility]'s policies and procedures; verbalized understanding. No new concerns voiced. Labs drawn peripherally and sent for processing. Visit Vitals  /66   Pulse 69   Temp 97 °F (36.1 °C)   Resp 18   Breastfeeding? No       Medications:  Prolia (SC R arm)    1220 Pt tolerated treatment well. D/c home via wheelchair in no distress. Pt aware of next [facility] appointment scheduled for 06/11/2019.     Recent Results (from the past 12 hour(s))   RENAL FUNCTION PANEL    Collection Time: 12/11/18 10:27 AM   Result Value Ref Range    Sodium 140 136 - 145 mmol/L    Potassium 4.3 3.5 - 5.1 mmol/L    Chloride 106 97 - 108 mmol/L    CO2 26 21 - 32 mmol/L    Anion gap 8 5 - 15 mmol/L    Glucose 214 (H) 65 - 100 mg/dL    BUN 24 (H) 6 - 20 MG/DL    Creatinine 1.19 (H) 0.55 - 1.02 MG/DL    BUN/Creatinine ratio 20 12 - 20      GFR est AA 52 (L) >60 ml/min/1.73m2    GFR est non-AA 43 (L) >60 ml/min/1.73m2    Calcium 8.7 8.5 - 10.1 MG/DL    Phosphorus 3.8 2.6 - 4.7 MG/DL    Albumin 3.3 (L) 3.5 - 5.0 g/dL   MAGNESIUM    Collection Time: 12/11/18 10:27 AM   Result Value Ref Range    Magnesium 2.1 1.6 - 2.4 mg/dL   PHOSPHORUS    Collection Time: 12/11/18 10:27 AM   Result Value Ref Range    Phosphorus 3.8 2.6 - 4.7 MG/DL

## 2018-12-18 ENCOUNTER — TELEPHONE (OUTPATIENT)
Dept: FAMILY MEDICINE CLINIC | Age: 83
End: 2018-12-18

## 2018-12-18 NOTE — TELEPHONE ENCOUNTER
Pt's daughter Rupali Patel (On [place]) is calling on behalf of Pt in regards to Pt being in the hospital [facility]. [place] She also notes that Hospital is not giving Pt correct dosage of medication levothyroxine (SYNTHROID) 88 mcg tablet. She notes that they are giving Pt 75mg. Pt has been in hospital since Saturday. She is requesting a call back in regards to this.      Best call back number  [phone] or [phone] (h)

## 2018-12-18 NOTE — TELEPHONE ENCOUNTER
Called and spoke to [address] [facility] [address] (on hippa ). Informed to that we cannot control what dose of Synthroid they give her. Advised her to inform her MD and or nurses there about this. Also that our MDS do not make rounds at that hospital. She understand same but wants Dr. Charmaine Genao informed she is there. Sates there due to edema and copd.

## 2018-12-20 NOTE — TELEPHONE ENCOUNTER
----- Message from David Villa sent at 12/20/2018  3:27 PM EST -----  Regarding: Dr. Marj Calix Telephone   Contact: [phone]  Pts Daughter Shanthi Hermosillo is requesting a return call regarding an appointment on 1/9/19. Pt needed paperwork and an evaluation to get started back in adult .

## 2018-12-21 ENCOUNTER — HOSPITAL ENCOUNTER (OUTPATIENT)
Dept: INFUSION THERAPY | Age: 83
End: 2018-12-21
Payer: MEDICARE

## 2019-01-09 ENCOUNTER — OFFICE VISIT (OUTPATIENT)
Dept: FAMILY MEDICINE CLINIC | Age: 84
End: 2019-01-09

## 2019-01-09 ENCOUNTER — HOSPITAL ENCOUNTER (OUTPATIENT)
Dept: LAB | Age: 84
Discharge: HOME | End: 2019-01-09
Payer: MEDICARE

## 2019-01-09 ENCOUNTER — PATIENT OUTREACH (OUTPATIENT)
Dept: FAMILY MEDICINE CLINIC | Age: 84
End: 2019-01-09

## 2019-01-09 VITALS
HEART RATE: 68 BPM | RESPIRATION RATE: 18 BRPM | TEMPERATURE: 97.4 F | OXYGEN SATURATION: 97 % | DIASTOLIC BLOOD PRESSURE: 55 MMHG | SYSTOLIC BLOOD PRESSURE: 109 MMHG | BODY MASS INDEX: 23.92 KG/M2 | HEIGHT: 62 IN | WEIGHT: 130 LBS

## 2019-01-09 DIAGNOSIS — N18.30 CKD (CHRONIC KIDNEY DISEASE) STAGE 3, GFR 30-59 ML/MIN (HCC): ICD-10-CM

## 2019-01-09 DIAGNOSIS — J44.1 COPD WITH ACUTE EXACERBATION (HCC): ICD-10-CM

## 2019-01-09 DIAGNOSIS — E78.2 MIXED HYPERLIPIDEMIA: ICD-10-CM

## 2019-01-09 DIAGNOSIS — Z00.00 ENCOUNTER FOR MEDICARE ANNUAL WELLNESS EXAM: ICD-10-CM

## 2019-01-09 DIAGNOSIS — F32.A MILD DEPRESSION: ICD-10-CM

## 2019-01-09 DIAGNOSIS — E03.9 ACQUIRED HYPOTHYROIDISM: ICD-10-CM

## 2019-01-09 DIAGNOSIS — R73.9 ELEVATED BLOOD SUGAR: ICD-10-CM

## 2019-01-09 DIAGNOSIS — G30.1 LATE ONSET ALZHEIMER'S DISEASE WITHOUT BEHAVIORAL DISTURBANCE (HCC): ICD-10-CM

## 2019-01-09 DIAGNOSIS — I10 ESSENTIAL HYPERTENSION: ICD-10-CM

## 2019-01-09 DIAGNOSIS — F02.80 LATE ONSET ALZHEIMER'S DISEASE WITHOUT BEHAVIORAL DISTURBANCE (HCC): ICD-10-CM

## 2019-01-09 DIAGNOSIS — E11.9 CONTROLLED TYPE 2 DIABETES MELLITUS WITHOUT COMPLICATION, WITHOUT LONG-TERM CURRENT USE OF INSULIN (HCC): Primary | ICD-10-CM

## 2019-01-09 DIAGNOSIS — Z11.1 SCREENING FOR TUBERCULOSIS: ICD-10-CM

## 2019-01-09 LAB — HBA1C MFR BLD HPLC: 7.6 %

## 2019-01-09 PROCEDURE — 84443 ASSAY THYROID STIM HORMONE: CPT

## 2019-01-09 PROCEDURE — 84439 ASSAY OF FREE THYROXINE: CPT

## 2019-01-09 PROCEDURE — 86480 TB TEST CELL IMMUN MEASURE: CPT

## 2019-01-09 PROCEDURE — 85025 COMPLETE CBC W/AUTO DIFF WBC: CPT

## 2019-01-09 PROCEDURE — 80061 LIPID PANEL: CPT

## 2019-01-09 PROCEDURE — 36415 COLL VENOUS BLD VENIPUNCTURE: CPT

## 2019-01-09 PROCEDURE — 80053 COMPREHEN METABOLIC PANEL: CPT

## 2019-01-09 PROCEDURE — 82043 UR ALBUMIN QUANTITATIVE: CPT

## 2019-01-09 RX ORDER — LEVOTHYROXINE SODIUM 75 UG/1
TABLET ORAL
COMMUNITY
Start: 2018-11-21 | End: 2019-01-15 | Stop reason: DRUGHIGH

## 2019-01-09 RX ORDER — GLIPIZIDE 5 MG/1
5 TABLET, FILM COATED, EXTENDED RELEASE ORAL DAILY
Qty: 30 TAB | Refills: 2 | Status: SHIPPED | OUTPATIENT
Start: 2019-01-09 | End: 2019-01-31 | Stop reason: SDUPTHER

## 2019-01-09 RX ORDER — FUROSEMIDE 40 MG/1
40 TABLET ORAL
COMMUNITY
Start: 2018-12-22 | End: 2019-01-31 | Stop reason: SDUPTHER

## 2019-01-09 RX ORDER — ARFORMOTEROL TARTRATE 15 UG/2ML
SOLUTION RESPIRATORY (INHALATION)
COMMUNITY
Start: 2018-12-20 | End: 2019-05-20 | Stop reason: SDUPTHER

## 2019-01-09 RX ORDER — ARFORMOTEROL TARTRATE 15 UG/2ML
15 SOLUTION RESPIRATORY (INHALATION) 2 TIMES DAILY
COMMUNITY

## 2019-01-09 RX ORDER — BUDESONIDE 0.5 MG/2ML
0.5 INHALANT ORAL 2 TIMES DAILY
COMMUNITY
End: 2019-06-04 | Stop reason: SDUPTHER

## 2019-01-09 RX ORDER — INSULIN PUMP SYRINGE, 3 ML
EACH MISCELLANEOUS
Qty: 1 KIT | Refills: 0 | Status: SHIPPED | OUTPATIENT
Start: 2019-01-09 | End: 2019-05-20

## 2019-01-09 NOTE — PROGRESS NOTES
This is the Subsequent Medicare Annual Wellness Exam, performed 12 months or more after the Initial AWV or the last Subsequent AWV I have reviewed the patient's medical history in detail and updated the computerized patient record. History Past Medical History:  
Diagnosis Date  Anxiety state, unspecified  CKD (chronic kidney disease) stage 3, GFR 30-59 ml/min ([facility]) 4/6/2016  Dementia 6/12 Landa/npysch  Depression 9/20/2017  Generalized osteoarthrosis, unspecified site  Lumbar compression fracture ([address] 4/08  
 epidurals x 2  Umang/os  Osteopenia  Other and unspecified hyperlipidemia  Psoriasis  Unspecified essential hypertension  Unspecified hypothyroidism   
 thyroid irradiated i131 Past Surgical History:  
Procedure Laterality Date  COLONOSCOPY  10/10 [place]  
 left [facility]  
 right Current Outpatient Medications Medication Sig Dispense Refill  furosemide (LASIX) 40 mg tablet 40 mg.    
 arformoterol (BROVANA) 15 mcg/2 mL nebu neb solution 15 mcg.  BROVANA 15 mcg/2 mL nebu neb solution  budesonide (PULMICORT) 0.5 mg/2 mL nbsp 0.5 mg.    
 LEVOXYL 75 mcg tablet  atorvastatin (LIPITOR) 40 mg tablet Take 1 Tab by mouth daily. 90 Tab 1  
 donepezil (ARICEPT) 10 mg tablet Take 1 Tab by mouth nightly. 90 Tab 1  
 ergocalciferol (ERGOCALCIFEROL) 50,000 unit capsule Take 1 Cap by mouth every seven (7) days. 13 Cap 3  
 denosumab (PROLIA) 60 mg/mL injection 60 mg.    
 albuterol-ipratropium (DUO-NEB) 2.5 mg-0.5 mg/3 ml nebu 3 mL by Nebulization route every four (4) hours as needed. 90 Nebule 1  
 lisinopril (PRINIVIL, ZESTRIL) 10 mg tablet Take 1 Tab by mouth daily. 90 Tab 1  
 memantine (NAMENDA) 10 mg tablet Take 1 Tab by mouth two (2) times a day.  Indications: MODERATE TO SEVERE ALZHEIMER'S TYPE DEMENTIA 180 Tab 1  
  albuterol (PROVENTIL HFA, VENTOLIN HFA, PROAIR HFA) 90 mcg/actuation inhaler Take 2 Puffs by inhalation every four (4) hours as needed for Wheezing. Please give with spacer. 1 Inhaler 0  
 imipramine (TOFRANIL) 25 mg tablet TAKE 2 TABLETS NIGHTLY 180 Tab 1  
 amLODIPine (NORVASC) 5 mg tablet TAKE 1 TABLET DAILY FOR BLOOD PRESSURE 90 Tab 1  
 FOLIC ACID/MULTIVIT-MIN/LUTEIN (CENTRUM SILVER PO) Take  by mouth.  levothyroxine (SYNTHROID) 88 mcg tablet Take 1 Tab by mouth Daily (before breakfast). (Patient taking differently: Take 75 mcg by mouth Daily (before breakfast). ) 30 Tab 1  
 fluticasone-umeclidin-vilanter (TRELEGY ELLIPTA) 100-62.5-25 mcg dsdv Take 1 Puff by inhalation daily. 3 Each 4  
 inhalational spacing device 1 Each by Does Not Apply route as needed. 1 Device 0 Allergies Allergen Reactions  Keflex [Cephalexin] Rash  Pcn [Penicillins] Rash  Sulfa (Sulfonamide Antibiotics) Rash Family History Problem Relation Age of Onset  Cancer Mother   
     stomach tumors  Cancer Brother   
     prostate Social History Tobacco Use  Smoking status: Former Smoker Packs/day: 0.50 Years: 60.00 Pack years: 30.00 Types: Cigarettes Last attempt to quit: 2014 Years since quittin.0  Smokeless tobacco: Never Used Substance Use Topics  Alcohol use: No  
  Alcohol/week: 0.0 oz Patient Active Problem List  
Diagnosis Code  Hypothyroidism E03.9  Essential hypertension I10  
 Anxiety state, unspecified F41.1  Generalized osteoarthrosis, unspecified site M15.9  Hyperlipidemia E78.5  Psoriasis L40.9  Adverse reaction to NSAIDs; acute renal failure T39.395A  Dementia F03.90  
 Impaired mobility and ADLs Z74.09  
 Incontinence in female R32  
 CKD (chronic kidney disease) stage 3, GFR 30-59 ml/min ([facility]) N18.3  Osteoporosis M81.0  
 Primary osteoarthritis of both hands M19.041, [id number]  
  Advanced care planning/counseling discussion Z71.89  Late onset Alzheimer'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 behavioral disturbance G30.1, F02.80  Vitamin D deficiency E55.9  Depression A33.5  
 Umbilical hernia without obstruction and without gangrene K42.9  Mild depression (HCC) F32.0 Depression Risk Factor Screening: PHQ over the last two weeks 1/9/2019 PHQ Not Done - Little interest or pleasure in doing things Not at all Feeling down, depressed, irritable, or hopeless Not at all Total Score PHQ 2 0 Trouble falling or staying asleep, or sleeping too much - Feeling tired or having little energy - Poor appetite, weight loss, or overeating - Feeling bad about yourself - or that you are a failure or have let yourself or your family down - Trouble concentrating on things such as school, work, reading, or watching TV - Moving or speaking so slowly that other people could have noticed; or the opposite being so fidgety that others notice - Thoughts of being better off dead, or hurting yourself in some way -  
PHQ 9 Score - How difficult have these problems made it for you to do your work, take care of your home and get along with others - Alcohol Risk Factor Screening: You do not drink alcohol or very rarely. Functional Ability and Level of Safety:  
Hearing Loss The patient needs further evaluation. Activities of Daily Living The home contains: handrails and grab bars Patient needs help with:  phone, transportation, shopping, preparing meals, laundry, housework, managing medications, managing money, eating, dressing, bathing, hygiene, bathroom needs and walking Fall Risk Fall Risk Assessment, last 12 mths 1/9/2019 Able to walk? -  
Fall in past 12 months? No  
Fall with injury? -  
Number of falls in past 12 months - Fall Risk Score -  
 
 
Abuse Screen Patient is not abused Cognitive Screening Evaluation of Cognitive Function: Has your family/caregiver stated any concerns about your memory: yes Abnormal 
 
Patient Care Team  
Patient Care Team: 
Malaika Allred MD as PCP - General (Family Practice) Malaika Allred MD (Family Practice) Assessment/Plan Education and counseling provided: 
Are appropriate based on today's review and evaluation Diagnoses and all orders for this visit: 1. Controlled type 2 diabetes mellitus without complication, without long-term current use of insulin ([facility] [address] 2. Elevated blood sugar -     AMB POC HEMOGLOBIN A1C 
 
3. Essential hypertension 4. Mixed hyperlipidemia 5. Acquired hypothyroidism 6. Late onset Alzheimer'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 behavioral disturbance 7. Mild depression ([facility] [address] Assessment & Plan: This condition is managed by Specialist. 
Key Psychotherapeutic Meds   
    
  
 donepezil (ARICEPT) 10 mg tablet  (Taking) Take 1 Tab by mouth nightly. memantine (NAMENDA) 10 mg tablet  (Taking) Take 1 Tab by mouth two (2) times a day. 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  
 imipramine (TOFRANIL) 25 mg tablet  (Taking) TAKE 2 TABLETS NIGHTLY Other [address] [place] [address] The patient is on no other behavioral health meds. Lab Results Component Value Date/Time Sodium 140 12/11/2018 10:27 AM  
 Creatinine 1.19 12/11/2018 10:27 AM  
 TSH 18.990 10/29/2018 05:27 PM  
 WBC 7.9 06/12/2018 10:32 AM  
 ALT (SGPT) 37 10/29/2018 05:27 PM  
 AST (SGOT) 30 10/29/2018 05:27 PM  
 
 
 
8. CKD (chronic kidney disease) stage 3, GFR 30-59 ml/min ([facility]) Assessment & Plan: 
Stable, based on history, physical exam and review of pertinent labs, studies and medications; meds reconciled; continue current treatment plan. 9. COPD with acute exacerbation ([facility] [address] Health Maintenance Due Topic Date Due  Shingrix Vaccine Age 50> (1 of 2) 08/09/1979  MEDICARE YEARLY EXAM  09/21/2018

## 2019-01-09 NOTE — PROGRESS NOTES
Chief Complaint Patient presents with [facility] Annual Wellness Visit  Other  
  needs clearance to return to adult home, has form to be filled out  Cough  
  has bad cough -dry x 1 month Reviewed Record in preparation for visit and have obtained necessary documentation. Identified pt with two pt identifiers (Name @ ) Health Maintenance Due Topic  Shingrix Vaccine Age 50> (1 of 2)  MEDICARE YEARLY EXAM   
 
 
 
1. Have you been to the ER, urgent care clinic since your last visit? Hospitalized since your last visit? In [facility] for copd. 2. Have you seen or con 
 
sulted any other health care providers outside of the [address] [place] [address] since your last visit? Include any pap smears or colon screening.  no

## 2019-01-09 NOTE — LETTER
1/9/2019 4:07 PM 
 
[phone] To whom it may concern: 
 
 
Please give patient her diabetes medication Glipizide SR 5 mg on Monday, Wednesday, Friday Sincerely, Angela Silva MD

## 2019-01-09 NOTE — ACP (ADVANCE CARE PLANNING)
Advance Care Planning (ACP) Provider Note - Comprehensive     Date of ACP Conversation: 01/09/19  Persons included in Conversation:  patient and POA  Length of ACP Conversation in minutes:  <16 minutes (Non-Billable)            General ACP for ALL Patients with Decision Making Capacity:   Importance of advance care planning, including choosing a healthcare agent to communicate patient's healthcare decisions if patient lost the ability to make decisions, such as after a sudden illness or accident  Understanding of the healthcare agent role was assessed and information provided    Review of Existing Advance Directive:  Does this advance directive still reflect your preferences?   Yes (Provide new form/Refer for assistance in updating)    For Serious or Chronic Illness:  Understanding of medical condition      Interventions Provided:  Reviewed existing Advance Directive

## 2019-01-09 NOTE — PROGRESS NOTES
HISTORY OF PRESENT ILLNESS Elena Mullen is a 80 y.o. female presents today for cough and annual wellness visit. Presents with daughter. HPI Blood pressure 109/55, pulse 68, temperature 97.4 °F (36.3 °C), temperature source Oral, resp. rate 18, height 5' 2\" (1.575 m), weight 130 lb (59 kg), SpO2 97 %. Body mass index is 23.78 kg/m². Chief Complaint Patient presents with [place] Annual Wellness Visit  Other  
  needs clearance to return to adult home, has form to be filled out  Cough  
  has bad cough -dry x 1 month DM2: A1c per POC today 7.6, increased from A1c of 7.6 on 6.4. Pt continues with dietary management. Pt has not been managing diet and eating a lot of carbs. Start on glipizide 5 mg to take every morning with food. Advised pt to only take medication in the morning if she eats. Lab Results Component Value Date/Time Hemoglobin A1c 6.4 (H) 04/06/2016 06:01 PM  
 Hemoglobin A1c (POC) 7.6 01/09/2019 02:57 PM  
Hypertension: BP at office today 109/55. Pt continues with amlodipine 5 mg and lisinopril 10 mg. BP control stable, continue current regimen. Hyperlipidemia: Lipid panel on 10/29/18 notable for total cholesterol 210, LDL 97, HDL 66, and triglycerides 236. Pt continues with atorvastatin 40 mg. Stable, continue current regimen. Lab Results Component Value Date/Time Cholesterol, total 210 (H) 10/29/2018 05:27 PM  
 HDL Cholesterol 66 10/29/2018 05:27 PM  
 LDL, calculated 97 10/29/2018 05:27 PM  
 VLDL, calculated 46 (H) 05/01/2018 08:10 AM  
 Triglyceride 236 (H) 10/29/2018 05:27 PM  
 CHOL/HDL Ratio 3.4 10/27/2010 09:25 AM  
 
Hypothyroidism: Pt's TSH level was 18.990 on 10/29/18. Pt continues with levothyroxine 75 mcg since ER discharge. Presumed stable, will assess levels today. Lab Results Component Value Date/Time TSH 18.990 (H) 10/29/2018 05:27 PM  
 
Alzheimer's: Pt condition has been progressing. We will continue same medication regimen. Depression: Pt continues to follow up with specialist for psychiatric care. CKD: Pt urinary output has declined per daughter. She notes pt has not been drinking as much and it is difficult for her to urinate. Will reassess labs. COPD: Daughter notes pt COPD flares every October - December. Pt continues Duo Neb, Brovana, Pulmicort. Continue current medication regimen. Other: Daughter reports pt was recently discharged from hospital and rehab center and will need paperwork completed to return to adult facility. Pt will need tuberculosis screen. Current Outpatient Medications Medication Sig Dispense Refill  furosemide (LASIX) 40 mg tablet 40 mg.    
 arformoterol (BROVANA) 15 mcg/2 mL nebu neb solution 15 mcg.  BROVANA 15 mcg/2 mL nebu neb solution  budesonide (PULMICORT) 0.5 mg/2 mL nbsp 0.5 mg.    
 LEVOXYL 75 mcg tablet  glipiZIDE SR (GLUCOTROL XL) 5 mg CR tablet Take 1 Tab by mouth daily. 30 Tab 2  Blood-Glucose Meter monitoring kit Use as diercted 1 Kit 0  
 glucose blood VI test strips (ASCENSIA AUTODISC VI, ONE TOUCH ULTRA TEST VI) strip Check blood glucose daily 100 Strip 11  
 atorvastatin (LIPITOR) 40 mg tablet Take 1 Tab by mouth daily. 90 Tab 1  
 donepezil (ARICEPT) 10 mg tablet Take 1 Tab by mouth nightly. 90 Tab 1  
 ergocalciferol (ERGOCALCIFEROL) 50,000 unit capsule Take 1 Cap by mouth every seven (7) days. 13 Cap 3  
 denosumab (PROLIA) 60 mg/mL injection 60 mg.    
 albuterol-ipratropium (DUO-NEB) 2.5 mg-0.5 mg/3 ml nebu 3 mL by Nebulization route every four (4) hours as needed. 90 Nebule 1  
 lisinopril (PRINIVIL, ZESTRIL) 10 mg tablet Take 1 Tab by mouth daily. 90 Tab 1  
 memantine (NAMENDA) 10 mg tablet Take 1 Tab by mouth two (2) times a day.  Indications: MODERATE TO SEVERE ALZHEIMER'S TYPE DEMENTIA 180 Tab 1  
 albuterol (PROVENTIL HFA, VENTOLIN HFA, PROAIR HFA) 90 mcg/actuation inhaler Take 2 Puffs by inhalation every four (4) hours as needed for Wheezing. Please give with spacer. 1 Inhaler 0  
 imipramine (TOFRANIL) 25 mg tablet TAKE 2 TABLETS NIGHTLY 180 Tab 1  
 amLODIPine (NORVASC) 5 mg tablet TAKE 1 TABLET DAILY FOR BLOOD PRESSURE 90 Tab 1  
 FOLIC ACID/MULTIVIT-MIN/LUTEIN (CENTRUM SILVER PO) Take  by mouth.  levothyroxine (SYNTHROID) 88 mcg tablet Take 1 Tab by mouth Daily (before breakfast). (Patient taking differently: Take 75 mcg by mouth Daily (before breakfast). ) 30 Tab 1  
 fluticasone-umeclidin-vilanter (TRELEGY ELLIPTA) 100-62.5-25 mcg dsdv Take 1 Puff by inhalation daily. 3 Each 4  
 inhalational spacing device 1 Each by Does Not Apply route as needed. 1 Device 0 Allergies Allergen Reactions  Keflex [Cephalexin] Rash  Pcn [Penicillins] Rash  Sulfa (Sulfonamide Antibiotics) Rash Past Medical History:  
Diagnosis Date  Anxiety state, unspecified  CKD (chronic kidney disease) stage 3, GFR 30-59 ml/min ([facility]) 2016  Dementia  Landa/npysch  Depression 2017  Generalized osteoarthrosis, unspecified site  Lumbar compression fracture ([facility] [address]   
 epidurals x 2  Umang/os  Osteopenia  Other and unspecified hyperlipidemia  Psoriasis  Unspecified essential hypertension  Unspecified hypothyroidism   
 thyroid irradiated i131 Past Surgical History:  
Procedure Laterality Date  [facility] [place]  
 [facility]  
 right Family History Problem Relation Age of Onset  Cancer Mother   
     stomach tumors  Cancer Brother   
     prostate Social History Tobacco Use  Smoking status: Former Smoker Packs/day: 0.50 Years: 60.00 Pack years: 30.00 Types: Cigarettes Last attempt to quit:  Years since quittin.0  Smokeless tobacco: Never Used Substance Use Topics  Alcohol use: No  
  Alcohol/week: 0.0 oz Review of Systems Constitutional: Negative for malaise/fatigue. HENT: Negative for congestion. Eyes: Negative for blurred vision and pain. Respiratory: Negative for cough and shortness of breath. Cardiovascular: Negative for chest pain and palpitations. Gastrointestinal: Negative for abdominal pain and heartburn. Genitourinary: Negative for frequency and urgency. Musculoskeletal: Negative for joint pain and myalgias. Neurological: Negative for dizziness, tingling, sensory change, weakness and headaches. Psychiatric/Behavioral: Negative for depression, memory loss and substance abuse. Physical Exam  
Constitutional: She is oriented to person, place, and time. She appears well-developed and well-nourished. HENT:  
Head: Normocephalic and atraumatic. Right Ear: External ear normal.  
Left Ear: External ear normal.  
Nose: Nose normal.  
Mouth/Throat: Oropharynx is clear and moist.  
Eyes: Conjunctivae and EOM are normal.  
Neck: Normal range of motion. Neck supple. Carotid bruit is not present. No thyroid mass and no thyromegaly present. Cardiovascular: Normal rate, regular rhythm, S1 normal, S2 normal, normal heart sounds and intact distal pulses. Pulmonary/Chest: Effort normal and breath sounds normal.  
Abdominal: Soft. Normal appearance and bowel sounds are normal. There is no hepatosplenomegaly. There is no tenderness. Musculoskeletal: Normal range of motion. Neurological: She is alert and oriented to person, place, and time. She has normal strength. No cranial nerve deficit or sensory deficit. Coordination normal.  
Skin: Skin is warm, dry and intact. No abrasion and no rash noted. Psychiatric: She has a normal mood and affect. Her behavior is normal. Judgment and thought content normal.  
Nursing note and vitals reviewed. Diabetic foot exam:  
 
Left Foot: 
 Visual Exam: normal  
 Pulse DP: 2+ (normal) Filament test: normal sensation Right Foot: 
 Visual Exam: normal  
 Pulse DP: 2+ (normal) Filament test: normal sensation ASSESSMENT and PLAN Diagnoses and all orders for this visit: 1. Controlled type 2 diabetes mellitus without complication, without long-term current use of insulin ([address] Start on glipizide 5 mg to take every morning with food. Advised pt to only take medication in the morning if she eats and to monitor sugars once a day. -     glipiZIDE SR (GLUCOTROL XL) 5 mg CR tablet; Take 1 Tab by mouth daily. 
-     MICROALBUMIN, UR, RAND W/ MICROALB/CREAT RATIO 
-      DIABETES FOOT EXAM 
-     Blood-Glucose Meter monitoring kit; Use as diercted 
-     glucose blood VI test strips (ASCENSIA AUTODISC VI, ONE TOUCH ULTRA TEST VI) strip; Check blood glucose daily 2. Elevated blood sugar A1c 7.6 today in office per POC. Pt will start on glipizide with meals each morning.  
-     AMB POC HEMOGLOBIN A1C 
 
3. Essential hypertension BP control stable, continue current regimen.  
-     CBC WITH AUTOMATED DIFF 
-     METABOLIC PANEL, COMPREHENSIVE 4. Mixed hyperlipidemia Presumed stable, will assess levels today. -     LIPID PANEL 5. Acquired hypothyroidism Presumed stable, will assess levels today. Continue levothyroxine 75mcg.  
-     TSH 3RD GENERATION 
-     T4, FREE 6. Late onset Alzheimer'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 behavioral disturbance Disease is progressing, continue current treatment regimen. 7. Mild depression ([address] Assessment & Plan: This condition is managed by Specialist. 
Key Psychotherapeutic Meds   
    
  
 donepezil (ARICEPT) 10 mg tablet  (Taking) Take 1 Tab by mouth nightly. memantine (NAMENDA) 10 mg tablet  (Taking) Take 1 Tab by mouth two (2) times a day. 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  
 imipramine (TOFRANIL) 25 mg tablet  (Taking) TAKE 2 TABLETS NIGHTLY Other [facility] The patient is on no other behavioral health meds. Lab Results Component Value Date/Time Sodium 140 12/11/2018 10:27 AM  
 Creatinine 1.19 12/11/2018 10:27 AM  
 TSH 18.990 10/29/2018 05:27 PM  
 WBC 7.9 06/12/2018 10:32 AM  
 ALT (SGPT) 37 10/29/2018 05:27 PM  
 AST (SGOT) 30 10/29/2018 05:27 PM  
 
 
 
8. CKD (chronic kidney disease) stage 3, GFR 30-59 ml/min ([facility]) Assessment & Plan: 
Stable, based on history, physical exam and review of pertinent labs, studies and medications; meds reconciled; continue current treatment plan. Will reassess renal function today in office. 9. COPD with acute exacerbation ([facility] [address] Pt will continue Brovana, Duo Neb, and Pulmicort. 10. Screening for tuberculosis -     QUANTIFERON-TB PLUS(CLIENT INCUB.) Follow-up Disposition: 
Return in about 3 months (around 4/9/2019) for hypertension, diabetes, cholesterol follow up, hypothyroidism follow up. Medication risks/benefits/costs/interactions/alternatives discussed with patient. Advised patient to call back or return to office if symptoms worsen/change/persist.  If patient cannot reach us or should anything more severe/urgent arise he/she should proceed directly to the nearest emergency department. Discussed expected course/resolution/complications of diagnosis in detail with patient. Patient given a written after visit summary which includes her diagnoses, current medications and vitals. Patient expressed understanding with the diagnosis and plan. This document was written by Jonelle Hoskins, as dictated by Dr. Mary Ellen Johnson MD.  
3:36 PM - 4:00 PM 
Total time spent with the patient 24 minutes, greater than 50% of time spent counseling patient.

## 2019-01-09 NOTE — ASSESSMENT & PLAN NOTE
This condition is managed by Specialist. 
Key Psychotherapeutic Meds   
    
  
 donepezil (ARICEPT) 10 mg tablet  (Taking) Take 1 Tab by mouth nightly. memantine (NAMENDA) 10 mg tablet  (Taking) Take 1 Tab by mouth two (2) times a day. 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  
 imipramine (TOFRANIL) 25 mg tablet  (Taking) TAKE 2 TABLETS NIGHTLY Other [address] [place] [address] The patient is on no other behavioral health meds. Lab Results Component Value Date/Time  Sodium 140 12/11/2018 10:27 AM  
 Creatinine 1.19 12/11/2018 10:27 AM  
 TSH 18.990 10/29/2018 05:27 PM  
 WBC 7.9 06/12/2018 10:32 AM  
 ALT (SGPT) 37 10/29/2018 05:27 PM  
 AST (SGOT) 30 10/29/2018 05:27 PM

## 2019-01-09 NOTE — PROGRESS NOTES
Patient verified by 3 identifiers. Diabetic education included educating daughter of the disease process, s/s hyperglycemia and hypoglycemia, target HgbA1C and BS goals, prevention of complications to include daily foot exams, yearly eye exams,  exercise, and CHO counting. Daughter demonstrated correct CHO counting with sample meals and stated a goal to limit meals to 60 grams of CHO. The following goals were discussed: 
 
Goals Addressed This Visit's Progress   understanding of self -management goals of living with Diabetes. 01/09/19 · Referral received from PCP for Diabetes/ glucose management · Met with patient and daughter face to face to provide diabetes education. · Reviewed disease process, s/s hyperglycemia and hypoglycemia, target HgbA1C and BS goals · Educated on Carbohydrate counting and planning healthy Meals · Informed patient and daughter that for most adults, a guideline for the daily amount of carbs is: · 45 to 60 grams at each meal. That's about the same as 3 to 4 carbohydrate servings. · 15 to 20 grams at each snack. That's about the same as 1 carbohydrate serving. · Look at labels on packaged foods. This can tell you how many carbs are in a serving. You can also use guides from the American Diabetes Association. · Be aware of portions, or serving sizes. If a package has two servings and you eat the whole package, you need to double the number of grams of carbohydrate listed for one serving. · Protein, fat, and fiber do not raise blood sugar as much as carbs do. If you eat a lot of these nutrients in a meal, your blood sugar will rise more slowly than it would otherwise. · Educated on Use of glucose meter, goals for blood sugars and when to call for help. · Given copy of meal planning guide, a carbohydrate guide and self-care dairy to record blood glucose results · Encouraged to do daily fasting blood glucose and record results in dairy to bring to OVs 
 · NN contact information given for questions and concerns NN will follow in one week. pk 
  
  COMPLETED: Establish PCP relationships and regularly scheduled appointments.  COMPLETED: Knowledge and adherence to medication plan.  new meds promptly and take as directed  COMPLETED: Reduce ED Utilization  Skills necessary to properly manage their diabetes, including use of devices and symptom monitoring tools. 01/09/19 · Educated and demonstrated with daughter use of glucose meter, Daughter verbalized that she will stop at pharmacy to  meter today: · Wash your hands with warm, soapy water. Dry them well with a clean towel. You may also use an alcohol wipe to clean your finger or other site, but make sure your hands are dry before the test. 
· Insert a clean lancet into the lancet device. · Remove a test strip from the test strip bottle. Replace the lid immediately to keep moisture away from the other strips. · Follow the instructions that came with your meter to get it ready. · Use the lancet device to stick the side of your fingertip with the lancet. Do not stick the tip of your finger. Some blood sugar meters use lancet devices that take the blood sample from other sites, such as the palm of the hand or the forearm. But the finger is usually the most accurate place to test blood sugar. · Put a drop of blood on the correct spot on the test strip. · Apply pressure with a clean cotton ball to stop the bleeding. · Follow the directions that came with the meter to get the results. · Write down the results in log and the time that you tested your blood. Some meters will store the results for you. · Bring Log to be evaluated at each OV 
NN will follow in one week.  vineet

## 2019-01-09 NOTE — PATIENT INSTRUCTIONS
Medicare Wellness Visit, Female The best way to live healthy is to have a lifestyle where you eat a well-balanced diet, exercise regularly, limit alcohol use, and quit all forms of tobacco/nicotine, if applicable. Regular preventive services are another way to keep healthy. Preventive services (vaccines, screening tests, monitoring & exams) can help personalize your care plan, which helps you manage your own care. Screening tests can find health problems at the earliest stages, when they are easiest to treat. [facility] follows the current, evidence-based guidelines published by the [facility] Devin Erich ([facility]) when recommending preventive services for our patients. Because we follow these guidelines, sometimes recommendations change over time as research supports it. (For example, mammograms used to be recommended annually. Even though Medicare will still pay for an annual mammogram, the newer guidelines recommend a mammogram every two years for women of average risk.) Of course, you and your doctor may decide to screen more often for some diseases, based on your risk and your health status. Preventive services for you include: - Medicare offers their members a free annual wellness visit, which is time for you and your primary care provider to discuss and plan for your preventive service needs. Take advantage of this benefit every year! 
-All adults over the age of 72 should receive the recommended pneumonia vaccines. Current USPSTF guidelines recommend a series of two vaccines for the best pneumonia protection.  
-All adults should have a flu vaccine yearly and a tetanus vaccine every 10 years. All adults age 61 and older should receive a shingles vaccine once in their lifetime.   
-A bone mass density test is recommended when a woman turns 65 to screen for osteoporosis. This test is only recommended one time, as a screening. Some providers will use this same test as a disease monitoring tool if you already have osteoporosis. -All adults age 38-68 who are overweight should have a diabetes screening test once every three years.  
-Other screening tests and preventive services for persons with diabetes include: an eye exam to screen for diabetic retinopathy, a kidney function test, a foot exam, and stricter control over your cholesterol.  
-Cardiovascular screening for adults with routine risk involves an electrocardiogram (ECG) at intervals determined by your doctor.  
-Colorectal cancer screenings should be done for adults age 54-65 with no increased risk factors for colorectal cancer. There are a number of acceptable methods of screening for this type of cancer. Each test has its own benefits and drawbacks. Discuss with your doctor what is most appropriate for you during your annual wellness visit. The different tests include: colonoscopy (considered the best screening method), a fecal occult blood test, a fecal DNA test, and sigmoidoscopy. -Breast cancer screenings are recommended every other year for women of normal risk, age 54-69. 
-Cervical cancer screenings for women over age 72 are only recommended with certain risk factors.  
-All adults born between [facility] should be screened once for Hepatitis C. Here is a list of your current Health Maintenance items (your personalized list of preventive services) with a due date: 
Health Maintenance Due Topic Date Due  Shingles Vaccine (1 of 2) 08/09/1979 [email or web address] Annual Well Visit  09/21/2018

## 2019-01-10 ENCOUNTER — TELEPHONE (OUTPATIENT)
Dept: FAMILY MEDICINE CLINIC | Age: 84
End: 2019-01-10

## 2019-01-10 DIAGNOSIS — E11.00 TYPE 2 DIABETES MELLITUS WITH HYPEROSMOLARITY WITHOUT COMA, WITHOUT LONG-TERM CURRENT USE OF INSULIN (HCC): Primary | ICD-10-CM

## 2019-01-10 LAB
ALBUMIN SERPL-MCNC: 4 G/DL (ref 3.5–4.7)
ALBUMIN/GLOB SERPL: 1.5 {RATIO} (ref 1.2–2.2)
ALP SERPL-CCNC: 130 IU/L (ref 39–117)
ALT SERPL-CCNC: 25 IU/L (ref 0–32)
AST SERPL-CCNC: 27 IU/L (ref 0–40)
BASOPHILS # BLD AUTO: 0 X10E3/UL (ref 0–0.2)
BASOPHILS NFR BLD AUTO: 0 %
BILIRUB SERPL-MCNC: 0.4 MG/DL (ref 0–1.2)
BUN SERPL-MCNC: 19 MG/DL (ref 8–27)
BUN/CREAT SERPL: 15 (ref 12–28)
CALCIUM SERPL-MCNC: 9.2 MG/DL (ref 8.7–10.3)
CHLORIDE SERPL-SCNC: 99 MMOL/L (ref 96–106)
CHOLEST SERPL-MCNC: 183 MG/DL (ref 100–199)
CO2 SERPL-SCNC: 26 MMOL/L (ref 20–29)
CREAT SERPL-MCNC: 1.23 MG/DL (ref 0.57–1)
CREAT UR-MCNC: NORMAL MG/DL
EOSINOPHIL # BLD AUTO: 0.1 X10E3/UL (ref 0–0.4)
EOSINOPHIL NFR BLD AUTO: 2 %
ERYTHROCYTE [DISTWIDTH] IN BLOOD BY AUTOMATED COUNT: 14.3 % (ref 12.3–15.4)
GLOBULIN SER CALC-MCNC: 2.7 G/DL (ref 1.5–4.5)
GLUCOSE SERPL-MCNC: 101 MG/DL (ref 65–99)
HCT VFR BLD AUTO: 38.4 % (ref 34–46.6)
HDLC SERPL-MCNC: 49 MG/DL
HGB BLD-MCNC: 12.3 G/DL (ref 11.1–15.9)
IMM GRANULOCYTES # BLD AUTO: 0 X10E3/UL (ref 0–0.1)
IMM GRANULOCYTES NFR BLD AUTO: 0 %
INTERPRETATION, 910389: NORMAL
INTERPRETATION: NORMAL
LDLC SERPL CALC-MCNC: 95 MG/DL (ref 0–99)
LYMPHOCYTES # BLD AUTO: 2.2 X10E3/UL (ref 0.7–3.1)
LYMPHOCYTES NFR BLD AUTO: 36 %
MCH RBC QN AUTO: 28.6 PG (ref 26.6–33)
MCHC RBC AUTO-ENTMCNC: 32 G/DL (ref 31.5–35.7)
MCV RBC AUTO: 89 FL (ref 79–97)
MICROALBUMIN UR-MCNC: NORMAL
MONOCYTES # BLD AUTO: 0.7 X10E3/UL (ref 0.1–0.9)
MONOCYTES NFR BLD AUTO: 11 %
NEUTROPHILS # BLD AUTO: 3.1 X10E3/UL (ref 1.4–7)
NEUTROPHILS NFR BLD AUTO: 51 %
PDF IMAGE, 910387: NORMAL
PLATELET # BLD AUTO: 277 X10E3/UL (ref 150–379)
POTASSIUM SERPL-SCNC: 3.9 MMOL/L (ref 3.5–5.2)
PROT SERPL-MCNC: 6.7 G/DL (ref 6–8.5)
RBC # BLD AUTO: 4.3 X10E6/UL (ref 3.77–5.28)
SODIUM SERPL-SCNC: 143 MMOL/L (ref 134–144)
T4 FREE SERPL-MCNC: 1.29 NG/DL (ref 0.82–1.77)
TRIGL SERPL-MCNC: 195 MG/DL (ref 0–149)
TSH SERPL DL<=0.005 MIU/L-ACNC: 23.56 UIU/ML (ref 0.45–4.5)
VLDLC SERPL CALC-MCNC: 39 MG/DL (ref 5–40)
WBC # BLD AUTO: 6.1 X10E3/UL (ref 3.4–10.8)

## 2019-01-10 NOTE — TELEPHONE ENCOUNTER
Pt.'s daughter calling today requesting some paper work that Dr. Fransisca Peacock has from last night to be send to [facility]. Daughter asking please try to send over before Monday.         Fax # [phone]  joseph Craig

## 2019-01-10 NOTE — TELEPHONE ENCOUNTER
[phone] spoke to [address] [place] [address] gave another   Fax number for [facility] [phone] to fax the form and

## 2019-01-11 ENCOUNTER — TELEPHONE (OUTPATIENT)
Dept: FAMILY MEDICINE CLINIC | Age: 84
End: 2019-01-11

## 2019-01-11 LAB
GAMMA INTERFERON BACKGROUND BLD IA-ACNC: 0.08 IU/ML
M TB IFN-G BLD-IMP: NEGATIVE
M TB IFN-G CD4+ BCKGRND COR BLD-ACNC: 0.08 IU/ML
MITOGEN IGNF BLD-ACNC: 0.8 IU/ML
QUANTIFERON TB2 AG: 0.08 IU/ML
SERVICE CMNT-IMP: NORMAL

## 2019-01-11 RX ORDER — INSULIN PUMP SYRINGE, 3 ML
EACH MISCELLANEOUS
Qty: 1 KIT | Refills: 0 | Status: SHIPPED | OUTPATIENT
Start: 2019-01-11 | End: 2019-05-20

## 2019-01-11 NOTE — TELEPHONE ENCOUNTER
Requested Prescriptions     Signed Prescriptions Disp Refills    Blood-Glucose Meter (FREESTYLE LITE METER) monitoring kit 1 Kit 0     Sig: Check blood sugar daily DX:E11.9 or Please give patient Meter her insurance will cover     Authorizing Provider: Mookie Seth     Ordering User: Mone Candelario    Per Dr Radha Dasilva , verbal order received.

## 2019-01-11 NOTE — TELEPHONE ENCOUNTER
----- Message from Renny Juan sent at 1/11/2019  3:35 PM EST -----  Regarding: DR. MCKEON/ TELEPHONE  Contact: [phone] [address] IS REQUESTING RESPONSE FOR A MEDICAL STATEMENT FOR THE PT TO START SERVICES ON MONDAY    FAX : [phone]

## 2019-01-11 NOTE — TELEPHONE ENCOUNTER
Rupali (pts daughter) is calling on behalf of the patient, requesting a call back from in regards to the patient lab results . Rupali states that its URGENT.     Best callback:[phone]    LOV:  Wednesday, January 09, 2019

## 2019-01-11 NOTE — TELEPHONE ENCOUNTER
[phone] attempted to call Kelly Oliva left message on her private VM that per Dr Campbell Arrington he will finish paper work this weekend since he is not in the office today and will fax form Monday Morning and to call me back if she has question

## 2019-01-14 ENCOUNTER — TELEPHONE (OUTPATIENT)
Dept: FAMILY MEDICINE CLINIC | Age: 84
End: 2019-01-14

## 2019-01-14 NOTE — TELEPHONE ENCOUNTER
[phone] notified Rupali via private VM forms has been faxed to [facility] [phone] and notified via my chart as well

## 2019-01-14 NOTE — TELEPHONE ENCOUNTER
[phone] notified Rupali via private VM forms has been faxed to [facility] [phone] and sent her message my chart

## 2019-01-14 NOTE — PROGRESS NOTES
Gold quantiferon is negative for TB Thyroid is not controled We need to increase the levoxyl to 88 mcg and recheck the TSH in 4 weeks ( please pend medication to me) Lipid/LDL is at goal. 
 
Renal and liver function is stable

## 2019-01-14 NOTE — TELEPHONE ENCOUNTER
[phone] notified Rupali via private VM forms has been faxed to [facility] [phone]

## 2019-01-14 NOTE — TELEPHONE ENCOUNTER
----- Message from Gracie Connelly sent at 1/11/2019  4:50 PM EST -----  Regarding: Dr. Ilene Nowak  Ms. Luciano Rueda, pt's daughter, requesting to speak with the nurse in regards to status of [facility] paperwork brought in on  Wednesday 01/09/2019.   Best contact number [phone]

## 2019-01-15 ENCOUNTER — TELEPHONE (OUTPATIENT)
Dept: FAMILY MEDICINE CLINIC | Age: 84
End: 2019-01-15

## 2019-01-15 NOTE — TELEPHONE ENCOUNTER
----- Message from Nadine Perera sent at 1/15/2019  3:58 PM EST -----  Regarding: DR Bayron Hogan / Ely Dowell from [facility] is requesting a written order to be verbalized; due to incomplete medication directions.    Best Contact: [phone]

## 2019-01-15 NOTE — TELEPHONE ENCOUNTER
Abhishek Chambers from [address] [place] [address] is calling on behalf of the patient, stating that she needs a verbal order to give the glipiZIDE SR (GLUCOTROL XL) 5 mg CR tablet. The medication is not listed on the patients medication list to give. Would like a call back.     Best callback:[phone]    LOV: Wednesday, January 09, 2019

## 2019-01-15 NOTE — LETTER
1/16/2019 8:26 AM 
 
[phone] To whom it may concern: 
 
Please give patient Gregorio Bryant 08/09/1929 her diabetes medication Glipizide SR 5 mg by mouth on Monday, Wednesday, Friday and Check her blood sugar Monday, Wednesday, Friday. If you have any question please don't hesitate to call us back. Sincerely, Inocencio Fletcher MD

## 2019-01-16 NOTE — TELEPHONE ENCOUNTER
[phone] attempted to call [facility] no answer left message on her VM to call me back in regards to her message

## 2019-01-16 NOTE — TELEPHONE ENCOUNTER
Faxed letter to Noel Khan [phone] as request by Mimi Ortez and will give her copy when she comes in for her appointment today

## 2019-01-16 NOTE — TELEPHONE ENCOUNTER
Pt's daughter Mateo Nolen is calling in regards to having medication   glipiZIDE SR (GLUCOTROL XL) 5 mg CR tablet. She notes that directions need to be indicated (by mouth with breakfast MON, WED, FRI at [facility], and  other days she takes at home, blood does not need to be checked on Sat by [facility] due to blood being checked at home)  information faxed over to [facility]. She has provided fax number that paperwork is needing to be submitted to. She is also requesting to have a copy of paperwork that is faxed available for pickup. She notes that she will be in office today for an appt at 11am to be able to  form.      [facility]   Fax [phone] (Yoana Pelletier)    Best call back number  [phone]

## 2019-01-21 ENCOUNTER — PATIENT OUTREACH (OUTPATIENT)
Dept: FAMILY MEDICINE CLINIC | Age: 84
End: 2019-01-21

## 2019-01-21 NOTE — PROGRESS NOTES
Outbound call to daughter Rasta Coker) to check on status of patient and management of diabetes. Patient verified by 3 identifiers. According to daughter her mother is doing better. Copies of the meal plan was given to the day care center, and she is managering her meals better at home. Patient has received her glucose meter and the  is checking the results each morning and evening. Daughter did not know the results of the blood sugars due to the  just starting this week-end. NN asked if the daughter could have the day care to keep a log of all blood glucose levels and NN will follow in about a week to check results. NN contact information given for questions and concerns. Goals Addressed This Visit's Progress   understanding of self -management goals of living with Diabetes. On track · 01/09/19 · Referral received from PCP for Diabetes/ glucose management · Met with patient and daughter face to face to provide diabetes education. · Reviewed disease process, s/s hyperglycemia and hypoglycemia, target HgbA1C and BS goals · Educated on Carbohydrate counting and planning healthy Meals · Informed patient and daughter that for most adults, a guideline for the daily amount of carbs is: · 45 to 60 grams at each meal. That's about the same as 3 to 4 carbohydrate servings. · 15 to 20 grams at each snack. That's about the same as 1 carbohydrate serving. · Look at labels on packaged foods. This can tell you how many carbs are in a serving. You can also use guides from the American Diabetes Association. · Be aware of portions, or serving sizes. If a package has two servings and you eat the whole package, you need to double the number of grams of carbohydrate listed for one serving. · Protein, fat, and fiber do not raise blood sugar as much as carbs do. If you eat a lot of these nutrients in a meal, your blood sugar will rise more slowly than it would otherwise. · Educated on Use of glucose meter, goals for blood sugars and when to call for help. · Given copy of meal planning guide, a carbohydrate guide and self-care dairy to record blood glucose results · Encouraged to do daily fasting blood glucose and record results in dairy to bring to OVs 
· NN contact information given for questions and concerns NN will follow in one week. pk 
 
01/21/19 · According to [address] [place] [address], patient is being provided meals that total 60 grams of carbohydrate each · Both day care and family verbalized understanding of  portions, or serving sizes. If a package has two servings and you eat the whole package, you need to double the number of grams of carbohydrate listed for one serving. · NN will follow in one week. pk 
  
  Skills necessary to properly manage their diabetes, including use of devices and symptom monitoring tools. On track 01/09/19 · Educated and demonstrated with daughter use of glucose meter, Daughter verbalized that she will stop at pharmacy to  meter today: · Wash your hands with warm, soapy water. Dry them well with a clean towel. You may also use an alcohol wipe to clean your finger or other site, but make sure your hands are dry before the test. 
· Insert a clean lancet into the lancet device. · Remove a test strip from the test strip bottle. Replace the lid immediately to keep moisture away from the other strips. · Follow the instructions that came with your meter to get it ready. · Use the lancet device to stick the side of your fingertip with the lancet. Do not stick the tip of your finger. Some blood sugar meters use lancet devices that take the blood sample from other sites, such as the palm of the hand or the forearm. But the finger is usually the most accurate place to test blood sugar. · Put a drop of blood on the correct spot on the test strip. · Apply pressure with a clean cotton ball to stop the bleeding. · Follow the directions that came with the meter to get the results. · Write down the results in log and the time that you tested your blood. Some meters will store the results for you. · Bring Log to be evaluated at each OV 
NN will follow in one week. pk 
 
01/21/19 · Outreach to daughter to follow up on patient's status · Patient has received meters and daughter verbalized understanding of it's use · Meter given to day-care and blood sugars obtained BID by center · NN encouraged logging blood sugars for review · NN will follow in one week.  pk

## 2019-01-23 DIAGNOSIS — I10 ESSENTIAL HYPERTENSION WITH GOAL BLOOD PRESSURE LESS THAN 140/90: ICD-10-CM

## 2019-01-23 DIAGNOSIS — E11.9 CONTROLLED TYPE 2 DIABETES MELLITUS WITHOUT COMPLICATION, WITHOUT LONG-TERM CURRENT USE OF INSULIN (HCC): ICD-10-CM

## 2019-01-23 NOTE — TELEPHONE ENCOUNTER
Pt's daughter Jackie Nunez is calling on behalf of Pt in regards to medication furosemide (LASIX) 40 mg tablet. She states that when Pt went to hospital she was placed on 40mg everyday rather t than what she was previously taking 20mg prescribed by Dr. Marquise Trevino. She is requesting a call back from office with clarification if Pt is still needing to be on medication, she notes that Pt had complained of leg pain (craps) and she has Pt taking medication (40MG) M,W,F. Pt does not have swelling in her legs. She notes that if she does not answer than a VM may be left.      Best call back number  [phone]

## 2019-01-29 ENCOUNTER — PATIENT OUTREACH (OUTPATIENT)
Dept: FAMILY MEDICINE CLINIC | Age: 84
End: 2019-01-29

## 2019-01-29 NOTE — PROGRESS NOTES
DUC Follow-up assessment: 
Outbound call made to patient today to complete Diabetic follow up assessment. Patient verified by 3 identifiers. Follow-up appointments reviewed, and medication reconciliation completed. NN contact information given for questions and concerns. Goals   understanding of self -management goals of living with Diabetes. · 01/09/19 · Referral received from PCP for Diabetes/ glucose management · Met with patient and daughter face to face to provide diabetes education. · Reviewed disease process, s/s hyperglycemia and hypoglycemia, target HgbA1C and BS goals · Educated on Carbohydrate counting and planning healthy Meals · Informed patient and daughter that for most adults, a guideline for the daily amount of carbs is: · 45 to 60 grams at each meal. That's about the same as 3 to 4 carbohydrate servings. · 15 to 20 grams at each snack. That's about the same as 1 carbohydrate serving. · Look at labels on packaged foods. This can tell you how many carbs are in a serving. You can also use guides from the American Diabetes Association. · Be aware of portions, or serving sizes. If a package has two servings and you eat the whole package, you need to double the number of grams of carbohydrate listed for one serving. · Protein, fat, and fiber do not raise blood sugar as much as carbs do. If you eat a lot of these nutrients in a meal, your blood sugar will rise more slowly than it would otherwise. · Educated on Use of glucose meter, goals for blood sugars and when to call for help. · Given copy of meal planning guide, a carbohydrate guide and self-care dairy to record blood glucose results · Encouraged to do daily fasting blood glucose and record results in dairy to bring to OVs 
· NN contact information given for questions and concerns NN will follow in one week. pk 
 
01/21/19 · According to [address], patient is being provided meals that total 60 grams of carbohydrate each · Both day care and family verbalized understanding of  portions, or serving sizes. If a package has two servings and you eat the whole package, you need to double the number of grams of carbohydrate listed for one serving. · NN will follow in one week. Pk 
 
01/29/19 
? Patient continue to be provided meals that total 60 grms of carbs each ? Day care monitoring portion sizes and blood sugars ? NN will follow again in one week. pk 
  
  Skills necessary to properly manage their diabetes, including use of devices and symptom monitoring tools. 01/09/19 · Educated and demonstrated with daughter use of glucose meter, Daughter verbalized that she will stop at pharmacy to  meter today: · Wash your hands with warm, soapy water. Dry them well with a clean towel. You may also use an alcohol wipe to clean your finger or other site, but make sure your hands are dry before the test. 
· Insert a clean lancet into the lancet device. · Remove a test strip from the test strip bottle. Replace the lid immediately to keep moisture away from the other strips. · Follow the instructions that came with your meter to get it ready. · Use the lancet device to stick the side of your fingertip with the lancet. Do not stick the tip of your finger. Some blood sugar meters use lancet devices that take the blood sample from other sites, such as the palm of the hand or the forearm. But the finger is usually the most accurate place to test blood sugar. · Put a drop of blood on the correct spot on the test strip. · Apply pressure with a clean cotton ball to stop the bleeding. · Follow the directions that came with the meter to get the results. · Write down the results in log and the time that you tested your blood. Some meters will store the results for you. · Bring Log to be evaluated at each OV 
NN will follow in one week. pk 
 
01/21/19 · Outreach to daughter to follow up on patient's status · Patient has received meters and daughter verbalized understanding of it's use · Meter given to day-care and blood sugars obtained BID by center · NN encouraged logging blood sugars for review · NN will follow in one week. pk 
 
01/29/19 
? Outreach to daughter to complete DUC assessment ? Medication reconciliation completed ? Day Care instructed by daughter to keep log of daily blood sugars ? According to daughter, sugars running in 119-143 range ? NN will follow in one week.  pk

## 2019-01-31 RX ORDER — GLIPIZIDE 5 MG/1
5 TABLET, FILM COATED, EXTENDED RELEASE ORAL DAILY
Qty: 90 TAB | Refills: 1 | Status: SHIPPED | OUTPATIENT
Start: 2019-01-31 | End: 2019-02-28 | Stop reason: SDUPTHER

## 2019-01-31 RX ORDER — FUROSEMIDE 40 MG/1
40 TABLET ORAL DAILY
Qty: 90 TAB | Refills: 1 | Status: SHIPPED | OUTPATIENT
Start: 2019-01-31 | End: 2019-05-20

## 2019-01-31 RX ORDER — AMLODIPINE BESYLATE 5 MG/1
TABLET ORAL
Qty: 90 TAB | Refills: 1 | Status: SHIPPED | OUTPATIENT
Start: 2019-01-31 | End: 2019-04-20 | Stop reason: SDUPTHER

## 2019-01-31 NOTE — TELEPHONE ENCOUNTER
----- Message from Minh Mora sent at 1/31/2019  9:21 AM EST -----  Regarding:  /Telephone   Rupali Ceja,daughter, requesting a call back regarding if the pt is still taking Rx \"Lasix 40 mg\" and Rx \"Glipizide Xl 5 mg\". Merary Ashton stated if pt still on both Rxs, she would like the Rx\"Lasix 40 mg\" and Rx\" Glipizide Xl 5 mg\" to be called into [address] [place] [address] at [phone].  Best contact:[phone] (leave a voicemail if no answer)

## 2019-01-31 NOTE — TELEPHONE ENCOUNTER
Pt's daughter calling Olivier Myers) wanted to know if her mother's BP medicine is a  5 mg tab and she is requesting a refill on it. She said she completely out of it.      Best call#  [phone]

## 2019-01-31 NOTE — TELEPHONE ENCOUNTER
[phone] attempted to call Johnson Logan no answer left message on her private VM prescription was sent to pharmacy

## 2019-02-05 ENCOUNTER — PATIENT OUTREACH (OUTPATIENT)
Dept: FAMILY MEDICINE CLINIC | Age: 84
End: 2019-02-05

## 2019-02-05 NOTE — PROGRESS NOTES
. 
Patient has graduated from the [facility] and Risk Prevention  program on 2/5/19. Patient's symptoms are stable at this time. Patient/family has the ability to self-manage. Care management goals have been completed at this time. No further nurse navigator follow up scheduled. Goals Addressed This Visit's Progress  COMPLETED:  understanding of self -management goals of living with Diabetes. · 01/09/19 · Referral received from PCP for Diabetes/ glucose management · Met with patient and daughter face to face to provide diabetes education. · Reviewed disease process, s/s hyperglycemia and hypoglycemia, target HgbA1C and BS goals · Educated on Carbohydrate counting and planning healthy Meals · Informed patient and daughter that for most adults, a guideline for the daily amount of carbs is: · 45 to 60 grams at each meal. That's about the same as 3 to 4 carbohydrate servings. · 15 to 20 grams at each snack. That's about the same as 1 carbohydrate serving. · Look at labels on packaged foods. This can tell you how many carbs are in a serving. You can also use guides from the American Diabetes Association. · Be aware of portions, or serving sizes. If a package has two servings and you eat the whole package, you need to double the number of grams of carbohydrate listed for one serving. · Protein, fat, and fiber do not raise blood sugar as much as carbs do. If you eat a lot of these nutrients in a meal, your blood sugar will rise more slowly than it would otherwise. · Educated on Use of glucose meter, goals for blood sugars and when to call for help. · Given copy of meal planning guide, a carbohydrate guide and self-care dairy to record blood glucose results · Encouraged to do daily fasting blood glucose and record results in dairy to bring to OVs 
· NN contact information given for questions and concerns NN will follow in one week. pk 
 
01/21/19 · According to [address] [place] [address], patient is being provided meals that total 60 grams of carbohydrate each · Both day care and family verbalized understanding of  portions, or serving sizes. If a package has two servings and you eat the whole package, you need to double the number of grams of carbohydrate listed for one serving. · NN will follow in one week. Pk 
 
01/29/19 
? Patient continue to be provided meals that total 60 grms of carbs each ? Day care monitoring portion sizes and blood sugars ? NN will follow again in one week. pk 
  
  COMPLETED: Skills necessary to properly manage their diabetes, including use of devices and symptom monitoring tools. 01/09/19 · Educated and demonstrated with daughter use of glucose meter, Daughter verbalized that she will stop at pharmacy to  meter today: · Wash your hands with warm, soapy water. Dry them well with a clean towel. You may also use an alcohol wipe to clean your finger or other site, but make sure your hands are dry before the test. 
· Insert a clean lancet into the lancet device. · Remove a test strip from the test strip bottle. Replace the lid immediately to keep moisture away from the other strips. · Follow the instructions that came with your meter to get it ready. · Use the lancet device to stick the side of your fingertip with the lancet. Do not stick the tip of your finger. Some blood sugar meters use lancet devices that take the blood sample from other sites, such as the palm of the hand or the forearm. But the finger is usually the most accurate place to test blood sugar. · Put a drop of blood on the correct spot on the test strip. · Apply pressure with a clean cotton ball to stop the bleeding. · Follow the directions that came with the meter to get the results. · Write down the results in log and the time that you tested your blood. Some meters will store the results for you.  
· Bring Log to be evaluated at each OV 
 NN will follow in one week. pk 
 
01/21/19 · Outreach to daughter to follow up on patient's status · Patient has received meters and daughter verbalized understanding of it's use · Meter given to day-care and blood sugars obtained BID by center · NN encouraged logging blood sugars for review · NN will follow in one week. pk 
 
01/29/19 
? Outreach to daughter to complete DUC assessment ? Medication reconciliation completed ? Day Care instructed by daughter to keep log of daily blood sugars ? According to daughter, sugars running in 119-143 range ? NN will follow in one week. pk 
  
  
 
 
Pt has nurse navigator's contact information for any further questions, concerns, or needs. Patients upcoming visits:   
Future Appointments Date Time Provider Machelle Olivier 2/25/2019  4:00 PM Torri Manjarrez MD [facility] SCHED  
4/10/2019  2:00 PM Torri Manjarrez MD [facility] SCHED  
6/11/2019 11:00 AM ZAIRE INFUSION NURSE 3 [facility]  
6/21/2019  2:30 PM [facility]

## 2019-02-09 RX ORDER — AZITHROMYCIN 250 MG/1
TABLET, FILM COATED ORAL
Qty: 6 TAB | Refills: 0 | Status: SHIPPED | OUTPATIENT
Start: 2019-02-09 | End: 2019-02-14

## 2019-02-21 NOTE — TELEPHONE ENCOUNTER
----- Message from Lana Cherry sent at 2/21/2019 12:36 PM EST -----  Regarding: Dr. Emily Meredith Telephone  Mrs. Ana Paula Carter, pt daughter requesting a prescription refill on 3 hoses and 3 face mask with refill for the pt nebulizer sent to [facility]. Mrs. Ware Ranks best contact number is [phone].

## 2019-02-25 ENCOUNTER — OFFICE VISIT (OUTPATIENT)
Dept: FAMILY MEDICINE CLINIC | Age: 84
End: 2019-02-25

## 2019-02-25 VITALS
SYSTOLIC BLOOD PRESSURE: 105 MMHG | HEIGHT: 61 IN | BODY MASS INDEX: 24.92 KG/M2 | OXYGEN SATURATION: 97 % | WEIGHT: 132 LBS | DIASTOLIC BLOOD PRESSURE: 58 MMHG | RESPIRATION RATE: 18 BRPM | HEART RATE: 74 BPM | TEMPERATURE: 98.3 F

## 2019-02-25 DIAGNOSIS — E03.9 ACQUIRED HYPOTHYROIDISM: ICD-10-CM

## 2019-02-25 DIAGNOSIS — E11.9 CONTROLLED TYPE 2 DIABETES MELLITUS WITHOUT COMPLICATION, WITHOUT LONG-TERM CURRENT USE OF INSULIN (HCC): ICD-10-CM

## 2019-02-25 DIAGNOSIS — J44.1 COPD WITH ACUTE EXACERBATION (HCC): ICD-10-CM

## 2019-02-25 DIAGNOSIS — E78.5 HYPERLIPIDEMIA, UNSPECIFIED HYPERLIPIDEMIA TYPE: ICD-10-CM

## 2019-02-25 DIAGNOSIS — I10 ESSENTIAL HYPERTENSION WITH GOAL BLOOD PRESSURE LESS THAN 140/90: Primary | ICD-10-CM

## 2019-02-25 RX ORDER — CEFDINIR 250 MG/5ML
POWDER, FOR SUSPENSION ORAL
COMMUNITY
Start: 2019-01-09 | End: 2019-02-25 | Stop reason: ALTCHOICE

## 2019-02-25 NOTE — PROGRESS NOTES
Chief Complaint   Patient presents with    Hypertension     follow up    Diabetes     follow up     [address] DOCUMENTATION\"  1. Have you been to the ER, urgent care clinic since your last visit? Hospitalized since your last visit? No    2. Have you seen or consulted any other health care providers outside of the [address] [place] [address] since your last visit? Include any pap smears or colon screening.  No

## 2019-02-25 NOTE — PATIENT INSTRUCTIONS
Noninsulin Medicines for Type 2 Diabetes: Care Instructions  Your Care Instructions    There are different types of noninsulin medicines for diabetes. Each works in a different way. But they all help you control your blood sugar. Some types help your body make insulin to lower your blood sugar. Others lower how much insulin your body needs. Some can slow how fast your body digests sugars. And some can remove extra glucose through your urine. · Alpha-glucosidase inhibitors. These keep starches from breaking down. This means that they lower the amount of glucose absorbed when you eat. They don't help your body make more insulin. So they will not cause low blood sugar unless you use them with other medicines for diabetes. They include acarbose and miglitol. · DPP-4 inhibitors. These help your body raise the level of insulin after you eat. They also help your body make less of a hormone that raises blood sugar. They include linagliptin, saxagliptin, and sitagliptin. · Incretin hormones (GLP-1 receptor agonists). Your body makes a protein that can raise your insulin level. It also can lower your blood sugar and make you less hungry. You can get shots of hormones that work the same way. They include exenatide and liraglutide. · Meglitinides. These help your body release insulin. They also help slow how your body digests sugars. So they can keep your blood sugar from rising too fast after you eat. They include nateglinide and repaglinide. · Metformin. This lowers how much glucose your liver makes. And it helps you respond better to insulin. It also lowers the amount of stored sugar that your liver releases when you are not eating. · SGLT2 inhibitors. These help to remove extra glucose through your urine. They may also help some people lose weight. They include canagliflozin, dapagliflozin, and empagliflozin. · Sulfonylureas. These help your body release more insulin. Some work for many hours.  They can cause low blood sugar if you don't eat as you planned. They include glipizide and glyburide. · Thiazolidinediones. These reduce the amount of blood glucose. They also help you respond better to insulin. They include pioglitazone and rosiglitazone. You may need to take more than one medicine for diabetes. Two or more medicines may work better to lower your blood sugar level than just one does. Follow-up care is a key part of your treatment and safety. Be sure to make and go to all appointments, and call your doctor if you are having problems. It's also a good idea to know your test results and keep a list of the medicines you take. How can you care for yourself at home? · Eat a healthy diet. Get some exercise each day. This may help you to reduce how much medicine you need. · Do not take other prescription or over-the-counter medicines, vitamins, herbal products, or supplements without talking to your doctor first. Some medicines for type 2 diabetes can cause problems with other medicines or supplements. · Tell your doctor if you plan to get pregnant. Some of these drugs are not safe for pregnant women. · Be safe with medicines. Take your medicines exactly as prescribed. Meglitinides and sulfonylureas can cause your blood sugar to drop very low. Call your doctor if you think you are having a problem with your medicine. · Check your blood sugar often. You can use a glucose monitor. Keeping track can help you know how certain foods, activities, and medicines affect your blood sugar. And it can help you keep your blood sugar from getting so low that it's not safe. When should you call for help? Call 911 anytime you think you may need emergency care.  For example, call if:    · You passed out (lost consciousness).     · You are confused or cannot think clearly.     · Your blood sugar is very high or very low.    Watch closely for changes in your health, and be sure to contact your doctor if:    · Your blood sugar stays outside the level your doctor set for you.     · You have any problems. Where can you learn more? Go to http://asya-chad.info/. Enter H153 in the search box to learn more about \"Noninsulin Medicines for Type 2 Diabetes: Care Instructions. \"  Current as of: July 25, 2018  Content Version: 11.9  © 2023-9676 [employer]. Care instructions adapted under license by [employer] (which disclaims liability or warranty for this information). If you have questions about a medical condition or this instruction, always ask your healthcare professional. [address] any warranty or liability for your use of this information.

## 2019-02-25 NOTE — PROGRESS NOTES
HPI  Remberto Negro 80 y.o. female  presents to the office today for HTN and DM follow up. Presents with daughter. Blood pressure 105/58, pulse 74, temperature 98.3 °F (36.8 °C), temperature source Oral, resp. rate 18, height 5' 1\" (1.549 m), weight 132 lb (59.9 kg), SpO2 97 %. Body mass index is 24.94 kg/m². Chief Complaint   Patient presents with    Hypertension     follow up    Diabetes     follow up      Hypertension: BP at office today 105/58 on manual recheck. Pt continues with amlodipine 5 mg/d, lisinopril 10 mg/d. BP is at goal today in office, she will continue current medication regimen. DM2: A1c per POC 1/9/19 7.6, increased from A1c of 6.0 on 4/30/18. Pt continues with Glipizide 5 mg/d. A1c has increased from 6.0 to 7.6 today in office. Advised pt to continue with Glipizide 5 mg/d. Hypothyroidism: Pt's TSH level was 23.560 on 1/9/19. Pt continues with levothyroxine 88 mcg/d. Presumed stable, will assess levels on follow up. Hyperlipidemia: Lipid panel on 1/9/19 notable for total cholesterol 183, LDL 95, HDL 49, and triglycerides 195. Pt continues with atorvastatin 40 mg/d. Presumed stable, will assess levels at follow up. COPD: Mother reports pt is taking [place]. She is not taking Trellegy, Pulmicort, albuterol. She is under the care of pulmonary. Daughter notes pt COPD worsens in the Spring and August - November every year. She will follow up with pulmonary on 4/16/19. Advised pt to take Trelegy Ellipta, Ottis Dredge, albuterol daily to manage COPD acute exacerbation during the Spring and Fall. Health Maintenance: Pt continues to follow up with eye specialist yearly, next due date in 5/2019. Current Outpatient Medications   Medication Sig Dispense Refill    fluticasone-umeclidinium-vilanterol (TRELEGY ELLIPTA) 100-62.5-25 mcg inhaler Take 1 Puff by inhalation daily.  3 Each 4    Nebulizer Accessories [facility] [place] 3 hoses and 3 face mask for her nebulizer 6 Each 11    amLODIPine (NORVASC) 5 mg tablet TAKE 1 TABLET DAILY FOR BLOOD PRESSURE 90 Tab 1    furosemide (LASIX) 40 mg tablet Take 1 Tab by mouth daily. 90 Tab 1    glipiZIDE SR (GLUCOTROL XL) 5 mg CR tablet Take 1 Tab by mouth daily. 90 Tab 1    levothyroxine (SYNTHROID) 88 mcg tablet Take 1 Tab by mouth Daily (before breakfast). 30 Tab 2    Blood-Glucose Meter (FREESTYLE LITE METER) monitoring kit Check blood sugar daily DX:E11.9 or Please give patient Meter her insurance will cover 1 Kit 0    arformoterol (BROVANA) 15 mcg/2 mL nebu neb solution 15 mcg.  BROVANA 15 mcg/2 mL nebu neb solution       budesonide (PULMICORT) 0.5 mg/2 mL nbsp 0.5 mg.      Blood-Glucose Meter monitoring kit Use as diercted 1 Kit 0    glucose blood VI test strips (ASCENSIA AUTODISC VI, ONE TOUCH ULTRA TEST VI) strip Check blood glucose daily 100 Strip 11    atorvastatin (LIPITOR) 40 mg tablet Take 1 Tab by mouth daily. 90 Tab 1    donepezil (ARICEPT) 10 mg tablet Take 1 Tab by mouth nightly. 90 Tab 1    ergocalciferol (ERGOCALCIFEROL) 50,000 unit capsule Take 1 Cap by mouth every seven (7) days. 13 Cap 3    denosumab (PROLIA) 60 mg/mL injection 60 mg.      albuterol-ipratropium (DUO-NEB) 2.5 mg-0.5 mg/3 ml nebu 3 mL by Nebulization route every four (4) hours as needed. 90 Nebule 1    lisinopril (PRINIVIL, ZESTRIL) 10 mg tablet Take 1 Tab by mouth daily. 90 Tab 1    memantine (NAMENDA) 10 mg tablet Take 1 Tab by mouth two (2) times a day. Indications: MODERATE TO SEVERE ALZHEIMER'S TYPE DEMENTIA 180 Tab 1    inhalational spacing device 1 Each by Does Not Apply route as needed. 1 Device 0    imipramine (TOFRANIL) 25 mg tablet TAKE 2 TABLETS NIGHTLY 619 Tab 1    FOLIC ACID/MULTIVIT-MIN/LUTEIN (CENTRUM SILVER PO) Take  by mouth.  albuterol (PROVENTIL HFA, VENTOLIN HFA, PROAIR HFA) 90 mcg/actuation inhaler Take 2 Puffs by inhalation every four (4) hours as needed for Wheezing. Please give with spacer.  1 Inhaler 0 Allergies   Allergen Reactions    Keflex [Cephalexin] Rash    Pcn [Penicillins] Rash    Sulfa (Sulfonamide Antibiotics) Rash     Past Medical History:   Diagnosis Date    Anxiety state, unspecified     CKD (chronic kidney disease) stage 3, GFR 30-59 ml/min ([facility]) 2016    Dementia     Landa/npysch    Depression 2017    Generalized osteoarthrosis, unspecified site     Lumbar compression fracture ([address]     epidurals x 2  Umang/os    Osteopenia     Other and unspecified hyperlipidemia     Psoriasis     Unspecified essential hypertension     Unspecified hypothyroidism     thyroid irradiated i131     Past Surgical History:   Procedure Laterality Date    COLONOSCOPY  10/10    [place]/gi    TOTAL KNEE ARTHROPLASTY      left    TOTAL KNEE ARTHROPLASTY      right     Family History   Problem Relation Age of Onset    Cancer Mother         stomach tumors    Cancer Brother         prostate     Social History     Tobacco Use    Smoking status: Former Smoker     Packs/day: 0.50     Years: 60.00     Pack years: 30.00     Types: Cigarettes     Last attempt to quit:      Years since quittin.1    Smokeless tobacco: Never Used   Substance Use Topics    Alcohol use: No     Alcohol/week: 0.0 oz        Review of Systems   Constitutional: Negative for chills and fever. HENT: Negative for hearing loss and tinnitus. Eyes: Negative for blurred vision and double vision. Respiratory: Negative for shortness of breath. Cardiovascular: Negative for chest pain and palpitations. Gastrointestinal: Negative for nausea and vomiting. Genitourinary: Negative for dysuria and frequency. Musculoskeletal: Negative for back pain and falls. Skin: Negative for itching and rash. Neurological: Negative for dizziness, loss of consciousness and headaches. Psychiatric/Behavioral: Negative for depression. The patient is not nervous/anxious.         Physical Exam   Constitutional: She is oriented to person, place, and time. She appears well-developed and well-nourished. HENT:   Head: Normocephalic and atraumatic. Right Ear: External ear normal.   Left Ear: External ear normal.   Nose: Nose normal.   Mouth/Throat: Oropharynx is clear and moist.   Eyes: Conjunctivae and EOM are normal.   Neck: Normal range of motion. Neck supple. Carotid bruit is not present. No thyroid mass and no thyromegaly present. Cardiovascular: Normal rate, regular rhythm, S1 normal, S2 normal and intact distal pulses. Murmur heard. 3/6 Murmur   Pulmonary/Chest: Effort normal and breath sounds normal.   Abdominal: Soft. Normal appearance and bowel sounds are normal. There is no hepatosplenomegaly. There is no tenderness. Musculoskeletal: Normal range of motion. Neurological: She is alert and oriented to person, place, and time. She has normal strength. No cranial nerve deficit or sensory deficit. Coordination normal.   Skin: Skin is warm, dry and intact. No abrasion and no rash noted. Psychiatric: She has a normal mood and affect. Her behavior is normal. Judgment and thought content normal.   Nursing note and vitals reviewed. ASSESSMENT and PLAN  Diagnoses and all orders for this visit:    1. Essential hypertension with goal blood pressure less than 140/90  BP is at goal today in office. Advised pt to continue with amlodipine 5 mg/d and lisinopril 10 mg/d. -     METABOLIC PANEL, COMPREHENSIVE; Future    2. Controlled type 2 diabetes mellitus without complication, without long-term current use of insulin (HCC)  A1c has increased from 6.0 to 7.6 today in office. Advised pt to continue with Glipizide 5 mg/d.   -     REFERRAL TO OPHTHALMOLOGY  -     HEMOGLOBIN A1C WITH EAG; Future    3. Hyperlipidemia, unspecified hyperlipidemia type  Presumed stable, will assess levels on follow up. Continue atorvastatin 40 mg/d.  -     LIPID PANEL; Future    4.  Acquired hypothyroidism  Presumed stable, will assess levels on follow up. Continue levothyroxine 88 mcg/d.  -     TSH 3RD GENERATION; Future    5. COPD with acute exacerbation ([address]  Advised pt to take Trelegy Ellipta, Norleen Proper, albuterol daily to manage COPD acute exacerbation during the Spring and Fall. She will follow up with pulmonary 4/2019.  -     fluticasone-umeclidinium-vilanterol (TRELEGY ELLIPTA) 100-62.5-25 mcg inhaler; Take 1 Puff by inhalation daily. Follow-up Disposition:  Return in about 3 months (around 5/25/2019) for hypertension, diabetes, cholesterol follow up. Medication risks/benefits/costs/interactions/alternatives discussed with patient. Advised patient to call back or return to office if symptoms worsen/change/persist.  If patient cannot reach us or should anything more severe/urgent arise he/she should proceed directly to the nearest emergency department. Discussed expected course/resolution/complications of diagnosis in detail with patient. Patient given a written after visit summary which includes her diagnoses, current medications and vitals. Patient expressed understanding with the diagnosis and plan. Written by prieto Cameron, as dictated by Oswaldo Llamas M.D.    4:35 PM - 4:56 PM    Total time spent with the patient 21 minutes, greater than 50% of time spent counseling patient.

## 2019-02-28 DIAGNOSIS — J44.1 COPD WITH ACUTE EXACERBATION (HCC): ICD-10-CM

## 2019-02-28 DIAGNOSIS — E11.9 CONTROLLED TYPE 2 DIABETES MELLITUS WITHOUT COMPLICATION, WITHOUT LONG-TERM CURRENT USE OF INSULIN (HCC): ICD-10-CM

## 2019-02-28 RX ORDER — GLIPIZIDE 5 MG/1
5 TABLET, FILM COATED, EXTENDED RELEASE ORAL DAILY
Qty: 90 TAB | Refills: 1 | Status: SHIPPED | OUTPATIENT
Start: 2019-02-28 | End: 2019-03-20 | Stop reason: DRUGHIGH

## 2019-02-28 NOTE — TELEPHONE ENCOUNTER
Pharmacy is requesting medication refill for 90day supply     Requested Prescriptions     Pending Prescriptions Disp Refills    glipiZIDE SR (GLUCOTROL XL) 5 mg CR tablet 90 Tab 1     Sig: Take 1 Tab by mouth daily.  fluticasone-umeclidinium-vilanterol (TRELEGY ELLIPTA) 100-62.5-25 mcg inhaler 3 Each 4     Sig: Take 1 Puff by inhalation daily.      Medication was submitted to local pharmacy on   02/25/19 Trelegy  01/31/2019 Glipizide    Pharmacy on file verified  ([facility])

## 2019-03-05 LAB — CREATININE, EXTERNAL: 1.16

## 2019-03-07 ENCOUNTER — TELEPHONE (OUTPATIENT)
Dept: FAMILY MEDICINE CLINIC | Age: 84
End: 2019-03-07

## 2019-03-07 NOTE — TELEPHONE ENCOUNTER
Aurea Guevara from [facility] day services is calling stating that a request for a written order has been faxed over that's needs to be signed and dated and faxed back over.         Fax:[phone]      LOV:  Monday, February 25, 2019

## 2019-03-12 ENCOUNTER — HOSPITAL ENCOUNTER (OUTPATIENT)
Dept: LAB | Age: 84
Discharge: HOME | End: 2019-03-12
Payer: MEDICARE

## 2019-03-12 DIAGNOSIS — E11.9 CONTROLLED TYPE 2 DIABETES MELLITUS WITHOUT COMPLICATION, WITHOUT LONG-TERM CURRENT USE OF INSULIN (HCC): ICD-10-CM

## 2019-03-12 DIAGNOSIS — E78.5 HYPERLIPIDEMIA, UNSPECIFIED HYPERLIPIDEMIA TYPE: ICD-10-CM

## 2019-03-12 DIAGNOSIS — I10 ESSENTIAL HYPERTENSION WITH GOAL BLOOD PRESSURE LESS THAN 140/90: ICD-10-CM

## 2019-03-12 DIAGNOSIS — E03.9 ACQUIRED HYPOTHYROIDISM: ICD-10-CM

## 2019-03-12 PROCEDURE — 80053 COMPREHEN METABOLIC PANEL: CPT

## 2019-03-12 PROCEDURE — 84443 ASSAY THYROID STIM HORMONE: CPT

## 2019-03-12 PROCEDURE — 36415 COLL VENOUS BLD VENIPUNCTURE: CPT

## 2019-03-12 PROCEDURE — 80061 LIPID PANEL: CPT

## 2019-03-12 PROCEDURE — 83036 HEMOGLOBIN GLYCOSYLATED A1C: CPT

## 2019-03-13 ENCOUNTER — OFFICE VISIT (OUTPATIENT)
Dept: FAMILY MEDICINE CLINIC | Age: 84
End: 2019-03-13

## 2019-03-13 VITALS
RESPIRATION RATE: 16 BRPM | HEIGHT: 61 IN | DIASTOLIC BLOOD PRESSURE: 79 MMHG | WEIGHT: 135 LBS | SYSTOLIC BLOOD PRESSURE: 168 MMHG | OXYGEN SATURATION: 97 % | BODY MASS INDEX: 25.49 KG/M2 | HEART RATE: 97 BPM

## 2019-03-13 DIAGNOSIS — J32.9 RHINOSINUSITIS: ICD-10-CM

## 2019-03-13 DIAGNOSIS — J31.0 RHINOSINUSITIS: ICD-10-CM

## 2019-03-13 DIAGNOSIS — J44.1 COPD WITH ACUTE EXACERBATION (HCC): ICD-10-CM

## 2019-03-13 DIAGNOSIS — J06.9 VIRAL URI WITH COUGH: ICD-10-CM

## 2019-03-13 DIAGNOSIS — R09.89 CHEST RALES: Primary | ICD-10-CM

## 2019-03-13 LAB
ALBUMIN SERPL-MCNC: 4.1 G/DL (ref 3.5–4.7)
ALBUMIN/GLOB SERPL: 2 {RATIO} (ref 1.2–2.2)
ALP SERPL-CCNC: 115 IU/L (ref 39–117)
ALT SERPL-CCNC: 23 IU/L (ref 0–32)
AST SERPL-CCNC: 24 IU/L (ref 0–40)
BILIRUB SERPL-MCNC: 0.3 MG/DL (ref 0–1.2)
BUN SERPL-MCNC: 28 MG/DL (ref 8–27)
BUN/CREAT SERPL: 25 (ref 12–28)
CALCIUM SERPL-MCNC: 9.3 MG/DL (ref 8.7–10.3)
CHLORIDE SERPL-SCNC: 106 MMOL/L (ref 96–106)
CHOLEST SERPL-MCNC: 158 MG/DL (ref 100–199)
CO2 SERPL-SCNC: 23 MMOL/L (ref 20–29)
CREAT SERPL-MCNC: 1.13 MG/DL (ref 0.57–1)
EST. AVERAGE GLUCOSE BLD GHB EST-MCNC: 120 MG/DL
GLOBULIN SER CALC-MCNC: 2.1 G/DL (ref 1.5–4.5)
GLUCOSE SERPL-MCNC: 129 MG/DL (ref 65–99)
HBA1C MFR BLD: 5.8 % (ref 4.8–5.6)
HDLC SERPL-MCNC: 59 MG/DL
INTERPRETATION, 910389: NORMAL
INTERPRETATION: NORMAL
LDLC SERPL CALC-MCNC: 72 MG/DL (ref 0–99)
PDF IMAGE, 910387: NORMAL
POTASSIUM SERPL-SCNC: 4.1 MMOL/L (ref 3.5–5.2)
PROT SERPL-MCNC: 6.2 G/DL (ref 6–8.5)
SODIUM SERPL-SCNC: 144 MMOL/L (ref 134–144)
TRIGL SERPL-MCNC: 136 MG/DL (ref 0–149)
TSH SERPL DL<=0.005 MIU/L-ACNC: 5.74 UIU/ML (ref 0.45–4.5)
VLDLC SERPL CALC-MCNC: 27 MG/DL (ref 5–40)

## 2019-03-13 RX ORDER — DOXYCYCLINE 25 MG/5ML
100 POWDER, FOR SUSPENSION ORAL EVERY 12 HOURS
Qty: 200 ML | Refills: 0 | Status: SHIPPED | OUTPATIENT
Start: 2019-03-13 | End: 2019-03-18

## 2019-03-13 NOTE — PROGRESS NOTES
[facility] Note      Subjective:     Chief Complaint   Patient presents with    Cough     Claudia Ruvalcaba is a 80y.o. year old female who presents for evaluation of the following:    Cough: Onset 2 days ago  Has history COPD and goes to adult    Sore throat while cough, nose running today. BM 4 times yesterday  Tx: none  Denies fever, vomiting, chest pain, shortness of breath    History provided by patient's daughter, Demarcus Irizarry. Review of Systems   Pertinent positives and negative per HPI. All other systems  reviewed are negative for a Comprehensive ROS (10+).        Past Medical History:   Diagnosis Date    Anxiety state, unspecified     CKD (chronic kidney disease) stage 3, GFR 30-59 ml/min ([facility]) 2016    Dementia     Landa/npysch    Depression 2017    Generalized osteoarthrosis, unspecified site     [address]bar [facility] [address] [address]     epidurals x 2  Umang/os    Osteopenia     Other and unspecified hyperlipidemia     Psoriasis     Unspecified essential hypertension     Unspecified hypothyroidism     thyroid irradiated i131        Social History     Socioeconomic History    Marital status:      Spouse name: Not on file    Number of children: Not on file    Years of education: Not on file    Highest education level: Not on file   Social Needs    Financial resource strain: Not on file    Food insecurity - worry: Not on file    Food insecurity - inability: Not on file   [place] needs - medical: Not on file   [place] needs - non-medical: Not on file   Occupational History    Not on file   Tobacco Use    Smoking status: Former Smoker     Packs/day: 0.50     Years: 60.00     Pack years: 30.00     Types: Cigarettes     Last attempt to quit:      Years since quittin.1    Smokeless tobacco: Never Used   Substance and Sexual Activity    Alcohol use: No     Alcohol/week: 0.0 oz    Drug use: No    Sexual activity: No   Other Topics Concern     Service Not Asked    Blood Transfusions Not Asked    Caffeine Concern Not Asked    Occupational Exposure Not Asked    Hobby Hazards Not Asked    Sleep Concern Not Asked    Stress Concern Not Asked    Weight Concern Not Asked    Special Diet Not Asked    Back Care Not Asked    Exercise Yes     Comment: enjoys working in the yard   [employer] Not Asked   [address] [facility] Not Asked    Self-Exams Not Asked   Social History Narrative    Not on file       Current Outpatient Medications   Medication Sig    glipiZIDE SR (GLUCOTROL XL) 5 mg CR tablet Take 1 Tab by mouth daily.  Nebulizer Accessories misc Patient needs 3 hoses and 3 face mask for her nebulizer    amLODIPine (NORVASC) 5 mg tablet TAKE 1 TABLET DAILY FOR BLOOD PRESSURE    furosemide (LASIX) 40 mg tablet Take 1 Tab by mouth daily.  levothyroxine (SYNTHROID) 88 mcg tablet Take 1 Tab by mouth Daily (before breakfast).  Blood-Glucose Meter (FREESTYLE LITE METER) monitoring kit Check blood sugar daily DX:E11.9 or Please give patient Meter her insurance will cover    arformoterol (BROVANA) 15 mcg/2 mL nebu neb solution 15 mcg.  BROVANA 15 mcg/2 mL nebu neb solution     budesonide (PULMICORT) 0.5 mg/2 mL nbsp 0.5 mg.    Blood-Glucose Meter monitoring kit Use as diercted    glucose blood VI test strips (ASCENSIA AUTODISC VI, ONE TOUCH ULTRA TEST VI) strip Check blood glucose daily    atorvastatin (LIPITOR) 40 mg tablet Take 1 Tab by mouth daily.  donepezil (ARICEPT) 10 mg tablet Take 1 Tab by mouth nightly.  ergocalciferol (ERGOCALCIFEROL) 50,000 unit capsule Take 1 Cap by mouth every seven (7) days.  denosumab (PROLIA) 60 mg/mL injection 60 mg.    albuterol-ipratropium (DUO-NEB) 2.5 mg-0.5 mg/3 ml nebu 3 mL by Nebulization route every four (4) hours as needed.  lisinopril (PRINIVIL, ZESTRIL) 10 mg tablet Take 1 Tab by mouth daily.     memantine (NAMENDA) 10 mg tablet Take 1 Tab by mouth two (2) times a day. Indications: MODERATE TO SEVERE ALZHEIMER'S TYPE DEMENTIA    albuterol (PROVENTIL HFA, VENTOLIN HFA, PROAIR HFA) 90 mcg/actuation inhaler Take 2 Puffs by inhalation every four (4) hours as needed for Wheezing. Please give with spacer.  inhalational spacing device 1 Each by Does Not Apply route as needed.  imipramine (TOFRANIL) 25 mg tablet TAKE 2 TABLETS NIGHTLY    FOLIC ACID/MULTIVIT-MIN/LUTEIN (CENTRUM SILVER PO) Take  by mouth.  fluticasone-umeclidinium-vilanterol (TRELEGY ELLIPTA) 100-62.5-25 mcg inhaler Take 1 Puff by inhalation daily. No current facility-administered medications for this visit. Objective:     Vitals:    03/13/19 1811   BP: 168/79   Pulse: 97   Resp: 16   SpO2: 97%   Weight: 135 lb (61.2 kg)   Height: 5' 1\" (1.549 m)       Physical Examination:  General: Alert, cooperative, no distress, appears stated age. In a wheelchair  Eyes: Conjunctivae clear. PERRL, EOMs intact. Ears: Normal external ear canals both ears. TM clear and mobile bilaterally, partially visualized due to scant cerumen bilaterally partially obstructing view. Nose: Nares normal. Septum midline. Mucosa normal. No drainage or sinus tenderness. Mouth/Throat: Lips, mucosa, and tongue normal. No oropharyngeal erythema. No tonsillar enlargement or exudate. Neck: Supple, symmetrical, trachea midline, no adenopathy. No thyroid enlargement/tenderness/nodules  Respiratory: Breathing comfortably, in no acute respiratory distress. Rales in right lower lobe. .  Cardiovascular: Regular rate and rhythm, S1, S2 normal, no murmur, click, rub or gallop. Extremities with no cyanosis or edema. Pulses 2+ and symmetric radial   Abdominal: Soft, nondistended, nontender. Bowel sounds normal.    Skin: Skin color, texture, turgor normal. No rashes or lesions on exposed skin. Lymph nodes: Cervical, supraclavicular nodes normal.  Neurologic: CNII-XII intact.   Psychiatric: Appropriate affect. Speech and sparse. Orders Only on 03/12/2019   Component Date Value Ref Range Status    TSH 03/12/2019 5.740* 0.450 - 4.500 uIU/mL Final    Glucose 03/12/2019 129* 65 - 99 mg/dL Final    BUN 03/12/2019 28* 8 - 27 mg/dL Final    Creatinine 03/12/2019 1.13* 0.57 - 1.00 mg/dL Final    GFR est non-AA 03/12/2019 43* >59 mL/min/1.73 Final    GFR est AA 03/12/2019 50* >59 mL/min/1.73 Final    BUN/Creatinine ratio 03/12/2019 25  12 - 28 Final    Sodium 03/12/2019 144  134 - 144 mmol/L Final    Potassium 03/12/2019 4.1  3.5 - 5.2 mmol/L Final    Chloride 03/12/2019 106  96 - 106 mmol/L Final    CO2 03/12/2019 23  20 - 29 mmol/L Final    Calcium 03/12/2019 9.3  8.7 - 10.3 mg/dL Final    Protein, total 03/12/2019 6.2  6.0 - 8.5 g/dL Final    Albumin 03/12/2019 4.1  3.5 - 4.7 g/dL Final    GLOBULIN, TOTAL 03/12/2019 2.1  1.5 - 4.5 g/dL Final    A-G Ratio 03/12/2019 2.0  1.2 - 2.2 Final    Bilirubin, total 03/12/2019 0.3  0.0 - 1.2 mg/dL Final    Alk. phosphatase 03/12/2019 115  39 - 117 IU/L Final    AST (SGOT) 03/12/2019 24  0 - 40 IU/L Final    ALT (SGPT) 03/12/2019 23  0 - 32 IU/L Final    Hemoglobin A1c 03/12/2019 5.8* 4.8 - 5.6 % Final    Comment:          Prediabetes: 5.7 - 6.4           Diabetes: >6.4           Glycemic control for adults with diabetes: <7.0      Estimated average glucose 03/12/2019 120  mg/dL Final    Cholesterol, total 03/12/2019 158  100 - 199 mg/dL Final    Triglyceride 03/12/2019 136  0 - 149 mg/dL Final    HDL Cholesterol 03/12/2019 59  >39 mg/dL Final    VLDL, calculated 03/12/2019 27  5 - 40 mg/dL Final    LDL, calculated 03/12/2019 72  0 - 99 mg/dL Final    INTERPRETATION 03/12/2019 Note   Final    Supplemental report is available.  PDF IMAGE 04/05/3310 Not applicable   Final    Interpretation 03/12/2019 Note   Final    Supplemental report is available.    Telephone on 03/07/2019   Component Date Value Ref Range Status    Creatinine, External 03/05/2019 1.16   Final   Office Visit on 01/09/2019   Component Date Value Ref Range Status    Hemoglobin A1c (POC) 01/09/2019 7.6  % Final    WBC 01/09/2019 6.1  3.4 - 10.8 x10E3/uL Final    RBC 01/09/2019 4.30  3.77 - 5.28 x10E6/uL Final    HGB 01/09/2019 12.3  11.1 - 15.9 g/dL Final    HCT 01/09/2019 38.4  34.0 - 46.6 % Final    MCV 01/09/2019 89  79 - 97 fL Final    MCH 01/09/2019 28.6  26.6 - 33.0 pg Final    MCHC 01/09/2019 32.0  31.5 - 35.7 g/dL Final    RDW 01/09/2019 14.3  12.3 - 15.4 % Final    PLATELET 56/38/3010 748  150 - 379 x10E3/uL Final    NEUTROPHILS 01/09/2019 51  Not Estab. % Final    Lymphocytes 01/09/2019 36  Not Estab. % Final    MONOCYTES 01/09/2019 11  Not Estab. % Final    EOSINOPHILS 01/09/2019 2  Not Estab. % Final    BASOPHILS 01/09/2019 0  Not Estab. % Final    ABS. NEUTROPHILS 01/09/2019 3.1  1.4 - 7.0 x10E3/uL Final    Abs Lymphocytes 01/09/2019 2.2  0.7 - 3.1 x10E3/uL Final    ABS. MONOCYTES 01/09/2019 0.7  0.1 - 0.9 x10E3/uL Final    ABS. EOSINOPHILS 01/09/2019 0.1  0.0 - 0.4 x10E3/uL Final    ABS. BASOPHILS 01/09/2019 0.0  0.0 - 0.2 x10E3/uL Final    IMMATURE GRANULOCYTES 01/09/2019 0  Not Estab. % Final    ABS. IMM.  GRANS. 01/09/2019 0.0  0.0 - 0.1 x10E3/uL Final    Glucose 01/09/2019 101* 65 - 99 mg/dL Final    BUN 01/09/2019 19  8 - 27 mg/dL Final    Creatinine 01/09/2019 1.23* 0.57 - 1.00 mg/dL Final    GFR est non-AA 01/09/2019 39* >59 mL/min/1.73 Final    GFR est AA 01/09/2019 45* >59 mL/min/1.73 Final    BUN/Creatinine ratio 01/09/2019 15  12 - 28 Final    Sodium 01/09/2019 143  134 - 144 mmol/L Final    Potassium 01/09/2019 3.9  3.5 - 5.2 mmol/L Final    Chloride 01/09/2019 99  96 - 106 mmol/L Final    CO2 01/09/2019 26  20 - 29 mmol/L Final    Calcium 01/09/2019 9.2  8.7 - 10.3 mg/dL Final    Protein, total 01/09/2019 6.7  6.0 - 8.5 g/dL Final    Albumin 01/09/2019 4.0  3.5 - 4.7 g/dL Final    GLOBULIN, TOTAL 01/09/2019 2.7  1.5 - 4.5 g/dL Final    A-G Ratio 01/09/2019 1.5  1.2 - 2.2 Final    Bilirubin, total 01/09/2019 0.4  0.0 - 1.2 mg/dL Final    Alk. phosphatase 01/09/2019 130* 39 - 117 IU/L Final    AST (SGOT) 01/09/2019 27  0 - 40 IU/L Final    ALT (SGPT) 01/09/2019 25  0 - 32 IU/L Final    Cholesterol, total 01/09/2019 183  100 - 199 mg/dL Final    Triglyceride 01/09/2019 195* 0 - 149 mg/dL Final    HDL Cholesterol 01/09/2019 49  >39 mg/dL Final    VLDL, calculated 01/09/2019 39  5 - 40 mg/dL Final    LDL, calculated 01/09/2019 95  0 - 99 mg/dL Final    Creatinine, urine 01/09/2019 CANCELED  mg/dL Final-Edited    Comment: Test Not Performed. Patient was unable to void at the time of  collection. The following test(s) were not performed:    Result canceled by the ancillary.  Microalbumin, urine 01/09/2019 CANCELED   Final-Edited    Comment: Test not performed    Result canceled by the ancillary.  TSH 01/09/2019 23.560* 0.450 - 4.500 uIU/mL Final    T4, Free 01/09/2019 1.29  0.82 - 1.77 ng/dL Final    QuantiFERON Criteria 01/09/2019 Comment   Final    Comment: The QuantiFERON-TB Gold Plus result is determined by subtracting  the Nil value from either TB antigen (Ag) tube. The mitogen tube  serves as a control for the test.      QuantiFERON TB1 Ag 01/09/2019 0.08  IU/mL Final    QuantiFERON TB2 Ag 01/09/2019 0.08  IU/mL Final    QuantiFERON Nil Value 01/09/2019 0.08  IU/mL Final    QuantiFERON Mitogen Value 01/09/2019 0.80  IU/mL Final    QuantiFERON Plus 01/09/2019 Negative  Negative Final    Comment: The specimen received for QuantiFERON testing was incubated by the  ordering institution. Specific procedures outlined in our Directory  of Services and in the package insert for the QuantiFERON Gold  (In Tube) test must be followed to enable for proper stimulation of  cells for the production of interferon gamma.  INTERPRETATION 01/09/2019 Note   Final    Supplemental report is available.    [place]  PDF IMAGE 93/54/3063 Not applicable   Final    Interpretation 01/09/2019 Note   Final    Supplemental report is available. Assessment/ Plan:   Diagnoses and all orders for this visit:    1. Chest rales  -     XR CHEST PA LAT; Future  -     doxycycline monohydrate (VIBRAMYCIN) 25 mg/5 mL susr; Take 20 mL by mouth every twelve (12) hours for 5 days. 2. Viral URI with cough    3. Rhinosinusitis  -     XR CHEST PA LAT; Future  -     doxycycline monohydrate (VIBRAMYCIN) 25 mg/5 mL susr; Take 20 mL by mouth every twelve (12) hours for 5 days. 4. COPD with acute exacerbation (HCC)        Cough with right lower lobe rales, concerning for comedocarcinoma. No SIRS. Chest x-ray to evaluate. Trial of otc meds for symptom relief discussed and listed in patient instructions- nasal steroid + mucinex + antihistamine + sinus rinse + otc analgesia + humidifier prn  - Antibiotic to for empiric treatment of community-acquired pneumonia and to prevent prolonged symptoms given history of COPD    Noted BP elevation. Patient noncompliant with relaxation during BP monitoring. Educated patient on red flag symptoms to warrant return to clinic or emergency room visit. I have discussed the diagnosis with the patient and the intended plan as seen in the above orders. The patient has been offered or received an after-visit summary and questions were answered concerning future plans. I have discussed medication side effects and warnings with the patient as well. Follow-up Disposition:  Return if symptoms worsen or fail to improve.       Signed,    Marino Field MD  3/13/2019

## 2019-03-13 NOTE — PATIENT INSTRUCTIONS
For your symptoms: Your symptoms may improve with an oral antihistamine. These are available over the counter and include:  Loratadine/claritin  Cetirizine/Zyrtec  Fexofenadine/Allegra  Levocetirizine/Xyzal    · Your symptoms may improve with a nasal steroid. These are available over the counter and include:  · Flonase (aka fluticasone)  · Nasocort (aka triamcinolone)  · Nasonex (aka mometasone)  · Rhinocort (aka budesonide)    · Increase fluid intake, especially water to thin mucous and boost the immune system. · Avoid sugar and dairy while congested since they thicken mucous. · Get plenty of rest!    · Gargle 3 times daily and as needed in Listerine or warm salt water vinegar solutions (1 tsp salt, 1 tsp vinegar in 1 cup lukewarm water.)    · Use OTC nasal saline spray up each nostril four times daily. You could also consider using a netipot with distilled water. · Use humidifier at bedtime. · Use OTC Mucinex 600 mg twice daily to loosen mucous. · Use OTC Tylenol  (up to 650mg every 6 hours) or Ibuprofen (up to 800 mg every 8 hours) as needed for pain, fever or headaches. ·  Avoid decongestants and Ibuprofen if you have high blood pressure! Return to the doctor for evaluation:  · If mucous is consistently discolored yellow or green throughout the day for more than a week  · If you develop worsening facial pain  · If you develop a fever that will not go away  · If your symptoms worsen instead of improve           Learning About COPD and Upper Respiratory Infections  What are upper respiratory infections? An upper respiratory infection, also called a URI, is an infection of the nose, sinuses, or throat. Viruses or bacteria can cause URIs. Colds, the flu, and sinusitis are examples of URIs. These infections are spread by coughs, sneezes, and close contact. How do these infections affect COPD?   A URI can worsen COPD symptoms, such as having too much mucus in your lungs, coughing, or being short of breath. What can you do to manage most infections at home? · Do not smoke. Avoid secondhand smoke. · Take an over-the-counter pain medicine, such as acetaminophen (Tylenol), ibuprofen (Advil, Motrin), or naproxen (Aleve). Read and follow all instructions on the label. · Be careful when taking over-the-counter cold or flu medicines and Tylenol at the same time. Many of these medicines have acetaminophen, which is Tylenol. Read the labels to make sure that you are not taking more than the recommended dose. Too much acetaminophen (Tylenol) can be harmful. · If your doctor prescribed antibiotics, take them as directed. Do not stop taking them just because you feel better. You need to take the full course of antibiotics. · Ask your doctor about cough medicines and decongestants. Some doctors recommend these medicines, while others feel that they do not help. · Learn breathing techniques for COPD, such as breathing through pursed lips. These techniques can help you breathe easier. What can you do to prevent these infections? Stay healthy  · Do not smoke. This is the most important step you can take to prevent more damage to your lungs. If you need help quitting, talk to your doctor about stop-smoking programs and medicines. These can increase your chances of quitting for good. · Avoid secondhand smoke, air pollution, and high altitudes. Also avoid cold, dry air and hot, humid air. Stay at home with your windows closed when air pollution is bad. · Get a flu shot every year. · Get a pneumococcal vaccine shot. If you have had one before, ask your doctor whether you need another dose. · If you must be around people with colds or the flu, wash your hands often. Exercise and eat well  · If your doctor recommends it, get more exercise. Walking is a good choice. Bit by bit, increase the amount you walk every day. Try for at least 30 minutes on most days of the week. · Eat regular, well-balanced meals. Eating right keeps your energy levels up and helps your body fight infection. · Get plenty of rest and sleep. Follow-up care is a key part of your treatment and safety. Be sure to make and go to all appointments, and call your doctor if you are having problems. It's also a good idea to know your test results and keep a list of the medicines you take. Where can you learn more? Go to http://asya-chad.info/. Enter S521 in the search box to learn more about \"Learning About COPD and Upper Respiratory Infections. \"  Current as of: September 5, 2018  Content Version: 11.9  © 6676-7206 [employer]. Care instructions adapted under license by [employer] (which disclaims liability or warranty for this information). If you have questions about a medical condition or this instruction, always ask your healthcare professional. [address] any warranty or liability for your use of this information. Saline Nasal Washes: Care Instructions  Your Care Instructions  Saline nasal washes help keep the nasal passages open by washing out thick or dried mucus. This simple remedy can help relieve symptoms of allergies, sinusitis, and colds. It also can make the nose feel more comfortable by keeping the mucous membranes moist. You may notice a little burning sensation in your nose the first few times you use the solution, but this usually gets better in a few days. Follow-up care is a key part of your treatment and safety. Be sure to make and go to all appointments, and call your doctor if you are having problems. It's also a good idea to know your test results and keep a list of the medicines you take. How can you care for yourself at home? · You can buy premixed saline solution in a squeeze bottle or other sinus rinse products at a drugstore. Read and follow the instructions on the label.   · You also can make your own saline solution by adding 1 teaspoon of salt and 1 teaspoon of baking soda to 2 cups of distilled water. · If you use a homemade solution, pour a small amount into a clean bowl. Using a rubber bulb syringe, squeeze the syringe and place the tip in the salt water. Pull a small amount of the salt water into the syringe by relaxing your hand. · Sit down with your head tilted slightly back. Do not lie down. Put the tip of the bulb syringe or the squeeze bottle a little way into one of your nostrils. Gently drip or squirt a few drops into the nostril. Repeat with the other nostril. Some sneezing and gagging are normal at first.  · Gently blow your nose. · Wipe the syringe or bottle tip clean after each use. · Repeat this 2 or 3 times a day. · Use nasal washes gently if you have nosebleeds often. When should you call for help? Watch closely for changes in your health, and be sure to contact your doctor if:    · You often get nosebleeds.     · You have problems doing the nasal washes. Where can you learn more? Go to http://asya-chad.info/. Enter 071 [id number] [id number] in the search box to learn more about \"Saline Nasal Washes: Care Instructions. \"  Current as of: March 27, 2018  Content Version: 11.9  © 9308-7597 [employer]. Care instructions adapted under license by [employer] (which disclaims liability or warranty for this information). If you have questions about a medical condition or this instruction, always ask your healthcare professional. [place] [address] any warranty or liability for your use of this information. DASH Diet: Care Instructions  Your Care Instructions    The DASH diet is an eating plan that can help lower your blood pressure. DASH stands for Dietary Approaches to Stop Hypertension. Hypertension is high blood pressure. The DASH diet focuses on eating foods that are high in calcium, potassium, and magnesium. These nutrients can lower blood pressure.  The foods that are highest in these nutrients are fruits, vegetables, low-fat dairy products, nuts, seeds, and legumes. But taking calcium, potassium, and magnesium supplements instead of eating foods that are high in those nutrients does not have the same effect. The DASH diet also includes whole grains, fish, and poultry. The DASH diet is one of several lifestyle changes your doctor may recommend to lower your high blood pressure. Your doctor may also want you to decrease the amount of sodium in your diet. Lowering sodium while following the DASH diet can lower blood pressure even further than just the DASH diet alone. Follow-up care is a key part of your treatment and safety. Be sure to make and go to all appointments, and call your doctor if you are having problems. It's also a good idea to know your test results and keep a list of the medicines you take. How can you care for yourself at home? Following the DASH diet  · Eat 4 to 5 servings of fruit each day. A serving is 1 medium-sized piece of fruit, ½ cup chopped or canned fruit, 1/4 cup dried fruit, or 4 ounces (½ cup) of fruit juice. Choose fruit more often than fruit juice. · Eat 4 to 5 servings of vegetables each day. A serving is 1 cup of lettuce or raw leafy vegetables, ½ cup of chopped or cooked vegetables, or 4 ounces (½ cup) of vegetable juice. Choose vegetables more often than vegetable juice. · Get 2 to 3 servings of low-fat and fat-free dairy each day. A serving is 8 ounces of milk, 1 cup of yogurt, or 1 ½ ounces of cheese. · Eat 6 to 8 servings of grains each day. A serving is 1 slice of bread, 1 ounce of dry cereal, or ½ cup of cooked rice, pasta, or cooked cereal. Try to choose whole-grain products as much as possible. · Limit lean meat, poultry, and fish to 2 servings each day. A serving is 3 ounces, about the size of a deck of cards. · Eat 4 to 5 servings of nuts, seeds, and legumes (cooked dried beans, lentils, and split peas) each week.  A serving is 1/3 cup of nuts, 2 tablespoons of seeds, or ½ cup of cooked beans or peas. · Limit fats and oils to 2 to 3 servings each day. A serving is 1 teaspoon of vegetable oil or 2 tablespoons of salad dressing. · Limit sweets and added sugars to 5 servings or less a week. A serving is 1 tablespoon jelly or jam, ½ cup sorbet, or 1 cup of lemonade. · Eat less than 2,300 milligrams (mg) of sodium a day. If you limit your sodium to 1,500 mg a day, you can lower your blood pressure even more. Tips for success  · Start small. Do not try to make dramatic changes to your diet all at once. You might feel that you are missing out on your favorite foods and then be more likely to not follow the plan. Make small changes, and stick with them. Once those changes become habit, add a few more changes. · Try some of the following:  ? Make it a goal to eat a fruit or vegetable at every meal and at snacks. This will make it easy to get the recommended amount of fruits and vegetables each day. ? Try yogurt topped with fruit and nuts for a snack or healthy dessert. ? Add lettuce, tomato, cucumber, and onion to sandwiches. ? Combine a ready-made pizza crust with low-fat mozzarella cheese and lots of vegetable toppings. Try using tomatoes, squash, spinach, broccoli, carrots, cauliflower, and onions. ? Have a variety of cut-up vegetables with a low-fat dip as an appetizer instead of chips and dip. ? Sprinkle sunflower seeds or chopped almonds over salads. Or try adding chopped walnuts or almonds to cooked vegetables. ? Try some vegetarian meals using beans and peas. Add garbanzo or kidney beans to salads. Make burritos and tacos with mashed johnson beans or black beans. Where can you learn more? Go to http://asya-chad.info/. Enter B226 in the search box to learn more about \"DASH Diet: Care Instructions. \"  Current as of: July 22, 2018  Content Version: 11.9  © 9065-6687 [employer].  Care instructions adapted under license by [employer] (which disclaims liability or warranty for this information). If you have questions about a medical condition or this instruction, always ask your healthcare professional. [address] any warranty or liability for your use of this information.

## 2019-03-15 ENCOUNTER — TELEPHONE (OUTPATIENT)
Dept: FAMILY MEDICINE CLINIC | Age: 84
End: 2019-03-15

## 2019-03-15 NOTE — TELEPHONE ENCOUNTER
----- Message from Geo Beckman sent at 3/15/2019  3:37 PM EDT -----  Regarding: Dr. Richard Barton  Pt daughter Melissa Winchester verifying that Rx for antibiotics has been sent to [address] [place] [address]. Best contact [phone].

## 2019-03-15 NOTE — TELEPHONE ENCOUNTER
Outbound call to patients daughter Vishnu Kate who is on PHI. No answer left message on name confirmed voicemail that RX was sent to the pharmacy [facility] and they were going to order for it to be there today.  Advised to contact Brittany Shanon

## 2019-03-20 RX ORDER — GLIPIZIDE 2.5 MG/1
2.5 TABLET, EXTENDED RELEASE ORAL DAILY
Qty: 90 TAB | Refills: 1 | Status: SHIPPED | OUTPATIENT
Start: 2019-03-20 | End: 2019-05-22

## 2019-03-22 ENCOUNTER — TELEPHONE (OUTPATIENT)
Dept: FAMILY MEDICINE CLINIC | Age: 84
End: 2019-03-22

## 2019-03-22 NOTE — TELEPHONE ENCOUNTER
----- Message from Luciana Pagan sent at 3/22/2019  7:36 AM EDT -----  Regarding: Dr. Liane Juan , daughter , would need a note stated that pt's medication has changed to \" Glyceride 2.5 mg\" and that she would  Need it Mondays , Wednesdays, and Fridays in the morning, so daughter can sent it to [facility]. Fax: [phone] Mrs. Ceja's callback:   [phone]

## 2019-03-22 NOTE — LETTER
3/22/2019 10:40 AM 
 
[facility] [address] [phone] To whom it may concern: 
  
Please give patient Irma Blum 08/09/1929 her diabetes medication Glipizide SR 2.5 mg  by mouth in morning on Monday, Wednesday, Friday and Check her blood sugar Monday, Wednesday, Friday in morning. If you have any question please don't hesitate to call us back. 
  
 
 
 
 
Sincerely, Anju Baxter MD

## 2019-03-23 DIAGNOSIS — G30.1 LATE ONSET ALZHEIMER'S DISEASE WITHOUT BEHAVIORAL DISTURBANCE (HCC): ICD-10-CM

## 2019-03-23 DIAGNOSIS — F02.80 LATE ONSET ALZHEIMER'S DISEASE WITHOUT BEHAVIORAL DISTURBANCE (HCC): ICD-10-CM

## 2019-03-24 RX ORDER — MEMANTINE HYDROCHLORIDE 10 MG/1
TABLET ORAL
Qty: 180 TAB | Refills: 1 | Status: SHIPPED | OUTPATIENT
Start: 2019-03-24 | End: 2019-11-03 | Stop reason: SDUPTHER

## 2019-03-25 DIAGNOSIS — E03.8 OTHER SPECIFIED HYPOTHYROIDISM: Primary | ICD-10-CM

## 2019-03-25 RX ORDER — LEVOTHYROXINE SODIUM 100 UG/1
100 TABLET ORAL
Qty: 90 TAB | Refills: 0 | Status: SHIPPED | OUTPATIENT
Start: 2019-03-25 | End: 2019-06-23 | Stop reason: SDUPTHER

## 2019-03-25 NOTE — PROGRESS NOTES
[phone] spoke to [facility] ([address]) Spoke to patient Identified pt with two pt identifiers (Name @ )  Notified Joni Kat of patient lab results and understand  Per Dr Hudson malone to order medication and labs

## 2019-03-25 NOTE — PROGRESS NOTES
1) increase synthroid to 100 mcg daily #30 2 refills  Recheck TSH in 6 weeks place future order for labs.     2) blood sugar under control    3) renal function is stable

## 2019-03-27 ENCOUNTER — TELEPHONE (OUTPATIENT)
Dept: FAMILY MEDICINE CLINIC | Age: 84
End: 2019-03-27

## 2019-03-27 NOTE — TELEPHONE ENCOUNTER
----- Message from Rg Mane sent at 3/27/2019  7:40 AM EDT -----  Regarding: Dr. Saud Prince pt's  with [facility],is calling for verbal orders for pt's diabetes medication. She stated doctor signature was no included. [phone].

## 2019-03-27 NOTE — TELEPHONE ENCOUNTER
[phone] spoke to [facility] per Dr Asuncion malone to give verbal order for patient glipizide SR [facility] understand

## 2019-03-29 ENCOUNTER — HOSPITAL ENCOUNTER (EMERGENCY)
Age: 84
Discharge: HOME | End: 2019-03-29
Attending: EMERGENCY MEDICINE | Admitting: EMERGENCY MEDICINE
Payer: MEDICARE

## 2019-03-29 ENCOUNTER — APPOINTMENT (OUTPATIENT)
Dept: GENERAL RADIOLOGY | Age: 84
End: 2019-03-29
Attending: EMERGENCY MEDICINE
Payer: MEDICARE

## 2019-03-29 VITALS
SYSTOLIC BLOOD PRESSURE: 167 MMHG | RESPIRATION RATE: 18 BRPM | HEIGHT: 64 IN | BODY MASS INDEX: 22.28 KG/M2 | OXYGEN SATURATION: 100 % | WEIGHT: 130.51 LBS | DIASTOLIC BLOOD PRESSURE: 64 MMHG | TEMPERATURE: 99.3 F | HEART RATE: 60 BPM

## 2019-03-29 DIAGNOSIS — R44.3 HALLUCINATIONS: ICD-10-CM

## 2019-03-29 DIAGNOSIS — E86.0 DEHYDRATION: Primary | ICD-10-CM

## 2019-03-29 LAB
ALBUMIN SERPL-MCNC: 3.3 G/DL (ref 3.5–5)
ALBUMIN/GLOB SERPL: 0.8 {RATIO} (ref 1.1–2.2)
ALP SERPL-CCNC: 109 U/L (ref 45–117)
ALT SERPL-CCNC: 29 U/L (ref 12–78)
ANION GAP SERPL CALC-SCNC: 7 MMOL/L (ref 5–15)
APPEARANCE UR: CLEAR
ARTERIAL PATENCY WRIST A: YES
AST SERPL-CCNC: 23 U/L (ref 15–37)
BACTERIA URNS QL MICRO: NEGATIVE /HPF
BASE DEFICIT BLD-SCNC: 4 MMOL/L
BASOPHILS # BLD: 0 K/UL (ref 0–0.1)
BASOPHILS NFR BLD: 0 % (ref 0–1)
BDY SITE: ABNORMAL
BILIRUB SERPL-MCNC: 0.3 MG/DL (ref 0.2–1)
BILIRUB UR QL: NEGATIVE
BUN SERPL-MCNC: 47 MG/DL (ref 6–20)
BUN/CREAT SERPL: 29 (ref 12–20)
CALCIUM SERPL-MCNC: 9.6 MG/DL (ref 8.5–10.1)
CHLORIDE SERPL-SCNC: 108 MMOL/L (ref 97–108)
CO2 SERPL-SCNC: 25 MMOL/L (ref 21–32)
COLOR UR: NORMAL
COMMENT, HOLDF: NORMAL
CREAT SERPL-MCNC: 1.63 MG/DL (ref 0.55–1.02)
DIFFERENTIAL METHOD BLD: ABNORMAL
EOSINOPHIL # BLD: 0.1 K/UL (ref 0–0.4)
EOSINOPHIL NFR BLD: 1 % (ref 0–7)
EPITH CASTS URNS QL MICRO: NORMAL /LPF
ERYTHROCYTE [DISTWIDTH] IN BLOOD BY AUTOMATED COUNT: 14.8 % (ref 11.5–14.5)
GAS FLOW.O2 O2 DELIVERY SYS: ABNORMAL L/MIN
GLOBULIN SER CALC-MCNC: 4 G/DL (ref 2–4)
GLUCOSE SERPL-MCNC: 142 MG/DL (ref 65–100)
GLUCOSE UR STRIP.AUTO-MCNC: NEGATIVE MG/DL
HCO3 BLD-SCNC: 21.1 MMOL/L (ref 22–26)
HCT VFR BLD AUTO: 42 % (ref 35–47)
HGB BLD-MCNC: 13.1 G/DL (ref 11.5–16)
HGB UR QL STRIP: NEGATIVE
HYALINE CASTS URNS QL MICRO: NORMAL /LPF (ref 0–5)
IMM GRANULOCYTES # BLD AUTO: 0 K/UL (ref 0–0.04)
IMM GRANULOCYTES NFR BLD AUTO: 1 % (ref 0–0.5)
KETONES UR QL STRIP.AUTO: NEGATIVE MG/DL
LEUKOCYTE ESTERASE UR QL STRIP.AUTO: NEGATIVE
LYMPHOCYTES # BLD: 3.5 K/UL (ref 0.8–3.5)
LYMPHOCYTES NFR BLD: 41 % (ref 12–49)
MAGNESIUM SERPL-MCNC: 2.4 MG/DL (ref 1.6–2.4)
MCH RBC QN AUTO: 28.1 PG (ref 26–34)
MCHC RBC AUTO-ENTMCNC: 31.2 G/DL (ref 30–36.5)
MCV RBC AUTO: 90.1 FL (ref 80–99)
MONOCYTES # BLD: 0.9 K/UL (ref 0–1)
MONOCYTES NFR BLD: 11 % (ref 5–13)
NEUTS SEG # BLD: 4 K/UL (ref 1.8–8)
NEUTS SEG NFR BLD: 46 % (ref 32–75)
NITRITE UR QL STRIP.AUTO: NEGATIVE
NRBC # BLD: 0 K/UL (ref 0–0.01)
NRBC BLD-RTO: 0 PER 100 WBC
O2/TOTAL GAS SETTING VFR VENT: 0.21 %
PCO2 BLD: 36.4 MMHG (ref 35–45)
PH BLD: 7.37 [PH] (ref 7.35–7.45)
PH UR STRIP: 5 [PH] (ref 5–8)
PLATELET # BLD AUTO: 227 K/UL (ref 150–400)
PMV BLD AUTO: 11 FL (ref 8.9–12.9)
PO2 BLD: 77 MMHG (ref 80–100)
POTASSIUM SERPL-SCNC: 4.2 MMOL/L (ref 3.5–5.1)
PROT SERPL-MCNC: 7.3 G/DL (ref 6.4–8.2)
PROT UR STRIP-MCNC: NEGATIVE MG/DL
RBC # BLD AUTO: 4.66 M/UL (ref 3.8–5.2)
RBC #/AREA URNS HPF: NORMAL /HPF (ref 0–5)
SAMPLES BEING HELD,HOLD: NORMAL
SAO2 % BLD: 95 % (ref 92–97)
SODIUM SERPL-SCNC: 140 MMOL/L (ref 136–145)
SP GR UR REFRACTOMETRY: 1.02 (ref 1–1.03)
SPECIMEN TYPE: ABNORMAL
UR CULT HOLD, URHOLD: NORMAL
UROBILINOGEN UR QL STRIP.AUTO: 0.2 EU/DL (ref 0.2–1)
WBC # BLD AUTO: 8.6 K/UL (ref 3.6–11)
WBC URNS QL MICRO: NORMAL /HPF (ref 0–4)

## 2019-03-29 PROCEDURE — 94762 N-INVAS EAR/PLS OXIMTRY CONT: CPT

## 2019-03-29 PROCEDURE — 83735 ASSAY OF MAGNESIUM: CPT

## 2019-03-29 PROCEDURE — 36600 WITHDRAWAL OF ARTERIAL BLOOD: CPT

## 2019-03-29 PROCEDURE — 81001 URINALYSIS AUTO W/SCOPE: CPT

## 2019-03-29 PROCEDURE — 71045 X-RAY EXAM CHEST 1 VIEW: CPT

## 2019-03-29 PROCEDURE — 99285 EMERGENCY DEPT VISIT HI MDM: CPT

## 2019-03-29 PROCEDURE — 96360 HYDRATION IV INFUSION INIT: CPT

## 2019-03-29 PROCEDURE — 93005 ELECTROCARDIOGRAM TRACING: CPT

## 2019-03-29 PROCEDURE — 99284 EMERGENCY DEPT VISIT MOD MDM: CPT

## 2019-03-29 PROCEDURE — 85025 COMPLETE CBC W/AUTO DIFF WBC: CPT

## 2019-03-29 PROCEDURE — 96361 HYDRATE IV INFUSION ADD-ON: CPT

## 2019-03-29 PROCEDURE — 77030011943

## 2019-03-29 PROCEDURE — 74011250636 HC RX REV CODE- 250/636: Performed by: EMERGENCY MEDICINE

## 2019-03-29 PROCEDURE — 80053 COMPREHEN METABOLIC PANEL: CPT

## 2019-03-29 PROCEDURE — 82803 BLOOD GASES ANY COMBINATION: CPT

## 2019-03-29 RX ADMIN — SODIUM CHLORIDE 1000 ML: 900 INJECTION, SOLUTION INTRAVENOUS at 21:50

## 2019-03-29 RX ADMIN — SODIUM CHLORIDE 1000 ML: 900 INJECTION, SOLUTION INTRAVENOUS at 20:42

## 2019-03-29 NOTE — ED TRIAGE NOTES
Daughter says she has been out of it. Not as alert, confused. She thinks it might be due to her COPD. She has not complained of shortness of breath.

## 2019-03-30 LAB
ATRIAL RATE: 69 BPM
CALCULATED P AXIS, ECG09: -16 DEGREES
CALCULATED R AXIS, ECG10: -21 DEGREES
CALCULATED T AXIS, ECG11: 114 DEGREES
DIAGNOSIS, 93000: NORMAL
P-R INTERVAL, ECG05: 138 MS
Q-T INTERVAL, ECG07: 414 MS
QRS DURATION, ECG06: 88 MS
QTC CALCULATION (BEZET), ECG08: 443 MS
VENTRICULAR RATE, ECG03: 69 BPM

## 2019-03-30 NOTE — ED PROVIDER NOTES
80 y.o. female with past medical history significant for unspecified hypothyroidism, lumbar compression fracture, anxiety state, osteopenia, dementia, hyperlipidemia, CKD, COPD, and depression who presents from home with chief complaint of lethargy. Pt's daughter reports Pt has been confused, fatigued, and lethargic for 2-3 weeks. accompanied by new cough. The daughter states she believes Pt may be hallucinating as she has often been speaking with others who aren't in her room. Per daughter, Pt has been eating well, but she has not been drinking fluids. Pt's daughter states she believes Pt's sx may be d/t COPD noting it is usually exacerbated ~3 times per year, but Pt does not c/o SOB. The daughter notes Pt recently finished a course of abx and prednisone w/ no changes to her sx. Her pulmonologist is Dr. Mariel Castellano. There are no other acute medical concerns at this time. PCP: Jack Hogan MD 
 
Full history, physical exam, and ROS unable to be obtained due to:  dementia. Note written by Bruna Wolff, as dictated by Domi Woodall MD 7:53 PM 
 
 
  
 
Past Medical History:  
Diagnosis Date  Anxiety state, unspecified  CKD (chronic kidney disease) stage 3, GFR 30-59 ml/min ([facility]) 4/6/2016  Dementia 6/12 Landa/npysch  Depression 9/20/2017  Generalized osteoarthrosis, unspecified site  Lumbar compression fracture ([facility] [address] 4/08  
 epidurals x 2  Umang/os  Osteopenia  Other and unspecified hyperlipidemia  Psoriasis  Unspecified essential hypertension  Unspecified hypothyroidism   
 thyroid irradiated i131 Past Surgical History:  
Procedure Laterality Date  COLONOSCOPY  10/10 [place]  
 left [place]  
 right Family History:  
Problem Relation Age of Onset  Cancer Mother   
     stomach tumors  Cancer Brother   
     prostate Social History Socioeconomic History  Marital status:  Spouse name: Not on file  Number of children: Not on file  Years of education: Not on file  Highest education level: Not on file Occupational History  Not on file Social Needs  Financial resource strain: Not on file  Food insecurity:  
  Worry: Not on file Inability: Not on file  Transportation needs:  
  Medical: Not on file Non-medical: Not on file Tobacco Use  Smoking status: Former Smoker Packs/day: 0.50 Years: 60.00 Pack years: 30.00 Types: Cigarettes Last attempt to quit:  Years since quittin.2  Smokeless tobacco: Never Used Substance and Sexual Activity  Alcohol use: No  
  Alcohol/week: 0.0 oz  Drug use: No  
 Sexual activity: Never Lifestyle  Physical activity:  
  Days per week: Not on file Minutes per session: Not on file  Stress: Not on file Relationships  Social connections:  
  Talks on phone: Not on file Gets together: Not on file Attends Yazdanism service: Not on file Active member of club or organization: Not on file Attends meetings of clubs or organizations: Not on file Relationship status: Not on file  Intimate partner violence:  
  Fear of current or ex partner: Not on file Emotionally abused: Not on file Physically abused: Not on file Forced sexual activity: Not on file Other Topics Concern [address] Service Not Asked  Blood Transfusions Not Asked  Caffeine Concern Not Asked  Occupational Exposure Not Asked [place] Hazards Not Asked  Sleep Concern Not Asked  Stress Concern Not Asked  Weight Concern Not Asked  Special Diet Not Asked  Back Care Not Asked  Exercise Yes Comment: enjoys working in the yard  Bike Helmet Not Asked  Seat Belt Not Asked  Self-Exams Not Asked Social History Narrative  Not on file ALLERGIES: Keflex [cephalexin]; Pcn [penicillins]; and Sulfa (sulfonamide antibiotics) Review of Systems Unable to perform ROS: Dementia Vitals:  
 03/29/19 1917 03/29/19 1929 03/29/19 1947 BP:  133/85 Pulse:  67 Resp:  16 Temp:   99.3 °F (37.4 °C) SpO2: 94% 95% Weight:  59.2 kg (130 lb 8.2 oz) Height:  5' 4\" (1.626 m) Physical Exam  
Constitutional: She appears well-developed and well-nourished. No distress. HENT:  
Head: Normocephalic and atraumatic. Eyes: Conjunctivae and EOM are normal.  
Neck: Normal range of motion. Neck supple. Cardiovascular: Normal rate, regular rhythm and normal heart sounds. Pulmonary/Chest: Effort normal and breath sounds normal. No respiratory distress. Abdominal: Soft. Bowel sounds are normal. She exhibits no distension. There is no tenderness. Musculoskeletal: Normal range of motion. Neurological: She is alert. Oriented to name only. Skin: Skin is warm and dry. Nursing note and vitals reviewed. Note written by Bruna Huggins, as dictated by Sergio Mitchell MD 7:53 PM 
 
MDM Procedures ED EKG interpretation: 
Rhythm: normal sinus rhythm; and regular . Rate (approx.): 70; Axis: normal; P wave: normal; QRS interval: normal ; ST/T wave: non-specific changes; in  Lead: III , aVF  and V1 ; Other findings: . This EKG was interpreted by Purnima Yen MD,ED Provider.  8:08 PM

## 2019-03-30 NOTE — PROGRESS NOTES
Attempted to obtain ABG, pt jerks her arm away continuously and shouts profanities towards me. Pt will not allow me to attempt stick again at this time.

## 2019-03-30 NOTE — DISCHARGE INSTRUCTIONS
Patient Education        Dehydration: Care Instructions  Your Care Instructions  Dehydration happens when your body loses too much fluid. This might happen when you do not drink enough water or you lose large amounts of fluids from your body because of diarrhea, vomiting, or sweating. Severe dehydration can be life-threatening. Water and minerals called electrolytes help put your body fluids back in balance. Learn the early signs of fluid loss, and drink more fluids to prevent dehydration. Follow-up care is a key part of your treatment and safety. Be sure to make and go to all appointments, and call your doctor if you are having problems. It's also a good idea to know your test results and keep a list of the medicines you take. How can you care for yourself at home? · To prevent dehydration, drink plenty of fluids, enough so that your urine is light yellow or clear like water. Choose water and other caffeine-free clear liquids until you feel better. If you have kidney, heart, or liver disease and have to limit fluids, talk with your doctor before you increase the amount of fluids you drink. · If you do not feel like eating or drinking, try taking small sips of water, sports drinks, or other rehydration drinks. · Get plenty of rest.  To prevent dehydration  · Add more fluids to your diet and daily routine, unless your doctor has told you not to. · During hot weather, drink more fluids. Drink even more fluids if you exercise a lot. Stay away from drinks with alcohol or caffeine. · Watch for the symptoms of dehydration. These include:  ? A dry, sticky mouth. ? Dark yellow urine, and not much of it. ? Dry and sunken eyes. ? Feeling very tired. · Learn what problems can lead to dehydration. These include:  ? Diarrhea, fever, and vomiting. ? Any illness with a fever, such as pneumonia or the flu. ?  Activities that cause heavy sweating, such as endurance races and heavy outdoor work in hot or humid weather. ? Alcohol or drug abuse or withdrawal.  ? Certain medicines, such as cold and allergy pills (antihistamines), diet pills (diuretics), and laxatives. ? Certain diseases, such as diabetes, cancer, and heart or kidney disease. When should you call for help? Call 911 anytime you think you may need emergency care. For example, call if:    · You passed out (lost consciousness).    Call your doctor now or seek immediate medical care if:    · You are confused and cannot think clearly.     · You are dizzy or lightheaded, or you feel like you may faint.     · You have signs of needing more fluids. You have sunken eyes and a dry mouth, and you pass only a little dark urine.     · You cannot keep fluids down.    Watch closely for changes in your health, and be sure to contact your doctor if:    · You are not making tears.     · Your skin is very dry and sags slowly back into place after you pinch it.     · Your mouth and eyes are very dry. Where can you learn more? Go to http://asya-chad.info/. Enter Y097 in the search box to learn more about \"Dehydration: Care Instructions. \"  Current as of: September 23, 2018  Content Version: 11.9  © 8656-3872 [employer]. Care instructions adapted under license by [employer] (which disclaims liability or warranty for this information). If you have questions about a medical condition or this instruction, always ask your healthcare professional. [place] [address] any warranty or liability for your use of this information.

## 2019-04-20 DIAGNOSIS — I10 ESSENTIAL HYPERTENSION WITH GOAL BLOOD PRESSURE LESS THAN 140/90: ICD-10-CM

## 2019-04-23 RX ORDER — AMLODIPINE BESYLATE 5 MG/1
TABLET ORAL
Qty: 90 TAB | Refills: 1 | Status: SHIPPED | OUTPATIENT
Start: 2019-04-23 | End: 2019-09-27 | Stop reason: SDUPTHER

## 2019-05-10 ENCOUNTER — TELEPHONE (OUTPATIENT)
Dept: FAMILY MEDICINE CLINIC | Age: 84
End: 2019-05-10

## 2019-05-10 NOTE — TELEPHONE ENCOUNTER
----- Message from Medina Jin sent at 5/10/2019  9:29 AM EDT -----  Regarding: Dr. Darien Yang / Telephone  Pt's mother needs to be seen for a f/u with Dr. Darien Yang as soon as possible and she prefers a evening appt. Best contact number is  [phone].         Outbound call made.  verified. Spoke with the patient daughter Maik Singh), Yuval  know Dr. Velazquez Drafts availability this month.  Tammy Burris accepted an appointment for 7/15/19, for a follow up.        Best callback:[phone]

## 2019-05-10 NOTE — TELEPHONE ENCOUNTER
----- Message from Tyler Sans sent at 5/10/2019  9:30 AM EDT -----  Regarding: Dr. Nhung Ambriz / Telephone  Contact: [phone]  Per daughter, Caridad Tinajero, pt needs to be seen for a f/u with Dr. Nhung Ambriz as soon as possible and she prefers a evening appt. Best contact number is [phone]. Outbound call made.  verified. Spoke with the patient daughter Desi Cottrell), Steffen Solis know Dr. Simón Caicedo availability this month. Virginia Brown accepted an appointment for 7/15/19, for a follow up.       Best [phone]

## 2019-05-20 ENCOUNTER — HOSPITAL ENCOUNTER (INPATIENT)
Age: 84
LOS: 2 days | Discharge: HOME | DRG: 315 | End: 2019-05-22
Attending: EMERGENCY MEDICINE | Admitting: FAMILY MEDICINE
Payer: MEDICARE

## 2019-05-20 ENCOUNTER — APPOINTMENT (OUTPATIENT)
Dept: CT IMAGING | Age: 84
DRG: 315 | End: 2019-05-20
Attending: EMERGENCY MEDICINE
Payer: MEDICARE

## 2019-05-20 ENCOUNTER — APPOINTMENT (OUTPATIENT)
Dept: GENERAL RADIOLOGY | Age: 84
DRG: 315 | End: 2019-05-20
Attending: EMERGENCY MEDICINE
Payer: MEDICARE

## 2019-05-20 DIAGNOSIS — R27.0 ATAXIA: ICD-10-CM

## 2019-05-20 DIAGNOSIS — R41.0 DISORIENTATION: Primary | ICD-10-CM

## 2019-05-20 DIAGNOSIS — I67.82 CEREBRAL ISCHEMIA: ICD-10-CM

## 2019-05-20 DIAGNOSIS — R26.9 GAIT DISTURBANCE: ICD-10-CM

## 2019-05-20 PROBLEM — R41.82 ALTERED MENTAL STATUS: Status: ACTIVE | Noted: 2019-05-20

## 2019-05-20 LAB
ALBUMIN SERPL-MCNC: 3.3 G/DL (ref 3.5–5)
ALBUMIN/GLOB SERPL: 0.9 {RATIO} (ref 1.1–2.2)
ALP SERPL-CCNC: 99 U/L (ref 45–117)
ALT SERPL-CCNC: 30 U/L (ref 12–78)
ANION GAP SERPL CALC-SCNC: 7 MMOL/L (ref 5–15)
APPEARANCE UR: CLEAR
AST SERPL-CCNC: 25 U/L (ref 15–37)
BACTERIA URNS QL MICRO: NEGATIVE /HPF
BASOPHILS # BLD: 0 K/UL (ref 0–0.1)
BASOPHILS NFR BLD: 0 % (ref 0–1)
BILIRUB SERPL-MCNC: 0.2 MG/DL (ref 0.2–1)
BILIRUB UR QL: NEGATIVE
BUN SERPL-MCNC: 42 MG/DL (ref 6–20)
BUN/CREAT SERPL: 36 (ref 12–20)
CALCIUM SERPL-MCNC: 9.3 MG/DL (ref 8.5–10.1)
CHLORIDE SERPL-SCNC: 111 MMOL/L (ref 97–108)
CO2 SERPL-SCNC: 23 MMOL/L (ref 21–32)
COLOR UR: ABNORMAL
CREAT SERPL-MCNC: 1.17 MG/DL (ref 0.55–1.02)
DIFFERENTIAL METHOD BLD: NORMAL
EOSINOPHIL # BLD: 0.1 K/UL (ref 0–0.4)
EOSINOPHIL NFR BLD: 2 % (ref 0–7)
EPITH CASTS URNS QL MICRO: ABNORMAL /LPF
ERYTHROCYTE [DISTWIDTH] IN BLOOD BY AUTOMATED COUNT: 14.3 % (ref 11.5–14.5)
GLOBULIN SER CALC-MCNC: 3.5 G/DL (ref 2–4)
GLUCOSE BLD STRIP.AUTO-MCNC: 136 MG/DL (ref 65–100)
GLUCOSE SERPL-MCNC: 77 MG/DL (ref 65–100)
GLUCOSE UR STRIP.AUTO-MCNC: NEGATIVE MG/DL
HCT VFR BLD AUTO: 39.6 % (ref 35–47)
HGB BLD-MCNC: 12.4 G/DL (ref 11.5–16)
HGB UR QL STRIP: NEGATIVE
HYALINE CASTS URNS QL MICRO: ABNORMAL /LPF (ref 0–5)
IMM GRANULOCYTES # BLD AUTO: 0 K/UL (ref 0–0.04)
IMM GRANULOCYTES NFR BLD AUTO: 0 % (ref 0–0.5)
KETONES UR QL STRIP.AUTO: NEGATIVE MG/DL
LACTATE BLD-SCNC: 0.9 MMOL/L (ref 0.4–2)
LEUKOCYTE ESTERASE UR QL STRIP.AUTO: NEGATIVE
LYMPHOCYTES # BLD: 3.3 K/UL (ref 0.8–3.5)
LYMPHOCYTES NFR BLD: 49 % (ref 12–49)
MCH RBC QN AUTO: 28.6 PG (ref 26–34)
MCHC RBC AUTO-ENTMCNC: 31.3 G/DL (ref 30–36.5)
MCV RBC AUTO: 91.2 FL (ref 80–99)
MONOCYTES # BLD: 0.8 K/UL (ref 0–1)
MONOCYTES NFR BLD: 12 % (ref 5–13)
NEUTS SEG # BLD: 2.5 K/UL (ref 1.8–8)
NEUTS SEG NFR BLD: 37 % (ref 32–75)
NITRITE UR QL STRIP.AUTO: NEGATIVE
NRBC # BLD: 0 K/UL (ref 0–0.01)
NRBC BLD-RTO: 0 PER 100 WBC
PH UR STRIP: 5.5 [PH] (ref 5–8)
PLATELET # BLD AUTO: 173 K/UL (ref 150–400)
PMV BLD AUTO: 11.4 FL (ref 8.9–12.9)
POTASSIUM SERPL-SCNC: 4.6 MMOL/L (ref 3.5–5.1)
PROT SERPL-MCNC: 6.8 G/DL (ref 6.4–8.2)
PROT UR STRIP-MCNC: NEGATIVE MG/DL
RBC # BLD AUTO: 4.34 M/UL (ref 3.8–5.2)
RBC #/AREA URNS HPF: ABNORMAL /HPF (ref 0–5)
SERVICE CMNT-IMP: ABNORMAL
SODIUM SERPL-SCNC: 141 MMOL/L (ref 136–145)
SP GR UR REFRACTOMETRY: 1.02 (ref 1–1.03)
TSH SERPL DL<=0.05 MIU/L-ACNC: 0.71 UIU/ML (ref 0.36–3.74)
UR CULT HOLD, URHOLD: NORMAL
UROBILINOGEN UR QL STRIP.AUTO: 0.2 EU/DL (ref 0.2–1)
WBC # BLD AUTO: 6.8 K/UL (ref 3.6–11)
WBC URNS QL MICRO: ABNORMAL /HPF (ref 0–4)

## 2019-05-20 PROCEDURE — 36415 COLL VENOUS BLD VENIPUNCTURE: CPT

## 2019-05-20 PROCEDURE — 82962 GLUCOSE BLOOD TEST: CPT

## 2019-05-20 PROCEDURE — 83605 ASSAY OF LACTIC ACID: CPT

## 2019-05-20 PROCEDURE — 70450 CT HEAD/BRAIN W/O DYE: CPT

## 2019-05-20 PROCEDURE — 71045 X-RAY EXAM CHEST 1 VIEW: CPT

## 2019-05-20 PROCEDURE — 77030011943

## 2019-05-20 PROCEDURE — 81001 URINALYSIS AUTO W/SCOPE: CPT

## 2019-05-20 PROCEDURE — 85025 COMPLETE CBC W/AUTO DIFF WBC: CPT

## 2019-05-20 PROCEDURE — 80053 COMPREHEN METABOLIC PANEL: CPT

## 2019-05-20 PROCEDURE — 99285 EMERGENCY DEPT VISIT HI MDM: CPT

## 2019-05-20 PROCEDURE — 84443 ASSAY THYROID STIM HORMONE: CPT

## 2019-05-20 PROCEDURE — 65660000000 HC RM CCU STEPDOWN

## 2019-05-20 RX ORDER — FUROSEMIDE 40 MG/1
40 TABLET ORAL
COMMUNITY
End: 2019-05-22

## 2019-05-20 RX ORDER — SODIUM CHLORIDE 9 MG/ML
75 INJECTION, SOLUTION INTRAVENOUS CONTINUOUS
Status: DISCONTINUED | OUTPATIENT
Start: 2019-05-20 | End: 2019-05-22 | Stop reason: HOSPADM

## 2019-05-20 RX ORDER — MONTELUKAST SODIUM 10 MG/1
10 TABLET ORAL DAILY
COMMUNITY
End: 2019-05-22

## 2019-05-20 RX ORDER — ERGOCALCIFEROL 1.25 MG/1
50000 CAPSULE ORAL
COMMUNITY
End: 2020-01-22

## 2019-05-20 NOTE — PROGRESS NOTES
Spiritual Care Assessment/Progress Note [address] 
 
 
NAME: Carmela Peabody      MRN: [id number] AGE: 80 y.o. SEX: female Caodaism Affiliation: Jhonny Ashley Language: [place] 5/20/2019     Total Time (in minutes): 11 Spiritual Assessment begun in [address] [facility] [address] DEP through conversation with: 
  
    []Patient        [] Family    [] Friend(s) Reason for Consult: Other (comment)(Code Stroke) Spiritual beliefs: (Please include comment if needed) 
   [] Identifies with a corinne tradition:     
   [] Supported by a corinne community:        
   [] Claims no spiritual orientation:       
   [] Seeking spiritual identity:            
   [] Adheres to an individual form of spirituality:       
   [x] Not able to assess:                   
 
    
Identified resources for coping:  
   [] Prayer                           
   [] Music                  [] Guided Imagery 
   [] Family/friends                 [] Pet visits [] Devotional reading                         [x] Unknown 
   [] Other:                                          
 
 
Interventions offered during this visit: (See comments for more details) Plan of Care: 
 
 [] Support spiritual and/or cultural needs  
 [] Support AMD and/or advance care planning process    
 [] Support grieving process 
 [] Coordinate Rites and/or Rituals  
 [] Coordination with community clergy [] No spiritual needs identified at this time 
 [] Detailed Plan of Care below (See Comments)  [] Make referral to Music Therapy 
[] Make referral to Pet Therapy    
[] Make referral to Addiction services 
[] Make referral to [facility] 
[] Make referral to Spiritual Care Partner 
[] No future visits requested       
[x] Follow up visits as needed Comments:  responded to Code Stroke in Emergency Room (ER) triage. Patient being taken for CT scan.   Was told that daughter brought patient to ER, but daughter not present at this time. Patient will return from CT to room 3/4, daughter not present in room 3/4. Will continue to follow up as needed and upon request as able. Visited by Rev. Ramon Theodore, [facility] paging service: [phone])

## 2019-05-20 NOTE — ED TRIAGE NOTES
Patient arrives with daughter from adult day care for confusion and difficulty ambulating with her RIGHT leg. Daughter reports she was at her baseline this morning and able to walk fine. LKW 0600. Patient to CT. Code S leverl 2 called in triage.

## 2019-05-20 NOTE — ED PROVIDER NOTES
80 y.o. female with past medical history significant for hypothyroidism, essential HTN, anxiety state, osteoarthrosis, lumbar compression fracture, osteopenia, dementia, hyperlipidemia, and CKD who presents from home with chief complaint of AMS. Pt's daughter reports Pt was acting normal when she was dropped off at adult day care at 0600 this morning, but she slept most of the day which is unusual, and Pt had new R leg weakness w/ increased confusion when the daughter picked her back up ~1 hour ago. Per daughter, Pt was hallucinating last night, but she seemed to be at her baseline this morning. Pt's daughter denies fever, chills, N/V/D, urine changes, and appetite change. There are no other acute medical concerns at this time. PCP: Aggie Mcdonald MD    Full history, physical exam, and ROS unable to be obtained due to:  dementia.     Note written by Bruna Lizama, as dictated by Kaylyn Tavares MD 4:45 PM           Past Medical History:   Diagnosis Date    Anxiety state, unspecified     CKD (chronic kidney disease) stage 3, GFR 30-59 ml/min ([address] 4/6/2016    Dementia 6/12    Landa/npysch    Depression 9/20/2017    Generalized osteoarthrosis, unspecified site     Lumbar compression fracture ([address] 4/08    epidurals x 2  Umang/os    Osteopenia     Other and unspecified hyperlipidemia     Psoriasis     Unspecified essential hypertension     Unspecified hypothyroidism     thyroid irradiated i131       Past Surgical History:   Procedure Laterality Date    [place]  10/10    [place]/gi    TOTAL KNEE ARTHROPLASTY  1992    left    TOTAL KNEE ARTHROPLASTY  1992    right         Family History:   Problem Relation Age of Onset    Cancer Mother         stomach tumors    Cancer Brother         prostate       Social History     Socioeconomic History    Marital status:      Spouse name: Not on file    Number of children: Not on file    Years of education: Not on file    Highest education level: Not on file   Occupational History    Not on file   Social Needs    Financial resource strain: Not on file    Food insecurity:     Worry: Not on file     Inability: Not on file    Transportation needs:     Medical: Not on file     Non-medical: Not on file   Tobacco Use    Smoking status: Former Smoker     Packs/day: 0.50     Years: 60.00     Pack years: 30.00     Types: Cigarettes     Last attempt to quit:      Years since quittin.3    Smokeless tobacco: Never Used   Substance and Sexual Activity    Alcohol use: No     Alcohol/week: 0.0 oz    Drug use: No    Sexual activity: Never   Lifestyle    Physical activity:     Days per week: Not on file     Minutes per session: Not on file    Stress: Not on file   Relationships    Social connections:     Talks on phone: Not on file     Gets together: Not on file     Attends Anabaptist service: Not on file     Active member of club or organization: Not on file     Attends meetings of clubs or organizations: Not on file     Relationship status: Not on file    Intimate partner violence:     Fear of current or ex partner: Not on file     Emotionally abused: Not on file     Physically abused: Not on file     Forced sexual activity: Not on file   Other Topics Concern     Service Not Asked    Blood Transfusions Not Asked    Caffeine Concern Not Asked    Occupational Exposure Not Asked   Hugh Peeling Hazards Not Asked    Sleep Concern Not Asked    Stress Concern Not Asked    Weight Concern Not Asked    Special Diet Not Asked    Back Care Not Asked    Exercise Yes     Comment: enjoys working in the yard   [employer] Not Asked    [facility] Not Asked    Self-Exams Not Asked   Social History Narrative    Not on file         ALLERGIES: Keflex [cephalexin];  Pcn [penicillins]; and Sulfa (sulfonamide antibiotics)    Review of Systems   Unable to perform ROS: Dementia       Vitals:    19 1624   BP: 125/65   Pulse: 89   Resp: 16   SpO2: 99% Physical Exam   Constitutional: She appears well-developed and well-nourished. No distress. HENT:   Head: Normocephalic and atraumatic. Nose: Nose normal.   Mouth/Throat: Oropharynx is clear and moist.   Eyes: Pupils are equal, round, and reactive to light. Conjunctivae and EOM are normal. No scleral icterus. Neck: Normal range of motion. Neck supple. No JVD present. No tracheal deviation present. No thyromegaly present. No carotid bruits noted. Cardiovascular: Normal rate, regular rhythm, normal heart sounds and intact distal pulses. Exam reveals no gallop and no friction rub. No murmur heard. Pulmonary/Chest: Effort normal and breath sounds normal. No respiratory distress. She has no wheezes. She has no rales. She exhibits no tenderness. Abdominal: Soft. Bowel sounds are normal. She exhibits no distension and no mass. There is no tenderness. There is no rebound and no guarding. Musculoskeletal: Normal range of motion. She exhibits no edema or tenderness. Lymphadenopathy:     She has no cervical adenopathy. Neurological: She is alert. She has normal reflexes. No cranial nerve deficit. Coordination normal.   Pt seems to be confused w/ hx of dementia. No focal neurological deficit. Pt was able to move all extremities, but she became frustrated with examination and did not cooperate with moving her L leg. Skin: Skin is warm and dry. No rash noted. No erythema. Nursing note and vitals reviewed.   Note written by Bruna Pryor, as dictated by Neeru Gilmore MD 4:45 PM    MDM  Number of Diagnoses or Management Options  Ataxia: new and requires workup  Disorientation: new and requires workup     Amount and/or Complexity of Data Reviewed  Clinical lab tests: ordered and reviewed  Tests in the radiology section of CPT®: ordered and reviewed  Decide to obtain previous medical records or to obtain history from someone other than the patient: yes  Obtain history from someone other than the patient: yes  Review and summarize past medical records: yes  Discuss the patient with other providers: yes  Independent visualization of images, tracings, or specimens: yes    Risk of Complications, Morbidity, and/or Mortality  Presenting problems: high  Diagnostic procedures: high  Management options: high    Patient Progress  Patient progress: stable         Procedures      CONSULT NOTE:  4:42 PM Jo-Ann Hancock MD spoke with Dr. Barney Phillip, Consult for Teleneurology. Discussed available diagnostic tests and clinical findings. Dr. Barney Phillip will evaluate Pt. CONSULT NOTE:  5:30 PM Jo-Ann Hancock MD spoke with Dr. Barney Phillip, Consult for Teleneurology. Dr. Barney Phillip recommends Pt is not a TPA candidate. PROGRESS NOTE:  5:35 PM  Jo-Ann Hancock MD plans to r/o infectious etiology. PROGRESS NOTE:  7:53 PM  CXR, WBC count, and lactate are normal. Awaiting electrolytes and UA. Hospitalist Andrews for Admission  9:45 PM    ED Room Number: HB89/80  Patient Name and age: Amaya Harman 80 y.o.  female  Working Diagnosis:   1. Disorientation    2.  Ataxia      Readmission: no  Isolation Requirements:  no  Recommended Level of Care:  med/surg  Code Status:  full

## 2019-05-20 NOTE — ED TRIAGE NOTES
1632: Pt in [address] with RN    3631: MD at bedside, pt's daughter stated pt was also hallucinating last night, has hx of dementia    1648: Mat Frame at bedside. Pt's daughter states she assists with pt's ADLs and is not continent    1700: Pt does not tolerate BP readings and proceeds to yell and move about in bed, Dr Austyn Walker does not feel stroke work up is necessary, suggests taking BP less frequently d/t reaction of pt.  Pt follows some commands but states \"I am doing it\" or \"I don't want to do that\" when asked to do NIH assessment questions    8639: No change in mentation, pt will not tolerate BP cuff    2015: Pt straight cathed with 4 RNs to assist

## 2019-05-21 ENCOUNTER — APPOINTMENT (OUTPATIENT)
Dept: VASCULAR SURGERY | Age: 84
DRG: 315 | End: 2019-05-21
Attending: PSYCHIATRY & NEUROLOGY
Payer: MEDICARE

## 2019-05-21 ENCOUNTER — APPOINTMENT (OUTPATIENT)
Dept: MRI IMAGING | Age: 84
DRG: 315 | End: 2019-05-21
Attending: PSYCHIATRY & NEUROLOGY
Payer: MEDICARE

## 2019-05-21 LAB
AMMONIA PLAS-SCNC: 12 UMOL/L
ANION GAP SERPL CALC-SCNC: 6 MMOL/L (ref 5–15)
BASOPHILS # BLD: 0 K/UL (ref 0–0.1)
BASOPHILS NFR BLD: 0 % (ref 0–1)
BUN SERPL-MCNC: 31 MG/DL (ref 6–20)
BUN/CREAT SERPL: 28 (ref 12–20)
CALCIUM SERPL-MCNC: 8.7 MG/DL (ref 8.5–10.1)
CHLORIDE SERPL-SCNC: 111 MMOL/L (ref 97–108)
CO2 SERPL-SCNC: 28 MMOL/L (ref 21–32)
CREAT SERPL-MCNC: 1.09 MG/DL (ref 0.55–1.02)
DIFFERENTIAL METHOD BLD: NORMAL
EOSINOPHIL # BLD: 0.1 K/UL (ref 0–0.4)
EOSINOPHIL NFR BLD: 2 % (ref 0–7)
ERYTHROCYTE [DISTWIDTH] IN BLOOD BY AUTOMATED COUNT: 14.5 % (ref 11.5–14.5)
GLUCOSE SERPL-MCNC: 83 MG/DL (ref 65–100)
HCT VFR BLD AUTO: 39.6 % (ref 35–47)
HGB BLD-MCNC: 12.5 G/DL (ref 11.5–16)
IMM GRANULOCYTES # BLD AUTO: 0 K/UL (ref 0–0.04)
IMM GRANULOCYTES NFR BLD AUTO: 0 % (ref 0–0.5)
LYMPHOCYTES # BLD: 3.2 K/UL (ref 0.8–3.5)
LYMPHOCYTES NFR BLD: 41 % (ref 12–49)
MAGNESIUM SERPL-MCNC: 2.5 MG/DL (ref 1.6–2.4)
MCH RBC QN AUTO: 28.7 PG (ref 26–34)
MCHC RBC AUTO-ENTMCNC: 31.6 G/DL (ref 30–36.5)
MCV RBC AUTO: 90.8 FL (ref 80–99)
MONOCYTES # BLD: 0.9 K/UL (ref 0–1)
MONOCYTES NFR BLD: 11 % (ref 5–13)
NEUTS SEG # BLD: 3.5 K/UL (ref 1.8–8)
NEUTS SEG NFR BLD: 46 % (ref 32–75)
NRBC # BLD: 0 K/UL (ref 0–0.01)
NRBC BLD-RTO: 0 PER 100 WBC
PHOSPHATE SERPL-MCNC: 3.2 MG/DL (ref 2.6–4.7)
PLATELET # BLD AUTO: 174 K/UL (ref 150–400)
PMV BLD AUTO: 11.3 FL (ref 8.9–12.9)
POTASSIUM SERPL-SCNC: 4.2 MMOL/L (ref 3.5–5.1)
RBC # BLD AUTO: 4.36 M/UL (ref 3.8–5.2)
SODIUM SERPL-SCNC: 145 MMOL/L (ref 136–145)
WBC # BLD AUTO: 7.7 K/UL (ref 3.6–11)

## 2019-05-21 PROCEDURE — 74011250636 HC RX REV CODE- 250/636: Performed by: FAMILY MEDICINE

## 2019-05-21 PROCEDURE — 36415 COLL VENOUS BLD VENIPUNCTURE: CPT

## 2019-05-21 PROCEDURE — 65660000000 HC RM CCU STEPDOWN

## 2019-05-21 PROCEDURE — 70544 MR ANGIOGRAPHY HEAD W/O DYE: CPT

## 2019-05-21 PROCEDURE — 70551 MRI BRAIN STEM W/O DYE: CPT

## 2019-05-21 PROCEDURE — 85025 COMPLETE CBC W/AUTO DIFF WBC: CPT

## 2019-05-21 PROCEDURE — 93880 EXTRACRANIAL BILAT STUDY: CPT

## 2019-05-21 PROCEDURE — 82140 ASSAY OF AMMONIA: CPT

## 2019-05-21 PROCEDURE — 74011250637 HC RX REV CODE- 250/637: Performed by: PSYCHIATRY & NEUROLOGY

## 2019-05-21 PROCEDURE — 84100 ASSAY OF PHOSPHORUS: CPT

## 2019-05-21 PROCEDURE — 80048 BASIC METABOLIC PNL TOTAL CA: CPT

## 2019-05-21 PROCEDURE — 83735 ASSAY OF MAGNESIUM: CPT

## 2019-05-21 RX ORDER — ONDANSETRON 4 MG/1
4 TABLET, ORALLY DISINTEGRATING ORAL
Status: DISCONTINUED | OUTPATIENT
Start: 2019-05-21 | End: 2019-05-22 | Stop reason: HOSPADM

## 2019-05-21 RX ORDER — GUAIFENESIN 100 MG/5ML
81 LIQUID (ML) ORAL DAILY
Status: DISCONTINUED | OUTPATIENT
Start: 2019-05-21 | End: 2019-05-22 | Stop reason: HOSPADM

## 2019-05-21 RX ORDER — SODIUM CHLORIDE 0.9 % (FLUSH) 0.9 %
5-40 SYRINGE (ML) INJECTION AS NEEDED
Status: DISCONTINUED | OUTPATIENT
Start: 2019-05-21 | End: 2019-05-22 | Stop reason: HOSPADM

## 2019-05-21 RX ORDER — SODIUM CHLORIDE 0.9 % (FLUSH) 0.9 %
5-40 SYRINGE (ML) INJECTION EVERY 8 HOURS
Status: DISCONTINUED | OUTPATIENT
Start: 2019-05-21 | End: 2019-05-22 | Stop reason: HOSPADM

## 2019-05-21 RX ADMIN — Medication 10 ML: at 21:41

## 2019-05-21 RX ADMIN — ASPIRIN 81 MG CHEWABLE TABLET 81 MG: 81 TABLET CHEWABLE at 13:00

## 2019-05-21 RX ADMIN — SODIUM CHLORIDE 75 ML/HR: 900 INJECTION, SOLUTION INTRAVENOUS at 02:51

## 2019-05-21 RX ADMIN — Medication 10 ML: at 14:00

## 2019-05-21 RX ADMIN — Medication 10 ML: at 02:50

## 2019-05-21 NOTE — ROUTINE PROCESS
TRANSFER - OUT REPORT: 
 
Verbal report given to SIMON Veloz(name) on Rodrick Irizarry  being transferred to [facility] for routine progression of care Report consisted of patients Situation, Background, Assessment and  
Recommendations(SBAR). Information from the following report(s) SBAR, ED Summary, Intake/Output, MAR, Recent Results and Cardiac Rhythm NSR was reviewed with the receiving nurse. Lines:  
Peripheral IV 05/21/19 Right Wrist (Active) Site Assessment Clean, dry, & intact 5/21/2019 12:01 AM  
Phlebitis Assessment 0 5/21/2019 12:01 AM  
Infiltration Assessment 0 5/21/2019 12:01 AM  
Dressing Status Clean, dry, & intact 5/21/2019 12:01 AM  
Dressing Type Transparent 5/21/2019 12:01 AM  
Hub Color/Line Status Pink;Patent; Flushed 5/21/2019 12:01 AM  
   
Peripheral IV 05/21/19 Left Antecubital (Active) Site Assessment Clean, dry, & intact 5/21/2019  6:41 AM  
Phlebitis Assessment 0 5/21/2019  6:41 AM  
Infiltration Assessment 0 5/21/2019  6:41 AM  
Dressing Status Clean, dry, & intact 5/21/2019  6:41 AM  
Dressing Type Transparent 5/21/2019  6:41 AM  
Hub Color/Line Status Pink;Patent; Flushed 5/21/2019  6:41 AM  
Action Taken Blood drawn 5/21/2019  6:41 AM  
  
 
Opportunity for questions and clarification was provided. Patient transported with: 
 Tech and monitor

## 2019-05-21 NOTE — ED NOTES
0000 Assumed care of pt at this time. Verbal shift change report given to [place], RN (oncoming nurse) by Linda Storm RN (offgoing nurse). Report included the following information SBAR, ED Summary, Intake/Output, MAR, Recent Results and Cardiac Rhythm NSR. Family at bedside. VSS. Call bell within reach. Will continue to monitor and assess.

## 2019-05-21 NOTE — ROUTINE PROCESS
Bedside and Verbal shift change report given to Joe Pang RN  (oncoming nurse) by Chelsie Winston RN  (offgoing nurse). Report included the following information SBAR, Kardex, ED Summary, Procedure Summary, Intake/Output, MAR, Recent Results, Med Rec Status, Cardiac Rhythm SB. and Alarm Parameters .

## 2019-05-21 NOTE — H&P
[address] [facility]  HISTORY AND PHYSICAL     Name:  Kristi Noel  MR#:  [id number]  :  1929  ADMIT DATE:  2019     CHIEF COMPLAINT:  The patient unable to provide.     HISTORY OF PRESENT ILLNESS:  A 70-year-old white female with past medical history of dementia, hypothyroidism, anxiety, chronic kidney disease, depression, osteopenia, lumbar compression fractures, psoriasis, essential hypertension, presented to the emergency department from home with reported altered mental status. The patient is a poor historian. She is accompanied by her daughter, who is the medical power of  and provides additional history. The remainder of the history was obtained from review of ED. The patient reportedly was at her baseline mental status when she was transferred to the adult  center yesterday at 0600 hours. According to the report, the patient slept most of the day, which was unusual.  She reportedly had new onset of right leg weakness along with increased confusion. When her daughter picked the patient up later in the day, the patient was notably confused. She also had been having some hallucinations the night before. At baseline, the patient uses a wheelchair and has a walker, but even with the same she is unsteady. Upon arrival at [facility] ED, initial recorded vital signs, blood pressure 125/65, heart rate 89, respiratory rate 16, O2 saturation 99% on room air. A workup included CT code neuro of the head without contrast showed no acute intracranial abnormalities. The patient's labs showed elevated BUN of 42 and creatinine of 1.17. The patient is now seen for admission to the hospitalist service for continued evaluation and treatment. There were no reports that the patient had any recent falls, head or neck trauma, visual disturbance, new onset slurred speech, facial droop.   No reports of chest pain, shortness of breath, palpitations, abdominal pain, nausea, vomiting, diarrhea, melena, dysuria, hematuria, calf pain, swelling, edema, fever, chills, or rash.     PAST MEDICAL HISTORY:  1. Anxiety. 2.  Chronic kidney disease. 3.  Dementia. 4.  Depression. 5.  Osteopenia. 6.  Lumbar compression fracture. 7.  Psoriasis. 8.  Essential hypertension. 9.  Hypothyroidism. 10.  Gait abnormality. 11.  General debility.     PAST SURGICAL HISTORY:  1. Colonoscopy. 2.  Left total knee arthroplasty. 3.  Right total knee arthroplasty.     ALLERGIES:  KEFLEX, PENICILLIN, SULFA DRUGS.     CURRENT MEDICATIONS:  Medication list reviewed and noted on chart records.     SOCIAL HISTORY:  Former smoker before but quit in 2014. No reports of alcohol or illicit drugs. .     FAMILY HISTORY:  Stomach cancer in mother and prostate cancer in brother.     REVIEW OF SYSTEMS:  Unable to obtain complete 12-system review as the patient is not answering all questions appropriately.     PHYSICAL EXAMINATION:  GENERAL:  The patient is an elderly female, in no acute respiratory distress. VITAL SIGNS:  Temperature is 98.4 degrees Fahrenheit, blood pressure 119/53, heart rate 54, respiratory rate of 20, O2 saturation 97% on room air. PSYCH:  The patient is awake, alert, oriented to name only, otherwise confused. NEUROLOGIC:  GCS of 13, best response, E4, V3, M6, with spontaneous opening, confused speech, follows some but not all commands. Moves extremities x4 with generalized weakness. Sensation grossly decreased in the plantar surface of both feet with dysarthric but not slurred speech, no facial droop. Does not follow commands well enough to check for pronator drift. HEENT:  Normocephalic, atraumatic. PERRLA, EOMs intact. Sclerae anicteric. Conjunctivae clear. Nares are patent. Pharynx:  Tongue is midline, not edematous. NECK:  Supple without lymphadenopathy, JVD, carotid bruits, or thyromegaly.   LYMPHATIC:  Negative for cervical or supraclavicular adenopathy. RESPIRATORY/LUNGS:  Clear to auscultation bilaterally. CVS/HEART:  Bradycardic, regular rhythm. No murmurs, rubs, or gallops. GI:  Abdomen is soft, nontender,  and nondistended. Normoactive bowel sounds. No rebound, guarding, or rigidity. No auscultated abdominal bruits. No palpable abdominal mass. BACK:  No CVA tenderness. No step-off deformity. MUSCULOSKELETAL:  No acute palpable bony deformity. Negative calf tenderness. VASCULAR:  A 2+ radial to 1+ dorsalis pedis pulses bilaterally without cyanosis, clubbing, or edema. SKIN:  Warm and dry.     LABORATORY DATA:  Reviewed as follows:  Sodium is 141, potassium is 4.6, chloride 111, CO2 of 23, BUN of 42, creatinine 1.17, glucose 77, anion gap of 7, calcium of 9.3, GFR 44, total bilirubin 0.2, total protein 6.8, albumin 3.3, ALT of 30, AST of 25, alkaline phosphatase 99. WBC 6.8, hemoglobin 12.4, hematocrit of 39.6, platelets of 998, neutrophils 37%. Urinalysis, leukocyte esterase negative, nitrites negative, urobilinogen is 0.2, bilirubin negative, blood negative, ketones negative, glucose negative, protein negative, pH of 5.5, specific gravity of 1.016, wbc's 0 to 4, rbc's 0 to 5, bacteria negative. CT code neuro of the head without contrast, no acute intracranial abnormalities or atherosclerosis, microvascular disease and age-related volume loss. Chest x-ray portable, no evidence for pneumonia.     IMPRESSION AND PLAN:  1. Altered mental status. May be due to progression of underlying dementia, possible metabolic encephalopathy with noted dehydration. Treat underlying factors. Continue to monitor closely with neurovascular checks and fall precautions. Consider for neurologist's consultation today. 2.  Hallucinations. Currently resolved. Continue workup and plan as noted above. 3.  Right leg weakness. The patient has generalized but not focal weakness on current exam.  Follow up with physical therapy.   4.  Gait abnormality with general debility. Plan as noted above. 5.  Anxiety and depression. Plan as noted and resume home medications. 6.  Dehydration. Order IV fluids for hydration. Repeat renal panel in a.m.  7.  Venous thromboembolism prophylaxis.   Sequential compression devices to lower extremities.     CODE STATUS:  FULL CODE (FOR NOW AS DISCUSSED WITH PATIENT'S DAUGHTER WHO IS NOTEDLY PATIENT'S POWER OF ).     Monse Mcbride MD

## 2019-05-21 NOTE — PROGRESS NOTES
Admission Medication Reconciliation:    Information obtained from:  daughter, Chris Pedraza and Chart Review, Medication list    Comments/Recommendations: Updated PTA meds/reviewed patient's allergies. Daughter provided a medication list (dated 10/9/18) that was used. Of note the medication list was NOT updated, and daughter had to provide information. There were a few medications that were not able to verify. Informed daughter to update her medication list.    1) Medication changes (since last review): Added  -Singular    Adjusted  - Lasix  -Vit D2 dose day    Removed  - Misc. medical supplies (nebulizer, inhalation spacing device. Leo Keel etc)  -Trelegy Ellipta    2) Unable to verify Imipramine, Lisinopril and amlodipine,  Per daughter, patient is taking one BP meds,     3) Per daughter, patient had her more meds today. Daughter denies any recent changes to PTA medication list (within the past few weeks)           Allergies:  Keflex [cephalexin]; Pcn [penicillins]; and Sulfa (sulfonamide antibiotics)    Significant PMH/Disease States:   Past Medical History:   Diagnosis Date    Anxiety state, unspecified     CKD (chronic kidney disease) stage 3, GFR 30-59 ml/min ([facility]) 4/6/2016    Dementia 6/12    Landa/npysch    Depression 9/20/2017    Generalized osteoarthrosis, unspecified site     Lumbar compression fracture ([facility] [address] 4/08    epidurals x 2  Umang/os    Osteopenia     Other and unspecified hyperlipidemia     Psoriasis     Unspecified essential hypertension     Unspecified hypothyroidism     thyroid irradiated i131       Chief Complaint for this Admission:    Chief Complaint   Patient presents with    Altered mental status       Prior to Admission Medications: .Mayelin Kim  Prior to Admission Medications   Prescriptions Last Dose Informant Patient Reported? Taking? DENOSUMAB SC 11/1/2018  Yes Yes   Sig: by SubCUTAneous route every 6 months.    FOLIC ACID/MULTIVIT-MIN/LUTEIN (CENTRUM SILVER PO)   Yes Yes   Sig: Take 1 Tab by mouth daily. albuterol (PROVENTIL HFA, VENTOLIN HFA, PROAIR HFA) 90 mcg/actuation inhaler   No Yes   Sig: Take 2 Puffs by inhalation every four (4) hours as needed for Wheezing. Please give with spacer. albuterol-ipratropium (DUO-NEB) 2.5 mg-0.5 mg/3 ml nebu   No Yes   Sig: 3 mL by Nebulization route every four (4) hours as needed. amLODIPine (NORVASC) 5 mg tablet   No Yes   Sig: TAKE 1 TABLET DAILY FOR BLOOD PRESSURE   arformoterol (BROVANA) 15 mcg/2 mL nebu neb solution   Yes Yes   Sig: 15 mcg by Nebulization route. atorvastatin (LIPITOR) 40 mg tablet 2019 at Unknown time  No Yes   Sig: Take 1 Tab by mouth daily. budesonide (PULMICORT) 0.5 mg/2 mL nbsp   Yes Yes   Si.5 mg by Nebulization route two (2) times a day. Takes 30 mins After Brovana   donepezil (ARICEPT) 10 mg tablet 2019 at Unknown time  No Yes   Sig: Take 1 Tab by mouth nightly.   ergocalciferol (ERGOCALCIFEROL) 50,000 unit capsule 2019 at Unknown time  Yes Yes   Sig: Take 50,000 Units by mouth every Tuesday. furosemide (LASIX) 40 mg tablet   Yes Yes   Sig: Take 40 mg by mouth daily as needed. glipiZIDE SR (GLUCOTROL XL) 2.5 mg CR tablet 2019 at Unknown time  No Yes   Sig: Take 1 Tab by mouth daily. imipramine (TOFRANIL) 25 mg tablet Unknown at Unknown time  No No   Sig: TAKE 2 TABLETS NIGHTLY   levothyroxine (SYNTHROID) 100 mcg tablet 2019 at Unknown time  No Yes   Sig: Take 1 Tab by mouth Daily (before breakfast). lisinopril (PRINIVIL, ZESTRIL) 10 mg tablet Unknown at Unknown time  No No   Sig: Take 1 Tab by mouth daily. 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       Facility-Administered Medications: None

## 2019-05-21 NOTE — CONSULTS
NEUROLOGY  5/21/2019     Consulted by: Vanda Hickman MD        Patient ID:  Anna Moran  [id number]  80 y.o.  8/9/1929    Chief Complaint   Patient presents with    Altered mental status       HPI    Ms. Daphney Mcneill is an 51-year-old woman with baseline dementia, hypertension, anxiety, spine disease who is here because she had increasing mental status changes. Her daughter saw the change. Per the daughter, in the past couple days she has had more hallucinations and paranoia. She went to adult . When she picked her up she noticed that she was dragging her right leg. All of the symptoms are distinctly different from her baseline. She was not on aspirin prior to admission. She used to have a lot of falls but since she is living with her daughter her gait has improved. She is on Lipitor and Aricept prior to admission. Daughter says she is approaching her baseline today.       Review of Systems   Unable to perform ROS: Dementia       Past Medical History:   Diagnosis Date    Anxiety state, unspecified     CKD (chronic kidney disease) stage 3, GFR 30-59 ml/min ([facility]) 4/6/2016    Dementia 6/12    Landa/npysch    Depression 9/20/2017    Generalized osteoarthrosis, unspecified site     Lumbar compression fracture ([address] 4/08    epidurals x 2  Umang/os    Osteopenia     Other and unspecified hyperlipidemia     Psoriasis     Unspecified essential hypertension     Unspecified hypothyroidism     thyroid irradiated i131     Family History   Problem Relation Age of Onset    Cancer Mother         stomach tumors    Cancer Brother         prostate     Social History     Socioeconomic History    Marital status:      Spouse name: Not on file    Number of children: Not on file    Years of education: Not on file    Highest education level: Not on file   Occupational History    Not on file   Social Needs    Financial resource strain: Not on file    Food insecurity:     Worry: Not on file Inability: Not on file    Transportation needs:     Medical: Not on file     Non-medical: Not on file   Tobacco Use    Smoking status: Former Smoker     Packs/day: 0.50     Years: 60.00     Pack years: 30.00     Types: Cigarettes     Last attempt to quit:      Years since quittin.3    Smokeless tobacco: Never Used   Substance and Sexual Activity    Alcohol use: No     Alcohol/week: 0.0 oz    Drug use: No    Sexual activity: Never   Lifestyle    Physical activity:     Days per week: Not on file     Minutes per session: Not on file    Stress: Not on file   Relationships    Social connections:     Talks on phone: Not on file     Gets together: Not on file     Attends Caodaism service: Not on file     Active member of club or organization: Not on file     Attends meetings of clubs or organizations: Not on file     Relationship status: Not on file    Intimate partner violence:     Fear of current or ex partner: Not on file     Emotionally abused: Not on file     Physically abused: Not on file     Forced sexual activity: Not on file   Other Topics Concern     Service Not Asked    Blood Transfusions Not Asked    Caffeine Concern Not Asked    Occupational Exposure Not Asked    Hobby Hazards Not Asked    Sleep Concern Not Asked    Stress Concern Not Asked    Weight Concern Not Asked    Special Diet Not Asked    Back Care Not Asked    Exercise Yes     Comment: enjoys working in the yard   [employer] Not Asked    Seat Belt Not Asked    Self-Exams Not Asked   Social History Narrative    Not on file     Current Facility-Administered Medications   Medication Dose Route Frequency    sodium chloride (NS) flush 5-40 mL  5-40 mL IntraVENous Q8H    sodium chloride (NS) flush 5-40 mL  5-40 mL IntraVENous PRN    aspirin chewable tablet 81 mg  81 mg Oral DAILY    0.9% sodium chloride infusion  75 mL/hr IntraVENous CONTINUOUS     Allergies   Allergen Reactions    Keflex [Cephalexin] Rash  Pcn [Penicillins] Rash    Sulfa (Sulfonamide Antibiotics) Rash       Visit Vitals  /58   Pulse (!) 59   Temp 98 °F (36.7 °C)   Resp 14   SpO2 95%     Physical Exam   Constitutional: She appears well-developed and well-nourished. Cardiovascular: Normal rate. Pulmonary/Chest: Effort normal.   Skin: Skin is warm and dry. Psychiatric: Her behavior is normal.   Vitals reviewed. Neurologic Exam     Mental Status   Elderly woman sitting upright on the edge of her bed. She is pleasant. She does not know why she is here. She gets very frustrated and a little bit agitated with my questions and requests. She does not know the date or her location. She recognizes her daughter. Her pupils appear equal, EOMI difficult to assess due to poor comprehension  Face is a little bit asymmetric to the right  Tongue is midline  Speech is a little dysarthric but baseline per daughter  She can move all of her extremities spontaneously against gravity very well. She does not maintain effort for me to assess her strength. She can lift both of her feet off of the ground simultaneously against gravity.   Gait deferred due to poor comprehension and she did not want to participate  Sensation grossly intact            Lab Results   Component Value Date/Time    WBC 7.7 05/21/2019 06:36 AM    HGB 12.5 05/21/2019 06:36 AM    HCT 39.6 05/21/2019 06:36 AM    PLATELET 978 29/30/3946 06:36 AM    MCV 90.8 05/21/2019 06:36 AM     Lab Results   Component Value Date/Time    Hemoglobin A1c 5.8 (H) 03/12/2019 11:40 AM    Hemoglobin A1c 6.4 (H) 04/06/2016 06:01 PM    Glucose 83 05/21/2019 06:36 AM    Glucose (POC) 136 (H) 05/20/2019 04:25 PM    LDL, calculated 72 03/12/2019 11:40 AM    Creatinine 1.09 (H) 05/21/2019 06:36 AM      Lab Results   Component Value Date/Time    Cholesterol, total 158 03/12/2019 11:40 AM    HDL Cholesterol 59 03/12/2019 11:40 AM    LDL, calculated 72 03/12/2019 11:40 AM    Triglyceride 136 03/12/2019 11:40 AM CHOL/HDL Ratio 3.4 10/27/2010 09:25 AM     Lab Results   Component Value Date/Time    ALT (SGPT) 30 05/20/2019 07:06 PM    AST (SGOT) 25 05/20/2019 07:06 PM    Alk. phosphatase 99 05/20/2019 07:06 PM    Bilirubin, total 0.2 05/20/2019 07:06 PM    Albumin 3.3 (L) 05/20/2019 07:06 PM    Protein, total 6.8 05/20/2019 07:06 PM    Ammonia 12 05/21/2019 06:37 AM    PLATELET 005 16/85/0510 06:36 AM        CT Results (maximum last 3): Results from [place] encounter on 05/20/19   CT CODE NEURO HEAD WO CONTRAST    Narrative EXAM:  CT HEAD WITHOUT CONTRAST  INDICATION: Confusion. COMPARISON: None. CONTRAST: None. TECHNIQUE: Unenhanced CT of the head was performed using 5 mm images. Brain and  bone windows were generated. Sagittal and coronal reformations were generated. CT dose reduction was achieved through use of a standardized protocol tailored  for this examination and automatic exposure control for dose modulation. Adaptive statistical iterative reconstruction (ASIR) was utilized for this  examination. FINDINGS:  The ventricles and sulci are enlarged in a generalized fashion consistent with  volume loss. There is atherosclerotic calcification of the carotid arteries and mild  decreased attenuation in the periventricular white matter which is nonspecific  but consistent with small vessel disease. There is no intracranial hemorrhage. There is no extra-axial collection, mass, mass effect or midline shift. The basilar cisterns are open. No acute infarct is identified. The bone windows demonstrate no abnormalities. The visualized portions of the paranasal sinuses and mastoid air cells are  clear. Impression IMPRESSION: No acute intracranial abnormality. Atherosclerosis, microvascular  disease and age-related volume loss. Results from [place] encounter on 01/17/17   CT ABD PELV WO CONT    Narrative EXAM:  CT ABD PELV WO CONT    INDICATION:  Epigastric pain. Abdominal pain. Constipation. COMPARISON: None. CONTRAST:  None. TECHNIQUE:   Unenhanced multislice helical CT was performed from the diaphragm to the  symphysis pubis without oral or intravenous contrast administration. Contiguous  5 mm axial images were reconstructed and lung and soft tissue windows were  generated. Coronal and sagittal reformations were generated. CT dose reduction  was achieved through use of a standardized protocol tailored for this  examination and automatic exposure control for dose modulation. FINDINGS:  LUNG: No acute finding is seen in the lungs. There is mitral valve calcification  as well as coronary artery calcification incidentally noted. .    The absence of intravenous contrast material reduces the sensitivity for  evaluation of the solid parenchymal organs of the abdomen. LIVER: No focal lesion. GALLBLADDER: Unremarkable. SPLEEN: No focal lesion. PANCREAS: No focal lesion. ADRENALS: No focal lesion. KIDNEYS: No calculus. No hydronephrosis. AORTA: The aorta tapers without aneurysm. There is tortuosity and  atherosclerotic change. RETROPERITONEUM:  There is no retroperitoneal adenopathy or mass. PERITONEUM: There is no ascites or free intraperitoneal air. BOWEL: Diverticulosis of the sigmoid colon without diverticulitis noted. Remainder of the bowel demonstrates no evidence of obstruction or inflammatory  change. .  The appendix is Unremarkable. BLADDER: There is a 7.3 mm bladder calculus along the posterior wall of the  urinary bladder to the left. PELVIS: There is no pelvic mass or adenopathy. Impression IMPRESSION:    1. Bladder calculus is identified. 2. No acute abnormality noted within limits of unenhanced technique. MRI Results (maximum last 3): Results from [place] encounter on 07/01/10   MRI SHOULDER WITHOUT CONTRAST    Narrative Final Report       ICD Codes / Adm. Diagnosis:    /   CUFF TEAR  Examination: SHOULDER WO CON L - [id number] - Jul 1 2010 10:17AM  Accession No:  [id number]  Reason:  CUFF TEAR      REPORT:  INDICATION: Increasing chronic left shoulder pain. No trauma or previous   shoulder surgery. COMPARISON: None    TECHNIQUE: Axial, oblique coronal, and oblique sagittal noncontrast  MRI of   the left  shoulder in the T1, T2, and proton density pulse sequences with   and without fat saturation . FINDINGS: There is no acute fracture or dislocation. Grade 3/4   chondromalacia of the glenohumeral joint. Subchondral sclerosis and cyst   formation in the glenoid. Marginal osteophyte formation projects inferiorly   from the humeral head. Mild AC joint osteoarthritis. Subcortical cystic degenerative signal is   greatest in the posterior aspect of the greater tuberosity at the   infraspinatus insertional.     There is a trace glenohumeral joint effusion. Small volume of fluid in the   subacromial/subdeltoid bursa. Supraspinatus tendon is extremely thin. Full thickness tear of the posterior   50% of the supraspinatus tendon. Indistinct fibers are retracted   approximately 2 cm. Full thickness tear of the anterior 10% of the distal infraspinatus tendon. Torn fibers are indistinct. Posterior infraspinatus tendon is thinned and   irregular. Teres minor and subscapularis tendons are intact. Intra-articular biceps   tendinosis. Degenerated , diminutive labrum. No displaced labral tissue. Macerated   superior labrum. The inferior glenohumeral ligament is within normal limits. The anterior acromion process is type 2/3. Diffuse fatty muscle atrophy is greatest in the supraspinatus muscle. No   abnormal muscle inflammation. IMPRESSION:   1. Degenerated and torn rotator cuff. 2. Moderate-severe glenohumeral joint osteoarthritis. 3. Degenerated labrum. 4. Type 2/3 anterior acromion process.              Interpreting/Reading Doctor: Doris Tai ([id number])  Transcribed: n/a on 07/01/2010  Approved: Bob Comp ([id number])  07/01/2010             Distribution:  Attending Doctor: Estheal Pineda Doctor: Harjinder Lorenzana            VAS/US/Carotid Doppler Results (maximum last 3): No results found for this or any previous visit. PET Results (maximum last 3): No results found for this or any previous visit. Assessment and Plan        80year-old woman who has dementia and hypertension who in the past 2 to 3 days had mental status changes and also physical changes. Concern for possible stroke given her history. Exam is limited due to dementia. She also did not want to participate in exam.  I would like to obtain an MRI of the brain and MRA of the head. Daughter is okay with doing that. I am also going to challenge her with low-dose baby aspirin. Continue stroke work-up to include carotids and echo. Did not do MRA of the neck because I do not think she could not tolerate being in the scanner. Any acute changes please activate code stroke. Following     During this evaluation, we also discussed stroke education to include signs and symptoms of stroke and TIA.        Amy Shields DO  NEUROLOGIST  Diplomate ABPN  5/21/2019

## 2019-05-22 VITALS
DIASTOLIC BLOOD PRESSURE: 55 MMHG | OXYGEN SATURATION: 98 % | BODY MASS INDEX: 21.63 KG/M2 | TEMPERATURE: 97.8 F | RESPIRATION RATE: 14 BRPM | WEIGHT: 126 LBS | SYSTOLIC BLOOD PRESSURE: 104 MMHG | HEART RATE: 61 BPM

## 2019-05-22 LAB
ANION GAP SERPL CALC-SCNC: 5 MMOL/L (ref 5–15)
BUN SERPL-MCNC: 26 MG/DL (ref 6–20)
BUN/CREAT SERPL: 25 (ref 12–20)
CALCIUM SERPL-MCNC: 8.3 MG/DL (ref 8.5–10.1)
CHLORIDE SERPL-SCNC: 114 MMOL/L (ref 97–108)
CHOLEST SERPL-MCNC: 162 MG/DL
CO2 SERPL-SCNC: 25 MMOL/L (ref 21–32)
CREAT SERPL-MCNC: 1.02 MG/DL (ref 0.55–1.02)
ERYTHROCYTE [DISTWIDTH] IN BLOOD BY AUTOMATED COUNT: 14.5 % (ref 11.5–14.5)
EST. AVERAGE GLUCOSE BLD GHB EST-MCNC: 114 MG/DL
GLUCOSE SERPL-MCNC: 99 MG/DL (ref 65–100)
HBA1C MFR BLD: 5.6 % (ref 4.2–6.3)
HCT VFR BLD AUTO: 36.1 % (ref 35–47)
HDLC SERPL-MCNC: 46 MG/DL
HDLC SERPL: 3.5 {RATIO} (ref 0–5)
HGB BLD-MCNC: 11.3 G/DL (ref 11.5–16)
LDLC SERPL CALC-MCNC: 83 MG/DL (ref 0–100)
LEFT CCA DIST DIAS: 8.9 CM/S
LEFT CCA DIST SYS: 73.9 CM/S
LEFT CCA PROX DIAS: 6.4 CM/S
LEFT CCA PROX SYS: 74.8 CM/S
LEFT ECA DIAS: 8.87 CM/S
LEFT ECA SYS: 68.7 CM/S
LEFT ICA DIST DIAS: 12.1 CM/S
LEFT ICA DIST SYS: 51.8 CM/S
LEFT ICA MID DIAS: 12.9 CM/S
LEFT ICA MID SYS: 64.5 CM/S
LEFT ICA PROX DIAS: 17.1 CM/S
LEFT ICA PROX SYS: 80 CM/S
LEFT ICA/CCA SYS: 1.08
LEFT VERTEBRAL DIAS: 10.73 CM/S
LEFT VERTEBRAL SYS: 57.8 CM/S
LIPID PROFILE,FLP: ABNORMAL
MCH RBC QN AUTO: 28.5 PG (ref 26–34)
MCHC RBC AUTO-ENTMCNC: 31.3 G/DL (ref 30–36.5)
MCV RBC AUTO: 91.2 FL (ref 80–99)
NRBC # BLD: 0 K/UL (ref 0–0.01)
NRBC BLD-RTO: 0 PER 100 WBC
PLATELET # BLD AUTO: 153 K/UL (ref 150–400)
PMV BLD AUTO: 11.5 FL (ref 8.9–12.9)
POTASSIUM SERPL-SCNC: 4 MMOL/L (ref 3.5–5.1)
RBC # BLD AUTO: 3.96 M/UL (ref 3.8–5.2)
RIGHT CCA DIST DIAS: 6.3 CM/S
RIGHT CCA DIST SYS: 55.7 CM/S
RIGHT CCA PROX DIAS: 13.5 CM/S
RIGHT CCA PROX SYS: 95 CM/S
RIGHT ECA DIAS: 4.8 CM/S
RIGHT ECA SYS: 84.8 CM/S
RIGHT ICA DIST DIAS: 11.9 CM/S
RIGHT ICA DIST SYS: 59.2 CM/S
RIGHT ICA MID DIAS: 9.1 CM/S
RIGHT ICA MID SYS: 55.8 CM/S
RIGHT ICA PROX DIAS: 12.5 CM/S
RIGHT ICA PROX SYS: 54.4 CM/S
RIGHT ICA/CCA SYS: 1.1
RIGHT VERTEBRAL DIAS: 9.05 CM/S
RIGHT VERTEBRAL SYS: 47.5 CM/S
SODIUM SERPL-SCNC: 144 MMOL/L (ref 136–145)
TRIGL SERPL-MCNC: 165 MG/DL (ref ?–150)
VLDLC SERPL CALC-MCNC: 33 MG/DL
WBC # BLD AUTO: 6.6 K/UL (ref 3.6–11)

## 2019-05-22 PROCEDURE — 83036 HEMOGLOBIN GLYCOSYLATED A1C: CPT

## 2019-05-22 PROCEDURE — 94760 N-INVAS EAR/PLS OXIMETRY 1: CPT

## 2019-05-22 PROCEDURE — 97116 GAIT TRAINING THERAPY: CPT

## 2019-05-22 PROCEDURE — 80048 BASIC METABOLIC PNL TOTAL CA: CPT

## 2019-05-22 PROCEDURE — 85027 COMPLETE CBC AUTOMATED: CPT

## 2019-05-22 PROCEDURE — 97161 PT EVAL LOW COMPLEX 20 MIN: CPT

## 2019-05-22 PROCEDURE — 74011250637 HC RX REV CODE- 250/637: Performed by: NURSE PRACTITIONER

## 2019-05-22 PROCEDURE — 80061 LIPID PANEL: CPT

## 2019-05-22 PROCEDURE — 74011250637 HC RX REV CODE- 250/637: Performed by: PSYCHIATRY & NEUROLOGY

## 2019-05-22 PROCEDURE — 36415 COLL VENOUS BLD VENIPUNCTURE: CPT

## 2019-05-22 RX ORDER — LISINOPRIL 10 MG/1
5 TABLET ORAL DAILY
Qty: 90 TAB | Refills: 1 | Status: SHIPPED | OUTPATIENT
Start: 2019-05-22 | End: 2019-10-04

## 2019-05-22 RX ORDER — ATORVASTATIN CALCIUM 10 MG/1
10 TABLET, FILM COATED ORAL
Status: DISCONTINUED | OUTPATIENT
Start: 2019-05-22 | End: 2019-05-22 | Stop reason: HOSPADM

## 2019-05-22 RX ORDER — GUAIFENESIN 100 MG/5ML
81 LIQUID (ML) ORAL DAILY
Qty: 30 TAB | Refills: 5 | Status: SHIPPED | OUTPATIENT
Start: 2019-05-23 | End: 2019-10-04 | Stop reason: DRUGHIGH

## 2019-05-22 RX ADMIN — ONDANSETRON 4 MG: 4 TABLET, ORALLY DISINTEGRATING ORAL at 00:11

## 2019-05-22 RX ADMIN — ASPIRIN 81 MG CHEWABLE TABLET 81 MG: 81 TABLET CHEWABLE at 09:10

## 2019-05-22 RX ADMIN — Medication 10 ML: at 06:00

## 2019-05-22 NOTE — PROGRESS NOTES
Patient could not have coherent conversation due to recall issues. Explained who I was and what we provide as a department. Tried lots of different avenues for discussion. Slovak prayer and words of comfort offered. Advised of continuous availability. Nothing further at this time.     Theodore Navarro, [facility], [facility] Provider

## 2019-05-22 NOTE — PROGRESS NOTES
Physical Therapy:     Orders received, chart reviewed and patient evaluated by physical therapy. Recommend patient discharge home with daughter once medically cleared. Patient is at her functional baseline. No further PT services needed. Full evaluation to follow.  Thank you,    Tamara Bernal, PT, DPT

## 2019-05-22 NOTE — ROUTINE PROCESS
I have reviewed discharge instructions with the patient and caregiver. The patient and caregiver verbalized understanding. PIV and telemetry discontinued. Pt discharged home via car by her daughter. Care plan ansd education closed. F/U appts made. Signed AVS placed on chart. Discharge medications reviewed with patient and caregiver and appropriate educational materials and side effects teaching were provided. VS WNL, no complaints of pain or discomfort.

## 2019-05-22 NOTE — PROGRESS NOTES
Hospitalist Progress Note  Gilda Iyer MD  Answering service: [phone] OR [phone] from in house phone        Date of Service:  2019  NAME:  Carmela Peabody  :  1929  MRN:  [id number]      Admission Summary:   A 60-year-old white female with past medical history of dementia, hypothyroidism, anxiety, chronic kidney disease, depression, osteopenia, lumbar compression fractures, psoriasis, essential hypertension, presented to the emergency department from home with reported altered mental status.  The patient is a poor historian. Radha Woodward is accompanied by her daughter, who is the medical power of  and provides additional history.  The remainder of the history was obtained from review of ED.  The patient reportedly was at her baseline mental status when she was transferred to the [facility] yesterday at 0600 hours.  According to the report, the patient slept most of the day, which was unusual.  She reportedly had new onset of right leg weakness along with increased confusion.  When her daughter picked the patient up later in the day, the patient was notably confused.  She also had been having some hallucinations the night before.  At baseline, the patient uses a wheelchair and has a walker, but even with the same she is unsteady      Interval history / Subjective:   Patient without complaints, but cognitively impaired  Daughter at bedside     Assessment & Plan:     1.  Altered mental status. - rule out CVA  Neurology consulted- MRI and MRA of the brain ordered  CT head   81 aspirin  ECHO/carotids  2.  Hallucinations.  Currently resolved.  Continue workup and plan as noted above. 3.  Right leg weakness.  The patient has generalized but not focal weakness on current exam.  Follow up with physical therapy. 4.  Anxiety and depression.  Plan as noted and resume home medications. 5. Hypothyroid- resume levothyroxine, normal TSH  6.  DM type 2- mild hyperglycemia- stop glipizide, check HbA1c  7. Dementia- resume aricept and namenda  LDL=72, recs to treat if + for CVA      Code status: FULL  DVT prophylaxis: SCDs    Care Plan discussed with: Patient/Family  Disposition: Home w/Family and TBD     Hospital Problems  Date Reviewed: 3/13/2019          Codes Class Noted POA    Ataxia ICD-10-CM: R27.0  ICD-9-CM: 781.3  5/20/2019 Unknown        Altered mental status ICD-10-CM: R41.82  ICD-9-CM: 780.97  5/20/2019 Unknown                Review of Systems:   A comprehensive review of systems was negative except for that written in the HPI. Vital Signs:    Last 24hrs VS reviewed since prior progress note. Most recent are:  Visit Vitals  /65 (BP 1 Location: Left arm, BP Patient Position: At rest)   Pulse 65   Temp 97.1 °F (36.2 °C)   Resp 27   SpO2 97%       No intake or output data in the 24 hours ending 05/21/19 9491     Physical Examination:             Constitutional:  No acute distress, cooperative, pleasant    ENT:  Oral mucous moist, oropharynx benign. Neck supple,    Resp:  CTA bilaterally. No wheezing/rhonchi/rales. No accessory muscle use   CV:  Regular rhythm, normal rate, no murmurs, gallops, rubs    GI:  Soft, non distended, non tender. normoactive bowel sounds, no hepatosplenomegaly     Musculoskeletal:  No edema, warm, 2+ pulses throughout    Neurologic:  Moves all extremities.   AAOx3, CN II-XII reviewed  Psych- demented            Data Review:    Review and/or order of tests in the medicine section of CPT      Labs:     Recent Labs     05/21/19 0636 05/20/19 1906   WBC 7.7 6.8   HGB 12.5 12.4   HCT 39.6 39.6    173     Recent Labs     05/21/19 0636 05/20/19 1906    141   K 4.2 4.6   * 111*   CO2 28 23   BUN 31* 42*   CREA 1.09* 1.17*   GLU 83 77   CA 8.7 9.3   MG 2.5*  --    PHOS 3.2  --      Recent Labs     05/20/19 1906   SGOT 25   ALT 30   AP 99   TBILI 0.2   TP 6.8   ALB 3.3*   GLOB 3.5     No results for input(s): INR, PTP, APTT in the last 72 hours. No lab exists for component: INREXT   No results for input(s): FE, TIBC, PSAT, FERR in the last 72 hours. Lab Results   Component Value Date/Time    Folate >19.9 03/15/2012 12:26 PM      No results for input(s): PH, PCO2, PO2 in the last 72 hours. No results for input(s): CPK, CKNDX, TROIQ in the last 72 hours.     No lab exists for component: CPKMB  Lab Results   Component Value Date/Time    Cholesterol, total 158 03/12/2019 11:40 AM    HDL Cholesterol 59 03/12/2019 11:40 AM    LDL, calculated 72 03/12/2019 11:40 AM    Triglyceride 136 03/12/2019 11:40 AM    CHOL/HDL Ratio 3.4 10/27/2010 09:25 AM     Lab Results   Component Value Date/Time    Glucose (POC) 136 (H) 05/20/2019 04:25 PM     Lab Results   Component Value Date/Time    Color YELLOW/STRAW 05/20/2019 08:16 PM    Appearance CLEAR 05/20/2019 08:16 PM    Specific gravity 1.016 05/20/2019 08:16 PM    pH (UA) 5.5 05/20/2019 08:16 PM    Protein NEGATIVE  05/20/2019 08:16 PM    Glucose NEGATIVE  05/20/2019 08:16 PM    Ketone NEGATIVE  05/20/2019 08:16 PM    Bilirubin NEGATIVE  05/20/2019 08:16 PM    Urobilinogen 0.2 05/20/2019 08:16 PM    Nitrites NEGATIVE  05/20/2019 08:16 PM    Leukocyte Esterase NEGATIVE  05/20/2019 08:16 PM    Epithelial cells MODERATE (A) 05/20/2019 08:16 PM    Bacteria NEGATIVE  05/20/2019 08:16 PM    WBC 0-4 05/20/2019 08:16 PM    RBC 0-5 05/20/2019 08:16 PM         Medications Reviewed:     Current Facility-Administered Medications   Medication Dose Route Frequency    sodium chloride (NS) flush 5-40 mL  5-40 mL IntraVENous Q8H    sodium chloride (NS) flush 5-40 mL  5-40 mL IntraVENous PRN    aspirin chewable tablet 81 mg  81 mg Oral DAILY    0.9% sodium chloride infusion  75 mL/hr IntraVENous CONTINUOUS     ______________________________________________________________________  EXPECTED LENGTH OF STAY: - - -  ACTUAL LENGTH OF STAY:          1                 Francia Ku MD

## 2019-05-22 NOTE — PROGRESS NOTES
Neurology Progress Note    Patient ID:  Tasneem Morley  [id number]  80 y.o.  8/9/1929    Chief Complaint:     HPI     Ms. Ayana Anna is an 70-year-old woman with baseline dementia, hypertension, anxiety, spine disease who is here because she had increasing mental status changes. Her daughter saw the change. Per the daughter, in the past couple days she has had more hallucinations and paranoia. She went to adult . When she picked her up she noticed that she was dragging her right leg. All of the symptoms are distinctly different from her baseline. She was not on aspirin prior to admission. She used to have a lot of falls but since she is living with her daughter her gait has improved. She is on Lipitor and Aricept prior to admission. Daughter says she is approaching her baseline today. Subjective:   Doing well today. Daughter at the bedside. Following simple commands. PT recommend patient discharge home with daughter. MRI/MRA of the head, Unremarkable. Objective:    All records in [facility] reviewed and noted    ROS:  Unable to obtain    Meds:  Current Facility-Administered Medications   Medication Dose Route Frequency    sodium chloride (NS) flush 5-40 mL  5-40 mL IntraVENous Q8H    sodium chloride (NS) flush 5-40 mL  5-40 mL IntraVENous PRN    aspirin chewable tablet 81 mg  81 mg Oral DAILY    ondansetron (ZOFRAN ODT) tablet 4 mg  4 mg Oral Q8H PRN    0.9% sodium chloride infusion  75 mL/hr IntraVENous CONTINUOUS       Imaging:      Lab Review   Recent Results (from the past 24 hour(s))   DUPLEX CAROTID BILATERAL    Collection Time: 05/21/19  5:00 PM   Result Value Ref Range    Right cca dist sys 55.7 cm/s    Right CCA dist neil 6.3 cm/s    Right CCA prox sys 95.0 cm/s    Right CCA prox neil 13.5 cm/s    Right eca sys 84.8 cm/s    RIGHT EXTERNAL CAROTID ARTERY D 4.80 cm/s    Right ICA dist sys 59.2 cm/s    Right ICA dist neil 11.9 cm/s    Right ICA mid sys 55.8 cm/s    Right ICA mid neil 9.1 cm/s    Right ICA prox sys 54.4 cm/s    Right ICA prox neil 12.5 cm/s    Right vertebral sys 47.5 cm/s    RIGHT VERTEBRAL ARTERY D 9.05 cm/s    Right ICA/CCA sys 1.1     Left CCA dist sys 73.9 cm/s    Left CCA dist neil 8.9 cm/s    Left CCA prox sys 74.8 cm/s    Left CCA prox neil 6.4 cm/s    Left ECA sys 68.7 cm/s    LEFT EXTERNAL CAROTID ARTERY D 8.87 cm/s    Left ICA dist sys 51.8 cm/s    Left ICA dist neil 12.1 cm/s    Left ICA mid sys 64.5 cm/s    Left ICA mid neil 12.9 cm/s    Left ICA prox sys 80.0 cm/s    Left ICA prox neil 17.1 cm/s    Left vertebral sys 57.8 cm/s    LEFT VERTEBRAL ARTERY D 10.73 cm/s    Left ICA/CCA sys 1.08    CBC W/O DIFF    Collection Time: 05/22/19 12:55 AM   Result Value Ref Range    WBC 6.6 3.6 - 11.0 K/uL    RBC 3.96 3.80 - 5.20 M/uL    HGB 11.3 (L) 11.5 - 16.0 g/dL    HCT 36.1 35.0 - 47.0 %    MCV 91.2 80.0 - 99.0 FL    MCH 28.5 26.0 - 34.0 PG    MCHC 31.3 30.0 - 36.5 g/dL    RDW 14.5 11.5 - 14.5 %    PLATELET 819 066 - 347 K/uL    MPV 11.5 8.9 - 12.9 FL    NRBC 0.0 0  WBC    ABSOLUTE NRBC 0.00 0.00 - 6.40 K/uL   METABOLIC PANEL, BASIC    Collection Time: 05/22/19 12:55 AM   Result Value Ref Range    Sodium 144 136 - 145 mmol/L    Potassium 4.0 3.5 - 5.1 mmol/L    Chloride 114 (H) 97 - 108 mmol/L    CO2 25 21 - 32 mmol/L    Anion gap 5 5 - 15 mmol/L    Glucose 99 65 - 100 mg/dL    BUN 26 (H) 6 - 20 MG/DL    Creatinine 1.02 0.55 - 1.02 MG/DL    BUN/Creatinine ratio 25 (H) 12 - 20      GFR est AA >60 >60 ml/min/1.73m2    GFR est non-AA 51 (L) >60 ml/min/1.73m2    Calcium 8.3 (L) 8.5 - 10.1 MG/DL   LIPID PANEL    Collection Time: 05/22/19 12:55 AM   Result Value Ref Range    LIPID PROFILE          Cholesterol, total 162 <200 MG/DL    Triglyceride 165 (H) <150 MG/DL    HDL Cholesterol 46 MG/DL    LDL, calculated 83 0 - 100 MG/DL    VLDL, calculated 33 MG/DL    CHOL/HDL Ratio 3.5 0.0 - 5.0     HEMOGLOBIN A1C WITH EAG    Collection Time: 05/22/19 12:55 AM   Result Value Ref Range    Hemoglobin A1c 5.6 4.2 - 6.3 %    Est. average glucose 114 mg/dL       Exam:  Visit Vitals  /55 (BP 1 Location: Left arm, BP Patient Position: At rest)   Pulse 61   Temp 97.8 °F (36.6 °C)   Resp 14   Wt 57.2 kg (126 lb)   SpO2 98%   BMI 21.63 kg/m²     Gen: Well developed  CV: RRR  Neuro: A&O x 3, mild dysarthria, NO aphasia  CN II-XII: EOMI, face symmetric, tongue/palate midline  Motor:She can move all of her extremities spontaneously against gravity very well  Sensory:deferred   Gait: deferred     Assessment:     Patient Active Problem List   Diagnosis Code    Hypothyroidism E03.9    Essential hypertension I10    Anxiety state, unspecified F41.1    Generalized osteoarthrosis, unspecified site M15.9    Hyperlipidemia E78.5    Psoriasis L40.9    Adverse reaction to NSAIDs; acute renal failure T39.395A    Dementia F03.90    Impaired mobility and ADLs Z74.09    Incontinence in female R32    CKD (chronic kidney disease) stage 3, GFR 30-59 ml/min (HCC) N18.3    Osteoporosis M81.0    Primary osteoarthritis of both hands M19.041, M19.042    Advanced care planning/counseling discussion Z70.80    Late onset Alzheimer'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 behavioral disturbance G30.1, F02.80    Vitamin D deficiency E55.9    Depression X37.9    Umbilical hernia without obstruction and without gangrene K42.9    Mild depression (HCC) F32.0    Ataxia R27.0    Altered mental status R41.82     Plan:   1. MRI brain/ MRA head- unremarkable   2. Dopplers-pending   3. Continue Telemetry  4. Stroke labs   [id number]  LDL- 83, will start low dose statin, given her dementia, age, High dose statin would not be appropriate    5. Continue  ASA 81mg at discharge  6. Start statin, Lipitor 10 mg nightly    7. ST/OT/PT eval ordered and will assess patient for rehab  8. Discussed personal risk factors for stroke includin.  Discussed signs and symptoms of stroke and when to alert 911          Signed:  Summer Mcgowan NP  5/22/2019  11:32 AM

## 2019-05-22 NOTE — DISCHARGE SUMMARY
Discharge Summary       PATIENT ID: Genesis Buchanan  MRN: [id number]   YOB: 1929    DATE OF ADMISSION: 5/20/2019  4:30 PM    DATE OF DISCHARGE: 5/22/19 10am  PRIMARY CARE PROVIDER: Grant Hancock MD     ATTENDING PHYSICIAN: Patricia Goldstein  DISCHARGING PROVIDER: Anna Mittal MD    To contact this individual call [phone] and ask the  to page. If unavailable ask to be transferred the Adult Hospitalist Department. CONSULTATIONS: IP CONSULT TO NEUROLOGY    PROCEDURES/SURGERIES: * No surgery found *    ADMITTING DIAGNOSES & HOSPITAL COURSE:   A 80-year-old white female with past medical history of dementia, hypothyroidism, anxiety, chronic kidney disease, depression, osteopenia, lumbar compression fractures, psoriasis, essential hypertension, presented to the emergency department from home with reported altered mental status.  The patient is a poor historian. Sosa Gregory is accompanied by her daughter, who is the medical power of  and provides additional history.  The remainder of the history was obtained from review of ED.  The patient reportedly was at her baseline mental status when she was transferred to the adult  center yesterday at 0600 hours.  According to the report, the patient slept most of the day, which was unusual.  She reportedly had new onset of right leg weakness along with increased confusion.  When her daughter picked the patient up later in the day, the patient was notably confused.  She also had been having some hallucinations the night before.  At baseline, the patient uses a wheelchair and has a walker, but even with the same she is unsteady        DISCHARGE DIAGNOSES / PLAN:       1.  Acute encephalopathy- NOS.  - suspected TIA, but could be caused by hypotension or hypoglycemia as well- stopped glipizide, decreased dose antihypertensives, added 81mg aspirin  Neurology consulted- MRI and MRA of the brain unremarkable except for atrophy  CT head showing atrophy   81 aspirin  Carotids unremarkable, cancelled the ECHO  2.  Hallucinations.  Currently resolved.  no further workup per neuro  3.  Right leg weakness.  resolved.  -   4. Hypothyroid- resume levothyroxine, normal TSH  5.  DM type 2- mild hyperglycemia- stop glipizide,  HbA1c low normal  6. Dementia- resume aricept and namenda  7. Hyperlipidemia- LDL=72, cont statin at prior dose     ADDITIONAL CARE RECOMMENDATIONS:  Monitor blood pressure and adjust BP meds    PENDING TEST RESULTS:   At the time of discharge the following test results are still pending: none    FOLLOW UP APPOINTMENTS:    Follow-up Information     Follow up With Specialties Details Why Contact Info    Nereyda Thomas MD Family Practice In 1 week as needed for further blood pressure management [id number] [facility]  [phone]               DIET: Diabetic Diet    ACTIVITY: Activity as tolerated    WOUND CARE: N/A    EQUIPMENT needed: none    DISCHARGE MEDICATIONS:  Current Discharge Medication List      START taking these medications    Details   aspirin 81 mg chewable tablet Take 1 Tab by mouth daily. Qty: 30 Tab, Refills: 5         CONTINUE these medications which have CHANGED    Details   lisinopril (PRINIVIL, ZESTRIL) 10 mg tablet Take 0.5 Tabs by mouth daily. Qty: 90 Tab, Refills: 1         CONTINUE these medications which have NOT CHANGED    Details   ergocalciferol (ERGOCALCIFEROL) 50,000 unit capsule Take 50,000 Units by mouth every Tuesday. amLODIPine (NORVASC) 5 mg tablet TAKE 1 TABLET DAILY FOR BLOOD PRESSURE  Qty: 90 Tab, Refills: 1    Associated Diagnoses: Essential hypertension with goal blood pressure less than 140/90      levothyroxine (SYNTHROID) 100 mcg tablet Take 1 Tab by mouth Daily (before breakfast).   Qty: 90 Tab, Refills: 0    Associated Diagnoses: Other specified hypothyroidism      memantine (NAMENDA) 10 mg tablet TAKE 1 TABLET TWICE A DAY FOR MODERATE TO SEVERE ALZHEIMERS TYPE DEMENTIA  Qty: 180 Tab, Refills: 1    Associated Diagnoses: Late onset Alzheimer'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 behavioral disturbance      arformoterol (BROVANA) 15 mcg/2 mL nebu neb solution 15 mcg by Nebulization route. budesonide (PULMICORT) 0.5 mg/2 mL nbsp 0.5 mg by Nebulization route two (2) times a day. Takes 30 mins After Brovana      atorvastatin (LIPITOR) 40 mg tablet Take 1 Tab by mouth daily. Qty: 90 Tab, Refills: 1    Associated Diagnoses: Mixed hyperlipidemia      donepezil (ARICEPT) 10 mg tablet Take 1 Tab by mouth nightly. Qty: 90 Tab, Refills: 1      DENOSUMAB SC by SubCUTAneous route every 6 months. albuterol-ipratropium (DUO-NEB) 2.5 mg-0.5 mg/3 ml nebu 3 mL by Nebulization route every four (4) hours as needed. Qty: 90 Nebule, Refills: 1    Associated Diagnoses: COPD with acute exacerbation (HCC)      albuterol (PROVENTIL HFA, VENTOLIN HFA, PROAIR HFA) 90 mcg/actuation inhaler Take 2 Puffs by inhalation every four (4) hours as needed for Wheezing. Please give with spacer. Qty: 1 Inhaler, Refills: 0    Associated Diagnoses: Chronic obstructive pulmonary disease, unspecified COPD type (HCC)      FOLIC ACID/MULTIVIT-MIN/LUTEIN (CENTRUM SILVER PO) Take 1 Tab by mouth daily. imipramine (TOFRANIL) 25 mg tablet TAKE 2 TABLETS NIGHTLY  Qty: 180 Tab, Refills: 1         STOP taking these medications       furosemide (LASIX) 40 mg tablet Comments:   Reason for Stopping:         montelukast (SINGULAIR) 10 mg tablet Comments:   Reason for Stopping:         glipiZIDE SR (GLUCOTROL XL) 2.5 mg CR tablet Comments:   Reason for Stopping:                 NOTIFY YOUR PHYSICIAN FOR ANY OF THE FOLLOWING:   Fever over 101 degrees for 24 hours. Chest pain, shortness of breath, fever, chills, nausea, vomiting, diarrhea, change in mentation, falling, weakness, bleeding. Severe pain or pain not relieved by medications. Or, any other signs or symptoms that you may have questions about.     DISPOSITION:    Home With:   OT  PT  [facility]  RN Long term SNF/Inpatient Rehab    Independent/assisted living    Hospice   X Other:       PATIENT CONDITION AT DISCHARGE:     Functional status    Poor    X Deconditioned     Independent      Cognition     Lucid     Forgetful    X Dementia      Catheters/lines (plus indication)    Suárez     PICC     PEG    X None      Code status   X  Full code     DNR      PHYSICAL EXAMINATION AT DISCHARGE:                                                   Constitutional:  No acute distress, cooperative, pleasant    ENT:  Oral mucous moist, oropharynx benign. Neck supple,    Resp:  CTA bilaterally. No wheezing/rhonchi/rales. No accessory muscle use   CV:  Regular rhythm, normal rate, no murmurs, gallops, rubs    GI:  Soft, non distended, non tender. normoactive bowel sounds, no hepatosplenomegaly     Musculoskeletal:  No edema, warm, 2+ pulses throughout    Neurologic:  Moves all extremities.   AAOx3, CN II-XII reviewed  Psych- demented       Labs:  Recent Results (from the past 24 hour(s))   DUPLEX CAROTID BILATERAL    Collection Time: 05/21/19  5:00 PM   Result Value Ref Range    Right cca dist sys 55.7 cm/s    Right CCA dist neil 6.3 cm/s    Right CCA prox sys 95.0 cm/s    Right CCA prox neil 13.5 cm/s    Right eca sys 84.8 cm/s    RIGHT EXTERNAL CAROTID ARTERY D 4.80 cm/s    Right ICA dist sys 59.2 cm/s    Right ICA dist neil 11.9 cm/s    Right ICA mid sys 55.8 cm/s    Right ICA mid neil 9.1 cm/s    Right ICA prox sys 54.4 cm/s    Right ICA prox neil 12.5 cm/s    Right vertebral sys 47.5 cm/s    RIGHT VERTEBRAL ARTERY D 9.05 cm/s    Right ICA/CCA sys 1.1     Left CCA dist sys 73.9 cm/s    Left CCA dist neil 8.9 cm/s    Left CCA prox sys 74.8 cm/s    Left CCA prox neil 6.4 cm/s    Left ECA sys 68.7 cm/s    LEFT EXTERNAL CAROTID ARTERY D 8.87 cm/s    Left ICA dist sys 51.8 cm/s    Left ICA dist neil 12.1 cm/s    Left ICA mid sys 64.5 cm/s    Left ICA mid neil 12.9 cm/s    Left ICA prox sys 80.0 cm/s    Left ICA prox neil 17.1 cm/s Left vertebral sys 57.8 cm/s    LEFT VERTEBRAL ARTERY D 10.73 cm/s    Left ICA/CCA sys 1.08    CBC W/O DIFF    Collection Time: 05/22/19 12:55 AM   Result Value Ref Range    WBC 6.6 3.6 - 11.0 K/uL    RBC 3.96 3.80 - 5.20 M/uL    HGB 11.3 (L) 11.5 - 16.0 g/dL    HCT 36.1 35.0 - 47.0 %    MCV 91.2 80.0 - 99.0 FL    MCH 28.5 26.0 - 34.0 PG    MCHC 31.3 30.0 - 36.5 g/dL    RDW 14.5 11.5 - 14.5 %    PLATELET 535 413 - 205 K/uL    MPV 11.5 8.9 - 12.9 FL    NRBC 0.0 0  WBC    ABSOLUTE NRBC 0.00 0.00 - 2.86 K/uL   METABOLIC PANEL, BASIC    Collection Time: 05/22/19 12:55 AM   Result Value Ref Range    Sodium 144 136 - 145 mmol/L    Potassium 4.0 3.5 - 5.1 mmol/L    Chloride 114 (H) 97 - 108 mmol/L    CO2 25 21 - 32 mmol/L    Anion gap 5 5 - 15 mmol/L    Glucose 99 65 - 100 mg/dL    BUN 26 (H) 6 - 20 MG/DL    Creatinine 1.02 0.55 - 1.02 MG/DL    BUN/Creatinine ratio 25 (H) 12 - 20      GFR est AA >60 >60 ml/min/1.73m2    GFR est non-AA 51 (L) >60 ml/min/1.73m2    Calcium 8.3 (L) 8.5 - 10.1 MG/DL   LIPID PANEL    Collection Time: 05/22/19 12:55 AM   Result Value Ref Range    LIPID PROFILE          Cholesterol, total 162 <200 MG/DL    Triglyceride 165 (H) <150 MG/DL    HDL Cholesterol 46 MG/DL    LDL, calculated 83 0 - 100 MG/DL    VLDL, calculated 33 MG/DL    CHOL/HDL Ratio 3.5 0.0 - 5.0     HEMOGLOBIN A1C WITH EAG    Collection Time: 05/22/19 12:55 AM   Result Value Ref Range    Hemoglobin A1c 5.6 4.2 - 6.3 %    Est. average glucose 114 mg/dL         CHRONIC MEDICAL DIAGNOSES:  Problem List as of 5/22/2019 Date Reviewed: 5/22/2019          Codes Class Noted - Resolved    Ataxia ICD-10-CM: R27.0  ICD-9-CM: 781.3  5/20/2019 - Present        Altered mental status ICD-10-CM: R41.82  ICD-9-CM: 780.97  5/20/2019 - Present        Mild depression ([facility] ICD-10-CM: F32.0  ICD-9-CM: 831  10/29/2018 - Present        Umbilical hernia without obstruction and without gangrene ICD-10-CM: K42.9  ICD-9-CM: 553.1  10/11/2017 - Present        Depression ICD-10-CM: F32.9  ICD-9-CM: 323  9/20/2017 - Present        Vitamin D deficiency ICD-10-CM: E55.9  ICD-9-CM: 268.9  6/6/2017 - Present        Late onset Alzheimer'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 behavioral disturbance ICD-10-CM: G30.1, F02.80  ICD-9-CM: 331.0, 294.10  1/11/2017 - Present        Advanced care planning/counseling discussion ICD-10-CM: Z71.89  ICD-9-CM: V65.49  9/15/2016 - Present    Overview Signed 9/15/2016  4:34 PM by Guille German MD     Patient given [facility] Directive form and booklet. Patient advised to follow up with me or contact nurse navigator to submit these form to medical chart. I advised Patient of the benefits of Advance Care Plan with regard to end of life care and benefits of filling forms out in case Patient cannot speak for themselves.                Primary osteoarthritis of both hands ICD-10-CM: M19.041, M19.042  ICD-9-CM: 715.14  4/26/2016 - Present        CKD (chronic kidney disease) stage 3, GFR 30-59 ml/min ([facility]) ICD-10-CM: N18.3  ICD-9-CM: 585.3  4/6/2016 - Present        Osteoporosis ICD-10-CM: M81.0  ICD-9-CM: 733.00  4/6/2016 - Present        Incontinence in female ICD-10-CM: R32  ICD-9-CM: 625.6  10/21/2015 - Present        Impaired mobility and ADLs ICD-10-CM: Z74.09  ICD-9-CM: 799.89  6/3/2015 - Present        Dementia ICD-10-CM: F03.90  ICD-9-CM: 294.20  8/4/2014 - Present        Adverse reaction to NSAIDs; acute renal failure ICD-10-CM: T39.395A  ICD-9-CM: E935.8  6/29/2011 - Present        Psoriasis ICD-10-CM: L40.9  ICD-9-CM: 696.1  Unknown - Present        Hypothyroidism ICD-10-CM: E03.9  ICD-9-CM: 244.9  Unknown - Present    Overview Signed 10/26/2010  4:59 PM by Emelia Noguera LPN     thyroid irradiated i131             Essential hypertension ICD-10-CM: I10  ICD-9-CM: 401.9  Unknown - Present        Anxiety state, unspecified ICD-10-CM: F41.1  ICD-9-CM: 300.00  Unknown - Present        Generalized osteoarthrosis, unspecified site ICD-10-CM: M15.9  ICD-9-CM: 715.00  Unknown - Present        Hyperlipidemia ICD-10-CM: E78.5  ICD-9-CM: 272.4  Unknown - Present        RESOLVED: Tobacco abuse ICD-10-CM: Z72.0  ICD-9-CM: 305.1  7/22/2015 - 3/2/2016        RESOLVED: Lumbar compression fracture ([facility] ICD-10-CM: S32.000A  ICD-9-CM: 805.4  Unknown - 1/10/2018    Overview Signed 10/26/2010  4:59 PM by Parmjit Johnson LPN     epidurals x 2  Umang/os                   Greater than 31 minutes were spent with the patient on counseling and coordination of care    Signed:   Bryce Sullivan MD  5/22/2019  9:49 AM

## 2019-05-22 NOTE — PROGRESS NOTES
physical Therapy EVALUATION/DISCHARGE  Patient: Opal Vallejo (16 y.o. female)  Date: 5/22/2019  Primary Diagnosis: Altered mental status [R41.82]  Ataxia [R27.0]       Precautions: FALL     ASSESSMENT :  Based on the objective data described below, the patient presents with confusion, delayed processing, impaired motor planning, generalized weakness, impaired coordination, and unsteady gait. Patient with hx of dementia and unable to give history/PLOF. History obtained from daughter. At baseline, patient lives with daughter, attends adult day care during day, requires assist with all ADLs and assistance with mobility. This date, patient required Judy/modA for bed mobility, Judy for transfers, and Judy for ambulation with HHA. Daughter reports patient is at her functional baseline. Patient/family have no questions or mobility concerns at this time. Recommend d/c home without further PT services once medically cleared. Will sign off. Further skilled acute physical therapy is not indicated at this time. PLAN :  Discharge Recommendations: None  Further Equipment Recommendations for Discharge: None     SUBJECTIVE:   Patient stated I don't want to.     OBJECTIVE DATA SUMMARY:   HISTORY:    Past Medical History:   Diagnosis Date    Anxiety state, unspecified     CKD (chronic kidney disease) stage 3, GFR 30-59 ml/min ([facility] [address] [address] 4/6/2016    Dementia 6/12    Landa/npysch    Depression 9/20/2017    Generalized osteoarthrosis, unspecified site     Lumbar compression fracture ([address] 4/08    epidurals x 2  Umang/os    Osteopenia     Other and unspecified hyperlipidemia     Psoriasis     Unspecified essential hypertension     Unspecified hypothyroidism     thyroid irradiated i131     Past Surgical History:   Procedure Laterality Date    COLONOSCOPY  10/10    [place]/gi    TOTAL KNEE ARTHROPLASTY  1992    left    TOTAL KNEE ARTHROPLASTY  1992    right     Prior Level of Function/Home Situation: At baseline, patient has dementia, lives with daughter, attends adult day care during day, requires assist with all ADLs and assistance with mobility. Personal factors and/or comorbidities impacting plan of care: Dementia, support at home    [address]: Private residence  One/Two Story Residence: Two story  Living Alone: No  Support Systems: Child(annemarie)  Patient Expects to be Discharged to[de-identified] Private residence  Current DME Used/Available at Home: Adaptive bathing aides, Wheelchair    EXAMINATION/PRESENTATION/DECISION MAKING:   Critical Behavior:  Neurologic State: Alert, Confused  Orientation Level: Oriented to person, Disoriented to place, Disoriented to situation, Disoriented to time  Cognition: Memory loss, Decreased attention/concentration, Decreased command following     Hearing: Auditory  Auditory Impairment: None  Skin:    Edema:   Range Of Motion:  AROM: Generally decreased, functional           PROM: Generally decreased, functional           Strength:    Strength: Generally decreased, functional                    Tone & Sensation:   Tone: Normal                              Coordination:  Coordination: Generally decreased, functional  Vision:      Functional Mobility:  Bed Mobility:     Supine to Sit: Minimum assistance  Sit to Supine: Moderate assistance     Transfers:  Sit to Stand: Minimum assistance  Stand to Sit: Minimum assistance                       Balance:   Sitting: Intact  Standing: Impaired; With support  Standing - Static: Fair;Constant support  Standing - Dynamic : Fair;Constant support  Ambulation/Gait Training:  Distance (ft): 50 Feet (ft)  Assistive Device: Gait belt(B HHA)  Ambulation - Level of Assistance: Minimal assistance;Assist x1        Gait Abnormalities: Decreased step clearance;Shuffling gait        Base of Support: Narrowed     Speed/Tanisha: Pace decreased (<100 feet/min); Shuffled  Step Length: Left shortened;Right shortened                  Functional Measure:  Barthel Index:    Bathin  Bladder: 0  Bowels: 0  Groomin  Dressin  Feedin  Mobility: 10  Stairs: 5  Toilet Use: 5  Transfer (Bed to Chair and Back): 10  Total: 40/100       Percentage of impairment   0%   1-19%   20-39%   40-59%   60-79%   80-99%   100%   Barthel Score 0-100 100 99-80 79-60 59-40 20-39 1-19   0     The Barthel ADL Index: Guidelines  1. The index should be used as a record of what a patient does, not as a record of what a patient could do. 2. The main aim is to establish degree of independence from any help, physical or verbal, however minor and for whatever reason. 3. The need for supervision renders the patient not independent. 4. A patient's performance should be established using the best available evidence. Asking the patient, friends/relatives and nurses are the usual sources, but direct observation and common sense are also important. However direct testing is not needed. 5. Usually the patient's performance over the preceding 24-48 hours is important, but occasionally longer periods will be relevant. 6. Middle categories imply that the patient supplies over 50 per cent of the effort. 7. Use of aids to be independent is allowed. Harlee Cowden., Barthel, D.W. (1515). Functional evaluation: the Barthel Index. [facility] (14)2. JUAN MIGUEL Sagastume, Joshua Alvarado, Pepe Sharif.[facility], [address] [place] [address] (). Measuring the change indisability after inpatient rehabilitation; comparison of the responsiveness of the Barthel Index and Functional [place] Measure. Journal of Neurology, Neurosurgery, and Psychiatry, 66(4), 538-118. Angelita Black, N.J.A, DIRK Thurman.SANJUANA, & Wellington Damon M.A. (2004.) Assessment of post-stroke quality of life in cost-effectiveness studies: The usefulness of the Barthel Index and the EuroQoL-5D.  Quality of Life Research, 15, 254-60       Physical Therapy Evaluation Charge Determination   History Examination Presentation Decision-Making   HIGH Complexity :3+ comorbidities / personal factors will impact the outcome/ POC  HIGH Complexity : 4+ Standardized tests and measures addressing body structure, function, activity limitation and / or participation in recreation  LOW Complexity : Stable, uncomplicated  LOW Complexity : FOTO score of       Based on the above components, the patient evaluation is determined to be of the following complexity level: LOW     Pain:  Pain Scale 1: Numeric (0 - 10)  Pain Intensity 1: 0     Activity Tolerance:   VSS, NAD  Please refer to the flowsheet for vital signs taken during this treatment. After treatment:   []   Patient left in no apparent distress sitting up in chair  [x]   Patient left in no apparent distress in bed  [x]   Call bell left within reach  [x]   Nursing notified  [x]   Caregiver present  [x]   Bed alarm activated    COMMUNICATION/EDUCATION:   Communication/Collaboration:  [x]   Fall prevention education was provided and the patient/caregiver indicated understanding. [x]   Patient/family have participated as able and agree with findings and recommendations. []   Patient is unable to participate in plan of care at this time.   Findings and recommendations were discussed with: Registered Nurse and     Thank you for this referral.  Lani Joyce, PT, DPT   Time Calculation: 17 mins

## 2019-05-22 NOTE — PROGRESS NOTES
Bedside and Verbal shift change report given to German Mcconnell RN (oncoming nurse) by Vikas Yang RN (offgoing nurse). Report included the following information SBAR, Kardex, ED Summary, Procedure Summary, Intake/Output, MAR, Accordion and Recent Results.

## 2019-05-23 ENCOUNTER — TELEPHONE (OUTPATIENT)
Dept: FAMILY MEDICINE CLINIC | Age: 84
End: 2019-05-23

## 2019-05-23 ENCOUNTER — PATIENT OUTREACH (OUTPATIENT)
Dept: FAMILY MEDICINE CLINIC | Age: 84
End: 2019-05-23

## 2019-05-23 NOTE — PROGRESS NOTES
Several calls made to daughter,Rpuali, medical POA. LM with my direct contact information. Also sent message via [employer] portal.  Will continue to try and reach Rupali to check on her mother. NN noticed that [facility] had called re changes in patient's meds after discharge. Called and spoke to Yovany Bautista, at [facility]. [address] had hospital discharge summary, reports that with discontinuing the Glipizide, her daughter had requested that the q d blood sugars be discontinued as well. Advised will send message to 's nurse to check with him. REports patient doing well there today, walked in this am. Did get upset and confused earlier, put her in a wheelchair for her safety sake. Requested she mention to her daughter that I am trying to reach her to discuss. Nurse agreed to do so.

## 2019-05-23 NOTE — TELEPHONE ENCOUNTER
[phone] spoke to [facility] patient went to ER and per discharge papers Glipizide was d/c and Rupali was asking to D/C checking blood sugar.  Clarence Giraldo needs order stating to d/c Glipizide and d/c checking blood sugar to be faxed to [phone]   Pending for Dr. Abhi English

## 2019-05-23 NOTE — TELEPHONE ENCOUNTER
Angela Yeung from [address] is calling stating that the pt went to [facility] on 5/20-22/19 (patient was dehydrated) . Angela Yeung stated that they made some medication changes, Angela Yeung just wants to confirm those changes with Dr. Kimberly Villanueva.         Best callback:  [phone]       LOV:  Wednesday, March 13, 2019

## 2019-05-29 ENCOUNTER — PATIENT OUTREACH (OUTPATIENT)
Dept: FAMILY MEDICINE CLINIC | Age: 84
End: 2019-05-29

## 2019-05-29 NOTE — PROGRESS NOTES
Called daughter,Rupali, again and LM requesting call back re mother. Needs to schedule DUC with . Will continue to try and reach.

## 2019-05-31 ENCOUNTER — PATIENT OUTREACH (OUTPATIENT)
Dept: FAMILY MEDICINE CLINIC | Age: 84
End: 2019-05-31

## 2019-05-31 NOTE — PROGRESS NOTES
Called and LM for daughter,Rupali, to please call me back to discuss mother. Needs DUC with pcp after recent discharge from hospital.  Will continue to try and reach. My Chart message sent as well.

## 2019-06-03 ENCOUNTER — PATIENT OUTREACH (OUTPATIENT)
Dept: FAMILY MEDICINE CLINIC | Age: 84
End: 2019-06-03

## 2019-06-03 NOTE — Clinical Note
Have not been able to talk to daughter. Did speak with [address] x 2. Trying to schedule appt for DUC if can reach daughter.

## 2019-06-03 NOTE — PROGRESS NOTES
[address]- spoke to Kareem there. Reports patient is doing well. Naps off and on throughout the day. Eating well at breakfast and lunch(ate 100% of meal). Walks with assistance, not using the walker. Right leg seems ok now, not voicing any complaints about pain re leg or anything else. Does continue to have confusion, nothing new for her. Daysi Yari that I have not been able to reach her daughter, Galdino Sheldon. REquest that she give her a message to call me, need to schedule a hospital f/u visit with her pcp, . Kierra Adams my direct number for the daughter. Goals      Prevent complications post hospitalization. 6/3/19 [facility] 5/20-5/22 AMS,halluc,right leg weakness,hypothyroid,mild hyperglycemia,dementia  · NN has been unable to reach daughter, Galdino Sheldon, have left numerous messages with my contact information  · Spoke to [facility]-request they give Rupali message to call me-need to schedule DUC with pcp  · Still need to review meds with daughter  · Review discharge instructions-needs to be done with daughter  · Need to review red flags with daughter. · NN to continue to try and reach daughter. mbt

## 2019-06-04 ENCOUNTER — PATIENT OUTREACH (OUTPATIENT)
Dept: FAMILY MEDICINE CLINIC | Age: 84
End: 2019-06-04

## 2019-06-04 NOTE — TELEPHONE ENCOUNTER
----- Message from Jitendra Antonio sent at 2019  9:48 AM EDT -----  Regarding: / Refill   Pt's daughter is requesting refill on Budesonide inhalation suspension 0.5 mg/ 2 ml sent to [facility]. Best contact number is [phone]. Ambrosio Mondragon Requested Prescriptions     Pending Prescriptions Disp Refills    budesonide (PULMICORT) 0.5 mg/2 mL nbsp       Si mL by Nebulization route two (2) times a day.  Takes 30 mins After REESE Faria

## 2019-06-06 RX ORDER — BUDESONIDE 0.5 MG/2ML
500 INHALANT ORAL 2 TIMES DAILY
Qty: 360 EACH | Refills: 1 | Status: SHIPPED | OUTPATIENT
Start: 2019-06-06

## 2019-06-11 ENCOUNTER — HOSPITAL ENCOUNTER (OUTPATIENT)
Dept: INFUSION THERAPY | Age: 84
End: 2019-06-11
Payer: MEDICARE

## 2019-06-19 ENCOUNTER — PATIENT OUTREACH (OUTPATIENT)
Dept: FAMILY MEDICINE CLINIC | Age: 84
End: 2019-06-19

## 2019-06-19 NOTE — PROGRESS NOTES
Called daughter again, did reach her today. Says mother is doing well. Going to day care each day. Always has the confusion. Drinking fluids, but has to encourage her to drink. Reminded her that she was supposed to see pcp within a week of discharge([facility] 5/20-5/22). Daughter says it is hard to get her in with their schedules. Agreed to appt on 7/3 at 5:15pm.  (will cancel the 7/15 4:15pm appt).

## 2019-06-23 DIAGNOSIS — E03.8 OTHER SPECIFIED HYPOTHYROIDISM: ICD-10-CM

## 2019-06-23 DIAGNOSIS — E78.2 MIXED HYPERLIPIDEMIA: ICD-10-CM

## 2019-06-25 ENCOUNTER — HOSPITAL ENCOUNTER (OUTPATIENT)
Dept: INFUSION THERAPY | Age: 84
Discharge: HOME | End: 2019-06-25
Payer: MEDICARE

## 2019-06-25 VITALS
HEART RATE: 65 BPM | RESPIRATION RATE: 18 BRPM | SYSTOLIC BLOOD PRESSURE: 123 MMHG | TEMPERATURE: 98.2 F | DIASTOLIC BLOOD PRESSURE: 68 MMHG

## 2019-06-25 LAB
ALBUMIN SERPL-MCNC: 3.4 G/DL (ref 3.5–5)
ANION GAP SERPL CALC-SCNC: 7 MMOL/L (ref 5–15)
BUN SERPL-MCNC: 25 MG/DL (ref 6–20)
BUN/CREAT SERPL: 20 (ref 12–20)
CALCIUM SERPL-MCNC: 9 MG/DL (ref 8.5–10.1)
CHLORIDE SERPL-SCNC: 109 MMOL/L (ref 97–108)
CO2 SERPL-SCNC: 26 MMOL/L (ref 21–32)
COMMENT, HOLDF: NORMAL
CREAT SERPL-MCNC: 1.23 MG/DL (ref 0.55–1.02)
GLUCOSE SERPL-MCNC: 174 MG/DL (ref 65–100)
MAGNESIUM SERPL-MCNC: 2.4 MG/DL (ref 1.6–2.4)
PHOSPHATE SERPL-MCNC: 3.5 MG/DL (ref 2.6–4.7)
POTASSIUM SERPL-SCNC: 4 MMOL/L (ref 3.5–5.1)
SAMPLES BEING HELD,HOLD: NORMAL
SODIUM SERPL-SCNC: 142 MMOL/L (ref 136–145)

## 2019-06-25 PROCEDURE — 80069 RENAL FUNCTION PANEL: CPT

## 2019-06-25 PROCEDURE — 74011250636 HC RX REV CODE- 250/636: Performed by: INTERNAL MEDICINE

## 2019-06-25 PROCEDURE — 96372 THER/PROPH/DIAG INJ SC/IM: CPT

## 2019-06-25 PROCEDURE — 83735 ASSAY OF MAGNESIUM: CPT

## 2019-06-25 PROCEDURE — 36415 COLL VENOUS BLD VENIPUNCTURE: CPT

## 2019-06-25 RX ORDER — DONEPEZIL HYDROCHLORIDE 10 MG/1
TABLET, FILM COATED ORAL
Qty: 90 TAB | Refills: 1 | Status: SHIPPED | OUTPATIENT
Start: 2019-06-25 | End: 2019-12-12 | Stop reason: SDUPTHER

## 2019-06-25 RX ORDER — LEVOTHYROXINE SODIUM 100 UG/1
TABLET ORAL
Qty: 90 TAB | Refills: 0 | Status: SHIPPED | OUTPATIENT
Start: 2019-06-25 | End: 2019-09-13 | Stop reason: SDUPTHER

## 2019-06-25 RX ORDER — ATORVASTATIN CALCIUM 40 MG/1
TABLET, FILM COATED ORAL
Qty: 90 TAB | Refills: 1 | Status: SHIPPED | OUTPATIENT
Start: 2019-06-25 | End: 2019-12-12 | Stop reason: SDUPTHER

## 2019-06-25 RX ADMIN — DENOSUMAB 60 MG: 60 INJECTION SUBCUTANEOUS at 12:30

## 2019-06-25 NOTE — PROGRESS NOTES
Outpatient [facility]fusion Center Short Visit Progress Note    1110 Pt admit to [facility] for Prolia via wheelchair in stable condition accompanied by family. Assessment completed. No new concerns voiced. Labs drawn peripherally and sent for processing. Patient Vitals for the past 12 hrs:   Temp Pulse Resp BP   06/25/19 1114 98.2 °F (36.8 °C) 65 18 123/68     Medications:  Prolia (SC R arm)    1230 Pt tolerated treatment well. D/c home via wheelchair in no distress. Pt's family member aware of next appointment scheduled for 01/07/2020. Recent Results (from the past 12 hour(s))   RENAL FUNCTION PANEL    Collection Time: 06/25/19 11:15 AM   Result Value Ref Range    Sodium 142 136 - 145 mmol/L    Potassium 4.0 3.5 - 5.1 mmol/L    Chloride 109 (H) 97 - 108 mmol/L    CO2 26 21 - 32 mmol/L    Anion gap 7 5 - 15 mmol/L    Glucose 174 (H) 65 - 100 mg/dL    BUN 25 (H) 6 - 20 MG/DL    Creatinine 1.23 (H) 0.55 - 1.02 MG/DL    BUN/Creatinine ratio 20 12 - 20      GFR est AA 50 (L) >60 ml/min/1.73m2    GFR est non-AA 41 (L) >60 ml/min/1.73m2    Calcium 9.0 8.5 - 10.1 MG/DL    Phosphorus 3.5 2.6 - 4.7 MG/DL    Albumin 3.4 (L) 3.5 - 5.0 g/dL   MAGNESIUM    Collection Time: 06/25/19 11:15 AM   Result Value Ref Range    Magnesium 2.4 1.6 - 2.4 mg/dL   SAMPLES BEING HELD    Collection Time: 06/25/19 11:15 AM   Result Value Ref Range    SAMPLES BEING HELD 1sst     COMMENT        Add-on orders for these samples will be processed based on acceptable specimen integrity and analyte stability, which may vary by analyte.

## 2019-07-03 ENCOUNTER — OFFICE VISIT (OUTPATIENT)
Dept: FAMILY MEDICINE CLINIC | Age: 84
End: 2019-07-03

## 2019-07-03 VITALS
RESPIRATION RATE: 18 BRPM | SYSTOLIC BLOOD PRESSURE: 130 MMHG | DIASTOLIC BLOOD PRESSURE: 82 MMHG | HEART RATE: 62 BPM | BODY MASS INDEX: 21.63 KG/M2 | HEIGHT: 64 IN | TEMPERATURE: 98.2 F | OXYGEN SATURATION: 94 %

## 2019-07-03 DIAGNOSIS — E78.2 MIXED HYPERLIPIDEMIA: ICD-10-CM

## 2019-07-03 DIAGNOSIS — S80.812A ABRASION OF LEFT LOWER EXTREMITY, INITIAL ENCOUNTER: Primary | ICD-10-CM

## 2019-07-03 DIAGNOSIS — I10 ESSENTIAL HYPERTENSION WITH GOAL BLOOD PRESSURE LESS THAN 140/90: ICD-10-CM

## 2019-07-03 DIAGNOSIS — G30.1 LATE ONSET ALZHEIMER'S DISEASE WITHOUT BEHAVIORAL DISTURBANCE (HCC): ICD-10-CM

## 2019-07-03 DIAGNOSIS — F02.80 LATE ONSET ALZHEIMER'S DISEASE WITHOUT BEHAVIORAL DISTURBANCE (HCC): ICD-10-CM

## 2019-07-03 NOTE — PROGRESS NOTES
HPI  Medhat Whittaker 80 y.o. female  presents to the office today with her daughter for follow up on cholesterol and laceration on left leg. Blood pressure 130/82, pulse 62, temperature 98.2 °F (36.8 °C), temperature source Oral, resp. rate 18, height 5' 4\" (1.626 m), SpO2 94 %. Body mass index is 21.63 kg/m². Chief Complaint   Patient presents with    Cholesterol Problem     follow up    Laceration     left leg      Pt is non-verbal during encounter and her daughter spoke on her behalf. Hyperlipidemia: Lipid panel on 5/22/19 notable for total cholesterol 162, HDL 46, LDL 83, and triglycerides 165. Pt continues with Atorvastatin 40 mg/d. Hypertension: BP at office today 130/82. Pt continues with 1/2 tab Lisinopril 10 mg/d and Norvasc 5 mg/d. Abrasion of left lower extremity: Pt has a laceration on left leg which has started to form scab and is healing. I advised pt to let the wound heal and avoid touching the wound. Late onset Alzheimer'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 behavioral disturbance: Condition is still progressing. Pt is currently taking Memantine 10 mg/BID and Donepezil 10 mg/d. Current Outpatient Medications   Medication Sig Dispense Refill    levothyroxine (SYNTHROID) 100 mcg tablet TAKE 1 TABLET DAILY BEFORE BREAKFAST 90 Tab 0    donepezil (ARICEPT) 10 mg tablet TAKE 1 TABLET NIGHTLY 90 Tab 1    atorvastatin (LIPITOR) 40 mg tablet TAKE 1 TABLET DAILY 90 Tab 1    budesonide (PULMICORT) 0.5 mg/2 mL nbsp 2 mL by Nebulization route two (2) times a day. Takes 30 mins After [address] Each 1    lisinopril (PRINIVIL, ZESTRIL) 10 mg tablet Take 0.5 Tabs by mouth daily. 90 Tab 1    aspirin 81 mg chewable tablet Take 1 Tab by mouth daily. 30 Tab 5    ergocalciferol (ERGOCALCIFEROL) 50,000 unit capsule Take 50,000 Units by mouth every Tuesday.       amLODIPine (NORVASC) 5 mg tablet TAKE 1 TABLET DAILY FOR BLOOD PRESSURE 90 Tab 1    memantine (NAMENDA) 10 mg tablet TAKE 1 TABLET TWICE A DAY FOR MODERATE TO SEVERE ALZHEIMERS TYPE DEMENTIA 180 Tab 1    arformoterol (BROVANA) 15 mcg/2 mL nebu neb solution 15 mcg by Nebulization route.  DENOSUMAB SC by SubCUTAneous route every 6 months.  albuterol-ipratropium (DUO-NEB) 2.5 mg-0.5 mg/3 ml nebu 3 mL by Nebulization route every four (4) hours as needed. 90 Nebule 1    albuterol (PROVENTIL HFA, VENTOLIN HFA, PROAIR HFA) 90 mcg/actuation inhaler Take 2 Puffs by inhalation every four (4) hours as needed for Wheezing. Please give with spacer. 1 Inhaler 0    imipramine (TOFRANIL) 25 mg tablet TAKE 2 TABLETS NIGHTLY 122 Tab 1    FOLIC ACID/MULTIVIT-MIN/LUTEIN (CENTRUM SILVER PO) Take 1 Tab by mouth daily.        Allergies   Allergen Reactions    Keflex [Cephalexin] Rash    Pcn [Penicillins] Rash    Sulfa (Sulfonamide Antibiotics) Rash     Past Medical History:   Diagnosis Date    Anxiety state, unspecified     CKD (chronic kidney disease) stage 3, GFR 30-59 ml[facility] 2016    Dementia     Landa/npysch    Depression 2017    Generalized osteoarthrosis, unspecified site     Lumbar compression fracture ([facility] [address]     epidurals x 2  Umang/os    Osteopenia     Other and unspecified hyperlipidemia     Psoriasis     Unspecified essential hypertension     Unspecified hypothyroidism     thyroid irradiated i131     Past Surgical History:   Procedure Laterality Date    COLONOSCOPY  10/10    [place]/gi    TOTAL KNEE ARTHROPLASTY      left    TOTAL KNEE ARTHROPLASTY      right     Family History   Problem Relation Age of Onset    Cancer Mother         stomach tumors    Cancer Brother         prostate     Social History     Tobacco Use    Smoking status: Former Smoker     Packs/day: 0.50     Years: 60.00     Pack years: 30.00     Types: Cigarettes     Last attempt to quit:      Years since quittin.5    Smokeless tobacco: Never Used   Substance Use Topics    Alcohol use: No     Alcohol/week: 0.0 oz        Review of Systems   Constitutional: Negative for chills and fever. HENT: Negative for hearing loss and tinnitus. Eyes: Negative for blurred vision and double vision. Respiratory: Negative for cough and shortness of breath. Cardiovascular: Negative for chest pain and palpitations. Gastrointestinal: Negative for nausea and vomiting. Genitourinary: Negative for dysuria and frequency. Musculoskeletal: Negative for back pain and falls. Skin: Negative for itching and rash. Laceration   Neurological: Negative for dizziness, loss of consciousness and headaches. Psychiatric/Behavioral: Negative for depression. The patient is not nervous/anxious. Physical Exam   Constitutional: She is oriented to person, place, and time. She appears well-developed and well-nourished. HENT:   Head: Normocephalic and atraumatic. Right Ear: External ear normal.   Left Ear: External ear normal.   Nose: Nose normal.   Mouth/Throat: Oropharynx is clear and moist.   Eyes: Conjunctivae and EOM are normal.   Neck: Normal range of motion. Neck supple. Cardiovascular: Normal rate, regular rhythm, normal heart sounds and intact distal pulses. Pulmonary/Chest: Effort normal and breath sounds normal.   Abdominal: Soft. Bowel sounds are normal.   Musculoskeletal: Normal range of motion. Neurological: She is alert and oriented to person, place, and time. Skin: Skin is warm and dry. Wound on left leg lateral, dark round triangle scabbed lesion, skin around wound erythematous, slight warm to touch. Psychiatric: She has a normal mood and affect. Her behavior is normal. Judgment and thought content normal.   Nursing note and vitals reviewed. ASSESSMENT and PLAN  Diagnoses and all orders for this visit:    1. Abrasion of left lower extremity, initial encounter  Wound is healing. Advised pt to avoid touching area. 2. Late onset Alzheimer'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 behavioral disturbance  Condition is progressing.  Advised pt to continue with Memantine 10 mg/BID and Donepezil 10 mg/d. 3. Essential hypertension with goal blood pressure less than 140/90  BP is at goal today in office. Advised pt to continue with 1/2 tab Lisinopril 10 mg/d and Norvasc 5 mg/d.     4. Mixed hyperlipidemia  Presumed stable. Advised pt to continue Atorvastatin 40 mg/d. Follow-up and Dispositions    · Return in about 3 months (around 10/3/2019) for hyperlipidemia follow up, hypertension follow up. Medication risks/benefits/costs/interactions/alternatives discussed with patient. Advised patient to call back or return to office if symptoms worsen/change/persist.  If patient cannot reach us or should anything more severe/urgent arise he/she should proceed directly to the nearest emergency department. Discussed expected course/resolution/complications of diagnosis in detail with patient. Patient given a written after visit summary which includes her diagnoses, current medications and vitals. Patient expressed understanding with the diagnosis and plan. Written by prieto De Guzman, as dictated by Karen Estes M.D.    5:41 PM - 5:55 PM    Total time spent with the patient 14 minutes, greater than 50% of time spent counseling patient.

## 2019-07-03 NOTE — PROGRESS NOTES
Chief Complaint   Patient presents with    Cholesterol Problem     follow up    Laceration     left leg     1. Have you been to the ER, urgent care clinic since your last visit? Hospitalized since your last visit? No    2. Have you seen or consulted any other health care providers outside of the [address] [place] [address] since your last visit? Include any pap smears or colon screening.  No

## 2019-07-03 NOTE — PATIENT INSTRUCTIONS
Home Blood Pressure Test: About This Test 
What is it? A home blood pressure test allows you to keep track of your blood pressure at home. Blood pressure is a measure of the force of blood against the walls of your arteries. Blood pressure readings include two numbers, such as 130/80 (say \"130 over 80\"). The first number is the systolic pressure. The second number is the diastolic pressure. Why is this test done? You may do this test at home to: · Find out if you have high blood pressure. · Track your blood pressure if you have high blood pressure. · Track how well medicine is working to reduce high blood pressure. · Check how lifestyle changes, such as weight loss and exercise, are affecting blood pressure. How can you prepare for the test? 
· Do not use caffeine, tobacco, or medicines known to raise blood pressure (such as nasal decongestant sprays) for at least 30 minutes before taking your blood pressure. · Do not exercise for at least 30 minutes before taking your blood pressure. What happens before the test? 
Take your blood pressure while you feel comfortable and relaxed. Sit quietly with both feet on the floor for at least 5 minutes before the test. 
What happens during the test? 
· Sit with your arm slightly bent and resting on a table so that your upper arm is at the same level as your heart. · Roll up your sleeve or take off your shirt to expose your upper arm. · Wrap the blood pressure cuff around your upper arm so that the lower edge of the cuff is about 1 inch above the bend of your elbow. Proceed with the following steps depending on if you are using an automatic or manual pressure monitor. Automatic blood pressure monitors · Press the on/off button on the automatic monitor and wait until the ready-to-measure \"heart\" symbol appears next to zero in the display window. · Press the start button. The cuff will inflate and deflate by itself. · Your blood pressure numbers will appear on the screen. · Write your numbers in your log book, along with the date and time. Manual blood pressure monitors · Place the earpieces of a stethoscope in your ears, and place the bell of the stethoscope over the artery, just below the cuff. · Close the valve on the rubber inflating bulb. · Squeeze the bulb rapidly with your opposite hand to inflate the cuff until the dial or column of mercury reads about 30 mm Hg higher than your usual systolic pressure. If you do not know your usual pressure, inflate the cuff to 210 mm Hg or until the pulse at your wrist disappears. · Open the pressure valve just slightly by twisting or pressing the valve on the bulb. · As you watch the pressure slowly fall, note the level on the dial at which you first start to hear a pulsing or tapping sound through the stethoscope. This is your systolic blood pressure. · Continue letting the air out slowly. The sounds will become muffled and will finally disappear. Note the pressure when the sounds completely disappear. This is your diastolic blood pressure. Let out all the remaining air. · Write your numbers in your log book, along with the date and time. What else should you know about the test? 
It is more accurate to take the average of several readings made throughout the day than to rely on a single reading. It's normal for blood pressure to go up and down throughout the day. Follow-up care is a key part of your treatment and safety. Be sure to make and go to all appointments, and call your doctor if you are having problems. It's also a good idea to keep a list of the medicines you take. Where can you learn more? Go to http://asya-chad.info/. Enter C427 in the search box to learn more about \"Home Blood Pressure Test: About This Test.\" Current as of: July 22, 2018 Content Version: 11.9 © 9672-6484 [employer].  Care instructions adapted under license by [address] (which disclaims liability or warranty for this information). If you have questions about a medical condition or this instruction, always ask your healthcare professional. [address] any warranty or liability for your use of this information.

## 2019-09-12 ENCOUNTER — HOSPITAL ENCOUNTER (OUTPATIENT)
Dept: GENERAL RADIOLOGY | Age: 84
Discharge: HOME | End: 2019-09-12
Payer: MEDICARE

## 2019-09-12 DIAGNOSIS — R05.9 COUGH: ICD-10-CM

## 2019-09-12 PROCEDURE — 71046 X-RAY EXAM CHEST 2 VIEWS: CPT

## 2019-09-13 DIAGNOSIS — E03.8 OTHER SPECIFIED HYPOTHYROIDISM: ICD-10-CM

## 2019-09-16 RX ORDER — LEVOTHYROXINE SODIUM 100 UG/1
TABLET ORAL
Qty: 90 TAB | Refills: 1 | Status: SHIPPED | OUTPATIENT
Start: 2019-09-16

## 2019-09-17 ENCOUNTER — CLINICAL SUPPORT (OUTPATIENT)
Dept: FAMILY MEDICINE CLINIC | Age: 84
End: 2019-09-17

## 2019-09-17 VITALS — TEMPERATURE: 98.4 F

## 2019-09-17 DIAGNOSIS — Z23 ENCOUNTER FOR IMMUNIZATION: ICD-10-CM

## 2019-09-17 NOTE — PROGRESS NOTES
Chief Complaint   Patient presents with    Immunization/Injection     flu shot     Wants Flu shot  Patient denies egg allergy, never had reaction to flu injection in past, not allergic thimerosol mercury based component, denies pregnancy, never had Guillain Barr's syndrome , denies fever. Risks and adverse reactions were discussed and the VIS was given to patient. All questions were addressed. Patient was observed for 5 min post injection. There were no reactions observed.  Signed form and scan     Spoke to patient Identified pt with two pt identifiers (Name @ )

## 2019-09-25 ENCOUNTER — HOSPITAL ENCOUNTER (EMERGENCY)
Age: 84
Discharge: HOME | End: 2019-09-25
Attending: EMERGENCY MEDICINE | Admitting: EMERGENCY MEDICINE
Payer: MEDICARE

## 2019-09-25 ENCOUNTER — APPOINTMENT (OUTPATIENT)
Dept: CT IMAGING | Age: 84
End: 2019-09-25
Attending: EMERGENCY MEDICINE
Payer: MEDICARE

## 2019-09-25 DIAGNOSIS — M79.18 ACUTE BUTTOCK PAIN: ICD-10-CM
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
## 2019-09-25 DIAGNOSIS — M54.5 ACUTE MIDLINE LOW BACK PAIN, WITH SCIATICA PRESENCE UNSPECIFIED: ICD-10-CM

## 2019-09-25 DIAGNOSIS — R91.8 LUNG NODULES: Primary | ICD-10-CM

## 2019-09-25 LAB
ALBUMIN SERPL-MCNC: 3.3 G/DL (ref 3.5–5)
ALBUMIN/GLOB SERPL: 0.7 {RATIO} (ref 1.1–2.2)
ALP SERPL-CCNC: 161 U/L (ref 45–117)
ALT SERPL-CCNC: 28 U/L (ref 12–78)
ANION GAP SERPL CALC-SCNC: 6 MMOL/L (ref 5–15)
APPEARANCE UR: CLEAR
AST SERPL-CCNC: 23 U/L (ref 15–37)
BACTERIA URNS QL MICRO: NEGATIVE /HPF
BASOPHILS # BLD: 0 K/UL (ref 0–0.1)
BASOPHILS NFR BLD: 0 % (ref 0–1)
BILIRUB SERPL-MCNC: 0.5 MG/DL (ref 0.2–1)
BILIRUB UR QL: NEGATIVE
BUN SERPL-MCNC: 18 MG/DL (ref 6–20)
BUN/CREAT SERPL: 17 (ref 12–20)
CALCIUM SERPL-MCNC: 9.6 MG/DL (ref 8.5–10.1)
CHLORIDE SERPL-SCNC: 105 MMOL/L (ref 97–108)
CO2 SERPL-SCNC: 30 MMOL/L (ref 21–32)
COLOR UR: ABNORMAL
COMMENT, HOLDF: NORMAL
CREAT SERPL-MCNC: 1.09 MG/DL (ref 0.55–1.02)
DIFFERENTIAL METHOD BLD: NORMAL
EOSINOPHIL # BLD: 0.1 K/UL (ref 0–0.4)
EOSINOPHIL NFR BLD: 1 % (ref 0–7)
EPITH CASTS URNS QL MICRO: ABNORMAL /LPF
ERYTHROCYTE [DISTWIDTH] IN BLOOD BY AUTOMATED COUNT: 13.8 % (ref 11.5–14.5)
GLOBULIN SER CALC-MCNC: 4.6 G/DL (ref 2–4)
GLUCOSE SERPL-MCNC: 121 MG/DL (ref 65–100)
GLUCOSE UR STRIP.AUTO-MCNC: NEGATIVE MG/DL
HCT VFR BLD AUTO: 42.5 % (ref 35–47)
HGB BLD-MCNC: 13.4 G/DL (ref 11.5–16)
HGB UR QL STRIP: NEGATIVE
HYALINE CASTS URNS QL MICRO: ABNORMAL /LPF (ref 0–5)
IMM GRANULOCYTES # BLD AUTO: 0 K/UL (ref 0–0.04)
IMM GRANULOCYTES NFR BLD AUTO: 0 % (ref 0–0.5)
KETONES UR QL STRIP.AUTO: NEGATIVE MG/DL
LEUKOCYTE ESTERASE UR QL STRIP.AUTO: ABNORMAL
LYMPHOCYTES # BLD: 2.4 K/UL (ref 0.8–3.5)
LYMPHOCYTES NFR BLD: 31 % (ref 12–49)
MCH RBC QN AUTO: 28.3 PG (ref 26–34)
MCHC RBC AUTO-ENTMCNC: 31.5 G/DL (ref 30–36.5)
MCV RBC AUTO: 89.7 FL (ref 80–99)
MONOCYTES # BLD: 0.5 K/UL (ref 0–1)
MONOCYTES NFR BLD: 7 % (ref 5–13)
NEUTS SEG # BLD: 4.8 K/UL (ref 1.8–8)
NEUTS SEG NFR BLD: 61 % (ref 32–75)
NITRITE UR QL STRIP.AUTO: POSITIVE
NRBC # BLD: 0 K/UL (ref 0–0.01)
NRBC BLD-RTO: 0 PER 100 WBC
PH UR STRIP: 7.5 [PH] (ref 5–8)
PLATELET # BLD AUTO: 307 K/UL (ref 150–400)
PMV BLD AUTO: 10.2 FL (ref 8.9–12.9)
POTASSIUM SERPL-SCNC: 4.1 MMOL/L (ref 3.5–5.1)
PROT SERPL-MCNC: 7.9 G/DL (ref 6.4–8.2)
PROT UR STRIP-MCNC: NEGATIVE MG/DL
RBC # BLD AUTO: 4.74 M/UL (ref 3.8–5.2)
RBC #/AREA URNS HPF: ABNORMAL /HPF (ref 0–5)
SAMPLES BEING HELD,HOLD: NORMAL
SODIUM SERPL-SCNC: 141 MMOL/L (ref 136–145)
SP GR UR REFRACTOMETRY: 1.01 (ref 1–1.03)
UR CULT HOLD, URHOLD: NORMAL
UROBILINOGEN UR QL STRIP.AUTO: 0.2 EU/DL (ref 0.2–1)
WBC # BLD AUTO: 7.9 K/UL (ref 3.6–11)
WBC URNS QL MICRO: ABNORMAL /HPF (ref 0–4)

## 2019-09-25 PROCEDURE — 80053 COMPREHEN METABOLIC PANEL: CPT

## 2019-09-25 PROCEDURE — 99285 EMERGENCY DEPT VISIT HI MDM: CPT

## 2019-09-25 PROCEDURE — 36415 COLL VENOUS BLD VENIPUNCTURE: CPT

## 2019-09-25 PROCEDURE — 85025 COMPLETE CBC W/AUTO DIFF WBC: CPT

## 2019-09-25 PROCEDURE — 81001 URINALYSIS AUTO W/SCOPE: CPT

## 2019-09-25 PROCEDURE — 87077 CULTURE AEROBIC IDENTIFY: CPT

## 2019-09-25 PROCEDURE — 77030019905 HC CATH URETH INTMIT MDII -A

## 2019-09-25 PROCEDURE — 96361 HYDRATE IV INFUSION ADD-ON: CPT

## 2019-09-25 PROCEDURE — 71260 CT THORAX DX C+: CPT

## 2019-09-25 PROCEDURE — 74011000258 HC RX REV CODE- 258: Performed by: RADIOLOGY

## 2019-09-25 PROCEDURE — 74011636320 HC RX REV CODE- 636/320: Performed by: RADIOLOGY

## 2019-09-25 PROCEDURE — 87186 SC STD MICRODIL/AGAR DIL: CPT

## 2019-09-25 PROCEDURE — 97161 PT EVAL LOW COMPLEX 20 MIN: CPT

## 2019-09-25 PROCEDURE — 96360 HYDRATION IV INFUSION INIT: CPT

## 2019-09-25 PROCEDURE — 77030011943

## 2019-09-25 PROCEDURE — 87086 URINE CULTURE/COLONY COUNT: CPT

## 2019-09-25 PROCEDURE — 74176 CT ABD & PELVIS W/O CONTRAST: CPT

## 2019-09-25 RX ORDER — SODIUM CHLORIDE 0.9 % (FLUSH) 0.9 %
10 SYRINGE (ML) INJECTION
Status: COMPLETED | OUTPATIENT
Start: 2019-09-25 | End: 2019-09-25

## 2019-09-25 RX ADMIN — SODIUM CHLORIDE 100 ML: 900 INJECTION, SOLUTION INTRAVENOUS at 10:06

## 2019-09-25 RX ADMIN — IOPAMIDOL 100 ML: 612 INJECTION, SOLUTION INTRAVENOUS at 10:06

## 2019-09-25 RX ADMIN — Medication 10 ML: at 10:06

## 2019-09-25 NOTE — ED NOTES
Pt left via wheelchair with discharge instructions out to the front of the triage to her daughters vehicle.

## 2019-09-25 NOTE — SENIOR SERVICES NOTE
SSED NP consult received and appreciated. Pt lives at Home with daughter who voiced feeling comfortable assisting patient at her current level of function. Patient appears to be functioning at her baseline mobility. She requires hand held assistance to walker. Pt lives with daughter and has 13 steps inside house that she climbs daily. Pt fell in bathroom while daughter was in there with her.

## 2019-09-25 NOTE — PROGRESS NOTES
Date of previous inpatient admission/ ED visit? 5/20/19-5/22/19 Inpatient Admission ([facility])    What brought the patient back to ED? Patient presents to the ED s/p fall    Did patient decline recommended services during last admission/ ED visit (if yes, what)? No    Has patient seen a provider since their last inpatient admission/ED visit (if yes, when)? Yes. PCP is Dr. Carlos Waters seen in office 7/3/19    CM Interventions:  From previous inpatient admission/ED visit: No previous CM Interventions noted  From current inpatient admission/ED visit: Assessment    SSED/CM consult received and appreciated. EMR reviewed. History significant for hypothyroidism, HTN, anxiety, OA, compression fracture, dementia, and CKD. Met w/patient, daughter/JUANCARLOSA Perri Powellvicki and SSED team. Patient was sleeping at beginning of visit - introduced to role of CM. History provided by Brandy Ayala who is sole caregiver. Daughter has resided w/ patient for 5 years in [address] ([address]). Patient had fall in [place] yesterday.  is able to walk hand assist in the home and w/c is utilized outside of home. Patient attends [facility] while Brandy Ayala works. Informed off on Tuesdays and day is utilized for medical appointments and household duties/errands. O2 is used briefly at night. Previous providers include [facility] (unable to remember name) and [facility]. Brandy Ayala acknowledged receiving respite funds from [address] [place] [address] 2-3 yrs ago. No other additional resources received from agency daughter is receptive of referral to agency. [facility] Medicare [facility], [facility] are insurance providers. [facility] is local pharmacy utilized. SSED/CM will follow for transitions of care needs. Care Management Interventions  PCP Verified by CM:  Yes  Last Visit to PCP: 07/03/19  Palliative Care Criteria Met (RRAT>21 & CHF Dx)?: No  Transition of Care Consult (CM Consult): Discharge Planning  MyChart Signup: Yes  Discharge Durable Medical Equipment: No  Health Maintenance Reviewed: Yes  Physical Therapy Consult: Yes  Occupational Therapy Consult: No  Speech Therapy Consult: No  Current Support Network: Lives with Caregiver, Own Home  Confirm Follow Up Transport: Family  Plan discussed with Pt/Family/Caregiver: Yes   Resource Information Provided?: No  Discharge Location  Discharge Placement: (TBD)

## 2019-09-25 NOTE — DISCHARGE INSTRUCTIONS
Patient Education        Patient Education        Learning About Lung Nodules  What is a lung nodule? A lung nodule is a growth in the lung. A single nodule surrounded by lung tissue is called a solitary pulmonary nodule. A lung nodule might not cause any symptoms. Your doctor may have found one or more nodules on your lung when you were having a chest X-ray or CT scan. Or it may have been found during a lung cancer screening. A lung nodule may be caused by an old infection or cancer. It might also be a noncancerous growth. Lung nodules can cause a screening to give an abnormal result. Most nodules do not cause any harm. But without further tests, your doctor can't tell whether an abnormal finding is cancer, a harmless nodule, or something else. What can you expect when you have a lung nodule? Your doctor will look at several risk factors to see how likely it is that the nodule is cancer. He or she will look at:  · Whether you smoke or have ever smoked. · Your age and your family's medical history. · Whether you have ever had lung cancer. · The size and shape of the nodule. · Whether the nodule has changed in size. Your doctor may look at past chest X-rays or CT scans, if available, and compare them. Or you may have a series of CT scans to see if the nodule grows over time. What happens next depends on the risk of the nodule being cancer. · If you have no risk factors and the nodule is small, your doctor may advise doing nothing. · If the risk is small, your doctor may schedule follow-up appointments and tests. You may have more CT scans later to see if the nodule is growing. If the nodule hasn't changed in 3 to 6 months, you may have CT scans every year. If it hasn't changed in 2 years, you may not need any more tests. · If there's a higher risk of cancer, your doctor may:  ? Do a PET scan, which may help tell if the nodule is cancerous or not. ? Take a sample of tissue from the nodule for testing. This is called a biopsy. ? Remove the nodule with surgery. Follow-up care is a key part of your treatment and safety. Be sure to make and go to all appointments, and call your doctor if you are having problems. It's also a good idea to know your test results and keep a list of the medicines you take. Where can you learn more? Go to http://asya-chad.info/. Enter V646 in the search box to learn more about \"Learning About Lung Nodules. \"  Current as of: December 19, 2018  Content Version: 12.2  © 7299-2682 [employer]. Care instructions adapted under license by [employer] (which disclaims liability or warranty for this information). If you have questions about a medical condition or this instruction, always ask your healthcare professional. [place] [address] any warranty or liability for your use of this information. Back Pain: Care Instructions  Your Care Instructions    Back pain has many possible causes. It is often related to problems with muscles and ligaments of the back. It may also be related to problems with the nerves, discs, or bones of the back. Moving, lifting, standing, sitting, or sleeping in an awkward way can strain the back. Sometimes you don't notice the injury until later. Arthritis is another common cause of back pain. Although it may hurt a lot, back pain usually improves on its own within several weeks. Most people recover in 12 weeks or less. Using good home treatment and being careful not to stress your back can help you feel better sooner. Follow-up care is a key part of your treatment and safety. Be sure to make and go to all appointments, and call your doctor if you are having problems. It's also a good idea to know your test results and keep a list of the medicines you take. How can you care for yourself at home? · Sit or lie in positions that are most comfortable and reduce your pain.  Try one of these positions when you lie down:  ? Lie on your back with your knees bent and supported by large pillows. ? Lie on the floor with your legs on the seat of a sofa or chair. ? Lie on your side with your knees and hips bent and a pillow between your legs. ? Lie on your stomach if it does not make pain worse. · Do not sit up in bed, and avoid soft couches and twisted positions. Bed rest can help relieve pain at first, but it delays healing. Avoid bed rest after the first day of back pain. · Change positions every 30 minutes. If you must sit for long periods of time, take breaks from sitting. Get up and walk around, or lie in a comfortable position. · Try using a heating pad on a low or medium setting for 15 to 20 minutes every 2 or 3 hours. Try a warm shower in place of one session with the heating pad. · You can also try an ice pack for 10 to 15 minutes every 2 to 3 hours. Put a thin cloth between the ice pack and your skin. · Take pain medicines exactly as directed. ? If the doctor gave you a prescription medicine for pain, take it as prescribed. ? If you are not taking a prescription pain medicine, ask your doctor if you can take an over-the-counter medicine. · Take short walks several times a day. You can start with 5 to 10 minutes, 3 or 4 times a day, and work up to longer walks. Walk on level surfaces and avoid hills and stairs until your back is better. · Return to work and other activities as soon as you can. Continued rest without activity is usually not good for your back. · To prevent future back pain, do exercises to stretch and strengthen your back and stomach. Learn how to use good posture, safe lifting techniques, and proper body mechanics. When should you call for help? Call your doctor now or seek immediate medical care if:    · You have new or worsening numbness in your legs.     · You have new or worsening weakness in your legs.  (This could make it hard to stand up.)     · You lose control of your bladder or bowels.    Watch closely for changes in your health, and be sure to contact your doctor if:    · You have a fever, lose weight, or don't feel well.     · You do not get better as expected. Where can you learn more? Go to http://asya-chad.info/. Enter E754 in the search box to learn more about \"Back Pain: Care Instructions. \"  Current as of: June 26, 2019  Content Version: 12.2  © 7013-2839 [employer]. Care instructions adapted under license by [employer] (which disclaims liability or warranty for this information). If you have questions about a medical condition or this instruction, always ask your healthcare professional. [address] any warranty or liability for your use of this information. Patient Education        Preventing Falls: Care Instructions  Your Care Instructions    Getting around your home safely can be a challenge if you have injuries or health problems that make it easy for you to fall. Loose rugs and furniture in walkways are among the dangers for many older people who have problems walking or who have poor eyesight. People who have conditions such as arthritis, osteoporosis, or dementia also have to be careful not to fall. You can make your home safer with a few simple measures. Follow-up care is a key part of your treatment and safety. Be sure to make and go to all appointments, and call your doctor if you are having problems. It's also a good idea to know your test results and keep a list of the medicines you take. How can you care for yourself at home? Taking care of yourself  · You may get dizzy if you do not drink enough water. To prevent dehydration, drink plenty of fluids, enough so that your urine is light yellow or clear like water. Choose water and other caffeine-free clear liquids.  If you have kidney, heart, or liver disease and have to limit fluids, talk with your doctor before you increase the amount of fluids you drink. · Exercise regularly to improve your strength, muscle tone, and balance. Walk if you can. Swimming may be a good choice if you cannot walk easily. · Have your vision and hearing checked each year or any time you notice a change. If you have trouble seeing and hearing, you might not be able to avoid objects and could lose your balance. · Know the side effects of the medicines you take. Ask your doctor or pharmacist whether the medicines you take can affect your balance. Sleeping pills or sedatives can affect your balance. · Limit the amount of alcohol you drink. Alcohol can impair your balance and other senses. · Ask your doctor whether calluses or corns on your feet need to be removed. If you wear loose-fitting shoes because of calluses or corns, you can lose your balance and fall. · Talk to your doctor if you have numbness in your feet. Preventing falls at home  · Remove raised doorway thresholds, throw rugs, and clutter. Repair loose carpet or raised areas in the floor. · Move furniture and electrical cords to keep them out of walking paths. · Use nonskid floor wax, and wipe up spills right away, especially on ceramic tile floors. · If you use a walker or cane, put rubber tips on it. If you use crutches, clean the bottoms of them regularly with an abrasive pad, such as steel wool. · Keep your house well lit, especially [place], and outside walkways. Use night-lights in areas such as hallways and bathrooms. Add extra light switches or use remote switches (such as switches that go on or off when you clap your hands) to make it easier to turn lights on if you have to get up during the night. · Install sturdy handrails on stairways. · Move items in your cabinets so that the things you use a lot are on the lower shelves (about waist level). · Keep a cordless phone and a flashlight with new batteries by your bed.  If possible, put a phone in each of the main rooms of your house, or carry a cell phone in case you fall and cannot reach a phone. Or, you can wear a device around your neck or wrist. You push a button that sends a signal for help. · Wear low-heeled shoes that fit well and give your feet good support. Use footwear with nonskid soles. Check the heels and soles of your shoes for wear. Repair or replace worn heels or soles. · Do not wear socks without shoes on wood floors. · Walk on the grass when the sidewalks are slippery. If you live in an area that gets snow and ice in the winter, sprinkle salt on slippery steps and sidewalks. Preventing falls in the bath  · Install grab bars and nonskid mats inside and outside your shower or tub and near the toilet and sinks. · Use shower chairs and bath benches. · Use a hand-held shower head that will allow you to sit while showering. · Get into a tub or shower by putting the weaker leg in first. Get out of a tub or shower with your strong side first.  · Repair loose toilet seats and consider installing a raised toilet seat to make getting on and off the toilet easier. · Keep your bathroom door unlocked while you are in the shower. Where can you learn more? Go to http://asya-chad.info/. Enter 0476 [id number] in the search box to learn more about \"Preventing Falls: Care Instructions. \"  Current as of: November 7, 2018  Content Version: 12.2  © 3282-2736 [employer]. Care instructions adapted under license by [employer] (which disclaims liability or warranty for this information). If you have questions about a medical condition or this instruction, always ask your healthcare professional. [place] [address] any warranty or liability for your use of this information.

## 2019-09-25 NOTE — ED TRIAGE NOTES
Triage Note:  Patient arrives via EMS from home where daughter is her caregiver. Patient has dementia with A&O to self. Patient's daughter states that patient fell last night in the bathroom but denied pain. Patient went to bed without pain. Patient awoke this morning with abdominal pain and back pain, patient unable to sit or stand without pain.

## 2019-09-25 NOTE — ED NOTES
7296:  Bedside and Verbal shift change report given to Zari Mckeon RN (oncoming nurse) by Tracie Braxton RN (offgoing nurse). Report included the following information SBAR, ED Summary and Recent Results.

## 2019-09-25 NOTE — ED PROVIDER NOTES
[address] y.o. female with past medical history significant for hypothyroidism, HTN, anxiety, OA, compression fracture, dementia, CKD who presents via EMS with chief complaint of abdominal pain. Per EMS, they spoke to the pt's daughter and she reports that the pt had a GLF yesterday in the bathroom. Pt did not have pain yesterday, but woke up this morning c/o lower back and abdominal pain. Pt is unable to sit or stand. Pt is demented at baseline and is unable to give any details of the fall. Pt's daughter is not yet in the ED. There are no other acute medical concerns at this time. Social hx: former tobacco smoker, denies EtOH consumption    PCP: Jo Pino MD    Full history, physical exam, and ROS unable to be obtained due to:  dementia. Note written by Bruna Villalta, as dictated by Stan Whitmore MD 7:02 AM      The history is provided by the EMS personnel and a relative. No  was used.         Past Medical History:   Diagnosis Date    Anxiety state, unspecified     CKD (chronic kidney disease) stage 3, GFR 30-59 ml/min ([facility]) 4/6/2016    Dementia 6/12    Landa/npysch    Depression 9/20/2017    Generalized osteoarthrosis, unspecified site     Lumbar compression fracture ([address] 4/08    epidurals x 2  Umang/os    Osteopenia     Other and unspecified hyperlipidemia     Psoriasis     Unspecified essential hypertension     Unspecified hypothyroidism     thyroid irradiated i131       Past Surgical History:   Procedure Laterality Date    COLONOSCOPY  10/10    [facility]/gi    TOTAL KNEE ARTHROPLASTY  1992    left    TOTAL KNEE ARTHROPLASTY  1992    right         Family History:   Problem Relation Age of Onset    Cancer Mother         stomach tumors    Cancer Brother         prostate       Social History     Socioeconomic History    Marital status:      Spouse name: Not on file    Number of children: Not on file    Years of education: Not on file  Highest education level: Not on file   Occupational History    Not on file   Social Needs    Financial resource strain: Not on file    Food insecurity:     Worry: Not on file     Inability: Not on file    Transportation needs:     Medical: Not on file     Non-medical: Not on file   Tobacco Use    Smoking status: Former Smoker     Packs/day: 0.50     Years: 60.00     Pack years: 30.00     Types: Cigarettes     Last attempt to quit:      Years since quittin.7    Smokeless tobacco: Never Used   Substance and Sexual Activity    Alcohol use: No     Alcohol/week: 0.0 standard drinks    Drug use: No    Sexual activity: Never   Lifestyle    Physical activity:     Days per week: Not on file     Minutes per session: Not on file    Stress: Not on file   Relationships    Social connections:     Talks on phone: Not on file     Gets together: Not on file     Attends [employer] service: Not on file     Active member of club or organization: Not on file     Attends meetings of clubs or organizations: Not on file     Relationship status: Not on file    Intimate partner violence:     Fear of current or ex partner: Not on file     Emotionally abused: Not on file     Physically abused: Not on file     Forced sexual activity: Not on file   Other Topics Concern     Service Not Asked    Blood Transfusions Not Asked    Caffeine Concern Not Asked    Occupational Exposure Not Asked   Glorine Mends Hazards Not Asked    Sleep Concern Not Asked    Stress Concern Not Asked    Weight Concern Not Asked    Special Diet Not Asked    Back Care Not Asked    Exercise Yes     Comment: enjoys working in the yard   [employer] Not Asked    [facility] Not Asked    Self-Exams Not Asked   Social History Narrative    Not on file         ALLERGIES: Keflex [cephalexin];  Pcn [penicillins]; and Sulfa (sulfonamide antibiotics)    Review of Systems   Unable to perform ROS: Dementia       Vitals:    19 0704   BP: 154/71 Pulse: 61   Resp: 18   Temp: 98.3 °F (36.8 °C)   SpO2: 95%            Physical Exam     Nursing note and vitals reviewed. Constitutional: appears well-developed and well-nourished. CURSING DURING ANY MOVEMENT. HENT:   Head: Normocephalic and atraumatic. Sclera anicteric  Nose: No rhinorrhea  Mouth/Throat: Oropharynx is clear and moist. Pharynx normal  Eyes: Conjunctivae are normal. Pupils are equal, round, and reactive to light. Right eye exhibits no discharge. Left eye exhibits no discharge. No scleral icterus. Neck: Painless normal range of motion. Supple. NONTENDER C-SPINE. Cardiovascular: Normal rate, regular rhythm, normal heart sounds and intact distal pulses. Exam reveals no gallop and no friction rub. No murmur heard. Pulmonary/Chest: Effort normal and breath sounds normal. No respiratory distress. no wheezes. no rales. Abdominal: Soft. Bowel sounds are normal. Exhibits no distension and no mass. No tenderness. No guarding. Musculoskeletal: Normal range of motion. no tenderness. No edema. NONTENDER SPINE DURING LOGROLL. Lymphadenopathy:   No cervical adenopathy. Neurological:  Alert and oriented to person ONLY. Moving all extremities. Skin: Skin is warm and dry. No rash noted. No pallor. MDM    26-year-old female with a history of dementia and very limited history except for EMS here with no specific complaints from the patient but EMS reports abdominal pain, back pain after fall yesterday.  Good range of motion of both hips.  Nontender spine.  Differential diagnosis includes pelvic fracture, hip fracture, intra-abdominal process and others.  Will check CT abdomen pelvis, labs. Procedures    PROGRESS NOTE:  9:35 AM  Pt's daughter is now at bedside. She reports that the pain started yesterday a few hours after the fall, but acutely worsened this morning and made the pt unable to get up out of bed.  Pt reportedly saw her pulmonologist last week for a cough and was put on a Z pack and kwame Willard MD reviewed the pt's results and imaging with the pt and her daughter. Will obtain a CT chest to determine infection vs. Lung nodule    PROGRESS NOTE:  11:46 AM  Will give a dose of Levaquin for UTI    PROGRESS NOTE:  11:48 AM  Pt with no urinary sx, will await urine cultures. Pt afebrile, normal wbc. PROGRESS NOTE:  11:53 AM  Reviewed results with daughter  Plan to follow up with pulmonology     12:23 PM  F/u pulmonary for lung nodules. Ambulated without difficulty with senior services who evaluated patient. 12:24 PM  Patient's results have been reviewed with them. Patient and/or family have verbally conveyed their understanding and agreement of the patient's signs, symptoms, diagnosis, treatment and prognosis and additionally agree to follow up as recommended or return to the Emergency Room should their condition change prior to follow-up. Discharge instructions have also been provided to the patient with some educational information regarding their diagnosis as well a list of reasons why they would want to return to the ER prior to their follow-up appointment should their condition change. Recent Results (from the past 24 hour(s))   SAMPLES BEING HELD    Collection Time: 09/25/19  7:09 AM   Result Value Ref Range    SAMPLES BEING HELD  1 RED, 1 BLUE, 1LAV. 1 PST     COMMENT        Add-on orders for these samples will be processed based on acceptable specimen integrity and analyte stability, which may vary by analyte.    CBC WITH AUTOMATED DIFF    Collection Time: 09/25/19  7:09 AM   Result Value Ref Range    WBC 7.9 3.6 - 11.0 K/uL    RBC 4.74 3.80 - 5.20 M/uL    HGB 13.4 11.5 - 16.0 g/dL    HCT 42.5 35.0 - 47.0 %    MCV 89.7 80.0 - 99.0 FL    MCH 28.3 26.0 - 34.0 PG    MCHC 31.5 30.0 - 36.5 g/dL    RDW 13.8 11.5 - 14.5 %    PLATELET 566 485 - 495 K/uL    MPV 10.2 8.9 - 12.9 FL    NRBC 0.0 0  WBC    ABSOLUTE NRBC 0.00 0.00 - 0.01 K/uL NEUTROPHILS 61 32 - 75 %    LYMPHOCYTES 31 12 - 49 %    MONOCYTES 7 5 - 13 %    EOSINOPHILS 1 0 - 7 %    BASOPHILS 0 0 - 1 %    IMMATURE GRANULOCYTES 0 0.0 - 0.5 %    ABS. NEUTROPHILS 4.8 1.8 - 8.0 K/UL    ABS. LYMPHOCYTES 2.4 0.8 - 3.5 K/UL    ABS. MONOCYTES 0.5 0.0 - 1.0 K/UL    ABS. EOSINOPHILS 0.1 0.0 - 0.4 K/UL    ABS. BASOPHILS 0.0 0.0 - 0.1 K/UL    ABS. IMM. GRANS. 0.0 0.00 - 0.04 K/UL    DF AUTOMATED     METABOLIC PANEL, COMPREHENSIVE    Collection Time: 09/25/19  7:09 AM   Result Value Ref Range    Sodium 141 136 - 145 mmol/L    Potassium 4.1 3.5 - 5.1 mmol/L    Chloride 105 97 - 108 mmol/L    CO2 30 21 - 32 mmol/L    Anion gap 6 5 - 15 mmol/L    Glucose 121 (H) 65 - 100 mg/dL    BUN 18 6 - 20 MG/DL    Creatinine 1.09 (H) 0.55 - 1.02 MG/DL    BUN/Creatinine ratio 17 12 - 20      GFR est AA 57 (L) >60 ml/min/1.73m2    GFR est non-AA 47 (L) >60 ml/min/1.73m2    Calcium 9.6 8.5 - 10.1 MG/DL    Bilirubin, total 0.5 0.2 - 1.0 MG/DL    ALT (SGPT) 28 12 - 78 U/L    AST (SGOT) 23 15 - 37 U/L    Alk.  phosphatase 161 (H) 45 - 117 U/L    Protein, total 7.9 6.4 - 8.2 g/dL    Albumin 3.3 (L) 3.5 - 5.0 g/dL    Globulin 4.6 (H) 2.0 - 4.0 g/dL    A-G Ratio 0.7 (L) 1.1 - 2.2     URINALYSIS W/MICROSCOPIC    Collection Time: 09/25/19  8:51 AM   Result Value Ref Range    Color YELLOW/STRAW      Appearance CLEAR CLEAR      Specific gravity 1.008 1.003 - 1.030      pH (UA) 7.5 5.0 - 8.0      Protein NEGATIVE  NEG mg/dL    Glucose NEGATIVE  NEG mg/dL    Ketone NEGATIVE  NEG mg/dL    Bilirubin NEGATIVE  NEG      Blood NEGATIVE  NEG      Urobilinogen 0.2 0.2 - 1.0 EU/dL    Nitrites POSITIVE (A) NEG      Leukocyte Esterase TRACE (A) NEG      WBC 20-50 0 - 4 /hpf    RBC 0-5 0 - 5 /hpf    Epithelial cells FEW FEW /lpf    Bacteria NEGATIVE  NEG /hpf    Hyaline cast 0-2 0 - 5 /lpf   URINE CULTURE HOLD SAMPLE    Collection Time: 09/25/19  8:51 AM   Result Value Ref Range    Urine culture hold        URINE ON HOLD IN MICROBIOLOGY DEPT FOR 3 DAYS. IF UNPRESERVED URINE IS SUBMITTED, IT CANNOT BE USED FOR ADDITIONAL TESTING AFTER 24 HRS, RECOLLECTION WILL BE REQUIRED. Ct Chest W Cont    Result Date: 9/25/2019  INDICATION: Pulmonary nodules on CT abdomen. Dementia. Fall last night. Psoriasis. Normal white blood cell count. COMPARISON: CT abdomen earlier the same day at 7:34 AM. CT lung screen on 1/30/2014. TECHNIQUE:  Following the uneventful intravenous administration of 100 cc Isovue-300, 5 mm axial images were obtained through the chest. Coronal and sagittal reconstructions were generated. CT dose reduction was achieved through use of a standardized protocol tailored for this examination and automatic exposure control for dose modulation. FINDINGS: THYROID: Absent versus atrophy versus not imaged. No nodule. MEDIASTINUM: Small lymph nodes. CARINA: Largest right hilar lymph node measures 1.7 x 0.6 cm. No left hilar lymphadenopathy. THORACIC AORTA: Atherosclerosis without aneurysm. No occlusion of the great vessels. MAIN PULMONARY ARTERY: Normal caliber. No central pulmonary embolism. TRACHEA/BRONCHI: Subtle debris in the trachea. ESOPHAGUS: Small sliding-type hiatal hernia. HEART: Cardiomegaly. Left atrium is particularly enlarged. Lateral valve calcifications. Coronary artery calcific atherosclerosis. No effusion. PLEURA: No effusion or pneumothorax. LUNGS: Mild-moderate centrilobular emphysema. Nodular opacity in the posterior, inferior right upper lobe measures 2.0 x 1.7 cm. Posterior right upper lobe lobulated nodular opacity measures 1.7 x 1.0 cm. Patchy dependent atelectasis in the right lower lobe. Medial right middle lobe nodule measures 0.6 cm. Ill-defined nodule in the lateral basilar left lower lobe measure 0.5 cm. INCIDENTALLY IMAGED UPPER ABDOMEN: No focal abnormality. BONES: Osteopenia. No acute fracture or aggressive bone lesion. IMPRESSION: 1. Irregular nodular opacities in the right upper lobe. Largest measures up to 2 cm. Given the low clinical suspicion for infection, neoplasm is favored over pneumonia. PET/CT would provide more information if results would change clinical management. 2. Normal size right hilar lymph node. No hilar or mediastinal lymphadenopathy by imaging size criteria. 3. Subcentimeter medial right middle lobe and lateral basilar left lower lobe nodules. 4. No acute fracture. 5. Debris in the trachea may represent aspiration. Ct Abd Pelv Wo Cont    Result Date: 9/25/2019  EXAM: CT ABD PELV WO CONT INDICATION: Abdominal pain and back pain distorting. Fall last night. Dementia. COMPARISON: CT abdomen/pelvis on 1/17/2017. CONTRAST:  None. TECHNIQUE: Thin axial images were obtained through the abdomen and pelvis. Coronal and sagittal reconstructions were generated. Oral contrast was not administered. CT dose reduction was achieved through use of a standardized protocol tailored for this examination and automatic exposure control for dose modulation. Adaptive statistical iterative reconstruction (ASIR) was utilized. The absence of intravenous contrast material reduces the sensitivity for evaluation of the solid parenchymal organs of the abdomen. FINDINGS: LUNG BASES: Interstitial nodules in the posterior right middle lobe are new. Scarring in the right lower lobe is increased. Irregular 6 x 4 mm nodule in the lateral left lower lobe is new (series 4, image 7). INCIDENTALLY IMAGED HEART AND MEDIASTINUM: Cardiomegaly and coronary artery disease are unchanged. Small sliding-type hiatal hernia is unchanged. LIVER: No mass or biliary dilatation. GALLBLADDER: Unremarkable. SPLEEN: Normal size. PANCREAS: Mild atrophy is increased. ADRENALS: Unremarkable. KIDNEYS/URETERS: No nephrolithiasis or hydronephrosis. There are vascular calcifications. STOMACH: Nondistended. SMALL BOWEL: No dilatation or wall thickening. COLON: Diverticulosis is unchanged. No inflammation. APPENDIX: Unremarkable.  PERITONEUM: No ascites or pneumoperitoneum. RETROPERITONEUM: Aorta is atherosclerotic without aneurysm. No lymphadenopathy. REPRODUCTIVE ORGANS: Uterus is not enlarged. URINARY BLADDER: No mass or calculus. BONES: Mild osteopenia. Bilateral L5 spondylolysis. Grade 1 anterolisthesis of L5-S1. 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 No articular surface collapse. Mild bilateral hip osteoarthritis. ADDITIONAL COMMENTS: N/A     IMPRESSION: Right middle lobe interstitial nodules. Left lower lobe 6 mm nodule. Interstitial pneumonia is possible. No acute abdominal process on noncontrast CT. Incidental findings are described above. Recommendation: If there are symptoms of pneumonia, recommend antibiotic treatment then CT chest with IV contrast in 8 weeks. If there are no symptoms of pneumonia, recommend nonemergent outpatient CT chest with IV contrast. No fracture.

## 2019-09-25 NOTE — PROGRESS NOTES
Patient seen at discharged. MPOA receptive of referral being sent to [facility] [facility]. Provided additional resources on program. No additional needs verbalized at this time.

## 2019-09-26 NOTE — PROGRESS NOTES
PHYSICAL THERAPY EMERGENCY DEPARTMENT EVALUATION WITH DISCHARGE  Patient: Chris Hansen (61 y.o. female)  Date: 9/25/2019  Primary Diagnosis: No admission diagnoses are documented for this encounter. Precautions: fall       ASSESSMENT  Patient fell in the bathroom. Daughter was with her at the time though did not witness the actual fall. Images negative for fracture, cleared for activity. On assessment, patient demonstrates functional strength. She is functioning at overall minimum assist level for bed mobility, transfers, and ambulation. She ambulated 75 feet with hand held assistance (baseline). She relies on w/c in the community. Daughter notes patient is functioning at her baseline mobility and is comfortable taking patient home. No additional PT needs identified. Functional Outcome Measure: The patient scored 6/28 on the Tinetti outcome measure which is indicative of high fall risk. Other factors to consider for discharge: Lives w/ daughter who has been assisting with all aspects of her car     Further skilled acute physical therapy is not indicated at this time. PLAN :  Recommendation for discharge: (in order for the patient to meet his/her long term goals)  No skilled physical therapy/ follow up rehabilitation needs identified at this time.     This discharge recommendation:  Has been made in collaboration with the attending provider and/or case management    Equipment recommendations for successful discharge: None      SUBJECTIVE:   Patient stated .  N/a - non conversational (dementia) - daughter provided answers    OBJECTIVE DATA SUMMARY:   HISTORY:    Past Medical History:   Diagnosis Date    Anxiety state, unspecified     CKD (chronic kidney disease) stage 3, GFR 30-59 ml/min ([facility] [address] [address] 4/6/2016    Dementia 6/12    Landa/npysch    Depression 9/20/2017    Generalized osteoarthrosis, unspecified site     Lumbar compression fracture ([address] 4/08    epidurals x 2  Umang/os    Osteopenia     Other and unspecified hyperlipidemia     Psoriasis     Unspecified essential hypertension     Unspecified hypothyroidism     thyroid irradiated i131     Past Surgical History:   Procedure Laterality Date    COLONOSCOPY  10/10    [place]/gi    TOTAL KNEE ARTHROPLASTY  1992    left    TOTAL KNEE ARTHROPLASTY  1992    right     Prior Level of Function/Home Situation: Lives w/ dtr  in two story condo with bedroom on the second floor. Daughter assists with ADL/ IADLs, mobility. Patient relies on hand held assist for ambulation inside the home, WC in the community. Attends adult day program [facility]) during the week while daughter is at work. Daughter Milad Liner is MPOA. Owns a rolling walker. Does not walk well with it despite having been trained. Dementia limits carry over of instruction provided. EXAMINATION/PRESENTATION/DECISION MAKING:   Critical Behavior:  Neurologic State: Alert, Confused  Orientation Level: Disoriented X4  Cognition: Decreased command following, Impaired decision making, Memory loss, Poor safety awareness     Hearing: Auditory  Auditory Impairment: Hard of hearing, bilateral    Range Of Motion:   Decreased, functional                       Strength:        Decreased, functional                 Tone & Sensation: Tone - normal  Sensation - unable to accurately assess (dementia,poor command following)                              Coordination:   Appears w/in normal limits  Functional Mobility:  Bed Mobility:   Supine<->sit: Minimum assistance, assist x1           Transfers:   Sit<->stand: Minimum assistance, assist x1                          Balance:    Sitting, unsupported: good  Standing, unsupported: impaired   Static - fair   Dynamic - poor  Ambulation/Gait Training:   Ambulates with hand held assistance, 75 ft, shortened strides, slow brent, decreased though adequate foot clearance. Did not attempt steps.   Daughter notes patient mobility observed is her baseline, feels comfortable taking patient home and assisting on the steps as she has been doing. Special Tests:  Tinetti test:    Sitting Balance: 1  Arises: 0  Attempts to Rise: 0  Immediate Standing Balance: 1(Hand held support)  Standing Balance: 1  Nudged: 0  Eyes Closed: 0  Turn 360 Degrees - Continuous/Discontinuous: 0  Turn 360 Degrees - Steady/Unsteady: 0  Sitting Down: 0  Balance Score: 3 Balance total score  Indication of Gait: 0  R Step Length/Height: 0  L Step Length/Height: 0  R Foot Clearance: 1  L Foot Clearance: 1  Step Symmetry: 0  Step Continuity: 0  Path: 1(hand held assist of another person)  Trunk: 0(hand held assist of another person)  Walking Time: 0  Gait Score: 3 Gait total score  Total Score: 6/28 Overall total score         Tinetti Tool Score Risk of Falls  <19 = High Fall Risk  19-24 = Moderate Fall Risk  25-28 = Low Fall Risk  Tinetti ME. Performance-Oriented Assessment of Mobility Problems in Elderly Patients. [facility] 66; [id number].  (Scoring Description: PT Bulletin Feb. 10, 1993)    Older adults: Frederick Pallas et al, 2009; n = 1000 [facility] elderly evaluated with ABC, MURIEL, ADL, and IADL)  · Mean MURIEL score for males aged 69-68 years = 26.21(3.40)  · Mean MURIEL score for females age 69-68 years = 25.16(4.30)  · Mean MURIEL score for males over 80 years = 23.29(6.02)  · Mean MURIEL score for females over 80 years = 17.20(8.32)          Physical Therapy Evaluation Charge Determination   History Examination Presentation Decision-Making   MEDIUM  Complexity : 1-2 comorbidities / personal factors will impact the outcome/ POC  LOW Complexity : 1-2 Standardized tests and measures addressing body structure, function, activity limitation and / or participation in recreation  LOW Complexity : Stable, uncomplicated  LOW Complexity : FOTO score of       Based on the above components, the patient evaluation is determined to be of the following complexity level: LOW       Pain:   Patient voiced no pain, did not present with behaviors indicating pain. Activity Tolerance:   Good  Please refer to the flowsheet for vital signs taken during this treatment. After treatment patient left in no apparent distress:   Supine in bed and Caregiver / family present    COMMUNICATION/EDUCATION:   Communication/Collaboration:  Fall prevention education was provided and the patient/caregiver indicated understanding., Patient/family have participated as able in goal setting and plan of care. and Patient/family agree to work toward stated goals and plan of care.     Findings and recommendations were discussed with: MD, KAMRAN NP, Care management    Thank you for this referral.  Quin Simmonds, PT   Time Calculation: 40 mins

## 2019-09-27 DIAGNOSIS — I10 ESSENTIAL HYPERTENSION WITH GOAL BLOOD PRESSURE LESS THAN 140/90: ICD-10-CM

## 2019-09-27 LAB
BACTERIA SPEC CULT: ABNORMAL
CC UR VC: ABNORMAL
SERVICE CMNT-IMP: ABNORMAL

## 2019-09-28 VITALS
BODY MASS INDEX: 21.57 KG/M2 | DIASTOLIC BLOOD PRESSURE: 67 MMHG | HEART RATE: 61 BPM | RESPIRATION RATE: 18 BRPM | OXYGEN SATURATION: 97 % | TEMPERATURE: 98.3 F | WEIGHT: 125.66 LBS | SYSTOLIC BLOOD PRESSURE: 132 MMHG

## 2019-09-28 RX ORDER — AMLODIPINE BESYLATE 5 MG/1
TABLET ORAL
Qty: 90 TAB | Refills: 1 | Status: SHIPPED | OUTPATIENT
Start: 2019-09-28 | End: 2020-03-12

## 2019-09-28 NOTE — PROGRESS NOTES
Attempted to reach patient. Message left for call back concerning culture result and need for treatment.   P: fosfomycin 3 g x 1

## 2019-09-30 ENCOUNTER — DOCUMENTATION ONLY (OUTPATIENT)
Dept: CASE MANAGEMENT | Age: 84
End: 2019-09-30

## 2019-09-30 NOTE — SENIOR SERVICES NOTE
Pt daughter called back to patient advocacy and was referred to me regarding UTI treatment. Wants antibiotic called to [facility] on [address] [facility] by [employer] [employer] [phone]. Reviewed Dustin Rodríguez note, called in RX, and notified daughter Marivel Hendricks.

## 2019-10-01 RX ORDER — GRANULES FOR ORAL 3 G/1
3 POWDER ORAL ONCE
Qty: 1 PACKET | Refills: 0 | Status: SHIPPED | OUTPATIENT
Start: 2019-10-01 | End: 2019-10-01

## 2019-10-02 NOTE — PROGRESS NOTES
Daughter had called back and left message [facility] [address] for fosfomycin 3 g x 1 to [facility] per request.   I called and left message for call concerning urine results.

## 2019-10-03 ENCOUNTER — APPOINTMENT (OUTPATIENT)
Dept: GENERAL RADIOLOGY | Age: 84
DRG: 552 | End: 2019-10-03
Attending: EMERGENCY MEDICINE
Payer: MEDICARE

## 2019-10-03 ENCOUNTER — APPOINTMENT (OUTPATIENT)
Dept: CT IMAGING | Age: 84
DRG: 552 | End: 2019-10-03
Attending: EMERGENCY MEDICINE
Payer: MEDICARE

## 2019-10-03 ENCOUNTER — HOSPITAL ENCOUNTER (INPATIENT)
Age: 84
LOS: 1 days | Discharge: NURSING, REHAB OR LONG-TERM CARE | DRG: 552 | End: 2019-10-05
Attending: EMERGENCY MEDICINE | Admitting: HOSPITALIST
Payer: MEDICARE

## 2019-10-03 DIAGNOSIS — S32.10XA CLOSED FRACTURE OF SACRUM, UNSPECIFIED PORTION OF SACRUM, INITIAL ENCOUNTER (HCC): Primary | ICD-10-CM

## 2019-10-03 LAB
ALBUMIN SERPL-MCNC: 3.3 G/DL (ref 3.5–5)
ALBUMIN/GLOB SERPL: 0.7 {RATIO} (ref 1.1–2.2)
ALP SERPL-CCNC: 136 U/L (ref 45–117)
ALT SERPL-CCNC: 22 U/L (ref 12–78)
ANION GAP SERPL CALC-SCNC: 9 MMOL/L (ref 5–15)
AST SERPL-CCNC: 18 U/L (ref 15–37)
BASOPHILS # BLD: 0 K/UL (ref 0–0.1)
BASOPHILS NFR BLD: 0 % (ref 0–1)
BILIRUB SERPL-MCNC: 0.3 MG/DL (ref 0.2–1)
BUN SERPL-MCNC: 24 MG/DL (ref 6–20)
BUN/CREAT SERPL: 23 (ref 12–20)
CALCIUM SERPL-MCNC: 10 MG/DL (ref 8.5–10.1)
CHLORIDE SERPL-SCNC: 110 MMOL/L (ref 97–108)
CO2 SERPL-SCNC: 26 MMOL/L (ref 21–32)
COMMENT, HOLDF: NORMAL
CREAT SERPL-MCNC: 1.03 MG/DL (ref 0.55–1.02)
DIFFERENTIAL METHOD BLD: ABNORMAL
EOSINOPHIL # BLD: 0.1 K/UL (ref 0–0.4)
EOSINOPHIL NFR BLD: 2 % (ref 0–7)
ERYTHROCYTE [DISTWIDTH] IN BLOOD BY AUTOMATED COUNT: 14.9 % (ref 11.5–14.5)
GLOBULIN SER CALC-MCNC: 4.6 G/DL (ref 2–4)
GLUCOSE SERPL-MCNC: 139 MG/DL (ref 65–100)
HCT VFR BLD AUTO: 40.7 % (ref 35–47)
HGB BLD-MCNC: 12.7 G/DL (ref 11.5–16)
IMM GRANULOCYTES # BLD AUTO: 0 K/UL
IMM GRANULOCYTES NFR BLD AUTO: 0 %
LACTATE BLD-SCNC: 1.31 MMOL/L (ref 0.4–2)
LYMPHOCYTES # BLD: 2.6 K/UL (ref 0.8–3.5)
LYMPHOCYTES NFR BLD: 42 % (ref 12–49)
MCH RBC QN AUTO: 28.4 PG (ref 26–34)
MCHC RBC AUTO-ENTMCNC: 31.2 G/DL (ref 30–36.5)
MCV RBC AUTO: 91.1 FL (ref 80–99)
MONOCYTES # BLD: 0.5 K/UL (ref 0–1)
MONOCYTES NFR BLD: 8 % (ref 5–13)
NEUTS SEG # BLD: 3.1 K/UL (ref 1.8–8)
NEUTS SEG NFR BLD: 48 % (ref 32–75)
NRBC # BLD: 0 K/UL (ref 0–0.01)
NRBC BLD-RTO: 0 PER 100 WBC
PLATELET # BLD AUTO: 281 K/UL (ref 150–400)
PMV BLD AUTO: 10.8 FL (ref 8.9–12.9)
POTASSIUM SERPL-SCNC: 4.1 MMOL/L (ref 3.5–5.1)
PROT SERPL-MCNC: 7.9 G/DL (ref 6.4–8.2)
RBC # BLD AUTO: 4.47 M/UL (ref 3.8–5.2)
RBC MORPH BLD: ABNORMAL
SAMPLES BEING HELD,HOLD: NORMAL
SODIUM SERPL-SCNC: 145 MMOL/L (ref 136–145)
WBC # BLD AUTO: 6.3 K/UL (ref 3.6–11)

## 2019-10-03 PROCEDURE — 87086 URINE CULTURE/COLONY COUNT: CPT

## 2019-10-03 PROCEDURE — 74177 CT ABD & PELVIS W/CONTRAST: CPT

## 2019-10-03 PROCEDURE — 83605 ASSAY OF LACTIC ACID: CPT

## 2019-10-03 PROCEDURE — 85025 COMPLETE CBC W/AUTO DIFF WBC: CPT

## 2019-10-03 PROCEDURE — 99285 EMERGENCY DEPT VISIT HI MDM: CPT

## 2019-10-03 PROCEDURE — 73502 X-RAY EXAM HIP UNI 2-3 VIEWS: CPT

## 2019-10-03 PROCEDURE — 36415 COLL VENOUS BLD VENIPUNCTURE: CPT

## 2019-10-03 PROCEDURE — 74011000258 HC RX REV CODE- 258: Performed by: RADIOLOGY

## 2019-10-03 PROCEDURE — 81001 URINALYSIS AUTO W/SCOPE: CPT

## 2019-10-03 PROCEDURE — 74011636320 HC RX REV CODE- 636/320: Performed by: RADIOLOGY

## 2019-10-03 PROCEDURE — 80053 COMPREHEN METABOLIC PANEL: CPT

## 2019-10-03 RX ORDER — SODIUM CHLORIDE 0.9 % (FLUSH) 0.9 %
10 SYRINGE (ML) INJECTION
Status: COMPLETED | OUTPATIENT
Start: 2019-10-03 | End: 2019-10-03

## 2019-10-03 RX ADMIN — SODIUM CHLORIDE 100 ML: 900 INJECTION, SOLUTION INTRAVENOUS at 22:59

## 2019-10-03 RX ADMIN — IOPAMIDOL 100 ML: 755 INJECTION, SOLUTION INTRAVENOUS at 22:59

## 2019-10-03 RX ADMIN — Medication 10 ML: at 22:59

## 2019-10-03 NOTE — ED TRIAGE NOTES
Pt's daughter states that the pt fell in the bathroom last Tuesday, came to the ED last Wednesday was diagnosed with a UTI and started on an antibiotic on Monday. Pt's daughter states that the pt still doesn't seem \"right\", saying that the pt continues to have right leg pain from her recent fall and abd pain.

## 2019-10-04 PROBLEM — S32.10XA SACRAL FRACTURE (HCC): Status: ACTIVE | Noted: 2019-10-04

## 2019-10-04 LAB
APPEARANCE UR: CLEAR
BACTERIA URNS QL MICRO: NEGATIVE /HPF
BILIRUB UR QL: NEGATIVE
COLOR UR: NORMAL
EPITH CASTS URNS QL MICRO: NORMAL /LPF
GLUCOSE UR STRIP.AUTO-MCNC: NEGATIVE MG/DL
HGB UR QL STRIP: NEGATIVE
HYALINE CASTS URNS QL MICRO: NORMAL /LPF (ref 0–5)
KETONES UR QL STRIP.AUTO: NEGATIVE MG/DL
LEUKOCYTE ESTERASE UR QL STRIP.AUTO: NEGATIVE
NITRITE UR QL STRIP.AUTO: NEGATIVE
PH UR STRIP: 6 [PH] (ref 5–8)
PROT UR STRIP-MCNC: NEGATIVE MG/DL
RBC #/AREA URNS HPF: NORMAL /HPF (ref 0–5)
SP GR UR REFRACTOMETRY: 1.01 (ref 1–1.03)
UROBILINOGEN UR QL STRIP.AUTO: 0.2 EU/DL (ref 0.2–1)
WBC URNS QL MICRO: NORMAL /HPF (ref 0–4)

## 2019-10-04 PROCEDURE — 94664 DEMO&/EVAL PT USE INHALER: CPT

## 2019-10-04 PROCEDURE — 65270000029 HC RM PRIVATE

## 2019-10-04 PROCEDURE — 74011250637 HC RX REV CODE- 250/637: Performed by: INTERNAL MEDICINE

## 2019-10-04 PROCEDURE — 77010033678 HC OXYGEN DAILY

## 2019-10-04 PROCEDURE — 74011250637 HC RX REV CODE- 250/637: Performed by: HOSPITALIST

## 2019-10-04 PROCEDURE — 74011250636 HC RX REV CODE- 250/636: Performed by: HOSPITALIST

## 2019-10-04 PROCEDURE — 97161 PT EVAL LOW COMPLEX 20 MIN: CPT

## 2019-10-04 PROCEDURE — 97116 GAIT TRAINING THERAPY: CPT

## 2019-10-04 PROCEDURE — 74011000250 HC RX REV CODE- 250: Performed by: HOSPITALIST

## 2019-10-04 PROCEDURE — 77030029684 HC NEB SM VOL KT MONA -A

## 2019-10-04 PROCEDURE — 94640 AIRWAY INHALATION TREATMENT: CPT

## 2019-10-04 RX ORDER — KETOROLAC TROMETHAMINE 30 MG/ML
15 INJECTION, SOLUTION INTRAMUSCULAR; INTRAVENOUS
Status: COMPLETED | OUTPATIENT
Start: 2019-10-04 | End: 2019-10-04

## 2019-10-04 RX ORDER — GUAIFENESIN 100 MG/5ML
81 LIQUID (ML) ORAL DAILY
Status: DISCONTINUED | OUTPATIENT
Start: 2019-10-04 | End: 2019-10-04

## 2019-10-04 RX ORDER — FENTANYL CITRATE 50 UG/ML
25 INJECTION, SOLUTION INTRAMUSCULAR; INTRAVENOUS
Status: DISCONTINUED | OUTPATIENT
Start: 2019-10-04 | End: 2019-10-05 | Stop reason: HOSPADM

## 2019-10-04 RX ORDER — MEMANTINE HYDROCHLORIDE 10 MG/1
10 TABLET ORAL 2 TIMES DAILY
Status: DISCONTINUED | OUTPATIENT
Start: 2019-10-04 | End: 2019-10-05 | Stop reason: HOSPADM

## 2019-10-04 RX ORDER — TRAMADOL HYDROCHLORIDE 50 MG/1
50 TABLET ORAL
Status: DISCONTINUED | OUTPATIENT
Start: 2019-10-04 | End: 2019-10-05 | Stop reason: HOSPADM

## 2019-10-04 RX ORDER — ATORVASTATIN CALCIUM 40 MG/1
40 TABLET, FILM COATED ORAL DAILY
Status: DISCONTINUED | OUTPATIENT
Start: 2019-10-04 | End: 2019-10-05 | Stop reason: HOSPADM

## 2019-10-04 RX ORDER — HEPARIN SODIUM 5000 [USP'U]/ML
5000 INJECTION, SOLUTION INTRAVENOUS; SUBCUTANEOUS EVERY 8 HOURS
Status: DISCONTINUED | OUTPATIENT
Start: 2019-10-04 | End: 2019-10-05 | Stop reason: HOSPADM

## 2019-10-04 RX ORDER — IMIPRAMINE HYDROCHLORIDE 50 MG/1
50 TABLET ORAL
Status: DISCONTINUED | OUTPATIENT
Start: 2019-10-04 | End: 2019-10-04

## 2019-10-04 RX ORDER — ASPIRIN 81 MG/1
81 TABLET ORAL
COMMUNITY

## 2019-10-04 RX ORDER — SODIUM CHLORIDE 0.9 % (FLUSH) 0.9 %
5-40 SYRINGE (ML) INJECTION EVERY 8 HOURS
Status: DISCONTINUED | OUTPATIENT
Start: 2019-10-04 | End: 2019-10-05 | Stop reason: HOSPADM

## 2019-10-04 RX ORDER — ONDANSETRON 2 MG/ML
4 INJECTION INTRAMUSCULAR; INTRAVENOUS
Status: DISCONTINUED | OUTPATIENT
Start: 2019-10-04 | End: 2019-10-05 | Stop reason: HOSPADM

## 2019-10-04 RX ORDER — ACETAMINOPHEN 325 MG/1
650 TABLET ORAL
Status: DISCONTINUED | OUTPATIENT
Start: 2019-10-04 | End: 2019-10-05 | Stop reason: HOSPADM

## 2019-10-04 RX ORDER — MONTELUKAST SODIUM 10 MG/1
10 TABLET ORAL
Status: DISCONTINUED | OUTPATIENT
Start: 2019-10-04 | End: 2019-10-05 | Stop reason: HOSPADM

## 2019-10-04 RX ORDER — MONTELUKAST SODIUM 10 MG/1
10 TABLET ORAL DAILY
COMMUNITY

## 2019-10-04 RX ORDER — SODIUM CHLORIDE 0.9 % (FLUSH) 0.9 %
5-40 SYRINGE (ML) INJECTION AS NEEDED
Status: DISCONTINUED | OUTPATIENT
Start: 2019-10-04 | End: 2019-10-05 | Stop reason: HOSPADM

## 2019-10-04 RX ORDER — GUAIFENESIN 100 MG/5ML
81 LIQUID (ML) ORAL
Status: DISCONTINUED | OUTPATIENT
Start: 2019-10-09 | End: 2019-10-05 | Stop reason: HOSPADM

## 2019-10-04 RX ORDER — DONEPEZIL HYDROCHLORIDE 10 MG/1
10 TABLET, FILM COATED ORAL
Status: DISCONTINUED | OUTPATIENT
Start: 2019-10-04 | End: 2019-10-05 | Stop reason: HOSPADM

## 2019-10-04 RX ORDER — ARFORMOTEROL TARTRATE 15 UG/2ML
15 SOLUTION RESPIRATORY (INHALATION)
Status: DISCONTINUED | OUTPATIENT
Start: 2019-10-04 | End: 2019-10-05 | Stop reason: HOSPADM

## 2019-10-04 RX ORDER — LISINOPRIL 5 MG/1
5 TABLET ORAL DAILY
Status: DISCONTINUED | OUTPATIENT
Start: 2019-10-04 | End: 2019-10-04

## 2019-10-04 RX ORDER — AMLODIPINE BESYLATE 5 MG/1
5 TABLET ORAL DAILY
Status: DISCONTINUED | OUTPATIENT
Start: 2019-10-04 | End: 2019-10-05 | Stop reason: HOSPADM

## 2019-10-04 RX ORDER — SODIUM CHLORIDE 9 MG/ML
75 INJECTION, SOLUTION INTRAVENOUS CONTINUOUS
Status: DISCONTINUED | OUTPATIENT
Start: 2019-10-04 | End: 2019-10-05 | Stop reason: HOSPADM

## 2019-10-04 RX ADMIN — KETOROLAC TROMETHAMINE 15 MG: 30 INJECTION, SOLUTION INTRAMUSCULAR at 03:33

## 2019-10-04 RX ADMIN — DONEPEZIL HYDROCHLORIDE 10 MG: 10 TABLET, FILM COATED ORAL at 03:34

## 2019-10-04 RX ADMIN — AMLODIPINE BESYLATE 5 MG: 5 TABLET ORAL at 09:39

## 2019-10-04 RX ADMIN — DONEPEZIL HYDROCHLORIDE 10 MG: 10 TABLET, FILM COATED ORAL at 22:30

## 2019-10-04 RX ADMIN — MEMANTINE HYDROCHLORIDE 10 MG: 10 TABLET ORAL at 17:14

## 2019-10-04 RX ADMIN — HEPARIN SODIUM 5000 UNITS: 5000 INJECTION INTRAVENOUS; SUBCUTANEOUS at 17:14

## 2019-10-04 RX ADMIN — MEMANTINE HYDROCHLORIDE 10 MG: 10 TABLET ORAL at 09:40

## 2019-10-04 RX ADMIN — HEPARIN SODIUM 5000 UNITS: 5000 INJECTION INTRAVENOUS; SUBCUTANEOUS at 03:34

## 2019-10-04 RX ADMIN — SODIUM CHLORIDE 75 ML/HR: 900 INJECTION, SOLUTION INTRAVENOUS at 11:19

## 2019-10-04 RX ADMIN — MONTELUKAST 10 MG: 10 TABLET, FILM COATED ORAL at 22:30

## 2019-10-04 RX ADMIN — HEPARIN SODIUM 5000 UNITS: 5000 INJECTION INTRAVENOUS; SUBCUTANEOUS at 09:40

## 2019-10-04 RX ADMIN — Medication 10 ML: at 22:30

## 2019-10-04 RX ADMIN — Medication 10 ML: at 17:15

## 2019-10-04 RX ADMIN — ATORVASTATIN CALCIUM 40 MG: 40 TABLET, FILM COATED ORAL at 09:39

## 2019-10-04 RX ADMIN — ACETAMINOPHEN 650 MG: 325 TABLET ORAL at 18:17

## 2019-10-04 RX ADMIN — ARFORMOTEROL TARTRATE 15 MCG: 15 SOLUTION RESPIRATORY (INHALATION) at 22:52

## 2019-10-04 NOTE — ED NOTES
0321: Verbal shift change report given to Claudene Silvius, RN (oncoming nurse) by Ana Torres RN (offgoing nurse). Report included the following information SBAR, Kardex, ED Summary, [facility] and Recent Results.

## 2019-10-04 NOTE — ED NOTES
Assumed care of pt from Melissa George, [facility]. Pt resting in bed with daughter at bedside, will continue to monitor.

## 2019-10-04 NOTE — PROGRESS NOTES
TRANSFER - OUT REPORT:    Verbal report given to maryam(name) on Chinedu Verma  being transferred to 546(unit) for routine progression of care       Report consisted of patients Situation, Background, Assessment and   Recommendations(SBAR). Information from the following report(s) ED Summary was reviewed with the receiving nurse. Opportunity for questions and clarification was provided.       Patient transported with:   MOLI

## 2019-10-04 NOTE — ROUTINE PROCESS
Patient is confused and combative. Second IV line was removed via patient since admission. MD was called and notified in regards of this situation. RN was told to obtain a new IV access and saline lock it since kidney function is improved and patient has an adequate input and output.

## 2019-10-04 NOTE — ROUTINE PROCESS
Primary Nurse Lenin Petty RN and Pat RN performed a dual skin assessment on this patient Impairment noted- see wound doc flow sheet Yung score is 14

## 2019-10-04 NOTE — ED NOTES
2332: Verbal shift change report given to Gerard Velasquez. RN (oncoming nurse) by Kade De RN (offgoing nurse). Report included the following information SBAR, Kardex and ED Summary.

## 2019-10-04 NOTE — H&P
HISTORY AND PHYSICAL      PCP: Cortney Byrne MD  History source: the patient's daughter, ER, the patient doesn't provide any history due to dementia      CC: back pain      HPI: 80 y.o lady w/ dementia who presents with low back pain. Symptoms began a week ago. Her daughter states she had a ground-level fall. Came here and was diagnosed with a UTI and discharged on antibiotics. Since the fall, she's complained of low back pain and hasn't been moving much. She is unable to provide any history. PMH/PSH:  Past Medical History:   Diagnosis Date    Anxiety state, unspecified     Chronic kidney disease     Chronic obstructive pulmonary disease (HCC)     CKD (chronic kidney disease) stage 3, GFR 30-59 ml/min ([address] 4/6/2016    Dementia ([facility] [address] 6/12    Landa/npysch    Depression 9/20/2017    Generalized osteoarthrosis, unspecified site     Lumbar compression fracture ([facility] [address] 4/08    epidurals x 2  Umang/os    Osteopenia     Other and unspecified hyperlipidemia     Psoriasis     Unspecified essential hypertension     Unspecified hypothyroidism     thyroid irradiated i131     Past Surgical History:   Procedure Laterality Date    COLONOSCOPY  10/10    [place]/gi    TOTAL KNEE ARTHROPLASTY  1992    left    TOTAL KNEE ARTHROPLASTY  1992    right       Home meds:   Prior to Admission medications    Medication Sig Start Date End Date Taking? Authorizing Provider   amLODIPine (NORVASC) 5 mg tablet TAKE 1 TABLET DAILY FOR BLOOD PRESSURE 9/28/19   Nuria Manjarrez MD   levothyroxine (SYNTHROID) 100 mcg tablet TAKE 1 TABLET DAILY BEFORE BREAKFAST 9/16/19   Nuria Manjarrez MD   donepezil (ARICEPT) 10 mg tablet TAKE 1 TABLET NIGHTLY 6/25/19   Nuria Manjarrez MD   atorvastatin (LIPITOR) 40 mg tablet TAKE 1 TABLET DAILY 6/25/19   Phill Manjarrez MD   budesonide (PULMICORT) 0.5 mg/2 mL nbsp 2 mL by Nebulization route two (2) times a day.  Takes 30 mins After Ely Rhein 6/6/19   Shazia Rodriguez MD   lisinopril (PRINIVIL, ZESTRIL) 10 mg tablet Take 0.5 Tabs by mouth daily. 19   Melinda Saldana MD   aspirin 81 mg chewable tablet Take 1 Tab by mouth daily. 19   Melinda Saldana MD   ergocalciferol (ERGOCALCIFEROL) 50,000 unit capsule Take 50,000 Units by mouth every Tuesday. 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 Provider, Historical   DENOSUMAB SC by SubCUTAneous route every 6 months. Provider, Historical   albuterol-ipratropium (DUO-NEB) 2.5 mg-0.5 mg/3 ml nebu 3 mL by Nebulization route every four (4) hours as needed. 18   Rabia Perea NP   albuterol (PROVENTIL HFA, VENTOLIN HFA, PROAIR HFA) 90 mcg/actuation inhaler Take 2 Puffs by inhalation every four (4) hours as needed for Wheezing. Please give with spacer. 18   Zeferino Martinez NP   imipramine (TOFRANIL) 25 mg tablet TAKE 2 TABLETS NIGHTLY 10/23/17   Ce Manjarrez MD   FOLIC ACID/MULTIVIT-MIN/LUTEIN (CENTRUM SILVER PO) Take 1 Tab by mouth daily. Provider, Historical       Allergies: Allergies   Allergen Reactions    Keflex [Cephalexin] Rash    Pcn [Penicillins] Rash    Sulfa (Sulfonamide Antibiotics) Rash       FH:  Family History   Problem Relation Age of Onset    Cancer Mother         stomach tumors    Cancer Brother         prostate       SH:  Social History     Tobacco Use    Smoking status: Former Smoker     Packs/day: 0.50     Years: 60.00     Pack years: 30.00     Types: Cigarettes     Last attempt to quit:      Years since quittin.7    Smokeless tobacco: Never Used   Substance Use Topics    Alcohol use: No     Alcohol/week: 0.0 standard drinks       ROS: Review of systems not obtained due to patient factors.       PHYSICAL EXAM:  Visit Vitals  /65 (BP 1 Location: Right arm, BP Patient Position: At rest;Supine)   Pulse 61   Temp 97.7 °F (36.5 °C)   Resp 15   Ht 5' 2\" (1.575 m)   Wt 59 kg (130 lb)   SpO2 93%   BMI 23.78 kg/m²       Gen: NAD, non-toxic  HEENT: anicteric sclerae, normal conjunctiva, oropharynx clear, MM dry  Neck: supple, trachea midline, no adenopathy  Heart: RRR, no MRG, no JVD, no peripheral edema  Lungs: CTA b/l, non-labored respirations  Abd: soft, NT, ND, BS+, no organomegaly  Extr: warm  Skin: dry, no rash  Neuro: grossly non-focal  Psych: not anxious nor agitated      Labs/Imaging:  Recent Results (from the past 24 hour(s))   SAMPLES BEING HELD    Collection Time: 10/03/19  9:46 PM   Result Value Ref Range    SAMPLES BEING HELD 1RED,1BLUE,1BLD CUR SILVER     COMMENT        Add-on orders for these samples will be processed based on acceptable specimen integrity and analyte stability, which may vary by analyte. CBC WITH AUTOMATED DIFF    Collection Time: 10/03/19  9:46 PM   Result Value Ref Range    WBC 6.3 3.6 - 11.0 K/uL    RBC 4.47 3.80 - 5.20 M/uL    HGB 12.7 11.5 - 16.0 g/dL    HCT 40.7 35.0 - 47.0 %    MCV 91.1 80.0 - 99.0 FL    MCH 28.4 26.0 - 34.0 PG    MCHC 31.2 30.0 - 36.5 g/dL    RDW 14.9 (H) 11.5 - 14.5 %    PLATELET 881 090 - 517 K/uL    MPV 10.8 8.9 - 12.9 FL    NRBC 0.0 0  WBC    ABSOLUTE NRBC 0.00 0.00 - 0.01 K/uL    NEUTROPHILS 48 32 - 75 %    LYMPHOCYTES 42 12 - 49 %    MONOCYTES 8 5 - 13 %    EOSINOPHILS 2 0 - 7 %    BASOPHILS 0 0 - 1 %    IMMATURE GRANULOCYTES 0 %    ABS. NEUTROPHILS 3.1 1.8 - 8.0 K/UL    ABS. LYMPHOCYTES 2.6 0.8 - 3.5 K/UL    ABS. MONOCYTES 0.5 0.0 - 1.0 K/UL    ABS. EOSINOPHILS 0.1 0.0 - 0.4 K/UL    ABS. BASOPHILS 0.0 0.0 - 0.1 K/UL    ABS. IMM.  GRANS. 0.0 K/UL    DF MANUAL      RBC COMMENTS NORMOCYTIC, NORMOCHROMIC     METABOLIC PANEL, COMPREHENSIVE    Collection Time: 10/03/19  9:46 PM   Result Value Ref Range    Sodium 145 136 - 145 mmol/L    Potassium 4.1 3.5 - 5.1 mmol/L    Chloride 110 (H) 97 - 108 mmol/L    CO2 26 21 - 32 mmol/L    Anion gap 9 5 - 15 mmol/L    Glucose 139 (H) 65 - 100 mg/dL    BUN 24 (H) 6 - 20 MG/DL    Creatinine 1.03 (H) 0.55 - 1.02 MG/DL    BUN/Creatinine ratio 23 (H) 12 - 20      GFR est AA >60 >60 ml/min/1.73m2    GFR est non-AA 50 (L) >60 ml/min/1.73m2    Calcium 10.0 8.5 - 10.1 MG/DL    Bilirubin, total 0.3 0.2 - 1.0 MG/DL    ALT (SGPT) 22 12 - 78 U/L    AST (SGOT) 18 15 - 37 U/L    Alk. phosphatase 136 (H) 45 - 117 U/L    Protein, total 7.9 6.4 - 8.2 g/dL    Albumin 3.3 (L) 3.5 - 5.0 g/dL    Globulin 4.6 (H) 2.0 - 4.0 g/dL    A-G Ratio 0.7 (L) 1.1 - 2.2     POC LACTIC ACID    Collection Time: 10/03/19  9:48 PM   Result Value Ref Range    Lactic Acid (POC) 1.31 0.40 - 2.00 mmol/L   URINALYSIS W/MICROSCOPIC    Collection Time: 10/03/19 11:55 PM   Result Value Ref Range    Color YELLOW/STRAW      Appearance CLEAR CLEAR      Specific gravity 1.015 1.003 - 1.030      pH (UA) 6.0 5.0 - 8.0      Protein NEGATIVE  NEG mg/dL    Glucose NEGATIVE  NEG mg/dL    Ketone NEGATIVE  NEG mg/dL    Bilirubin NEGATIVE  NEG      Blood NEGATIVE  NEG      Urobilinogen 0.2 0.2 - 1.0 EU/dL    Nitrites NEGATIVE  NEG      Leukocyte Esterase NEGATIVE  NEG      WBC 0-4 0 - 4 /hpf    RBC 0-5 0 - 5 /hpf    Epithelial cells FEW FEW /lpf    Bacteria NEGATIVE  NEG /hpf    Hyaline cast 0-2 0 - 5 /lpf       Recent Labs     10/03/19  2146   WBC 6.3   HGB 12.7   HCT 40.7        Recent Labs     10/03/19  2146      K 4.1   *   CO2 26   BUN 24*   CREA 1.03*   *   CA 10.0     Recent Labs     10/03/19  2146   SGOT 18   ALT 22   *   TBILI 0.3   TP 7.9   ALB 3.3*   GLOB 4.6*       No results for input(s): CPK, CKNDX, TROIQ in the last 72 hours. No lab exists for component: CPKMB    No results for input(s): INR, PTP, APTT, INREXT in the last 72 hours. No results for input(s): PH, PCO2, PO2 in the last 72 hours. Xr Hip Rt W Or Wo Pelv 2-3 Vws    Result Date: 10/3/2019  IMPRESSION: No acute abnormality.     Ct Abd Pelv W Cont    Result Date: 10/3/2019  IMPRESSION: 1. Possible nondisplaced fracture of the distal sacrum. No other spinal fracture. No femur fracture. 2. No acute process in the abdomen or pelvis. No soft tissue change since last week. Assessment & Plan:     Distal sacral fracture: she is unable to ambulate.  Doesn't use a cane or walker at baseline, usually walks with help  -admit for pain control  -therapy evals  -ortho consult    CKD stage II: stable, she is a bit azotemic and appears dry  -gentle IVNS overnight    HTN:  -continue home meds    Dementia    DVT ppx: sq heparin  Code status: DNR - d/w daughter/POA, she would like a DDNR which can be done prior to discharge  Disposition: will likely need placement    Signed By: Nazia Rosa MD     October 4, 2019

## 2019-10-04 NOTE — PROGRESS NOTES
Physical Therapy     Chart reviewed, noted patient positive for sacral fracture. Ortho consult is pending. Will defer PT at this time until ortho provides intervention plan/ weightbearing status.      Of note, if plan is to discharge to SNF, daughter voiced preference for [facility].

## 2019-10-04 NOTE — PROGRESS NOTES
Admission Medication Reconciliation:    Information obtained from:  Patient's daughter, medication list  RxQuery data available¹:  YES    Comments/Recommendations: Updated PTA meds/reviewed patient's allergies. 1)  Reviewed medication list with daughter    2)  Medication changes (since last review): Added  - Singulair    Adjusted  - aspirin daily to Wednesdays only due to easy brusing    Removed  - MVI, imipramine, lisinopril    3)  Next Prolia dose due in December    4)  Medications were last taken yesterday. ¹RxQuery pharmacy benefit data reflects medications filled and processed through the patient's insurance, however   this data does NOT capture whether the medication was picked up or is currently being taken by the patient. Allergies:  Keflex [cephalexin]; Pcn [penicillins]; Sulfa (sulfonamide antibiotics); and Tape [adhesive]    Significant PMH/Disease States:   Past Medical History:   Diagnosis Date    Anxiety state, unspecified     Chronic kidney disease     Chronic obstructive pulmonary disease (HCC)     CKD (chronic kidney disease) stage 3, GFR 30-59 ml/min ([address] 4/6/2016    Dementia ([facility] [address] 6/12    Landa/npysch    Depression 9/20/2017    Generalized osteoarthrosis, unspecified site     Lumbar compression fracture ([address] 4/08    epidurals x 2  Umang/os    Osteopenia     Other and unspecified hyperlipidemia     Psoriasis     Unspecified essential hypertension     Unspecified hypothyroidism     thyroid irradiated i131     Chief Complaint for this Admission:    Chief Complaint   Patient presents with    Abdominal Pain    Leg Pain     Prior to Admission Medications:   Prior to Admission Medications   Prescriptions Last Dose Informant Patient Reported? Taking? DENOSUMAB SC 10/3/2019  Yes Yes   Sig: by SubCUTAneous route every 6 months.    albuterol (PROVENTIL HFA, VENTOLIN HFA, PROAIR HFA) 90 mcg/actuation inhaler 10/3/2019  No Yes   Sig: Take 2 Puffs by inhalation every four (4) hours as needed for Wheezing. Please give with spacer. albuterol-ipratropium (DUO-NEB) 2.5 mg-0.5 mg/3 ml nebu 10/3/2019  No Yes   Sig: 3 mL by Nebulization route every four (4) hours as needed. amLODIPine (NORVASC) 5 mg tablet 10/3/2019  No Yes   Sig: TAKE 1 TABLET DAILY FOR BLOOD PRESSURE   arformoterol (BROVANA) 15 mcg/2 mL nebu neb solution 10/3/2019  Yes Yes   Sig: 15 mcg by Nebulization route two (2) times a day. aspirin delayed-release 81 mg tablet   Yes Yes   Sig: Take 81 mg by mouth every Wednesday. atorvastatin (LIPITOR) 40 mg tablet 10/3/2019  No Yes   Sig: TAKE 1 TABLET DAILY   budesonide (PULMICORT) 0.5 mg/2 mL nbsp 10/3/2019  No Yes   Si mL by Nebulization route two (2) times a day. Takes 30 mins After Brovana   donepezil (ARICEPT) 10 mg tablet 10/3/2019  No Yes   Sig: TAKE 1 TABLET NIGHTLY   ergocalciferol (ERGOCALCIFEROL) 50,000 unit capsule 10/3/2019  Yes Yes   Sig: Take 50,000 Units by mouth every Tuesday. 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 Facility-Administered Medications: None       Please contact the main inpatient pharmacy with any questions or concerns at [phone] and we will direct you to the clinical pharmacist covering this patient's care while in-house.    Patricia Rico, PHARMD

## 2019-10-04 NOTE — PROGRESS NOTES
Occupational Therapy: defer    Chart reviewed, noted patient positive for sacral fracture. Ortho consult is pending. Will defer OT at this time until ortho provides intervention plan/ weightbearing status.      Radha Giron, OTR/L

## 2019-10-04 NOTE — ED PROVIDER NOTES
HPI       89y F with hx of dementia here with diffuse abd pain, low back pain, and R hip pain. Had a fall about a week ago. Seen here. Found to have UTI. Had difficulty getting abx but finally got them filled 3 days ago. Still having trouble with ambulation - seems to be located to the lower back and the R hip. Pt not verbal at baseline so hard to vocalize complaints. Seems to be having discomfort in the diffuse abdomen.      Past Medical History:   Diagnosis Date    Anxiety state, unspecified     Chronic kidney disease     Chronic obstructive pulmonary disease (HCC)     CKD (chronic kidney disease) stage 3, GFR 30-59 ml/min ([address] 4/6/2016    Dementia ([facility] [address] 6/12    Landa/npysch    Depression 9/20/2017    Generalized osteoarthrosis, unspecified site     Lumbar compression fracture ([facility] [address] 4/08    epidurals x 2  Umang/os    Osteopenia     Other and unspecified hyperlipidemia     Psoriasis     Unspecified essential hypertension     Unspecified hypothyroidism     thyroid irradiated i131       Past Surgical History:   Procedure Laterality Date    COLONOSCOPY  10/10    [place]/gi    TOTAL KNEE ARTHROPLASTY  1992    left    TOTAL KNEE ARTHROPLASTY  1992    right         Family History:   Problem Relation Age of Onset    Cancer Mother         stomach tumors    Cancer Brother         prostate       Social History     Socioeconomic History    Marital status:      Spouse name: Not on file    Number of children: Not on file    Years of education: Not on file    Highest education level: Not on file   Occupational History    Not on file   Social Needs    Financial resource strain: Not on file    Food insecurity:     Worry: Not on file     Inability: Not on file    Transportation needs:     Medical: Not on file     Non-medical: Not on file   Tobacco Use    Smoking status: Former Smoker     Packs/day: 0.50     Years: 60.00     Pack years: 30.00     Types: Cigarettes     Last attempt to quit: 2014 Years since quittin.7    Smokeless tobacco: Never Used   Substance and Sexual Activity    Alcohol use: No     Alcohol/week: 0.0 standard drinks    Drug use: No    Sexual activity: Never   Lifestyle    Physical activity:     Days per week: Not on file     Minutes per session: Not on file    Stress: Not on file   Relationships    Social connections:     Talks on phone: Not on file     Gets together: Not on file     Attends Nondenominational service: Not on file     Active member of club or organization: Not on file     Attends meetings of clubs or organizations: Not on file     Relationship status: Not on file    Intimate partner violence:     Fear of current or ex partner: Not on file     Emotionally abused: Not on file     Physically abused: Not on file     Forced sexual activity: Not on file   Other Topics Concern     Service Not Asked    Blood Transfusions Not Asked    Caffeine Concern Not Asked    Occupational Exposure Not Asked   [employer] Hazards Not Asked    Sleep Concern Not Asked    Stress Concern Not Asked    Weight Concern Not Asked    Special Diet Not Asked    Back Care Not Asked    Exercise Yes     Comment: enjoys working in the yard   [employer] Not Asked    [facility] Not Asked    Self-Exams Not Asked   Social History Narrative    Not on file         ALLERGIES: Keflex [cephalexin]; Pcn [penicillins]; and Sulfa (sulfonamide antibiotics)    Review of Systems   See hpi, otherwise not able to obtain due to dementia        Vitals:    10/03/19 1627 10/03/19 1811 10/03/19 181   BP:   (!) 196/98   Pulse: 69 65 65   Resp:  16 17   Temp:  97.7 °F (36.5 °C) 97.7 °F (36.5 °C)   SpO2: 95% 95% 96%   Weight:  59 kg (130 lb)    Height:  5' 2\" (1.575 m)             Physical Exam Nursing note and vitals reviewed. Constitutional: oriented to person, place, and time. appears elderly and frail. No distress. Head: Normocephalic and atraumatic.  Sclera anicteric  Nose: No rhinorrhea  Mouth/Throat: Oropharynx is clear and moist. Pharynx normal  Eyes: Conjunctivae are normal. Pupils are equal, round, and reactive to light. Right eye exhibits no discharge. Left eye exhibits no discharge. Neck: Painless normal range of motion. Neck supple. No LAD. Cardiovascular: Normal rate, regular rhythm, normal heart sounds and intact distal pulses. Exam reveals no gallop and no friction rub. No murmur heard. Pulmonary/Chest:  No respiratory distress. No wheezes. No rales. No rhonchi. No increased work of breathing. No accessory muscle use. Good air exchange throughout. Abdominal: soft, non-tender, no rebound or guarding. No hepatosplenomegaly. Normal bowel sounds throughout. Back: no tenderness to palpation, no deformities, no CVA tenderness  Extremities/Musculoskeletal: pain with ranging of the RLE - localizes to hip. Distal extremities are neurovasc intact. Lymphadenopathy:   No adenopathy. Neurological:  Alert and oriented to person, place, and time. Coordination normal. CN 2-12 intact. Motor and sensory function intact. Skin: Skin is warm and dry. No rash noted. No pallor. MDM 89y F here with abd pain, RLE pain, low back pain. Had a fall a week ago and seen here but sx's not improved. Plan for labs and imaging. Procedures      11:49 PM  Urine pending but labs ok. CT shows non-displaced sacral fx. Pt not able to ambulate or do much at home. Will need admission. Hospitalist Andrews for Admission  11:49 PM    ED Room Number: ER18/18  Patient Name and age: Mary Felix 80 y.o.  female  Working Diagnosis: No diagnosis found. Readmission: no  Isolation Requirements:  no  Recommended Level of Care:  med/surg  Code Status:  Full Code  Department:[facility] Adult ED - 21   Other:  89y F here s/p GLF last week. Seen here at that time and imaging neg. Since then has been having trouble ambulating - not really to ambulate at all at home.  Has dementia so hard to determine exactly where but family feels it is pain in the sacrum/tailbone and R hip. No fever. No vomiting. Not taking PO well. Had UTI at last ED visit but treated. CT today shows non-displaced sacral fx.

## 2019-10-04 NOTE — PROGRESS NOTES
TRANSFER - IN REPORT:    Verbal report received from Errol(name) on Joey Martell  being received from ED(unit) for routine progression of care      Report consisted of patients Situation, Background, Assessment and   Recommendations(SBAR). Information from the following report(s) SBAR was reviewed with the receiving nurse. Opportunity for questions and clarification was provided. Assessment completed upon patients arrival to unit and care assumed.

## 2019-10-04 NOTE — PROGRESS NOTES
Orders received, chart reviewed and patient evaluated by physical therapy. Pending pain management, recommend home with family and home health therapy for home safety assessment and training. Of note, patient ambulated at her baseline of hand held assist today and without increased pain complaint. Daughter endorses that patient's mobility observed today is improved as compared with yesterday at home and close to patient's baseline. Recommend with nursing patient to complete as able in order to maintain strength, endurance and independence: OOB to chair 3x/day and ambulating with hand held assist. Thank you for your assistance. Full evaluation to follow.

## 2019-10-04 NOTE — PROGRESS NOTES
Reason for Admission:   Sacral Fracture               RRAT Score:    24              Resources/supports as identified by patient/family:          Daughter/AZEB Agosto is Caregiver for her Mother         Top Challenges facing patient (as identified by patient/family and CM): Finances/Medication cost?     No financial challenges verbalized at this time              Transportation? No               Support system or lack thereof? Primary Caregiver is Rupali daughter                     Living arrangements? Raquel Anderson lives in [facility] w/ patient (since 2014)           Self-care/ADLs/Cognition? Assistance provided w/ ADLs          Current Advanced Directive/Advance Care Plan:   AMD and DDNR on file                           Plan for utilizing home health:    Patient has received HH in the past (unable to remember name of agency)                 Transition of Care Plan:         Case Management Consult received for possible SNF Placement. EMR reviewed. This writer assessed patient on 9/25/19. Previous providers include / [facility]. Currently patient attends [facility]. O2 is used at night. Rupali acknowledges making contact w/ the [employer] from last ED visit. Spoke w/ COTY Ramachandran Care Coordinator and will communicate back regarding respite funds available. Discussed possible SNF referral process. Gave Choice List and Freedom Letter signed 1st choice is [facility] at present no 2nd choice will research medicare. gov and receive additional  assistance from patient's granddaughter. Discussed Medicare qualifying stay regarding SNF. Case Management will continue to follow for transitions of care needs. Care Management Interventions  PCP Verified by CM:  Yes  Last Visit to PCP: 07/03/19  Palliative Care Criteria Met (RRAT>21 & CHF Dx)?: No  Transition of Care Consult (CM Consult): Discharge Planning  MyChart Signup: Yes  Discharge Durable Medical Equipment: No  Health Maintenance Reviewed: Yes  Physical Therapy Consult: Yes  Occupational Therapy Consult: No  Speech Therapy Consult: No  Current Support Network: Own Home, Lives with Caregiver  Confirm Follow Up Transport: (TBD)  Plan discussed with Pt/Family/Caregiver: Yes  Freedom of Choice Offered: Yes  [address] Provided?: No  Discharge Location  Discharge Placement: (TBD)

## 2019-10-04 NOTE — ED NOTES
Patient resting in bed with no signs of distress, denies any needs at this time. Will continue to monitor. Daughter remains at bedside.

## 2019-10-04 NOTE — PROGRESS NOTES
Problem: Mobility Impaired (Adult and Pediatric)  Goal: *Acute Goals and Plan of Care (Insert Text)  Description  FUNCTIONAL STATUS PRIOR TO ADMISSION: Patient required minimal to moderate assistance for mobility and basic and instrumental ADLs. Level of assistance varied from day to day. HOME SUPPORT PRIOR TO ADMISSION: The patient lived with daughter who assists with aspects of care. Attends day program while daughter is at work. .    Physical Therapy Goals  Initiated 10/4/2019  1. Patient will move from supine to sit and sit to supine , scoot up and down and roll side to side in bed with minimal assistance/contact guard assist within 7 day(s). 2.  Patient will transfer from bed to chair and chair to bed with contact guard assist using the least restrictive device within 7 day(s). 3.  Patient will perform sit to stand with contact guard assist within 7 day(s). 4.  Patient will ambulate with hand held assist for 200 feet with the least restrictive device within 7 day(s). 5.  Patient will ascend/descend 12 stairs with handrail(s) with minimal assistance/contact guard assist within 7 day(s). Outcome: Progressing Towards Goal     PHYSICAL THERAPY EVALUATION  Patient: Doris Up (84 y.o. female)  Date: 10/4/2019  Primary Diagnosis: Sacral fracture (HCC) [S32.10XA]        Precautions: fall; memory impairment         ASSESSMENT  Based on the objective data described below, the patient presents with improvement in her pain level during this therapy encounter as compared with daughter and staff's report of pain level yesterday. Patient is ambulatory 100 ft with hand held assist (baseline). Gait was not antalgic and patient tolerated without complaint or s/s indicating pain. Patient negotiated 6 steps with her daughter assisting. Her only complaint was fear of falling.    Bed mobility was difficult for patient however her daughter notes that patient's performance with bed mobility varies at baseline based on patient's mentation. Today patient voiced fear of falling when transitioning to sit and demonstrates poor command following with instruction provided throughout this visit. If patient's pain is managed as it was during this encounter today,  patient should be able to return home with her daughter. Daughter asked re: SNF. With patient's confusion, fear of falling, fear when her daughter is not in her line of sight, and poor command following, this may not be the most suitable environment for her. Recommend with nursing patient to complete as able in order to maintain strength, endurance and independence: OOB to chair 3x/day and ambulating to the bathroom and in the back with hand held assist.   Therapy will resume next week. Thank you for your assistance. Current Level of Function Impacting Discharge (mobility/balance): Functional Outcome Measure: The patient scored 15/28 on the Tinetti outcome measure which is indicative of high fall risk. Other factors to consider for discharge: Lives with supportive daughter who has been assisting patient at home. Full flight of steps to access the bedroom. Patient will benefit from skilled therapy intervention to address the above noted impairments. PLAN :  Recommendations and Planned Interventions: bed mobility training, transfer training, gait training, therapeutic exercises, patient and family training/education and therapeutic activities      Frequency/Duration: Patient will be followed by physical therapy:  3 times a week to address goals. Recommendation for discharge: (in order for the patient to meet his/her long term goals)  Pending adequate pain control, recommend home with family and home health therapy at least two days/ wk for home safety assessment and caregiver training and supervision for safety.    If pain is not well managed and patient is not able to consistently walk at her baseline with hand held assist, may want to consider SNF placement. This discharge recommendation:  Has been made in collaboration with case management    Equipment recommendations for successful discharge (if) home: none         SUBJECTIVE:   Patient stated \"you are pretty\" patient said to multiple staff. OBJECTIVE DATA SUMMARY:   HISTORY:    Past Medical History:   Diagnosis Date    Anxiety state, unspecified     Chronic kidney disease     Chronic obstructive pulmonary disease (HCC)     CKD (chronic kidney disease) stage 3, GFR 30-59 ml/min ([facility]) 4/6/2016    Dementia ([facility] 6/12    Landa/npysch    Depression 9/20/2017    Generalized osteoarthrosis, unspecified site     Lumbar compression fracture ([facility] 4/08    epidurals x 2  Umang/os    Osteopenia     Other and unspecified hyperlipidemia     Psoriasis     Unspecified essential hypertension     Unspecified hypothyroidism     thyroid irradiated i131     Past Surgical History:   Procedure Laterality Date    COLONOSCOPY  10/10    [place]/gi    TOTAL KNEE ARTHROPLASTY  1992    left    TOTAL KNEE ARTHROPLASTY  1992    right         EXAMINATION/PRESENTATION/DECISION MAKING:   Critical Behavior:  Neurologic State: Confused  Orientation Level: Disoriented to person, Disoriented to situation, Disoriented to time  Cognition: Decreased attention/concentration, Decreased command following, Memory loss, Poor safety awareness     Hearing: Auditory  Auditory Impairment: None    Range Of Motion:  AROM: Generally decreased, functional                       Strength:    Strength: Generally decreased, functional                    Tone & Sensation:   Tone: Normal  Sensation appears intact. Unable to complete assessment d/t does not follow commands                           Coordination:  Coordination: Generally decreased, functional  Vision:   Appears intact. Does not respond to questions asked re: vision.     Functional Mobility:  Bed Mobility:     Supine to Sit: Moderate assistance;Assist x1;Adaptive equipment; Additional time  Sit to Supine: Moderate assistance;Assist x1;Additional time; Adaptive equipment     Transfers:  Sit to Stand: Minimum assistance;Assist x1  Stand to Sit: Contact guard assistance;Minimum assistance;Assist x1                       Balance:   Sitting: Intact; Without support  Standing: Impaired; Without support  Standing - Static: Poor  Standing - Dynamic : Poor  Ambulation/Gait Training:  Distance (ft): 100 Feet (ft)  Assistive Device: Other (comment);Gait belt(hand held assist)  Ambulation - Level of Assistance: Minimal assistance;Assist x1(Hand held assist with reaches to walls (baseline))     Gait Description (WDL): Exceptions to WDL  Gait Abnormalities: Decreased step clearance        Base of Support: Widened     Speed/Tanisha: Slow           Interventions: Safety awareness training;Verbal cues; Visual/Demos        Stairs:  Number of Stairs Trained: 3  Stairs - Level of Assistance: Contact guard assistance;Assist X1(Laz hands on right hand rail)        Functional Measure:  Tinetti test:    Sitting Balance: 1  Arises: 1  Attempts to Rise: 1  Immediate Standing Balance: 1  Standing Balance: 1  Nudged: 0  Eyes Closed: 0  Turn 360 Degrees - Continuous/Discontinuous: 1  Turn 360 Degrees - Steady/Unsteady: 1(with hand held assist)  Sitting Down: 1  Balance Score: 8 Balance total score  Indication of Gait: 0  R Step Length/Height: 1  L Step Length/Height: 1  R Foot Clearance: 1  L Foot Clearance: 1  Step Symmetry: 1  Step Continuity: 1  Path: 1  Trunk: 0  Walking Time: 0  Gait Score: 7 Gait total score  Total Score: 15/28 Overall total score         Tinetti Tool Score Risk of Falls  <19 = High Fall Risk  19-24 = Moderate Fall Risk  25-28 = Low Fall Risk  Tinetti ME. Performance-Oriented Assessment of Mobility Problems in Elderly Patients. [place] [address]; [id number].  (Scoring Description: PT Bulletin Feb. 10, 1993)    Older adults: Toan Steele et al, 2009; n = [address] elderly evaluated with ABC, MURIEL, ADL, and IADL)  · Mean MURIEL score for males aged 69-68 years = 26.21(3.40)  · Mean MURIEL score for females age 69-68 years = 25.16(4.30)  · Mean MURIEL score for males over 80 years = 23.29(6.02)  · Mean MURIEL score for females over 80 years = 17.20(8.32)            Physical Therapy Evaluation Charge Determination   History Examination Presentation Decision-Making   MEDIUM  Complexity : 1-2 comorbidities / personal factors will impact the outcome/ POC  LOW Complexity : 1-2 Standardized tests and measures addressing body structure, function, activity limitation and / or participation in recreation  LOW Complexity : Stable, uncomplicated  LOW Complexity : FOTO score of       Based on the above components, the patient evaluation is determined to be of the following complexity level: LOW     Pain Rating:  Patient did not appear to be in pain during ambulation this encounter. Gait was not antalgic. Activity Tolerance:   Good  Please refer to the flowsheet for vital signs taken during this treatment. After treatment patient left in no apparent distress:   Supine in bed, Call bell within reach, Caregiver / family present and Side rails x 3    COMMUNICATION/EDUCATION:   The patients plan of care was discussed with: Registered Nurse and Care management . Fall prevention education was provided and the patient/caregiver indicated understanding., Patient/family have participated as able in goal setting and plan of care. and Patient/family agree to work toward stated goals and plan of care.     Thank you for this referral.  Sanjay Clark, PT   Time Calculation: 38 mins

## 2019-10-04 NOTE — ED NOTES
0001: Patient is confused and agitated. Unable to verbalize orientation. NSR on the monitor, 94% on RA. Patient in no apparent distress at this time. Daughter remains at the bedside, side rails x 2, bed-wheels locked, call bell within reach.

## 2019-10-04 NOTE — CONSULTS
ORTHOPAEDIC CONSULT NOTE    Subjective:     Date of Consultation:  October 4, 2019  Referring Physician:  Brad Bravo is a 80 y.o. female is seen for \"tail\" pain per daughter. Patient demented and does not know where she is or why she is here. Daughter states that she fell in her bathroom at home about 10days ago and has had more trouble walking and sitting since and complains of \"tail pain\". She has been sitting on her hands or soft cushions since. She was seen here last week for back pain as well as UTI and had no acute findings seen on imaging. Was discharged to home with home Abx for her UTI but returned last night with continued pain and family having trouble caring for her as she could not participate with mobilization much. Today she is pleasantly confused but denies issues. She has been admitted to Medicine for pain control and tor work with PT and we have been asked to see her regarding her pain.     Patient Active Problem List    Diagnosis Date Noted    Sacral fracture ([address] 10/04/2019    Ataxia 05/20/2019    Altered mental status 05/20/2019    Mild depression ([address] 59/70/0829    Umbilical hernia without obstruction and without gangrene 10/11/2017    Depression 09/20/2017    Vitamin D deficiency 06/06/2017    Late onset Alzheimer'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 behavioral disturbance ([address] 01/11/2017    Advanced care planning/counseling discussion 09/15/2016    Primary osteoarthritis of both hands 04/26/2016    CKD (chronic kidney disease) stage 3, GFR 30-59 ml/min ([address] 04/06/2016    Osteoporosis 04/06/2016    Incontinence in female 10/21/2015    Impaired mobility and ADLs 06/03/2015    Dementia ([address] 08/04/2014    Adverse reaction to NSAIDs; acute renal failure 06/29/2011    Psoriasis     Hypothyroidism     Essential hypertension     Anxiety state, unspecified     Generalized osteoarthrosis, unspecified site     Hyperlipidemia      Family History   Problem Relation Age of Onset    Cancer Mother         stomach tumors    Cancer Brother         prostate      Social History     Tobacco Use    Smoking status: Former Smoker     Packs/day: 0.50     Years: 60.00     Pack years: 30.00     Types: Cigarettes     Last attempt to quit:      Years since quittin.7    Smokeless tobacco: Never Used   Substance Use Topics    Alcohol use: No     Alcohol/week: 0.0 standard drinks     Past Medical History:   Diagnosis Date    Anxiety state, unspecified     Chronic kidney disease     Chronic obstructive pulmonary disease (HCC)     CKD (chronic kidney disease) stage 3, GFR 30-59 ml/min ([facility] 2016    Dementia ([facility] [facility]     Landa/npysch    Depression 2017    Generalized osteoarthrosis, unspecified site     Lumbar compression fracture ([facility]     epidurals x 2  Umang/os    Osteopenia     Other and unspecified hyperlipidemia     Psoriasis     Unspecified essential hypertension     Unspecified hypothyroidism     thyroid irradiated i131      Past Surgical History:   Procedure Laterality Date    COLONOSCOPY  10/10    [place]/gi    TOTAL KNEE ARTHROPLASTY      left    TOTAL KNEE ARTHROPLASTY      right      Prior to Admission medications    Medication Sig Start Date End Date Taking? Authorizing Provider   montelukast (SINGULAIR) 10 mg tablet Take 10 mg by mouth daily. Yes Provider, Historical   aspirin delayed-release 81 mg tablet Take 81 mg by mouth every Wednesday. Yes Provider, Historical   amLODIPine (NORVASC) 5 mg tablet TAKE 1 TABLET DAILY FOR BLOOD PRESSURE 19  Yes Shazia Rodriguez MD   levothyroxine (SYNTHROID) 100 mcg tablet TAKE 1 TABLET DAILY BEFORE BREAKFAST 19  Yes Shazia Rodriguez MD   donepezil (ARICEPT) 10 mg tablet TAKE 1 TABLET NIGHTLY 19  Yes Shazia Rodriguez MD   atorvastatin (LIPITOR) 40 mg tablet TAKE 1 TABLET DAILY 19  Yes Shazia Rodriguez MD   budesonide (PULMICORT) 0.5 mg/2 mL nbsp 2 mL by Nebulization route two (2) times a day. Takes 30 mins After Ely Rhein 6/6/19  Yes Shazia Rodriguez MD   ergocalciferol (ERGOCALCIFEROL) 50,000 unit capsule Take 50,000 Units by mouth every Tuesday. Yes Provider, Historical   memantine (NAMENDA) 10 mg tablet TAKE 1 TABLET TWICE A DAY FOR MODERATE TO SEVERE ALZHEIMERS TYPE DEMENTIA 3/24/19  Yes Shazia Rodrgiuez MD   arformoterol (BROVANA) 15 mcg/2 mL nebu neb solution 15 mcg by Nebulization route two (2) times a day. Yes Provider, Historical   DENOSUMAB SC by SubCUTAneous route every 6 months. Yes Provider, Historical   albuterol-ipratropium (DUO-NEB) 2.5 mg-0.5 mg/3 ml nebu 3 mL by Nebulization route every four (4) hours as needed. 7/5/18  Yes Paulo Cerna NP   albuterol (PROVENTIL HFA, VENTOLIN HFA, PROAIR HFA) 90 mcg/actuation inhaler Take 2 Puffs by inhalation every four (4) hours as needed for Wheezing. Please give with spacer.  2/5/18  Yes Veda Martinez NP     Current Facility-Administered Medications   Medication Dose Route Frequency    sodium chloride (NS) flush 5-40 mL  5-40 mL IntraVENous Q8H    sodium chloride (NS) flush 5-40 mL  5-40 mL IntraVENous PRN    acetaminophen (TYLENOL) tablet 650 mg  650 mg Oral Q4H PRN    heparin (porcine) injection 5,000 Units  5,000 Units SubCUTAneous Q8H    ondansetron (ZOFRAN) injection 4 mg  4 mg IntraVENous Q4H PRN    traMADol (ULTRAM) tablet 50 mg  50 mg Oral Q6H PRN    fentaNYL citrate (PF) injection 25 mcg  25 mcg IntraVENous Q4H PRN    amLODIPine (NORVASC) tablet 5 mg  5 mg Oral DAILY    arformoterol (BROVANA) neb solution 15 mcg  15 mcg Nebulization BID RT    atorvastatin (LIPITOR) tablet 40 mg  40 mg Oral DAILY    donepezil (ARICEPT) tablet 10 mg  10 mg Oral QHS    memantine (NAMENDA) tablet 10 mg  10 mg Oral BID    0.9% sodium chloride infusion  75 mL/hr IntraVENous CONTINUOUS    [START ON 10/9/2019] aspirin chewable tablet 81 mg  81 mg Oral every Wednesday    montelukast (SINGULAIR) tablet 10 mg  10 mg Oral QHS     Current Outpatient Medications   Medication Sig    montelukast (SINGULAIR) 10 mg tablet Take 10 mg by mouth daily.  aspirin delayed-release 81 mg tablet Take 81 mg by mouth every Wednesday.  amLODIPine (NORVASC) 5 mg tablet TAKE 1 TABLET DAILY FOR BLOOD PRESSURE    levothyroxine (SYNTHROID) 100 mcg tablet TAKE 1 TABLET DAILY BEFORE BREAKFAST    donepezil (ARICEPT) 10 mg tablet TAKE 1 TABLET NIGHTLY    atorvastatin (LIPITOR) 40 mg tablet TAKE 1 TABLET DAILY    budesonide (PULMICORT) 0.5 mg/2 mL nbsp 2 mL by Nebulization route two (2) times a day. Takes 30 mins After [place]    ergocalciferol (ERGOCALCIFEROL) 50,000 unit capsule Take 50,000 Units by mouth every Tuesday.  memantine (NAMENDA) 10 mg tablet TAKE 1 TABLET TWICE A DAY FOR MODERATE TO SEVERE ALZHEIMERS TYPE DEMENTIA    arformoterol (BROVANA) 15 mcg/2 mL nebu neb solution 15 mcg by Nebulization route two (2) times a day.  DENOSUMAB SC by SubCUTAneous route every 6 months.  albuterol-ipratropium (DUO-NEB) 2.5 mg-0.5 mg/3 ml nebu 3 mL by Nebulization route every four (4) hours as needed.  albuterol (PROVENTIL HFA, VENTOLIN HFA, PROAIR HFA) 90 mcg/actuation inhaler Take 2 Puffs by inhalation every four (4) hours as needed for Wheezing. Please give with spacer. Allergies   Allergen Reactions    Keflex [Cephalexin] Rash    Pcn [Penicillins] Rash    Sulfa (Sulfonamide Antibiotics) Rash    Tape [Adhesive] Unknown (comments)        Review of Systems:  Pertinent items are noted in HPI.     Mental Status: medium dementia    Objective:     Patient Vitals for the past 8 hrs:   BP Temp Pulse Resp SpO2   10/04/19 1205 130/64       10/04/19 1200 119/45       10/04/19 1157 119/45  68  96 %   10/04/19 1100 114/47       10/04/19 1030 136/60       10/04/19 1000 131/58    97 %   10/04/19 0900 126/58    94 %   10/04/19 0800 133/62 98.4 °F (36.9 °C) (!) 56 16 93 %   10/04/19 0700 125/49       10/04/19 0600 133/48    95 % 10/04/19 0500 151/54    97 %     Temp (24hrs), Av.9 °F (36.6 °C), Min:97.7 °F (36.5 °C), Max:98.4 °F (36.9 °C)      EXAM: Awake and alert; oriented to self only; NAD; agreeable to exam  Does not follow instructions  Leg lengths equal  No pain with log roll of hips bilatearlly  Moves legs spontaneously  Distal pulses palpable    Imaging Review: CT Abd/Plev shows a non-displaced fracture of the distal sacrum; no other acute findings    Labs:   Recent Results (from the past 24 hour(s))   SAMPLES BEING HELD    Collection Time: 10/03/19  9:46 PM   Result Value Ref Range    SAMPLES BEING HELD 1RED,1BLUE,1BLD CUR SILVER     COMMENT        Add-on orders for these samples will be processed based on acceptable specimen integrity and analyte stability, which may vary by analyte. CBC WITH AUTOMATED DIFF    Collection Time: 10/03/19  9:46 PM   Result Value Ref Range    WBC 6.3 3.6 - 11.0 K/uL    RBC 4.47 3.80 - 5.20 M/uL    HGB 12.7 11.5 - 16.0 g/dL    HCT 40.7 35.0 - 47.0 %    MCV 91.1 80.0 - 99.0 FL    MCH 28.4 26.0 - 34.0 PG    MCHC 31.2 30.0 - 36.5 g/dL    RDW 14.9 (H) 11.5 - 14.5 %    PLATELET 212 782 - 454 K/uL    MPV 10.8 8.9 - 12.9 FL    NRBC 0.0 0  WBC    ABSOLUTE NRBC 0.00 0.00 - 0.01 K/uL    NEUTROPHILS 48 32 - 75 %    LYMPHOCYTES 42 12 - 49 %    MONOCYTES 8 5 - 13 %    EOSINOPHILS 2 0 - 7 %    BASOPHILS 0 0 - 1 %    IMMATURE GRANULOCYTES 0 %    ABS. NEUTROPHILS 3.1 1.8 - 8.0 K/UL    ABS. LYMPHOCYTES 2.6 0.8 - 3.5 K/UL    ABS. MONOCYTES 0.5 0.0 - 1.0 K/UL    ABS. EOSINOPHILS 0.1 0.0 - 0.4 K/UL    ABS. BASOPHILS 0.0 0.0 - 0.1 K/UL    ABS. IMM.  GRANS. 0.0 K/UL    DF MANUAL      RBC COMMENTS NORMOCYTIC, NORMOCHROMIC     METABOLIC PANEL, COMPREHENSIVE    Collection Time: 10/03/19  9:46 PM   Result Value Ref Range    Sodium 145 136 - 145 mmol/L    Potassium 4.1 3.5 - 5.1 mmol/L    Chloride 110 (H) 97 - 108 mmol/L    CO2 26 21 - 32 mmol/L    Anion gap 9 5 - 15 mmol/L    Glucose 139 (H) 65 - 100 mg/dL BUN 24 (H) 6 - 20 MG/DL    Creatinine 1.03 (H) 0.55 - 1.02 MG/DL    BUN/Creatinine ratio 23 (H) 12 - 20      GFR est AA >60 >60 ml/min/1.73m2    GFR est non-AA 50 (L) >60 ml/min/1.73m2    Calcium 10.0 8.5 - 10.1 MG/DL    Bilirubin, total 0.3 0.2 - 1.0 MG/DL    ALT (SGPT) 22 12 - 78 U/L    AST (SGOT) 18 15 - 37 U/L    Alk.  phosphatase 136 (H) 45 - 117 U/L    Protein, total 7.9 6.4 - 8.2 g/dL    Albumin 3.3 (L) 3.5 - 5.0 g/dL    Globulin 4.6 (H) 2.0 - 4.0 g/dL    A-G Ratio 0.7 (L) 1.1 - 2.2     POC LACTIC ACID    Collection Time: 10/03/19  9:48 PM   Result Value Ref Range    Lactic Acid (POC) 1.31 0.40 - 2.00 mmol/L   URINALYSIS W/MICROSCOPIC    Collection Time: 10/03/19 11:55 PM   Result Value Ref Range    Color YELLOW/STRAW      Appearance CLEAR CLEAR      Specific gravity 1.015 1.003 - 1.030      pH (UA) 6.0 5.0 - 8.0      Protein NEGATIVE  NEG mg/dL    Glucose NEGATIVE  NEG mg/dL    Ketone NEGATIVE  NEG mg/dL    Bilirubin NEGATIVE  NEG      Blood NEGATIVE  NEG      Urobilinogen 0.2 0.2 - 1.0 EU/dL    Nitrites NEGATIVE  NEG      Leukocyte Esterase NEGATIVE  NEG      WBC 0-4 0 - 4 /hpf    RBC 0-5 0 - 5 /hpf    Epithelial cells FEW FEW /lpf    Bacteria NEGATIVE  NEG /hpf    Hyaline cast 0-2 0 - 5 /lpf         Impression:     Active Problems:    Sacral fracture (HCC) (10/4/2019)        Plan:     Sacral fracture - no other acute sources of pain evident on imaging; patient complaints of \"tail pain\" consistent with the findings of sacral fracture as well  May WBAT   PT/OT for evaluation  Pain control - likely Tylenol +/- some Toradol and Ultram as needed - patient with dementia so want to minimize any narcotic use  May offload when sitting with donut or pillows  Medical management per primary team  Case Management for disposition planning - likely to need SNF unless advances rapidly with PT    Dr Amaya Barker aware of patient and in agreement with current plan    Callie Bowie, [address] [facility] [address] [facility]

## 2019-10-04 NOTE — ED NOTES
Verbal report received and assumed care from Padroni, [address] [place] [address] , [address] [place] [address]. Report included SBAR, Kardex, MAR, recent results and pt status. Pt resting comfortably in bed.

## 2019-10-05 VITALS
DIASTOLIC BLOOD PRESSURE: 74 MMHG | RESPIRATION RATE: 14 BRPM | TEMPERATURE: 97.8 F | BODY MASS INDEX: 23.92 KG/M2 | OXYGEN SATURATION: 96 % | HEIGHT: 62 IN | WEIGHT: 130 LBS | SYSTOLIC BLOOD PRESSURE: 166 MMHG | HEART RATE: 68 BPM

## 2019-10-05 LAB
BACTERIA SPEC CULT: NORMAL
CC UR VC: NORMAL
SERVICE CMNT-IMP: NORMAL

## 2019-10-05 PROCEDURE — 97535 SELF CARE MNGMENT TRAINING: CPT

## 2019-10-05 PROCEDURE — 94640 AIRWAY INHALATION TREATMENT: CPT

## 2019-10-05 PROCEDURE — 97530 THERAPEUTIC ACTIVITIES: CPT

## 2019-10-05 PROCEDURE — 97165 OT EVAL LOW COMPLEX 30 MIN: CPT

## 2019-10-05 PROCEDURE — 74011250636 HC RX REV CODE- 250/636: Performed by: HOSPITALIST

## 2019-10-05 PROCEDURE — 74011000250 HC RX REV CODE- 250: Performed by: HOSPITALIST

## 2019-10-05 PROCEDURE — 74011250637 HC RX REV CODE- 250/637: Performed by: HOSPITALIST

## 2019-10-05 PROCEDURE — 97116 GAIT TRAINING THERAPY: CPT

## 2019-10-05 RX ADMIN — ATORVASTATIN CALCIUM 40 MG: 40 TABLET, FILM COATED ORAL at 09:06

## 2019-10-05 RX ADMIN — AMLODIPINE BESYLATE 5 MG: 5 TABLET ORAL at 09:06

## 2019-10-05 RX ADMIN — Medication 10 ML: at 06:14

## 2019-10-05 RX ADMIN — ARFORMOTEROL TARTRATE 15 MCG: 15 SOLUTION RESPIRATORY (INHALATION) at 13:17

## 2019-10-05 RX ADMIN — HEPARIN SODIUM 5000 UNITS: 5000 INJECTION INTRAVENOUS; SUBCUTANEOUS at 01:51

## 2019-10-05 RX ADMIN — HEPARIN SODIUM 5000 UNITS: 5000 INJECTION INTRAVENOUS; SUBCUTANEOUS at 09:06

## 2019-10-05 RX ADMIN — MEMANTINE HYDROCHLORIDE 10 MG: 10 TABLET ORAL at 09:06

## 2019-10-05 NOTE — PROGRESS NOTES
Hospitalist Progress Note  Liberty Campbell MD  Answering service: [phone] from in house phone        Date of Service:  10/5/2019  NAME:  Maged Gallegos  :  1929  MRN:  [id number]      Admission Summary:        HPI: 80 y.o lady w/ dementia who presents with low back pain. Symptoms began a week ago. Her daughter states she had a ground-level fall. Came here and was diagnosed with a UTI and discharged on antibiotics. Since the fall, she's complained of low back pain and hasn't been moving much. She is unable to provide any history. Interval history / Subjective:       10/5/2019 :        Assessment & Plan:        Distal sacral fracture: she is unable to ambulate. Doesn't use a cane or walker at baseline, usually walks with help  -admit for pain control  -therapy evals  -ortho consult  - up w pt minimal pain, for snf. Discussed with daughter     CKD stage II: stable, she is a bit azotemic and appears dry  -gentle IVNS overnight     HTN:  -continue home meds     Dementia daughter desires dnr, she will discuss with other sib before this can be established. , Elba Fields understands ; Elba Fields, daughter, is main caretaker      DVT ppx: sq heparin  Code status: DNR - d/w daughter/POA, she would like a DDNR which can be done prior to discharge  Disposition: will likely need placement        Hospital Problems  Date Reviewed: 7/3/2019          Codes Class Noted POA    Sacral fracture ([address] ICD-10-CM: F18.16ER  ICD-9-CM: 805.6  10/4/2019 Unknown                Review of Systems:   Review of systems not obtained due to patient factors. Vital Signs:    Last 24hrs VS reviewed since prior progress note.  Most recent are:  Visit Vitals  /74   Pulse 68   Temp 97.8 °F (36.6 °C)   Resp 14   Ht 5' 2\" (1.575 m)   Wt 59 kg (130 lb)   SpO2 92%   BMI 23.78 kg/m²       No intake or output data in the 24 hours ending 10/05/19 0907     Physical Examination: Constitutional: No distress, demented. ENT:  no bleeding. No trauma    Resp:  CTA bilaterally. No wheezing/rhonchi/rales. No accessory muscle use   CV:  Regular rhythm, normal rate, no murmurs, gallops, rubs    GI:  Soft, non distended, non tender. normoactive bowel sounds, no hepatosplenomegaly     Musculoskeletal:  No edema, warm, 2+ pulses throughout    Neurologic:        Skin:  Good turgor, no rashes or ulcers       Data Review:    Review and/or order of clinical lab test      Labs:     Recent Labs     10/03/19  2146   WBC 6.3   HGB 12.7   HCT 40.7        Recent Labs     10/03/19  2146      K 4.1   *   CO2 26   BUN 24*   CREA 1.03*   *   CA 10.0     Recent Labs     10/03/19  2146   SGOT 18   ALT 22   *   TBILI 0.3   TP 7.9   ALB 3.3*   GLOB 4.6*     No results for input(s): INR, PTP, APTT, INREXT in the last 72 hours. No results for input(s): FE, TIBC, PSAT, FERR in the last 72 hours. Lab Results   Component Value Date/Time    Folate >19.9 03/15/2012 12:26 PM      No results for input(s): PH, PCO2, PO2 in the last 72 hours. No results for input(s): CPK, CKNDX, TROIQ in the last 72 hours.     No lab exists for component: CPKMB  Lab Results   Component Value Date/Time    Cholesterol, total 162 05/22/2019 12:55 AM    HDL Cholesterol 46 05/22/2019 12:55 AM    LDL, calculated 83 05/22/2019 12:55 AM    Triglyceride 165 (H) 05/22/2019 12:55 AM    CHOL/HDL Ratio 3.5 05/22/2019 12:55 AM     Lab Results   Component Value Date/Time    Glucose (POC) 136 (H) 05/20/2019 04:25 PM     Lab Results   Component Value Date/Time    Color YELLOW/STRAW 10/03/2019 11:55 PM    Appearance CLEAR 10/03/2019 11:55 PM    Specific gravity 1.015 10/03/2019 11:55 PM    pH (UA) 6.0 10/03/2019 11:55 PM    Protein NEGATIVE  10/03/2019 11:55 PM    Glucose NEGATIVE  10/03/2019 11:55 PM    Ketone NEGATIVE  10/03/2019 11:55 PM    Bilirubin NEGATIVE  10/03/2019 11:55 PM    Urobilinogen 0.2 10/03/2019 11:55 PM    Nitrites NEGATIVE  10/03/2019 11:55 PM    Leukocyte Esterase NEGATIVE  10/03/2019 11:55 PM    Epithelial cells FEW 10/03/2019 11:55 PM    Bacteria NEGATIVE  10/03/2019 11:55 PM    WBC 0-4 10/03/2019 11:55 PM    RBC 0-5 10/03/2019 11:55 PM         Medications Reviewed:     Current Facility-Administered Medications   Medication Dose Route Frequency    sodium chloride (NS) flush 5-40 mL  5-40 mL IntraVENous Q8H    sodium chloride (NS) flush 5-40 mL  5-40 mL IntraVENous PRN    acetaminophen (TYLENOL) tablet 650 mg  650 mg Oral Q4H PRN    heparin (porcine) injection 5,000 Units  5,000 Units SubCUTAneous Q8H    ondansetron (ZOFRAN) injection 4 mg  4 mg IntraVENous Q4H PRN    traMADol (ULTRAM) tablet 50 mg  50 mg Oral Q6H PRN    fentaNYL citrate (PF) injection 25 mcg  25 mcg IntraVENous Q4H PRN    amLODIPine (NORVASC) tablet 5 mg  5 mg Oral DAILY    arformoterol (BROVANA) neb solution 15 mcg  15 mcg Nebulization BID RT    atorvastatin (LIPITOR) tablet 40 mg  40 mg Oral DAILY    donepezil (ARICEPT) tablet 10 mg  10 mg Oral QHS    memantine (NAMENDA) tablet 10 mg  10 mg Oral BID    0.9% sodium chloride infusion  75 mL/hr IntraVENous CONTINUOUS    [START ON 10/9/2019] aspirin chewable tablet 81 mg  81 mg Oral every Wednesday    montelukast (SINGULAIR) tablet 10 mg  10 mg Oral QHS     ______________________________________________________________________  EXPECTED LENGTH OF STAY: 3d 0h  ACTUAL LENGTH OF STAY:          1                 Josi Harrison MD

## 2019-10-05 NOTE — DISCHARGE INSTRUCTIONS
Discharge Instructions       PATIENT ID: Maryann Huertas  MRN: [id number]   YOB: 1929    DATE OF ADMISSION: 10/3/2019  7:51 PM    DATE OF DISCHARGE: 10/5/2019    PRIMARY CARE PROVIDER: Yarelis Hidalgo MD     ATTENDING PHYSICIAN: Lexy Quintero MD  DISCHARGING PROVIDER: Carlos Escamilla MD    To contact this individual call [phone] and ask the  to page. If unavailable ask to be transferred the Adult Hospitalist Department. DISCHARGE DIAGNOSES sacral fracture    CONSULTATIONS: IP CONSULT TO HOSPITALIST  IP CONSULT TO ORTHOPEDIC SURGERY    PROCEDURES/SURGERIES: * No surgery found *    PENDING TEST RESULTS:   At the time of discharge the following test results are still pending: na    FOLLOW UP APPOINTMENTS:   Follow-up Information     Follow up With Specialties Details Why Contact Info    Yarelis Hidalgo MD Nephrology   [address] [place] [id number] [phone]             ADDITIONAL CARE RECOMMENDATIONS: na    DIET: Resume previous diet    ACTIVITY: Activity as tolerated    WOUND CARE: na    EQUIPMENT needed: na      DISCHARGE MEDICATIONS:   See Medication Reconciliation Form    · It is important that you take the medication exactly as they are prescribed. · Keep your medication in the bottles provided by the pharmacist and keep a list of the medication names, dosages, and times to be taken in your wallet. · Do not take other medications without consulting your doctor. NOTIFY YOUR PHYSICIAN FOR ANY OF THE FOLLOWING:   Fever over 101 degrees for 24 hours. Chest pain, shortness of breath, fever, chills, nausea, vomiting, diarrhea, change in mentation, falling, weakness, bleeding. Severe pain or pain not relieved by medications. Or, any other signs or symptoms that you may have questions about.       DISPOSITION:    Home With:   OT  PT  HH  RN       SNF/Inpatient Rehab/LTAC   x Independent/assisted living    Hospice    Other: Signed:   Ginny Williamson MD  10/5/2019  2:30 PM

## 2019-10-05 NOTE — PROGRESS NOTES
Bedside and Verbal shift change report given to Jina Carreon (oncoming nurse) by Asha Bender (offgoing nurse). Report included the following information SBAR, Kardex, Intake/Output and MAR.

## 2019-10-05 NOTE — PROGRESS NOTES
ORTHO FRACTURE PROGRESS NOTE    2019  Admit Date:   10/3/2019    Post Op day: * No surgery found *    Subjective: Julia Bumpers is resting comfortably but remains very confused. Appears to have done quite well when mobilized with PT yesterday with minimal pain issues. Only taking Tylenol at this time. No new issues.     PT/OT:   Gait:  Gait  Base of Support: Widened  Speed/Tanisha: Slow  Gait Abnormalities: Decreased step clearance  Ambulation - Level of Assistance: Minimal assistance, Assist x1(Hand held assist with reaches to walls (baseline))  Distance (ft): 100 Feet (ft)  Assistive Device: Other (comment), Gait belt(hand held assist)  Stairs - Level of Assistance: Contact guard assistance, Assist X1(Laz hands on right hand rail)  Number of Stairs Trained: 3  Interventions: Safety awareness training, Verbal cues, Visual/Demos            Interventions: Safety awareness training, Verbal cues, Visual/Demos    Vital Signs:    Patient Vitals for the past 8 hrs:   BP Temp Pulse Resp SpO2   10/05/19 0743 166/74 97.8 °F (36.6 °C) 68 14 92 %   10/05/19 0223 146/75 97.6 °F (36.4 °C) 60 16 91 %     Temp (24hrs), Av.8 °F (36.6 °C), Min:97.6 °F (36.4 °C), Max:97.9 °F (36.6 °C)      Pain Control:   Pain Assessment  Pain Scale 1: Numeric (0 - 10)  Pain Intensity 1: 0    Meds:    Current Facility-Administered Medications   Medication Dose Route Frequency    sodium chloride (NS) flush 5-40 mL  5-40 mL IntraVENous Q8H    sodium chloride (NS) flush 5-40 mL  5-40 mL IntraVENous PRN    acetaminophen (TYLENOL) tablet 650 mg  650 mg Oral Q4H PRN    heparin (porcine) injection 5,000 Units  5,000 Units SubCUTAneous Q8H    ondansetron (ZOFRAN) injection 4 mg  4 mg IntraVENous Q4H PRN    traMADol (ULTRAM) tablet 50 mg  50 mg Oral Q6H PRN    fentaNYL citrate (PF) injection 25 mcg  25 mcg IntraVENous Q4H PRN    amLODIPine (NORVASC) tablet 5 mg  5 mg Oral DAILY    arformoterol (BROVANA) neb solution 15 mcg  15 mcg Nebulization BID RT    atorvastatin (LIPITOR) tablet 40 mg  40 mg Oral DAILY    donepezil (ARICEPT) tablet 10 mg  10 mg Oral QHS    memantine (NAMENDA) tablet 10 mg  10 mg Oral BID    0.9% sodium chloride infusion  75 mL/hr IntraVENous CONTINUOUS    [START ON 10/9/2019] aspirin chewable tablet 81 mg  81 mg Oral every Wednesday    montelukast (SINGULAIR) tablet 10 mg  10 mg Oral QHS       LAB:    Recent Labs     10/03/19  2146   HCT 40.7   HGB 12.7       Transfuse PRBC's:      Assessment & Physician's Comment:  Patient oriented to self only  Does not respond to sensory or motor requests  Leg lengths equal  Neurovascular checks within normal limits  Orientation:  Disoriented (baseline)    Active Problems:    Sacral fracture (HCC) (10/4/2019)        Plan:    Cont PT/OT - WBAT  Tylenol for pain - avoid narcotics considering level of dementia  Heparin for DVT prophylaxis for now but if mobilizes more consistently consider mechanical DVT proph  Medical management per primary team  Case Management for disposition planning - SNF needs?   Can follow-up outpatient with Dr Vernell Patel on an as needed basis following discharge    Eileen Roca PA-C   Orthopedic Trauma Service  [facility]

## 2019-10-05 NOTE — PROGRESS NOTES
OCCUPATIONAL THERAPY EVALUATION/DISCHARGE  Patient: Svetlana Leung (87 y.o. female)  Date: 10/5/2019  Primary Diagnosis: Sacral fracture ([address] [S32.10XA]       Precautions: Fall    ASSESSMENT  Based on the objective data described below, the patient presents with generalized weakness, impaired cognition, and assist with all ADLs s/p admission for UTI and sacral fx d/t a GLF ~10 days ago. Patient requires Mod-Max A for ADLs and Min-Mod A for functional mobility,has baseline dementia, & attends an adult day program while her daughter works. She appears to be at her functional baseline, noted with Max A for toileting tasks, pain with all mobility (pankaj bed mobility). She fatigues quickly but was participatory in all tasks requested. Daughter reports the patient is at her baseline, no concerns with returning home today. No further acute OT needs, safe to dc home with daughter's assist/SPV PRN, has all DME/AE needed. Current Level of Function (ADLs/self-care): Mod-Max A for ADLs, Min-Mod A for functional mobility    Functional Outcome Measure: The patient scored 40/100 on the Barthel Index outcome measure which is indicative of impairment in 60% of ADLs and mobility, which is relatively close to her functional baseline. Other factors to consider for discharge: fall risk, has good support from daughter     PLAN :  Recommend with staff: Recommend with nursing patient to complete as able in order to maintain strength, endurance and independence: ADLs with supervision/setup, OOB to chair 3x/day and mobilizing to the bathroom for toileting with hand held assist x1. Thank you for your assistance. Recommendation for discharge: (in order for the patient to meet his/her long term goals)  No skilled occupational therapy/ follow up rehabilitation needs identified at this time.     This discharge recommendation:  Has not yet been discussed the attending provider and/or case management    Equipment recommendations for successful discharge: patient owns DME required for discharge       SUBJECTIVE:   Patient stated Jessica Morales we going in here? In here? [place]    OBJECTIVE DATA SUMMARY:   HISTORY:   Past Medical History:   Diagnosis Date    Anxiety state, unspecified     Chronic kidney disease     Chronic obstructive pulmonary disease (HCC)     CKD (chronic kidney disease) stage 3, GFR 30-59 ml/min ([facility] 4/6/2016    Dementia ([facility] 6/12    Landa/npysch    Depression 9/20/2017    Generalized osteoarthrosis, unspecified site     Lumbar compression fracture ([facility] 4/08    epidurals x 2  Umang/os    Osteopenia     Other and unspecified hyperlipidemia     Psoriasis     Unspecified essential hypertension     Unspecified hypothyroidism     thyroid irradiated i131     Past Surgical History:   Procedure Laterality Date    COLONOSCOPY  10/10    [place]    TOTAL KNEE ARTHROPLASTY  1992    left    TOTAL KNEE ARTHROPLASTY  1992    right       Prior Level of Function/Environment/Context: Receives assist for all ADLs and mobility from supportive daughter (who she lives with) 2* baseline dementia, attends an adult day program 5 days/week while her daughter is at work  Expanded or extensive additional review of patient history:   Home Situation  Home Environment: Private residence  # Steps to Enter: 2  Rails to Enter: Yes  One/Two Story Residence: Two story  # of Interior Steps: 1  Interior Rails: Both  Lift Chair Available: No  Living Alone: No  Support Systems: Child(annemarie)  Patient Expects to be Discharged to[de-identified] Private residence  Current DME Used/Available at Home: Commode, bedside, Grab bars, Raised toilet seat, Tub transfer bench(transport w/c)  Tub or Shower Type: Tub/Shower combination    Hand dominance: Right    EXAMINATION OF PERFORMANCE DEFICITS:  Cognitive/Behavioral Status:  Neurologic State: Confused; Alert  Orientation Level: Oriented to person;Disoriented to place; Disoriented to situation;Disoriented to time  Cognition: Appropriate for age attention/concentration; Follows commands; Impaired decision making;Poor safety awareness  Perception: Appears intact  Perseveration: No perseveration noted  Safety/Judgement: Awareness of environment;Decreased awareness of need for assistance;Decreased awareness of need for safety; Lack of insight into deficits    Skin: Appears intact    Edema: None    Hearing: Auditory  Auditory Impairment: None    Vision/Perceptual:    Tracking: Able to track stimulus in all quadrants w/o difficulty                      Acuity: Within Defined Limits         Range of Motion:  AROM: Generally decreased, functional                         Strength:  Strength: Grossly decreased, non-functional                Coordination:  Coordination: Generally decreased, functional  Fine Motor Skills-Upper: Left Intact; Right Intact    Gross Motor Skills-Upper: Left Intact; Right Intact    Tone & Sensation:  Tone: Normal                         Balance:  Sitting: Intact  Standing: Impaired; With support(HHA)  Standing - Static: Fair;Constant support  Standing - Dynamic : Fair;Constant support    Functional Mobility and Transfers for ADLs:  Bed Mobility:  Supine to Sit: Moderate assistance; Additional time  Scooting: Moderate assistance; Additional time    Transfers:  Sit to Stand: Contact guard assistance(HHA)  Stand to Sit: Contact guard assistance(HHA)  Bathroom Mobility: Contact guard assistance(HHA)  Toilet Transfer : Minimum assistance;Contact guard assistance(Min A for low toilet, cues for hand positioning)    ADL Assessment:  Feeding: Minimum assistance(infer for container mgmt)    Oral Facial Hygiene/Grooming: Minimum assistance(A for thoroughness per daughter)    Bathing: Maximum assistance(infer per mobility & cognition)    Upper Body Dressing: Minimum assistance(infer per mobility & cognition)    Lower Body Dressing: Maximum assistance    Toileting: Maximum assistance                ADL Intervention and task modifications: Lower Body Dressing Assistance  Protective Undergarmet: Maximum assistance  Socks: Total assistance (dependent)  Leg Crossed Method Used: No  Position Performed: Seated in chair;Seated edge of bed  Cues: Verbal cues provided;Visual cues provided; Tactile cues provided;Physical assistance    Toileting  Toileting Assistance: Total assistance(dependent)  Bladder Hygiene: Total assistance (dependent)  Bowel Hygiene: Total assistance (dependent)  Clothing Management: Maximum assistance  Cues: Verbal cues provided;Visual cues provided; Tactile cues provided;Physical assistance for pants down;Physical assistance for pants up  Adaptive Equipment: Grab bars(HHA)    Cognitive Retraining  Safety/Judgement: Awareness of environment;Decreased awareness of need for assistance;Decreased awareness of need for safety; Lack of insight into deficits    Therapeutic Exercise:  Ambulation to bathroom with HHA, CGA-Min A for toilet transfer onto low toilet     Functional Measure:  Barthel Index:    Bathin  Bladder: 0  Bowels: 0  Groomin  Dressin  Feedin  Mobility: 10  Stairs: 5  Toilet Use: 5  Transfer (Bed to Chair and Back): 10  Total: 40/100        The Barthel ADL Index: Guidelines  1. The index should be used as a record of what a patient does, not as a record of what a patient could do. 2. The main aim is to establish degree of independence from any help, physical or verbal, however minor and for whatever reason. 3. The need for supervision renders the patient not independent. 4. A patient's performance should be established using the best available evidence. Asking the patient, friends/relatives and nurses are the usual sources, but direct observation and common sense are also important. However direct testing is not needed. 5. Usually the patient's performance over the preceding 24-48 hours is important, but occasionally longer periods will be relevant.   6. Middle categories imply that the patient supplies over 50 per cent of the effort. 7. Use of aids to be independent is allowed. Daisey Bark., Barthel, D.W. (6737). Functional evaluation: the Barthel Index. [address] (14)2. JUAN MIGUEL Jeronimo, Heidi Orlando.Messi., Joe, [address] (1999). Measuring the change indisability after inpatient rehabilitation; comparison of the responsiveness of the Barthel Index and Functional [place] Measure. Journal of Neurology, Neurosurgery, and Psychiatry, 66(4), 519-857. LEAH Chavis.A, VALERIANO Thurman, & Cristy Lara M.A. (2004.) Assessment of post-stroke quality of life in cost-effectiveness studies: The usefulness of the Barthel Index and the EuroQoL-5D. Quality of Life Research, 15, 253-21         Occupational Therapy Evaluation Charge Determination   History Examination Decision-Making   LOW Complexity : Brief history review  MEDIUM Complexity : 3-5 performance deficits relating to physical, cognitive , or psychosocial skils that result in activity limitations and / or participation restrictions LOW Complexity : No comorbidities that affect functional and no verbal or physical assistance needed to complete eval tasks       Based on the above components, the patient evaluation is determined to be of the following complexity level: LOW   Pain Rating:  Patient unable to appropriately verbalize      Activity Tolerance:   Fair and requires rest breaks  Please refer to the flowsheet for vital signs taken during this treatment. After treatment patient left in no apparent distress:    Caregiver / family present and in w/c with PT present for stair training    COMMUNICATION/EDUCATION:   The patients plan of care was discussed with: Physical Therapist and Registered Nurse.     Thank you for this referral.  Vandana Hebert, ZOYA, OTR/L  Time Calculation: 24 mins

## 2019-10-05 NOTE — PROGRESS NOTES
Problem: Mobility Impaired (Adult and Pediatric)  Goal: *Acute Goals and Plan of Care (Insert Text)  Description  FUNCTIONAL STATUS PRIOR TO ADMISSION: Patient required minimal to moderate assistance for mobility and basic and instrumental ADLs. Level of assistance varied from day to day. HOME SUPPORT PRIOR TO ADMISSION: The patient lived with daughter who assists with aspects of care. Attends day program while daughter is at work. .    Physical Therapy Goals  Initiated 10/4/2019  1. Patient will move from supine to sit and sit to supine , scoot up and down and roll side to side in bed with minimal assistance/contact guard assist within 7 day(s). 2.  Patient will transfer from bed to chair and chair to bed with contact guard assist using the least restrictive device within 7 day(s). 3.  Patient will perform sit to stand with contact guard assist within 7 day(s). 4.  Patient will ambulate with hand held assist for 200 feet with the least restrictive device within 7 day(s). 5.  Patient will ascend/descend 12 stairs with handrail(s) with minimal assistance/contact guard assist within 7 day(s). Outcome: Progressing Towards Goal   PHYSICAL THERAPY TREATMENT/DISCHARGE  Patient: Erica Powell (48 y.o. female)  Date: 10/5/2019  Diagnosis: Sacral fracture ([facility] [S32.10XA] <principal problem not specified>       Precautions: Fall  Chart, physical therapy assessment, plan of care and goals were reviewed. ASSESSMENT  Patient continues with skilled PT services and is progressing towards goals. Patient demonstrates the ability to ambulates with 1-2 hand held assistance provided verbal and tactile cues to complete tasks. She ascends and descends 5 stairs with bilateral UE on railing and family member to provide contact guard assistance. Family is provided gait belt for continued safe mobility at home.  Recommend standard waffle cushion for increased comfort on return home as family members report patient sitting on her hand to offload her buttock. Caregivers have no further concerns at this time. Other factors to consider for discharge: none          PLAN :  Patient will be discharged from acute skilled physical therapy at this time. Rationale for discharge:  Goals achieved    Recommendation for discharge: (in order for the patient to meet his/her long term goals)  No skilled physical therapy/ follow up rehabilitation needs identified at this time. This discharge recommendation:  Has not yet been discussed the attending provider and/or case management    Equipment recommendations for successful discharge: patient owns DME required for discharge       SUBJECTIVE:   Patient stated you want me to sit down? Moises Evans    OBJECTIVE DATA SUMMARY:   Critical Behavior:  Neurologic State: Confused, Alert  Orientation Level: Oriented to person, Disoriented to place, Disoriented to situation, Disoriented to time  Cognition: Appropriate for age attention/concentration, Follows commands, Impaired decision making, Poor safety awareness  Safety/Judgement: Awareness of environment, Decreased awareness of need for assistance, Decreased awareness of need for safety, Lack of insight into deficits  Functional Mobility Training:  Bed Mobility:     Supine to Sit: Moderate assistance; Additional time     Scooting: Moderate assistance; Additional time        Transfers:  Sit to Stand: Contact guard assistance(HHA)  Stand to Sit: Contact guard assistance(HHA)                             Balance:  Sitting: Intact  Standing: Impaired; With support(HHA)  Standing - Static: Fair;Constant support  Standing - Dynamic : Fair;Constant support  Ambulation/Gait Training:  Distance (ft): 50 Feet (ft)  Assistive Device: Gait belt; Other (comment)(hand held assist)  Ambulation - Level of Assistance: Minimal assistance        Gait Abnormalities: Decreased step clearance        Base of Support: Widened     Speed/Tanisha: Slow           Interventions: Verbal cues;Tactile cues           Stairs:  Number of Stairs Trained: 5  Stairs - Level of Assistance: Contact guard assistance        Therapeutic Exercises:     Pain Rating:      Activity Tolerance:   Fair  Please refer to the flowsheet for vital signs taken during this treatment.     After treatment patient left in no apparent distress:   Sitting in chair, Call bell within reach and Caregiver / family present    COMMUNICATION/COLLABORATION:   The patients plan of care was discussed with: Occupational Therapist and Registered Nurse    Ruth Bearden, PT   Time Calculation: 29 mins

## 2019-10-07 ENCOUNTER — PATIENT OUTREACH (OUTPATIENT)
Dept: FAMILY MEDICINE CLINIC | Age: 84
End: 2019-10-07

## 2019-10-07 NOTE — PROGRESS NOTES
Hospital Discharge Follow-Up      Date/Time:  10/7/2019 4:02 PM    Patient was admitted to [facility] on 10/4 and discharged on 10/5 for sacral fracture. The physician discharge summary was not available at the time of outreach. Patient was contacted within 1 business days of discharge. Top Challenges reviewed with the provider   [facility] 10/4-10/5   Advance Care Planning:   Does patient have an Advance Directive:  reviewed and current        Method of communication with provider :chart routing,face to face    Inpatient RRAT score: 29  Was this a readmission? no   Patient stated reason for the readmission: shayna    Care Transition Nurse (CTN) contacted the family by telephone to perform post hospital discharge assessment. Verified name and  with family as identifiers. Provided introduction to self, and explanation of the CTN role. Daughter reports has discomfort when moves around. Walks with assistance from daughter. Using ESTylenol prn pain. Went to [employer] today. Family received hospital discharge instructions. CTN reviewed discharge instructions and red flags with family who verbalized understanding. Family given an opportunity to ask questions and does not have any further questions or concerns at this time. The family agrees to contact the PCP office for questions related to their healthcare. CTN provided contact information for future reference. Disease Specific:   N/A    Patients top risk factors for readmission:  Functional cognitive ability-dementia;  Functional physical ability-walks with assistance;    Home Health orders at discharge: [address]: na  Date of initial visit: na    Durable Medical Equipment ordered at discharge: none  [facility] [address] received: na    Medication(s):   New Medications at Discharge: none  Changed Medications at Discharge: none  Discontinued Medications at Discharge: none    Medication reconciliation was performed with family, who verbalizes understanding of administration of home medications. There were no barriers to obtaining medications identified at this time. Referral to Pharm D needed: no     Current Outpatient Medications   Medication Sig    montelukast (SINGULAIR) 10 mg tablet Take 10 mg by mouth daily.  aspirin delayed-release 81 mg tablet Take 81 mg by mouth every Wednesday.  amLODIPine (NORVASC) 5 mg tablet TAKE 1 TABLET DAILY FOR BLOOD PRESSURE    levothyroxine (SYNTHROID) 100 mcg tablet TAKE 1 TABLET DAILY BEFORE BREAKFAST    donepezil (ARICEPT) 10 mg tablet TAKE 1 TABLET NIGHTLY    atorvastatin (LIPITOR) 40 mg tablet TAKE 1 TABLET DAILY    budesonide (PULMICORT) 0.5 mg/2 mL nbsp 2 mL by Nebulization route two (2) times a day. Takes 30 mins After Brovana    ergocalciferol (ERGOCALCIFEROL) 50,000 unit capsule Take 50,000 Units by mouth every Tuesday.  memantine (NAMENDA) 10 mg tablet TAKE 1 TABLET TWICE A DAY FOR MODERATE TO SEVERE ALZHEIMERS TYPE DEMENTIA    arformoterol (BROVANA) 15 mcg/2 mL nebu neb solution 15 mcg by Nebulization route two (2) times a day.  DENOSUMAB SC by SubCUTAneous route every 6 months.  albuterol-ipratropium (DUO-NEB) 2.5 mg-0.5 mg/3 ml nebu 3 mL by Nebulization route every four (4) hours as needed.  albuterol (PROVENTIL HFA, VENTOLIN HFA, PROAIR HFA) 90 mcg/actuation inhaler Take 2 Puffs by inhalation every four (4) hours as needed for Wheezing. Please give with spacer. No current facility-administered medications for this visit. There are no discontinued medications.     [facility] follow up appointment(s):   Future Appointments   Date Time Provider Machelle Olivier   10/9/2019  4:00 PM Lexus Manjarrez MD [facility] SCHED   1/7/2020 11:30 AM [facility] INFUSION NURSE 3 [facility]      Non-[facility] follow up appointment(s): -daughter to schedule  [facility]:  information provided as a resource       Goals  Prevent complications post hospitalization. 10/7/19 [address] [place] [address] 10/4-10/5 Sacral fracture  · Reviewed discharge instructions with daughter  · Reviewed meds with daughter-no changes to meds  · Reviewed red flags: increased pain not relieved by ESTylenol, increased weakness,chest pain,sob,nausea,vomiting,diarrhea. · DUC with pcp 10/9- 4pm  · Reminded to sched f/u with . · Given info on [facility] as resource. · Given CTN direct contact info for any questions/concerns.   · Advised CTN will call back in 5-7 days for update or sooner [email or web address]

## 2019-10-09 ENCOUNTER — OFFICE VISIT (OUTPATIENT)
Dept: FAMILY MEDICINE CLINIC | Age: 84
End: 2019-10-09

## 2019-10-09 VITALS
HEART RATE: 76 BPM | OXYGEN SATURATION: 95 % | BODY MASS INDEX: 23.78 KG/M2 | HEIGHT: 62 IN | DIASTOLIC BLOOD PRESSURE: 64 MMHG | TEMPERATURE: 96.7 F | SYSTOLIC BLOOD PRESSURE: 148 MMHG | RESPIRATION RATE: 17 BRPM

## 2019-10-09 DIAGNOSIS — R60.9 EDEMA, UNSPECIFIED TYPE: ICD-10-CM

## 2019-10-09 DIAGNOSIS — I10 ESSENTIAL HYPERTENSION WITH GOAL BLOOD PRESSURE LESS THAN 140/90: Primary | ICD-10-CM

## 2019-10-09 DIAGNOSIS — S39.92XA INJURY OF COCCYX, INITIAL ENCOUNTER: ICD-10-CM

## 2019-10-09 DIAGNOSIS — E78.2 MIXED HYPERLIPIDEMIA: ICD-10-CM

## 2019-10-09 RX ORDER — FUROSEMIDE 40 MG/1
40 TABLET ORAL DAILY
Qty: 90 TAB | Refills: 1 | Status: SHIPPED | OUTPATIENT
Start: 2019-10-09 | End: 2020-01-22

## 2019-10-09 NOTE — DISCHARGE SUMMARY
Discharge Summary       PATIENT ID: Valery Vazquez  MRN: [id number]   YOB: 1929    DATE OF ADMISSION: 10/3/2019  7:51 PM    DATE OF DISCHARGE: 10/5/2019    PRIMARY CARE PROVIDER: Killian Harrison MD     ATTENDING PHYSICIAN: Aric Mckeon MD   DISCHARGING PROVIDER: Aric Mckeon MD    To contact this individual call [phone] and ask the  to page. If unavailable ask to be transferred the Adult Hospitalist Department. CONSULTATIONS: IP CONSULT TO ORTHOPEDIC SURGERY    PROCEDURES/SURGERIES: * No surgery found *    [address] COURSE:   Date of Service:  10/5/2019  NAME:  Valery Vazquez  :  1929  MRN:  [id number]        Admission Summary:         HPI: 80 y.o lady w/ dementia who presents with low back pain. Symptoms began a week ago. Her daughter states she had a ground-level fall. Came here and was diagnosed with a UTI and discharged on antibiotics. Since the fall, she's complained of low back pain and hasn't been moving much. She is unable to provide any history. Interval history / Subjective:       10/5/2019 :          Assessment & Plan:         Distal sacral fracture: she is unable to ambulate. Doesn't use a cane or walker at baseline, usually walks with help  -admit for pain control  -therapy evals  -ortho consult  - up w pt minimal pain, for snf. Discussed with daughter     CKD stage II: stable, she is a bit azotemic and appears dry  -gentle IVNS overnight     HTN:  -continue home meds     Dementia daughter desires dnr, she will discuss with other sib before this can be established. , Tiffanie Das understands ; Tiffanie Das, daughter, is main caretaker      DVT ppx: sq heparin  Code status: DNR - d/w daughter/POA, she would like a DDNR which can be done prior to discharge  Disposition: : home as did will with PT day of discharge.                       Hospital Problems  Date Reviewed: 7/3/2019           Codes Class Noted POA     Sacral fracture ([facility] ICD-10-CM: S32. 10XA  ICD-9-CM: 805.6   10/4/2019 Unknown                      Review of Systems:   Review of systems not obtained due to patient factors. Vital Signs:    Last 24hrs VS reviewed since prior progress note. Most recent are:  Visit Vitals  /74   Pulse 68   Temp 97.8 °F (36.6 °C)   Resp 14   Ht 5' 2\" (1.575 m)   Wt 59 kg (130 lb)   SpO2 92%   BMI 23.78 kg/m²         No intake or output data in the 24 hours ending 10/05/19 0936      Physical Examination:                                                       Constitutional: No distress, demented. ENT:  no bleeding. No trauma    Resp:  CTA bilaterally. No wheezing/rhonchi/rales. No accessory muscle use   CV:  Regular rhythm, normal rate, no murmurs, gallops, rubs    GI:  Soft, non distended, non tender. normoactive bowel sounds, no hepatosplenomegaly     Musculoskeletal:  No edema, warm, 2+ pulses throughout    Neurologic:                            Skin:  Good turgor, no rashes or ulcers         Data Review:    Review and/or order of clinical lab test        Labs:          Recent Labs     10/03/19  2146   WBC 6.3   HGB 12.7   HCT 40.7             Recent Labs     10/03/19  2146      K 4.1   *   CO2 26   BUN 24*   CREA 1.03*   *   CA 10.0          Recent Labs     10/03/19  2146   SGOT 18   ALT 22   *   TBILI 0.3   TP 7.9   ALB 3.3*   GLOB 4.6*      No results for input(s): INR, PTP, APTT, INREXT in the last 72 hours. No results for input(s): FE, TIBC, PSAT, FERR in the last 72 hours. Lab Results   Component Value Date/Time     Folate >19.9 03/15/2012 12:26 PM      No results for input(s): PH, PCO2, PO2 in the last 72 hours. No results for input(s): CPK, CKNDX, TROIQ in the last 72 hours.      No lab exists for component: CPKMB        Lab Results   Component Value Date/Time     Cholesterol, total 162 05/22/2019 12:55 AM     HDL Cholesterol 46 05/22/2019 12:55 AM     LDL, calculated 83 05/22/2019 12:55 AM Triglyceride 165 (H) 05/22/2019 12:55 AM     CHOL/HDL Ratio 3.5 05/22/2019 12:55 AM            Lab Results   Component Value Date/Time     Glucose (POC) 136 (H) 05/20/2019 04:25 PM            Lab Results   Component Value Date/Time     Color YELLOW/STRAW 10/03/2019 11:55 PM     Appearance CLEAR 10/03/2019 11:55 PM     Specific gravity 1.015 10/03/2019 11:55 PM     pH (UA) 6.0 10/03/2019 11:55 PM     Protein NEGATIVE  10/03/2019 11:55 PM     Glucose NEGATIVE  10/03/2019 11:55 PM     Ketone NEGATIVE  10/03/2019 11:55 PM     Bilirubin NEGATIVE  10/03/2019 11:55 PM     Urobilinogen 0.2 10/03/2019 11:55 PM     Nitrites NEGATIVE  10/03/2019 11:55 PM     Leukocyte Esterase NEGATIVE  10/03/2019 11:55 PM     Epithelial cells FEW 10/03/2019 11:55 PM     Bacteria NEGATIVE  10/03/2019 11:55 PM     WBC 0-4 10/03/2019 11:55 PM     RBC 0-5 10/03/2019 11:55 PM            Medications Reviewed:             Current Facility-Administered Medications   Medication Dose Route Frequency    sodium chloride (NS) flush 5-40 mL  5-40 mL IntraVENous Q8H    sodium chloride (NS) flush 5-40 mL  5-40 mL IntraVENous PRN    acetaminophen (TYLENOL) tablet 650 mg  650 mg Oral Q4H PRN    heparin (porcine) injection 5,000 Units  5,000 Units SubCUTAneous Q8H    ondansetron (ZOFRAN) injection 4 mg  4 mg IntraVENous Q4H PRN    traMADol (ULTRAM) tablet 50 mg  50 mg Oral Q6H PRN    fentaNYL citrate (PF) injection 25 mcg  25 mcg IntraVENous Q4H PRN    amLODIPine (NORVASC) tablet 5 mg  5 mg Oral DAILY    arformoterol (BROVANA) neb solution 15 mcg  15 mcg Nebulization BID RT    atorvastatin (LIPITOR) tablet 40 mg  40 mg Oral DAILY    donepezil (ARICEPT) tablet 10 mg  10 mg Oral QHS    memantine (NAMENDA) tablet 10 mg  10 mg Oral BID    0.9% sodium chloride infusion  75 mL/hr IntraVENous CONTINUOUS    [START ON 10/9/2019] aspirin chewable tablet 81 mg  81 mg Oral every Wednesday    montelukast (SINGULAIR) tablet 10 mg  10 mg Oral QHS DISCHARGE DIAGNOSES / PLAN:      1.  as above      ADDITIONAL CARE RECOMMENDATIONS:   na     PENDING TEST RESULTS:   At the time of discharge the following test results are still pending: na    FOLLOW UP APPOINTMENTS:    Follow-up Information     Follow up With Specialties Details Why Contact Info    Nikolai Shelby MD Nephrology   [address] [place] [address]  [phone]               DIET: Resume previous diet      ACTIVITY: Activity as tolerated    WOUND CARE: na    EQUIPMENT needed: na      DISCHARGE MEDICATIONS:  Discharge Medication List as of 10/5/2019  2:58 PM      CONTINUE these medications which have NOT CHANGED    Details   montelukast (SINGULAIR) 10 mg tablet Take 10 mg by mouth daily. , Historical Med      aspirin delayed-release 81 mg tablet Take 81 mg by mouth every Wednesday., Historical Med      amLODIPine (NORVASC) 5 mg tablet TAKE 1 TABLET DAILY FOR BLOOD PRESSURE, Normal, Disp-90 Tab, R-1      levothyroxine (SYNTHROID) 100 mcg tablet TAKE 1 TABLET DAILY BEFORE BREAKFAST, Normal, Disp-90 Tab, R-1      donepezil (ARICEPT) 10 mg tablet TAKE 1 TABLET NIGHTLY, Normal, Disp-90 Tab, R-1      atorvastatin (LIPITOR) 40 mg tablet TAKE 1 TABLET DAILY, Normal, Disp-90 Tab, R-1      budesonide (PULMICORT) 0.5 mg/2 mL nbsp 2 mL by Nebulization route two (2) times a day.  Takes 30 mins After Brovana, Normal, Disp-360 Each, R-1      ergocalciferol (ERGOCALCIFEROL) 50,000 unit capsule Take 50,000 Units by mouth every Tuesday., Historical Med      memantine (NAMENDA) 10 mg tablet TAKE 1 TABLET TWICE A DAY FOR MODERATE TO SEVERE ALZHEIMERS TYPE DEMENTIA, Normal, Disp-180 Tab, R-1      arformoterol (BROVANA) 15 mcg/2 mL nebu neb solution 15 mcg by Nebulization route two (2) times a day., Historical Med      DENOSUMAB SC by SubCUTAneous route every 6 months., Historical Med      albuterol-ipratropium (DUO-NEB) 2.5 mg-0.5 mg/3 ml nebu 3 mL by Nebulization route every four (4) hours as needed., Normal, Disp-90 Nebule, R-1      albuterol (PROVENTIL HFA, VENTOLIN HFA, PROAIR HFA) 90 mcg/actuation inhaler Take 2 Puffs by inhalation every four (4) hours as needed for Wheezing. Please give with spacer., Normal, Disp-1 Inhaler, R-0         STOP taking these medications       aspirin 81 mg chewable tablet Comments:   Reason for Stopping:                 NOTIFY YOUR PHYSICIAN FOR ANY OF THE FOLLOWING:   Fever over 101 degrees for 24 hours. Chest pain, shortness of breath, fever, chills, nausea, vomiting, diarrhea, change in mentation, falling, weakness, bleeding. Severe pain or pain not relieved by medications. Or, any other signs or symptoms that you may have questions about. DISPOSITION:    Home With:   OT  PT  HH  RN       Long term SNF/Inpatient Rehab   x Independent/assisted living    Hospice    Other:       PATIENT CONDITION AT DISCHARGE:     Functional status    Poor    x Deconditioned     Independent      Cognition     Lucid    x Forgetful     Dementia      Catheters/lines (plus indication)    Suárez     PICC     PEG    x None      Code status     Full code    x DNR      PHYSICAL EXAMINATION AT DISCHARGE:  General:          Alert, cooperative, no distress, appears stated age. HEENT:           Atraumatic, anicteric sclerae, pink conjunctivae                          No oral ulcers, mucosa moist, throat clear, dentition fair  Neck:               Supple, symmetrical  Lungs:             Clear to auscultation bilaterally. No Wheezing or Rhonchi. No rales. Chest wall:      No tenderness  No Accessory muscle use. Heart:              Regular  rhythm,  No  murmur   No edema  Abdomen:        Soft, non-tender. Not distended. Bowel sounds normal  Extremities:     No cyanosis. No clubbing,                            Skin turgor normal, Capillary refill normal  Skin:                Not pale. Not Jaundiced  No rashes   Psych:             Not anxious or agitated.   Neurologic:      Alert, moves all extremities, answers questions appropriately and responds to commands       [address] [place] [address]:  Problem List as of 10/5/2019 Date Reviewed: 10/5/2019          Codes Class Noted - Resolved    Ataxia ICD-10-CM: R27.0  ICD-9-CM: 781.3  5/20/2019 - Present        Altered mental status ICD-10-CM: R41.82  ICD-9-CM: 780.97  5/20/2019 - Present        Mild depression ([facility] ICD-10-CM: F32.0  ICD-9-CM: 971  10/29/2018 - Present        Umbilical hernia without obstruction and without gangrene ICD-10-CM: K42.9  ICD-9-CM: 553.1  10/11/2017 - Present        Depression ICD-10-CM: F32.9  ICD-9-CM: 311  9/20/2017 - Present        Vitamin D deficiency ICD-10-CM: E55.9  ICD-9-CM: 268.9  6/6/2017 - Present        Late onset Alzheimer'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 behavioral disturbance ([facility] ICD-10-CM: G30.1, F02.80  ICD-9-CM: 331.0, 294.10  1/11/2017 - Present        Advanced care planning/counseling discussion ICD-10-CM: Z71.89  ICD-9-CM: V65.49  9/15/2016 - Present    Overview Signed 9/15/2016  4:34 PM by Yadira Mane MD     Patient given [address] Directive form and booklet. Patient advised to follow up with me or contact nurse navigator to submit these form to medical chart. I advised Patient of the benefits of Advance Care Plan with regard to end of life care and benefits of filling forms out in case Patient cannot speak for themselves.                Primary osteoarthritis of both hands ICD-10-CM: M19.041, [id number]  ICD-9-CM: 715.14  4/26/2016 - Present        CKD (chronic kidney disease) stage 3, GFR 30-59 ml/min ([facility]) ICD-10-CM: N18.3  ICD-9-CM: 585.3  4/6/2016 - Present        Osteoporosis ICD-10-CM: M81.0  ICD-9-CM: 733.00  4/6/2016 - Present        Incontinence in female ICD-10-CM: R32  ICD-9-CM: 625.6  10/21/2015 - Present        Impaired mobility and ADLs ICD-10-CM: Z74.09  ICD-9-CM: 799.89  6/3/2015 - Present        Dementia ([facility] ICD-10-CM: F03.90  ICD-9-CM: 294.20  8/4/2014 - Present        Adverse reaction to NSAIDs; acute renal failure ICD-10-CM: T39.395A  ICD-9-CM: E935.8  6/29/2011 - Present        Psoriasis ICD-10-CM: L40.9  ICD-9-CM: 696.1  Unknown - Present        Hypothyroidism ICD-10-CM: E03.9  ICD-9-CM: 244.9  Unknown - Present    Overview Signed 10/26/2010  4:59 PM by Kenny Alves LPN     thyroid irradiated i131             Essential hypertension ICD-10-CM: I10  ICD-9-CM: 401.9  Unknown - Present        Anxiety state, unspecified ICD-10-CM: F41.1  ICD-9-CM: 300.00  Unknown - Present        Generalized osteoarthrosis, unspecified site ICD-10-CM: M15.9  ICD-9-CM: 715.00  Unknown - Present        Hyperlipidemia ICD-10-CM: E78.5  ICD-9-CM: 272.4  Unknown - Present        RESOLVED: Tobacco abuse ICD-10-CM: Z72.0  ICD-9-CM: 305.1  7/22/2015 - 3/2/2016        RESOLVED: Lumbar compression fracture ([facility] ICD-10-CM: S32.000A  ICD-9-CM: 805.4  Unknown - 1/10/2018    Overview Signed 10/26/2010  4:59 PM by Kenny Alves LPN     epidurals x 2  Umang/os             Sacral fracture ([facility] ICD-10-CM: S32.10XA  ICD-9-CM: 805.6  10/4/2019 - Present              Greater than  20  minutes were spent with the patient on counseling and coordination of care    Signed:   Sophia Swain MD  10/9/2019  10:44 AM

## 2019-10-09 NOTE — PROGRESS NOTES
HPI  Vanda Ferrarilyn 80 y.o. female  presents to the office with her daughter today for hospital follow up. Blood pressure 148/64, pulse 76, temperature 96.7 °F (35.9 °C), temperature source Oral, resp. rate 17, height 5' 2\" (1.575 m), SpO2 95 %. Body mass index is 23.78 kg/m². Chief Complaint   Patient presents with    Hypertension     follow up     Cholesterol Problem     follow up   [facility] Follow Up     [facility] - sacral fracture    Ankle swelling     left      Pt has existing dx of dementia, and her daughter spoke on pt's behalf during the encounter. Ms. Stephanie Smith is a 80y.o. year old female, she is seen today for Transition of Care services following a hospital discharge for sacral fracture on 10/5/19. Our office Nurse Navigator performed an outreach to Ms. Ramon Barboza on 10/7/19 (within 2 business days of discharge) to complete medication reconciliation and a telephonic assessment of her condition. Injury of coccyx: Pt presented to ER with low back pain due to a ground-level fall happened a week prior. Pt was diagnosed with distal sacral fracture and was admitted for pain control, therapy evals and ortho consult. According to pt's daughter, pt still complains of serious pain in her tailbone region after hospital discharge. Pt takes Tylenol 500 mg BID. The pain also causes difficulty ambulating and presents today in wheelchair. At hospital, pt's daughter requested DNR for pt; presents with paperwork today in office. Edema: Pt's daughter states that pt's bilateral leg swelling gets worse at night. Hypertension: BP at office today 148/64. Pt continues with Norvasc 5 mg/d. Hyperlipidemia: Lipid panel on 5/22/19 notable for total cholesterol 162, HDL 46, LDL 83, and triglycerides 165. Pt continues with Atorvastatin 40 mg/d. Current Outpatient Medications   Medication Sig Dispense Refill    furosemide (LASIX) 40 mg tablet Take 1 Tab by mouth daily.  90 Tab 1    montelukast (SINGULAIR) 10 mg tablet Take 10 mg by mouth daily.  aspirin delayed-release 81 mg tablet Take 81 mg by mouth every Wednesday.  amLODIPine (NORVASC) 5 mg tablet TAKE 1 TABLET DAILY FOR BLOOD PRESSURE 90 Tab 1    levothyroxine (SYNTHROID) 100 mcg tablet TAKE 1 TABLET DAILY BEFORE BREAKFAST 90 Tab 1    donepezil (ARICEPT) 10 mg tablet TAKE 1 TABLET NIGHTLY 90 Tab 1    atorvastatin (LIPITOR) 40 mg tablet TAKE 1 TABLET DAILY 90 Tab 1    budesonide (PULMICORT) 0.5 mg/2 mL nbsp 2 mL by Nebulization route two (2) times a day. Takes 30 mins After Brovana 360 Each 1    ergocalciferol (ERGOCALCIFEROL) 50,000 unit capsule Take 50,000 Units by mouth every Tuesday. 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  DENOSUMAB SC by SubCUTAneous route every 6 months.  albuterol-ipratropium (DUO-NEB) 2.5 mg-0.5 mg/3 ml nebu 3 mL by Nebulization route every four (4) hours as needed. 90 Nebule 1    albuterol (PROVENTIL HFA, VENTOLIN HFA, PROAIR HFA) 90 mcg/actuation inhaler Take 2 Puffs by inhalation every four (4) hours as needed for Wheezing. Please give with spacer.  1 Inhaler 0     Allergies   Allergen Reactions    Keflex [Cephalexin] Rash    Pcn [Penicillins] Rash    Sulfa (Sulfonamide Antibiotics) Rash    Tape [Adhesive] Unknown (comments)     Past Medical History:   Diagnosis Date    Anxiety state, unspecified     Chronic kidney disease     Chronic obstructive pulmonary disease (HCC)     CKD (chronic kidney disease) stage 3, GFR 30-59 ml/min ([address] 4/6/2016    Dementia ([facility] [address] 6/12    Landa/npysch    Depression 9/20/2017    Generalized osteoarthrosis, unspecified site     Lumbar compression fracture (HCC) 4/08    epidurals x 2  Umang/os    Osteopenia     Other and unspecified hyperlipidemia     Psoriasis     Unspecified essential hypertension     Unspecified hypothyroidism     thyroid irradiated i131     Past Surgical History:   Procedure Laterality Date    COLONOSCOPY  10/10    Cope/gi    TOTAL KNEE ARTHROPLASTY      left    TOTAL KNEE ARTHROPLASTY  1992    right     Family History   Problem Relation Age of Onset    Cancer Mother         stomach tumors    Cancer Brother         prostate     Social History     Tobacco Use    Smoking status: Former Smoker     Packs/day: 0.50     Years: 60.00     Pack years: 30.00     Types: Cigarettes     Last attempt to quit:      Years since quittin.7    Smokeless tobacco: Never Used   Substance Use Topics    Alcohol use: No     Alcohol/week: 0.0 standard drinks        Review of Systems   Constitutional: Negative for chills and fever. HENT: Negative for hearing loss and tinnitus. Eyes: Negative for blurred vision and double vision. Respiratory: Negative for cough and shortness of breath. Cardiovascular: Positive for leg swelling (bilateral). Negative for chest pain and palpitations. Gastrointestinal: Negative for nausea and vomiting. Genitourinary: Negative for dysuria and frequency. Musculoskeletal: Positive for back pain and falls. Skin: Negative for itching and rash. Neurological: Negative for dizziness, loss of consciousness and headaches. Psychiatric/Behavioral: Negative for depression. The patient is not nervous/anxious. Physical Exam   Constitutional: She is oriented to person, place, and time. She appears well-developed and well-nourished. HENT:   Head: Normocephalic and atraumatic. Right Ear: External ear normal.   Left Ear: External ear normal.   Nose: Nose normal.   Mouth/Throat: Oropharynx is clear and moist.   Eyes: Conjunctivae and EOM are normal.   Neck: Normal range of motion. Neck supple. Cardiovascular: Normal rate, regular rhythm, normal heart sounds and intact distal pulses.    Pulmonary/Chest: Effort normal and breath sounds normal.   Abdominal: Soft. Bowel sounds are normal.   Musculoskeletal: Normal range of motion. Back:         Right lower leg: She exhibits swelling and edema. Left lower leg: She exhibits swelling and edema. Neurological: She is alert and oriented to person, place, and time. Skin: Skin is warm and dry. Psychiatric: She has a normal mood and affect. Her behavior is normal. Judgment and thought content normal.   Nursing note and vitals reviewed. ASSESSMENT and PLAN  Diagnoses and all orders for this visit:    1. Essential hypertension with goal blood pressure less than 140/90  BP is not at goal today in office most likely due to pain. Advised pt to continue with Norvasc 5 mg/d.    2. Mixed hyperlipidemia  Presumed stable, will assess levels today. Pt continues with Atorvastatin 40 mg/d.     3. Injury of coccyx, initial encounter  Serious pain persists. Pt continues with Tylenol 500 mg q 6hrs. Pt's daughter should let me know if pain worsens. 4. Edema, unspecified type  Injury of coccyx most likely aggravated inflammation. Pt will take Lasix 40 mg/d. -     furosemide (LASIX) 40 mg tablet; Take 1 Tab by mouth daily. Follow-up and Dispositions    · Return in about 2 weeks (around 10/23/2019). Medication risks/benefits/costs/interactions/alternatives discussed with patient. Advised patient to call back or return to office if symptoms worsen/change/persist.  If patient cannot reach us or should anything more severe/urgent arise he/she should proceed directly to the nearest emergency department. Discussed expected course/resolution/complications of diagnosis in detail with patient. Patient given a written after visit summary which includes her diagnoses, current medications and vitals. Patient expressed understanding with the diagnosis and plan.     Written by prieto Anand, as dictated by Todd Teran M.D.    5:32 PM - 5:52 PM    Total time spent with the patient 20 minutes, greater than 50% of time spent counseling patient.

## 2019-10-09 NOTE — PROGRESS NOTES
Chief Complaint   Patient presents with    Hypertension     follow up     Cholesterol Problem     follow up   [facility] Follow Up     [facility] - sacral fracture    Ankle swelling     left     1. Have you been to the ER, urgent care clinic since your last visit? Hospitalized since your last visit? No    2. Have you seen or consulted any other health care providers outside of the [address] [place] [address] since your last visit? Include any pap smears or colon screening.  No

## 2019-10-22 ENCOUNTER — TELEPHONE (OUTPATIENT)
Dept: FAMILY MEDICINE CLINIC | Age: 84
End: 2019-10-22

## 2019-10-22 NOTE — TELEPHONE ENCOUNTER
----- Message from Radha Finnegan sent at 10/22/2019  4:50 PM EDT -----  Regarding: /Telephone   General Message/Vendor Calls    Caller's first and last name:Lana with [employer]       Reason for call:DNR form       Callback required yes/no and why:Yes      Best contact number(s):[phone] or Fax:[phone]      Details to clarify the request:Aundrea advising the Dr the Dnr form was file out wrong and she fax another form on 10/22/19 for the Dr to complete.       Anastasia Davidson Cull

## 2019-10-24 NOTE — TELEPHONE ENCOUNTER
Waiting for form    [phone] spoke to Lana advised her that we have not received the form yet and she will fax me another DNR advised her Dr Shila Koenig is out till Monday and she understand

## 2019-10-28 ENCOUNTER — OFFICE VISIT (OUTPATIENT)
Dept: FAMILY MEDICINE CLINIC | Age: 84
End: 2019-10-28

## 2019-10-28 VITALS
TEMPERATURE: 95.1 F | HEIGHT: 62 IN | SYSTOLIC BLOOD PRESSURE: 135 MMHG | BODY MASS INDEX: 25.64 KG/M2 | HEART RATE: 71 BPM | DIASTOLIC BLOOD PRESSURE: 75 MMHG | OXYGEN SATURATION: 96 % | RESPIRATION RATE: 18 BRPM | WEIGHT: 139.3 LBS

## 2019-10-28 DIAGNOSIS — L03.115 CELLULITIS OF RIGHT LOWER EXTREMITY: Primary | ICD-10-CM

## 2019-10-28 RX ORDER — DOXYCYCLINE 100 MG/1
CAPSULE ORAL
COMMUNITY
End: 2020-01-22 | Stop reason: ALTCHOICE

## 2019-10-28 NOTE — PROGRESS NOTES
HISTORY OF PRESENT ILLNESS  Grace Obregon is a 80 y.o. female. HPI  Follow up right leg cellulitis. Accompanied by daughter, patient with dementia. Daughter reports patient fell about 3 weeks ago and sustained a fractured coccyx. Shortly after she developed redness, warmth and pain of RLE. Was treated at [facility] [place] with rx for doxycycline. Daughter called [facility] for evaluation due to persistent sx. They advised to continue antibiotic for an additional week. Leg remains swollen and red. TTP. Daughter has been giving her 1 tylenol daily for the pain. No fever/chills. Patient Active Problem List   Diagnosis Code    Hypothyroidism E03.9    Essential hypertension I10    Anxiety state, unspecified F41.1    Generalized osteoarthrosis, unspecified site M15.9    Hyperlipidemia E78.5    Psoriasis L40.9    Adverse reaction to NSAIDs; acute renal failure T39.395A    Dementia ([address] F03.90    Impaired mobility and ADLs Z74.09    Incontinence in female R32    CKD (chronic kidney disease) stage 3, GFR 30-59 ml/min ([facility]) N18.3    Osteoporosis M81.0    Primary osteoarthritis of both hands M19.041, M19.042    Advanced care planning/counseling discussion Z70.80    Late onset Alzheimer'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 behavioral disturbance (HCC) G30.1, F02.80    Vitamin D deficiency E55.9    Depression S66.6    Umbilical hernia without obstruction and without gangrene K42.9    Mild depression (HCC) F32.0    Ataxia R27.0    Altered mental status R41.82    Sacral fracture ([facility]) S32.10XA     Current Outpatient Medications   Medication Sig    montelukast (SINGULAIR) 10 mg tablet Take 10 mg by mouth daily.  aspirin delayed-release 81 mg tablet Take 81 mg by mouth every Wednesday.     amLODIPine (NORVASC) 5 mg tablet TAKE 1 TABLET DAILY FOR BLOOD PRESSURE    levothyroxine (SYNTHROID) 100 mcg tablet TAKE 1 TABLET DAILY BEFORE BREAKFAST    donepezil (ARICEPT) 10 mg tablet TAKE 1 TABLET NIGHTLY    atorvastatin (LIPITOR) 40 mg tablet TAKE 1 TABLET DAILY    ergocalciferol (ERGOCALCIFEROL) 50,000 unit capsule Take 50,000 Units by mouth every Tuesday.  memantine (NAMENDA) 10 mg tablet TAKE 1 TABLET TWICE A DAY FOR MODERATE TO SEVERE ALZHEIMERS TYPE DEMENTIA    arformoterol (BROVANA) 15 mcg/2 mL nebu neb solution 15 mcg by Nebulization route two (2) times a day.  DENOSUMAB SC by SubCUTAneous route every 6 months.  albuterol-ipratropium (DUO-NEB) 2.5 mg-0.5 mg/3 ml nebu 3 mL by Nebulization route every four (4) hours as needed.  albuterol (PROVENTIL HFA, VENTOLIN HFA, PROAIR HFA) 90 mcg/actuation inhaler Take 2 Puffs by inhalation every four (4) hours as needed for Wheezing. Please give with spacer.  doxycycline (VIBRAMYCIN) 100 mg capsule doxycycline hyclate 100 mg capsule   Take 1 capsule twice a day by oral route for 10 days.  furosemide (LASIX) 40 mg tablet Take 1 Tab by mouth daily.  budesonide (PULMICORT) 0.5 mg/2 mL nbsp 2 mL by Nebulization route two (2) times a day. Takes 30 mins After Brovana     No current facility-administered medications for this visit. Social History     Tobacco Use    Smoking status: Former Smoker     Packs/day: 0.50     Years: 60.00     Pack years: 30.00     Types: Cigarettes     Last attempt to quit:      Years since quittin.8    Smokeless tobacco: Never Used   Substance Use Topics    Alcohol use: No     Alcohol/week: 0.0 standard drinks    Drug use: No     Blood pressure 135/75, pulse 71, temperature 95.1 °F (35.1 °C), temperature source Axillary, resp. rate 18, height 5' 2\" (1.575 m), weight 139 lb 4.8 oz (63.2 kg), SpO2 96 %. Review of Systems   Constitutional: Negative for chills, fever and malaise/fatigue. Respiratory: Negative. Cardiovascular: Negative. Musculoskeletal:        Pain RLE. Skin:        Warmth and swelling of right leg. All other systems reviewed and are negative. Physical Exam   Constitutional: No distress. Neck: Neck supple. Cardiovascular: Normal rate and regular rhythm. Murmur heard. Pulmonary/Chest: Effort normal and breath sounds normal.   Musculoskeletal: She exhibits edema (3+ RLE edema). Lymphadenopathy:     She has no cervical adenopathy. Skin:   Pre tibial region with erythema, warmth. Very TTP. Small blister noted on right heel. Skin intact. ASSESSMENT and PLAN  Diagnoses and all orders for this visit:    1. Cellulitis of right lower extremity    Suspect venous insufficiency complicating healing. Continue doxycycline. Stressed importance of keeping leg elevated AMAP. Continue to monitor for improvement. Follow up prn if no improvement over next 72 hours or prn sx worsen.

## 2019-10-28 NOTE — TELEPHONE ENCOUNTER
Received DNR form from [facility] and faxed back to [phone] attn: Magen Blackwell

## 2019-10-28 NOTE — PROGRESS NOTES
Chief Complaint   Patient presents with    Skin Infection     right lower leg red and inflamed, swollen. seen at [facility] 10/19/19. On antibiotic       Reviewed Record in preparation for visit and have obtained necessary documentation. Identified pt with two pt identifiers (Name @ )    Health Maintenance Due   Topic    EYE EXAM RETINAL OR DILATED     Shingrix Vaccine Age 50> (1 of 2)    GLAUCOMA SCREENING Q2Y     HEMOGLOBIN A1C Q6M          1. Have you been to the ER, urgent care clinic since your last visit? Hospitalized since your last visit? No    2. Have you seen or consulted any other health care providers outside of the [address] [place] [address] since your last visit? Include any pap smears or colon screening.  NO

## 2019-11-01 ENCOUNTER — PATIENT OUTREACH (OUTPATIENT)
Dept: FAMILY MEDICINE CLINIC | Age: 84
End: 2019-11-01

## 2019-11-01 NOTE — PROGRESS NOTES
Call  Made to daughter,Rupali. Spoke briefly to her, she was at work. Myrna Diaz mother is doing better. Asked about her right leg-says it is a little better. Trying to elevate as much as possible per rec of BRITTANY Jeffers. Message to [address] via [employer] to f/u with pcp if not improving as she thinks it should be. Call back to continue discussion. Goals      Prevent complications post hospitalization. 10/7/19 [facility] 10/4-10/5 Sacral fracture  · Reviewed discharge instructions with daughter  · Reviewed meds with daughter-no changes to meds  · Reviewed red flags: increased pain not relieved by ESTylenol, increased weakness,chest pain,sob,nausea,vomiting,diarrhea. · DUC with pcp 10/9- 4pm  · Reminded to sched f/u with . · Given info on [facility] as resource. · Given [facility] direct contact info for any questions/concerns. · Advised [facility] will call back in 5-7 days for update or sooner [email or web address]  11/1/19  Spoke to daughter. Says mother is better but right leg slowly improving. Not able to talk for long with daughter,she was at work, sent [employer] message advising f/u office visit if not improving as she feels it should.  Gave her my contact info, so I can schedule appt for her.mbt

## 2019-11-03 DIAGNOSIS — F02.80 LATE ONSET ALZHEIMER'S DISEASE WITHOUT BEHAVIORAL DISTURBANCE (HCC): ICD-10-CM

## 2019-11-03 DIAGNOSIS — G30.1 LATE ONSET ALZHEIMER'S DISEASE WITHOUT BEHAVIORAL DISTURBANCE (HCC): ICD-10-CM

## 2019-11-04 RX ORDER — MEMANTINE HYDROCHLORIDE 10 MG/1
TABLET ORAL
Qty: 180 TAB | Refills: 4 | Status: SHIPPED | OUTPATIENT
Start: 2019-11-04

## 2019-11-08 LAB — HBA1C MFR BLD HPLC: 6.3 %

## 2019-11-30 DIAGNOSIS — E55.9 VITAMIN D DEFICIENCY: ICD-10-CM

## 2019-12-02 RX ORDER — ERGOCALCIFEROL 1.25 MG/1
CAPSULE ORAL
Qty: 13 CAP | Refills: 4 | Status: SHIPPED | OUTPATIENT
Start: 2019-12-02

## 2019-12-12 DIAGNOSIS — E78.2 MIXED HYPERLIPIDEMIA: ICD-10-CM

## 2019-12-12 RX ORDER — ATORVASTATIN CALCIUM 40 MG/1
TABLET, FILM COATED ORAL
Qty: 90 TAB | Refills: 1 | Status: SHIPPED | OUTPATIENT
Start: 2019-12-12 | End: 2020-04-04 | Stop reason: SDUPTHER

## 2019-12-12 RX ORDER — DONEPEZIL HYDROCHLORIDE 10 MG/1
TABLET, FILM COATED ORAL
Qty: 90 TAB | Refills: 1 | Status: SHIPPED | OUTPATIENT
Start: 2019-12-12 | End: 2020-04-04 | Stop reason: SDUPTHER

## 2019-12-26 ENCOUNTER — APPOINTMENT (OUTPATIENT)
Dept: INFUSION THERAPY | Age: 84
End: 2019-12-26

## 2020-01-07 ENCOUNTER — HOSPITAL ENCOUNTER (OUTPATIENT)
Dept: INFUSION THERAPY | Age: 85
End: 2020-01-07

## 2020-01-07 ENCOUNTER — TELEPHONE (OUTPATIENT)
Dept: FAMILY MEDICINE CLINIC | Age: 85
End: 2020-01-07

## 2020-01-07 NOTE — TELEPHONE ENCOUNTER
----- Message from Venita Scales sent at 1/7/2020  9:23 AM EST -----  Regarding: Dr. Debra Mortensen Telephone  Contact: [phone]  Caller's first and last name: Shena Araujo ([facility] [place],Second [address] [place])  Reason for call: Pt's daughter stated that the pt needs to be scheduled to be seen on 1/22/20 so the pt can go back to . Please verify with daughter on this matter ASAP. Callback required yes/no and why: YES   Best contact number(s): [phone]  Details to clarify the request: N/A     Outbound call to pt. No answer LVM to return call back to the office to schedule an appt.

## 2020-01-08 NOTE — TELEPHONE ENCOUNTER
Dr. Erica Landry  Please advise on what date you can see this patient  You are booked up to February already

## 2020-01-08 NOTE — TELEPHONE ENCOUNTER
Rupali (on [facility]) is calling requesting for the patient to be seen she will be discharged on 1/20/20.  Tammy Burris would like for the patient to be seen on 1/22/20 with Dr. Darien Yang so the patient can return back to day program.         Best callback:[phone]  LOV:  Monday, October 28, 2019

## 2020-01-09 NOTE — TELEPHONE ENCOUNTER
Patient is already scheduled for 1/22/2020 at 3pm    Left voice message with with Rupali to call back and verify that she\s aware of the appointment or to reschedule

## 2020-01-22 ENCOUNTER — HOSPITAL ENCOUNTER (OUTPATIENT)
Dept: LAB | Age: 85
Discharge: HOME | End: 2020-01-22
Payer: COMMERCIAL

## 2020-01-22 ENCOUNTER — OFFICE VISIT (OUTPATIENT)
Dept: FAMILY MEDICINE CLINIC | Age: 85
End: 2020-01-22

## 2020-01-22 VITALS
WEIGHT: 130 LBS | OXYGEN SATURATION: 96 % | DIASTOLIC BLOOD PRESSURE: 62 MMHG | BODY MASS INDEX: 23.92 KG/M2 | HEIGHT: 62 IN | RESPIRATION RATE: 16 BRPM | TEMPERATURE: 96.5 F | HEART RATE: 58 BPM | SYSTOLIC BLOOD PRESSURE: 110 MMHG

## 2020-01-22 DIAGNOSIS — E78.2 MIXED HYPERLIPIDEMIA: ICD-10-CM

## 2020-01-22 DIAGNOSIS — F32.A MILD DEPRESSION: ICD-10-CM

## 2020-01-22 DIAGNOSIS — I10 ESSENTIAL HYPERTENSION: ICD-10-CM

## 2020-01-22 DIAGNOSIS — N18.30 CKD (CHRONIC KIDNEY DISEASE) STAGE 3, GFR 30-59 ML/MIN (HCC): ICD-10-CM

## 2020-01-22 DIAGNOSIS — J44.1 COPD WITH ACUTE EXACERBATION (HCC): ICD-10-CM

## 2020-01-22 DIAGNOSIS — F02.80 LATE ONSET ALZHEIMER'S DISEASE WITHOUT BEHAVIORAL DISTURBANCE (HCC): Primary | ICD-10-CM

## 2020-01-22 DIAGNOSIS — Z00.00 ENCOUNTER FOR MEDICARE ANNUAL WELLNESS EXAM: ICD-10-CM

## 2020-01-22 DIAGNOSIS — G30.1 LATE ONSET ALZHEIMER'S DISEASE WITHOUT BEHAVIORAL DISTURBANCE (HCC): Primary | ICD-10-CM

## 2020-01-22 DIAGNOSIS — E03.9 ACQUIRED HYPOTHYROIDISM: ICD-10-CM

## 2020-01-22 DIAGNOSIS — E11.9 CONTROLLED TYPE 2 DIABETES MELLITUS WITHOUT COMPLICATION, WITHOUT LONG-TERM CURRENT USE OF INSULIN (HCC): ICD-10-CM

## 2020-01-22 PROCEDURE — 80061 LIPID PANEL: CPT

## 2020-01-22 PROCEDURE — 84439 ASSAY OF FREE THYROXINE: CPT

## 2020-01-22 PROCEDURE — 84443 ASSAY THYROID STIM HORMONE: CPT

## 2020-01-22 PROCEDURE — 83036 HEMOGLOBIN GLYCOSYLATED A1C: CPT

## 2020-01-22 PROCEDURE — 80053 COMPREHEN METABOLIC PANEL: CPT

## 2020-01-22 NOTE — ASSESSMENT & PLAN NOTE
Stable, based on history, physical exam and review of pertinent labs, studies and medications; meds reconciled; continue current treatment plan. 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         Lab Results   Component Value Date/Time    Sodium 145 10/03/2019 09:46 PM    Creatinine 1.03 10/03/2019 09:46 PM    Creatinine, External 1.16 03/05/2019 02:54 PM    TSH 0.71 05/20/2019 07:06 PM    WBC 6.3 10/03/2019 09:46 PM    ALT (SGPT) 22 10/03/2019 09:46 PM    AST (SGOT) 18 10/03/2019 09:46 PM
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
## 2020-01-22 NOTE — PROGRESS NOTES
This is the Subsequent Medicare Annual Wellness Exam, performed 12 months or more after the Initial AWV or the last Subsequent AWV    I have reviewed the patient's medical history in detail and updated the computerized patient record.      History     Patient Active Problem List   Diagnosis Code    Hypothyroidism E03.9    Essential hypertension I10    Anxiety state, unspecified F41.1    Generalized osteoarthrosis, unspecified site M15.9    Hyperlipidemia E78.5    Psoriasis L40.9    Adverse reaction to NSAIDs; acute renal failure T39.395A    Dementia ([address] F03.90    Impaired mobility and ADLs Z74.09    Incontinence in female R32    CKD (chronic kidney disease) stage 3, GFR 30-59 ml/min ([facility]) N18.3    Osteoporosis M81.0    Primary osteoarthritis of both hands M19.041, M19.042    Advanced care planning/counseling discussion Z70.80    Late onset Alzheimer'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 behavioral disturbance (HCC) G30.1, F02.80    Vitamin D deficiency E55.9    Depression I12.8    Umbilical hernia without obstruction and without gangrene K42.9    Mild depression (HCC) F32.0    Ataxia R27.0    Altered mental status R41.82    Sacral fracture (HCC) S32.10XA     Past Medical History:   Diagnosis Date    Anxiety state, unspecified     Chronic kidney disease     Chronic obstructive pulmonary disease (HCC)     CKD (chronic kidney disease) stage 3, GFR 30-59 ml/min ([facility] 4/6/2016    Dementia ([facility] 6/12    Landa/npysch    Depression 9/20/2017    Generalized osteoarthrosis, unspecified site     Lumbar compression fracture (HCC) 4/08    epidurals x 2  Umang/os    Osteopenia     Other and unspecified hyperlipidemia     Psoriasis     Unspecified essential hypertension     Unspecified hypothyroidism     thyroid irradiated i131      Past Surgical History:   Procedure Laterality Date    COLONOSCOPY  10/10    [place]/gi    TOTAL KNEE ARTHROPLASTY  1992    left    TOTAL KNEE ARTHROPLASTY  1992    right     Current Outpatient Medications   Medication Sig Dispense Refill    donepezil (ARICEPT) 10 mg tablet TAKE 1 TABLET NIGHTLY 90 Tab 1    atorvastatin (LIPITOR) 40 mg tablet TAKE 1 TABLET DAILY (Patient taking differently: Take 40 mg by mouth daily.) 90 Tab 1    VITAMIN D2 50,000 unit capsule TAKE 1 CAPSULE EVERY 7 DAYS 13 Cap 4    memantine (NAMENDA) 10 mg tablet TAKE 1 TABLET TWICE A DAY FOR MODERATE TO SEVERE ALZHEIMERS TYPE DEMENTIA 180 Tab 4    montelukast (SINGULAIR) 10 mg tablet Take 10 mg by mouth daily.  aspirin delayed-release 81 mg tablet Take 81 mg by mouth every Wednesday.  amLODIPine (NORVASC) 5 mg tablet TAKE 1 TABLET DAILY FOR BLOOD PRESSURE 90 Tab 1    levothyroxine (SYNTHROID) 100 mcg tablet TAKE 1 TABLET DAILY BEFORE BREAKFAST 90 Tab 1    budesonide (PULMICORT) 0.5 mg/2 mL nbsp 2 mL by Nebulization route two (2) times a day. Takes 30 mins After Brovana 360 Each 1    arformoterol (BROVANA) 15 mcg/2 mL nebu neb solution 15 mcg by Nebulization route two (2) times a day.  DENOSUMAB SC by SubCUTAneous route every 6 months.  albuterol-ipratropium (DUO-NEB) 2.5 mg-0.5 mg/3 ml nebu 3 mL by Nebulization route every four (4) hours as needed.  90 Nebule 1     Allergies   Allergen Reactions    Penicillins Rash    Sulfa (Sulfonamide Antibiotics) Rash    Keflex [Cephalexin] Rash    Tape [Adhesive] Unknown (comments)       Family History   Problem Relation Age of Onset    Cancer Mother         stomach tumors    Cancer Brother         prostate     Social History     Tobacco Use    Smoking status: Former Smoker     Packs/day: 0.50     Years: 60.00     Pack years: 30.00     Types: Cigarettes     Last attempt to quit:      Years since quittin.0    Smokeless tobacco: Never Used   Substance Use Topics    Alcohol use: No     Alcohol/week: 0.0 standard drinks       Depression Risk Factor Screening:     3 most recent PHQ Screens 2020   PHQ Not Done -   Little interest or pleasure in doing things Not at all   Feeling down, depressed, irritable, or hopeless Not at all   Total Score PHQ 2 0   Trouble falling or staying asleep, or sleeping too much -   Feeling tired or having little energy -   Poor appetite, weight loss, or overeating -   Feeling bad about yourself - or that you are a failure or have let yourself or your family down -   Trouble concentrating on things such as school, work, reading, or watching TV -   Moving or speaking so slowly that other people could have noticed; or the opposite being so fidgety that others notice -   Thoughts of being better off dead, or hurting yourself in some way -   PHQ 9 Score -   How difficult have these problems made it for you to do your work, take care of your home and get along with others -       Alcohol Risk Factor Screening:   Do you average 1 drink per night or more than 7 drinks a week:  No    On any one occasion in the past three months have you have had more than 3 drinks containing alcohol:  No      Functional Ability and Level of Safety:   Hearing: Hearing is good. Activities of Daily Living: The home contains: handrails  Patient needs help with:  phone, transportation, shopping, preparing meals, laundry, housework, managing medications, managing money, dressing, bathing, hygiene, bathroom needs and walking    Ambulation: with no difficulty    Fall Risk:  Fall Risk Assessment, last 12 mths 1/22/2020   Able to walk? Yes   Fall in past 12 months? Yes   Fall with injury?  Yes   Number of falls in past 12 months 1   Fall Risk Score 2       Abuse Screen:  Patient is not abused    Cognitive Screening   Has your family/caregiver stated any concerns about your memory: yes - on medications      Patient Care Team   Patient Care Team:  Ana Valdes MD as PCP - General (Family Practice)  Ana Valdes MD as PCP - [facility] Empaneled Provider  Ana Valdes MD (Family Practice)    Assessment/Plan   Education and counseling provided:  Are appropriate based on today's review and evaluation        Health Maintenance Due   Topic Date Due    EYE EXAM RETINAL OR DILATED  08/09/1939    Shingrix Vaccine Age 50> (1 of 2) 08/09/1979    GLAUCOMA SCREENING Q2Y  03/21/2019    FOOT EXAM Q1  01/09/2020    MICROALBUMIN Q1  01/09/2020    MEDICARE YEARLY EXAM  01/10/2020

## 2020-01-22 NOTE — PATIENT INSTRUCTIONS
Counting Carbohydrates: Care Instructions Your Care Instructions You don't have to eat special foods when you have diabetes. You just have to be careful to eat healthy foods. Carbohydrates (carbs) raise blood sugar higher and quicker than any other nutrient. Carbs are found in desserts, breads and cereals, and fruit. They're also in starchy vegetables. These include potatoes, corn, and grains such as rice and pasta. Carbs are also in milk and yogurt. The more carbs you eat at one time, the higher your blood sugar will rise. Spreading carbs all through the day helps keep your blood sugar levels within your target range. Counting carbs is one of the best ways to keep your blood sugar under control. If you use insulin, counting carbs helps you match the right amount of insulin to the number of grams of carbs in a meal. Then you can change your diet and insulin dose as needed. Testing your blood sugar several times a day can help you learn how carbs affect your blood sugar. A registered dietitian or certified diabetes educator can help you plan meals and snacks. Follow-up care is a key part of your treatment and safety. Be sure to make and go to all appointments, and call your doctor if you are having problems. It's also a good idea to know your test results and keep a list of the medicines you take. How can you care for yourself at home? Know your daily amount of carbohydrates Your daily amount depends on several things, such as your weight, how active you are, which diabetes medicines you take, and what your goals are for your blood sugar levels. A registered dietitian or certified diabetes educator can help you plan how many carbs to include in each meal and snack. For most adults, a guideline for the daily amount of carbs is: · 45 to 60 grams at each meal. That's about the same as 3 to 4 carbohydrate servings. · 15 to 20 grams at each snack. That's about the same as 1 carbohydrate serving. Count carbs Counting carbs lets you know how much rapid-acting insulin to take before you eat. If you use an insulin pump, you get a constant rate of insulin during the day. So the pump must be programmed at meals. This gives you extra insulin to cover the rise in blood sugar after meals. If you take insulin: 
· Learn your own insulin-to-carb ratio. You and your diabetes health professional will figure out the ratio. You can do this by testing your blood sugar after meals. For example, you may need a certain amount of insulin for every 15 grams of carbs. · Add up the carb grams in a meal. Then you can figure out how many units of insulin to take based on your insulin-to-carb ratio. · Exercise lowers blood sugar. You can use less insulin than you would if you were not doing exercise. Keep in mind that timing matters. If you exercise within 1 hour after a meal, your body may need less insulin for that meal than it would if you exercised 3 hours after the meal. Test your blood sugar to find out how exercise affects your need for insulin. If you do or don't take insulin: 
· Look at labels on packaged foods. This can tell you how many carbs are in a serving. You can also use guides from the American Diabetes Association. · Be aware of portions, or serving sizes. If a package has two servings and you eat the whole package, you need to double the number of grams of carbohydrate listed for one serving. · Protein, fat, and fiber do not raise blood sugar as much as carbs do. If you eat a lot of these nutrients in a meal, your blood sugar will rise more slowly than it would otherwise. Eat from all food groups · Eat at least three meals a day. · Plan meals to include food from all the food groups. The food groups include grains, fruits, dairy, proteins, and vegetables. · Talk to your dietitian or diabetes educator about ways to add limited amounts of sweets into your meal plan. · If you drink alcohol, talk to your doctor. It may not be recommended when you are taking certain diabetes medicines. Where can you learn more? Go to http://asya-chad.info/. Enter P802 in the search box to learn more about \"Counting Carbohydrates: Care Instructions. \" Current as of: April 16, 2019 Content Version: 12.2 © 0464-1422 [employer]. Care instructions adapted under license by [employer] (which disclaims liability or warranty for this information). If you have questions about a medical condition or this instruction, always ask your healthcare professional. [place] [address] any warranty or liability for your use of this information.

## 2020-01-22 NOTE — ASSESSMENT & PLAN NOTE
Stable, based on history, physical exam and review of pertinent labs, studies and medications; meds reconciled; continue current treatment plan. Key CKD Meds             VITAMIN D2 50,000 unit capsule (Taking) TAKE 1 CAPSULE EVERY 7 DAYS        Lab Results   Component Value Date/Time    GFR est non-AA 50 10/03/2019 09:46 PM    GFR est AA >60 10/03/2019 09:46 PM    Creatinine 1.03 10/03/2019 09:46 PM    Creatinine, External 1.16 03/05/2019 02:54 PM    BUN 24 10/03/2019 09:46 PM    Sodium 145 10/03/2019 09:46 PM    Potassium 4.1 10/03/2019 09:46 PM    Chloride 110 10/03/2019 09:46 PM    CO2 26 10/03/2019 09:46 PM    Calcium 10.0 10/03/2019 09:46 PM    Magnesium 2.4 06/25/2019 11:15 AM    Phosphorus 3.5 06/25/2019 11:15 AM     CrCl cannot be calculated (Unknown ideal weight. ).

## 2020-01-22 NOTE — PROGRESS NOTES
Chief Complaint   Patient presents with    Documentation     forms need to be completed for patient to be able to go to day program     1. Have you been to the ER, urgent care clinic since your last visit? Hospitalized since your last visit? No    2. Have you seen or consulted any other health care providers outside of the [address] [place] [address] since your last visit? Include any pap smears or colon screening.  No

## 2020-01-22 NOTE — ASSESSMENT & PLAN NOTE
Worsening, based on history, physical exam and review of pertinent labs, studies and medications; meds reconciled; continue current treatment plan.

## 2020-01-23 LAB
ALBUMIN SERPL-MCNC: 4.2 G/DL (ref 3.5–4.6)
ALBUMIN/GLOB SERPL: 1.4 {RATIO} (ref 1.2–2.2)
ALP SERPL-CCNC: 249 IU/L (ref 39–117)
ALT SERPL-CCNC: 26 IU/L (ref 0–32)
AST SERPL-CCNC: 24 IU/L (ref 0–40)
BILIRUB SERPL-MCNC: 0.3 MG/DL (ref 0–1.2)
BUN SERPL-MCNC: 28 MG/DL (ref 10–36)
BUN/CREAT SERPL: 24 (ref 12–28)
CALCIUM SERPL-MCNC: 9.8 MG/DL (ref 8.7–10.3)
CHLORIDE SERPL-SCNC: 105 MMOL/L (ref 96–106)
CHOLEST SERPL-MCNC: 234 MG/DL (ref 100–199)
CO2 SERPL-SCNC: 24 MMOL/L (ref 20–29)
CREAT SERPL-MCNC: 1.15 MG/DL (ref 0.57–1)
EST. AVERAGE GLUCOSE BLD GHB EST-MCNC: 123 MG/DL
GLOBULIN SER CALC-MCNC: 3.1 G/DL (ref 1.5–4.5)
GLUCOSE SERPL-MCNC: 101 MG/DL (ref 65–99)
HBA1C MFR BLD: 5.9 % (ref 4.8–5.6)
HDLC SERPL-MCNC: 73 MG/DL
INTERPRETATION, 910389: NORMAL
INTERPRETATION: NORMAL
LDLC SERPL CALC-MCNC: 133 MG/DL (ref 0–99)
PDF IMAGE, 910387: NORMAL
POTASSIUM SERPL-SCNC: 4.4 MMOL/L (ref 3.5–5.2)
PROT SERPL-MCNC: 7.3 G/DL (ref 6–8.5)
SODIUM SERPL-SCNC: 144 MMOL/L (ref 134–144)
T4 FREE SERPL-MCNC: 1.24 NG/DL (ref 0.82–1.77)
TRIGL SERPL-MCNC: 142 MG/DL (ref 0–149)
TSH SERPL DL<=0.005 MIU/L-ACNC: 33.06 UIU/ML (ref 0.45–4.5)
VLDLC SERPL CALC-MCNC: 28 MG/DL (ref 5–40)

## 2020-01-28 ENCOUNTER — TELEPHONE (OUTPATIENT)
Dept: FAMILY MEDICINE CLINIC | Age: 85
End: 2020-01-28

## 2020-01-28 NOTE — TELEPHONE ENCOUNTER
----- Message from Tico Remy sent at 1/28/2020 12:28 PM EST -----  Regarding: Dr. Rufina Quintanilla Telephone  Contact: [phone]  Caller's first and last name: Liyah Cheney ([address]  Reason for call: Pt is supposed to be receiving some type of paperwork for the pt to get Outpatient Therapy at [facility]. The paperwork was sent by Mckenzie Means. Please verify with the Daughter on this matter.   Callback required yes/no and why: yes   Best contact number(s): [phone]  Details to clarify the request: n/a

## 2020-01-28 NOTE — TELEPHONE ENCOUNTER
----- Message from Cholo Hale sent at 1/28/2020 12:28 PM EST -----  Regarding: Dr. Tin Liriano Telephone  Contact: [phone]  Caller's first and last name: Romero Davison (Kayleen Cornelio Umang Leggett,Second Floor East Wing  Reason for call: Pt is supposed to be receiving some type of paperwork for the pt to get Outpatient Therapy at [facility]. The paperwork was sent by [facility]. Please verify with the Daughter on this matter.   Callback required yes/no and why: yes   Best contact number(s): [phone]  Details to clarify the request: n/a

## 2020-01-28 NOTE — TELEPHONE ENCOUNTER
Called and spoke to daughter. Received form late yesterday and will get Dr. Brenda Young to sign in am and will fax . Copy of  med list faxed as requested, confirmation page received.

## 2020-01-28 NOTE — TELEPHONE ENCOUNTER
Horní Dvorište from [facility] is calling requesting a signed and dated updated medication list to be faxed over, due to the patient returning back to [facility] soon.          Fax#[phone]       [facility] [id number]:[phone]      LOV: Wednesday, January 22, 2020

## 2020-01-29 ENCOUNTER — TELEPHONE (OUTPATIENT)
Dept: FAMILY MEDICINE CLINIC | Age: 85
End: 2020-01-29

## 2020-01-29 NOTE — TELEPHONE ENCOUNTER
----- Message from Carola Meadows sent at 1/29/2020 12:41 PM EST -----  Regarding: : Dr. Camacho Boyce: [phone]  Caller's first and last name: [facility]  --SIMON Rogers  Reason for call: Doctor inquiry   Callback required yes/no and why: Yes  Best contact number(s): [phone]  Details to clarify the request: RN needs medication list signed and dated by Dr. Thompsoneal Officer. Please send this over ASA.

## 2020-01-30 NOTE — TELEPHONE ENCOUNTER
----- Message from Isaías Ramos sent at 1/30/2020  7:25 AM EST -----  Regarding: Dr Manjarrez/telephone  Pt's daughter Tamia Medina is calling to check on the status of mom medication list that was suppose to be sent to [facility], for pt to return back to center, has to have doctor signatures , request this on 1-, pt can't go back until this is done, pt has missed 4 days, now, need this done today, please fax to [phone], if you have any questions please daughter at [phone]

## 2020-01-30 NOTE — TELEPHONE ENCOUNTER
Med list and [facility] form re' update on allergies signed by Shai Almodovar and faxed to [facility]. Cofirmation received. dtr called aware was done.

## 2020-01-30 NOTE — TELEPHONE ENCOUNTER
Called and spoke to dtr. And informed Nupur renee form was faxed testerday re' PT . She is stating that they need something with current meds for [facility]. I called facility and checked what was needed. They need current mad list with provider signature sent .  Okayed if signed by N.P.

## 2020-02-05 ENCOUNTER — TELEPHONE (OUTPATIENT)
Dept: FAMILY MEDICINE CLINIC | Age: 85
End: 2020-02-05

## 2020-02-05 DIAGNOSIS — E03.8 OTHER SPECIFIED HYPOTHYROIDISM: Primary | ICD-10-CM

## 2020-02-05 RX ORDER — LEVOTHYROXINE SODIUM 125 UG/1
125 TABLET ORAL
Qty: 30 TAB | Refills: 2 | Status: SHIPPED | OUTPATIENT
Start: 2020-02-05 | End: 2020-04-04 | Stop reason: SDUPTHER

## 2020-02-05 NOTE — PROGRESS NOTES
called and spoke  To patient dtrJonathan Zapien. She states that she has been getting her Thyroid 100 mcg per day.

## 2020-02-05 NOTE — TELEPHONE ENCOUNTER
Daughter called and wanting letter for her work saying she is the primary care giver for her mother and cannot work night. . Needs 24 hour care due to her medical conditions . I inform her that i would check with Dr. Darien Yang and get back with her.

## 2020-02-05 NOTE — TELEPHONE ENCOUNTER
Called and left message for Rupali of increase in synthroid. Aware rx sent to [facility] . repeat tsh in 1 month.

## 2020-02-05 NOTE — LETTER
2/5/2020 3:19 PM 
 
Ms. Manford Favre [place] [phone] To whoever it may concern: Shayan Bain ( daughter of Silvia Sparks ), is the primary care giver for her. Do to this , Ms Jonathan Ceja cannot work nights. Her mother requires 24 hour care. Please call if any questions. Thank you Sincerely, Phill Manjarrez md

## 2020-02-05 NOTE — TELEPHONE ENCOUNTER
Daughter Isidro Robert ( on hippa ) called and thyroid level reviewed with her. Informed her that thyroid level is poorly controlled. She does verify that patient is taking her Synthroid 100 mcg q day. Does want you to know that she has been very sleepy last few weeks and hard to keep her awake. Any changes  to medication?

## 2020-02-14 ENCOUNTER — PATIENT OUTREACH (OUTPATIENT)
Dept: FAMILY MEDICINE CLINIC | Age: 85
End: 2020-02-14

## 2020-02-14 NOTE — PROGRESS NOTES
Ambulatory Care Management Note    Date/Time:  2/14/2020 12:58 PM    This patient was received as a referral from Diogo Corea outreached to patient's caregiver today to offer care management services. Introduction to self and role of care manager provided. Spoke to Carlosvicenta Covarrubias (daughter) who  accepted care management services at this time and states mother is at [facility] adult day services. Follow up call scheduled at this time. Daughter has Ambulatory Care Manager's contact number for for any questions or concerns.

## 2020-02-19 ENCOUNTER — TELEPHONE (OUTPATIENT)
Dept: FAMILY MEDICINE CLINIC | Age: 85
End: 2020-02-19

## 2020-02-19 NOTE — TELEPHONE ENCOUNTER
[phone] attempted to call Adilene Martinez no answer left message to call me back     NOTE: Nathaly Weaver called but not on HIPAA

## 2020-02-19 NOTE — TELEPHONE ENCOUNTER
----- Message from Rhesa Kayser sent at 2/19/2020  1:09 PM EST -----  Regarding: Dr. Rigoberto Herrera telephone  Contact: [phone]  Caller's first and last name: April B [facility] care   Reason for call: Pt is currently looking to get hospice care and would like to know if the  physician could follow her care during this time, if so can you fill out and hospice evaluation  entry form. April would like for the physician to submit the pt medical records for the pts hospice care.    Callback required yes/no and why: yes  Best contact number(s): [phone] fx [phone]  Details to clarify the request: n/a

## 2020-02-19 NOTE — TELEPHONE ENCOUNTER
----- Message from Jamil Hsu sent at 2/19/2020  9:25 AM EST -----  Regarding: Dr. Fonseca Like  Contact: [phone]  Caller (if not patient): Sherryle Cane. with [facility] adult day services. Reason for call: The pt has a c2 fracture. She is requesting to receive a call from a nurse to discuss what is best for the pt. Callback required yes/no and why: Yes.    Best contact number(s): [phone]  Details to clarify the request: N/A

## 2020-02-20 PROBLEM — Z51.5 HOSPICE CARE PATIENT: Status: ACTIVE | Noted: 2020-02-20

## 2020-02-20 NOTE — TELEPHONE ENCOUNTER
[phone] spoke to [address] patient is no longer with [facility] because they couldn't take care of patient.  Patient is with hospice now and Per Rupali they're taking care of her healthcare and they're following her Dr Honorio Garcia doesn't need to follow her

## 2020-02-26 ENCOUNTER — PATIENT OUTREACH (OUTPATIENT)
Dept: FAMILY MEDICINE CLINIC | Age: 85
End: 2020-02-26

## 2020-02-26 NOTE — PROGRESS NOTES
Ambulatory Care Management Note    Date/Time:  2/26/2020 9:41 AM    This Ambulatory Care Manager (ACM) reviewed and updated the following screenings during this call; General assessment for COPD, HTN, Diabetes and Self management. .     Patient's challenges to self management identified:  Inappropriate functional and cognitive ability to care for self      Medication Management:  Patient need another to administer medications and provide adherence. Currently lives with daughter who manages this. Advance Care Planning:   Does patient have an Advance Directive:  reviewed and current    [employer], Referrals, and Durable Medical Equipment: Daughter is setting up Hospice in the home and home care for patient. Followed by [facility]. Health Maintenance Due   Topic Date Due    Shingrix Vaccine Age 49> (1 of 2) 08/09/1979    GLAUCOMA SCREENING Q2Y  03/21/2019    MICROALBUMIN Q1  01/09/2020     Health Maintenance reviewed -reminded daughter of past due screenings needed. Daughter was asked to consider health care goals that they would like to focus on with this ACM. ACM will follow up with patient to discuss goals and establish care plan in the next 7-14 days. Spoke to Jinni today who wants patient to be followed by her PCP as well as the hospice provider. Message sent to PCP.       PCP/Specialist follow up:   Future Appointments   Date Time Provider Machelle Olivier   4/22/2020  4:15 PM Magdi Crane  [place]

## 2020-03-06 ENCOUNTER — PATIENT OUTREACH (OUTPATIENT)
Dept: FAMILY MEDICINE CLINIC | Age: 85
End: 2020-03-06

## 2020-03-07 DIAGNOSIS — I10 ESSENTIAL HYPERTENSION WITH GOAL BLOOD PRESSURE LESS THAN 140/90: ICD-10-CM

## 2020-03-09 ENCOUNTER — HOSPITAL ENCOUNTER (OUTPATIENT)
Dept: GENERAL RADIOLOGY | Age: 85
Discharge: HOME | End: 2020-03-09
Payer: MEDICARE

## 2020-03-09 DIAGNOSIS — S12.100A: ICD-10-CM

## 2020-03-09 PROCEDURE — 72040 X-RAY EXAM NECK SPINE 2-3 VW: CPT

## 2020-03-09 NOTE — PROGRESS NOTES
Ambulatory Care Management Note    Date/Time:  3/9/2020 3:45 PM    This Ambulatory Care Manager (ACM) reviewed and updated the following screenings during this call; general assessment. Patient's challenges to self management identified: functional mobility and chronic pain       Medication Management: good adherence reviewed medications with caregiver    Advance Care Planning:   Does patient have an Advance Directive:  reviewed and current    [employer], Referrals, and Durable Medical Equipment:       Health Maintenance Due   Topic Date Due    Shingrix Vaccine Age 50> (1 of 2) 08/09/1979    GLAUCOMA SCREENING Q2Y  03/21/2019    MICROALBUMIN Q1  01/09/2020     Health Maintenance reviewed - Overdue topics reviewed with daughter    Patient was asked to consider health care goals that they would like to focus on with this ACM. ACM will follow up with patient to discuss goals and establish care plan in the next 7-14 days.        PCP/Specialist follow up:   Future Appointments   Date Time Provider Machelle Olivier   4/22/2020  4:15 PM Judy Pina MD [facility] [place]

## 2020-03-12 RX ORDER — AMLODIPINE BESYLATE 5 MG/1
TABLET ORAL
Qty: 90 TAB | Refills: 1 | Status: SHIPPED | OUTPATIENT
Start: 2020-03-12

## 2020-04-04 DIAGNOSIS — E78.2 MIXED HYPERLIPIDEMIA: ICD-10-CM

## 2020-04-09 RX ORDER — LEVOTHYROXINE SODIUM 125 UG/1
125 TABLET ORAL
Qty: 90 TAB | Refills: 1 | Status: SHIPPED | OUTPATIENT
Start: 2020-04-09

## 2020-04-09 RX ORDER — ATORVASTATIN CALCIUM 40 MG/1
TABLET, FILM COATED ORAL
Qty: 90 TAB | Refills: 1 | Status: SHIPPED | OUTPATIENT
Start: 2020-04-09

## 2020-04-09 RX ORDER — DONEPEZIL HYDROCHLORIDE 10 MG/1
TABLET, FILM COATED ORAL
Qty: 90 TAB | Refills: 1 | Status: SHIPPED | OUTPATIENT
Start: 2020-04-09

## 2020-04-16 ENCOUNTER — TELEPHONE (OUTPATIENT)
Dept: FAMILY MEDICINE CLINIC | Age: 85
End: 2020-04-16

## 2020-04-16 NOTE — TELEPHONE ENCOUNTER
Outbound call placed to patient daughter Jeannie Santamaria. name and  verified. Call placed to reschedule patients appointment due to COVID-19 pandemic. Patient scheduled Pt verbalized understanding of information discussed w/ no further questions at this time.

## 2020-04-22 ENCOUNTER — VIRTUAL VISIT (OUTPATIENT)
Dept: FAMILY MEDICINE CLINIC | Age: 85
End: 2020-04-22

## 2020-04-22 DIAGNOSIS — Z23 ENCOUNTER FOR IMMUNIZATION: ICD-10-CM

## 2020-04-22 DIAGNOSIS — R53.81 MALAISE AND FATIGUE: ICD-10-CM

## 2020-04-22 DIAGNOSIS — G30.1 LATE ONSET ALZHEIMER'S DISEASE WITHOUT BEHAVIORAL DISTURBANCE (HCC): Primary | ICD-10-CM

## 2020-04-22 DIAGNOSIS — L89.619 PRESSURE INJURY OF SKIN OF RIGHT HEEL, UNSPECIFIED INJURY STAGE: ICD-10-CM

## 2020-04-22 DIAGNOSIS — R53.83 MALAISE AND FATIGUE: ICD-10-CM

## 2020-04-22 DIAGNOSIS — F02.80 LATE ONSET ALZHEIMER'S DISEASE WITHOUT BEHAVIORAL DISTURBANCE (HCC): Primary | ICD-10-CM

## 2020-04-22 DIAGNOSIS — E03.9 ACQUIRED HYPOTHYROIDISM: ICD-10-CM

## 2020-04-22 DIAGNOSIS — F32.A MILD DEPRESSION: ICD-10-CM

## 2020-04-22 RX ORDER — ZOSTER VACCINE RECOMBINANT, ADJUVANTED 50 MCG/0.5
0.5 KIT INTRAMUSCULAR ONCE
Qty: 0.5 ML | Refills: 1 | Status: SHIPPED | OUTPATIENT
Start: 2020-04-22 | End: 2020-04-22

## 2020-04-22 NOTE — PROGRESS NOTES
Frederick Roach is a 80 y.o. female who was seen by synchronous (real-time) audio-video technology on 4/22/2020. Consent:  Services were provided through a video synchronous discussion virtually to substitute for in-person appointment. She and/or her healthcare decision maker is aware that this patient-initiated Telehealth encounter is a billable service, with coverage as determined by her insurance carrier. She is aware that she may receive a bill and has provided verbal consent to proceed: Yes    I was in the office while conducting this encounter. Subjective: Frederick Roach was seen for follow up of chronic conditions. Due to pt's mental acuity, she is nonverbal during this visit, and her daughter helps presenting the history. Alzherimer's disease: Pt continues to take Namenda 10mg/BID and Aricept 10mg/d    Mild depression ([address]): based on history, physical exam and review of pertinent labs, studies and medications; meds reconciled; continue current treatment plan. Fatigue and Malaise: Pt's daughter also notes that pt sleeps all the time. Hyperlipidemia: Lipid panel on 1/22/2020 notable for total cholesterol 234, HDL 73, , and triglycerides 142. Pt continues with Lipitor 40mg/d. Hypothyroidism: Pt's TSH level was 33.060 on 1/22/2020. Discussed about pt's poorly controlled thyroid levels with pt's daughter on 2/5/2020 and we agreed to increase the Synthroid dosage from 100mcg to 125mcg. Pressure injury of skin of right heel: Pt's daughter notes that pt's right foot, specifically the heel, is still in bad shape. Notes that pt has had an ulcer there for about 4 months. Pt is under care of Dr. Rosanna Guerar. Notes that there is blood seeping from ulcer, but hospice staff states it is healing. Requests tramadol refill to help relieve pain in her leg. Pt's daughter notes that pt can't put pressure on her foot at all. Pt takes morphine PRN.      Healthcare Maintenance: Pt's daughter gives her the nebulizer once a day. Reports that pt doesn't like solid foods and gives her liquid food like baby food instead, fruit smoothies and yogurts. Some days she eats, some days she doesn't. Pt's daughter states that pt rarely keeps her eyes open. Discussed the importance of Shingrix vaccine and maintaining quality of life. Informed pt that there is a national shortage of this vaccine, and wait time could be 3-4 months. Suggested that pt contact local pharmacy to be put on wait list.     Pt requests that Dr. Hernan Waldrop contact the pt's hospice nurse, [facility], to complete the pt's necessary labwork and blood tests at home. Prior to Admission medications    Medication Sig Start Date End Date Taking? Authorizing Provider   varicella-zoster recombinant, PF, (Shingrix, PF,) 50 mcg/0.5 mL susr injection 0.5 mL by IntraMUSCular route once for 1 dose. 4/22/20 4/22/20 Yes Ronal Wyatt MD   donepeziL (ARICEPT) 10 mg tablet TAKE 1 TABLET NIGHTLY 4/9/20   Valerie Manjarrez MD   atorvastatin (LIPITOR) 40 mg tablet TAKE 1 TABLET DAILY 4/9/20   Valerie Manjarrez MD   levothyroxine (SYNTHROID) 125 mcg tablet Take 1 Tab by mouth Daily (before breakfast). 4/9/20   Valerie Manjarrez MD   amLODIPine (NORVASC) 5 mg tablet TAKE 1 TABLET DAILY FOR BLOOD PRESSURE 3/12/20   Phill Manjarrez MD   VITAMIN D2 50,000 unit capsule TAKE 1 CAPSULE EVERY 7 DAYS 12/2/19   Valerie Manjarrez MD   memantine (NAMENDA) 10 mg tablet TAKE 1 TABLET TWICE A DAY FOR MODERATE TO SEVERE ALZHEIMERS TYPE DEMENTIA 11/4/19   Valerie Manjarrez MD   montelukast (SINGULAIR) 10 mg tablet Take 10 mg by mouth daily. Provider, Historical   aspirin delayed-release 81 mg tablet Take 81 mg by mouth every Wednesday. Provider, Historical   levothyroxine (SYNTHROID) 100 mcg tablet TAKE 1 TABLET DAILY BEFORE BREAKFAST 9/16/19   Phill Manjarrez MD   budesonide (PULMICORT) 0.5 mg/2 mL nbsp 2 mL by Nebulization route two (2) times a day.  Takes 30 mins After Felisha 6/6/19 Mary Darden MD   arformoterol (BROVANA) 15 mcg/2 mL nebu neb solution 15 mcg by Nebulization route two (2) times a day. Provider, Historical   DENOSUMAB SC by SubCUTAneous route every 6 months. Provider, Historical   albuterol-ipratropium (DUO-NEB) 2.5 mg-0.5 mg/3 ml nebu 3 mL by Nebulization route every four (4) hours as needed. 18   Stephania Coburn NP     Allergies   Allergen Reactions    Penicillins Rash    Sulfa (Sulfonamide Antibiotics) Rash    Keflex [Cephalexin] Rash    Tape [Adhesive] Unknown (comments)     Past Medical History:   Diagnosis Date    Anxiety state, unspecified     Chronic kidney disease     Chronic obstructive pulmonary disease (HCC)     CKD (chronic kidney disease) stage 3, GFR 30-59 ml/min ([address] 2016    Dementia ([facility]     Landa/npysch    Depression 2017    Generalized osteoarthrosis, unspecified site     Lumbar compression fracture (HCC)     epidurals x 2  Umang/os    Osteopenia     Other and unspecified hyperlipidemia     Psoriasis     Unspecified essential hypertension     Unspecified hypothyroidism     thyroid irradiated i131     Past Surgical History:   Procedure Laterality Date    COLONOSCOPY  10/10    [place]    TOTAL KNEE ARTHROPLASTY      left    TOTAL KNEE ARTHROPLASTY      right     Family History   Problem Relation Age of Onset    Cancer Mother         stomach tumors    Cancer Brother         prostate     Social History     Tobacco Use    Smoking status: Former Smoker     Packs/day: 0.50     Years: 60.00     Pack years: 30.00     Types: Cigarettes     Last attempt to quit:      Years since quittin.3    Smokeless tobacco: Never Used   Substance Use Topics    Alcohol use: No     Alcohol/week: 0.0 standard drinks        Review of Systems   Constitutional: Positive for malaise/fatigue. Negative for chills and fever. HENT: Negative for hearing loss and tinnitus.     Eyes: Negative for blurred vision and double vision. Respiratory: Negative for cough and shortness of breath. Cardiovascular: Negative for chest pain and palpitations. Gastrointestinal: Negative for nausea and vomiting. Genitourinary: Negative for dysuria and frequency. Musculoskeletal: Negative for back pain and falls. Skin: Negative for itching and rash. Neurological: Negative for dizziness, loss of consciousness and headaches. Dementia     Endo/Heme/Allergies: Negative. Psychiatric/Behavioral: Positive for depression. The patient is not nervous/anxious. PHYSICAL EXAMINATION:  Vital Signs: (As obtained by patient/caregiver at home)  There were no vitals taken for this visit.      Constitutional: [x] Appears well-developed and well-nourished [x] No apparent distress      [] Abnormal -     Mental status: [x] Alert and awake  [x] Oriented to person/place/time [x] Able to follow commands    [] Abnormal -     Eyes:   EOM    [x]  Normal    [] Abnormal -   Sclera  [x]  Normal    [] Abnormal -          Discharge [x]  None visible   [] Abnormal -     HENT: [x] Normocephalic, atraumatic  [] Abnormal -   [x] Mouth/Throat: Mucous membranes are moist    External Ears [x] Normal  [] Abnormal -    Neck: [x] No visualized mass [] Abnormal -     Pulmonary/Chest: [x] Respiratory effort normal   [x] No visualized signs of difficulty breathing or respiratory distress        [] Abnormal -      Musculoskeletal:   [x] Normal gait with no signs of ataxia         [x] Normal range of motion of neck        [] Abnormal -     Neurological:        [x] No Facial Asymmetry (Cranial nerve 7 motor function) (limited exam due to video visit)          [x] No gaze palsy        [] Abnormal -          Skin:        [x] No significant exanthematous lesions or discoloration noted on facial skin         [] Abnormal -            Psychiatric:       [x] Normal Affect [] Abnormal -        [x] No Hallucinations    Other pertinent observable physical exam findings:-    Assessment & Plan:   Diagnoses and all orders for this visit:    1. 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    2. Mild depression ([facility] [address] [facility]  Based on history, physical exam and review of pertinent labs, studies and medications; meds reconciled; continue current treatment plan. 3. Malaise and fatigue    4. Pressure injury of skin of right heel, unspecified injury stage  Tramadol refill request completed, and pt will continue to take Tramadol. Will take morphine PRN. 5. Acquired hypothyroidism  Pt advised to continue with Synthroid 125mcg/d, and will recheck TSH levels at a later time. 6. Encounter for immunization  Provided pt with prescription and will follow up with local pharmacy for Shingrix vaccine. -     varicella-zoster recombinant, PF, (Shingrix, PF,) 50 mcg/0.5 mL susr injection; 0.5 mL by IntraMUSCular route once for 1 dose. We discussed the expected course, resolution and complications of the diagnosis(es) in detail. Medication risks, benefits, costs, interactions, and alternatives were discussed as indicated. I advised her to contact the office if her condition worsens, changes or fails to improve as anticipated. She expressed understanding with the diagnosis(es) and plan. Pursuant to the emergency declaration under the [address] and [facility] waiver authority and the [employer] General Act, this Virtual Visit was conducted, with patient's consent, to reduce the patient's risk of exposure to COVID-19 and provide continuity of care for an established patient. Services were provided through a video synchronous discussion virtually to substitute for in-person clinic visit.      Written by nora Gary, as dictated by Andres Schrader M.D.    4:21 PM - 4:35 PM    Total time spent with the patient 14 minutes, greater than 50% of time spent counseling patient.

## 2020-04-24 ENCOUNTER — TELEPHONE (OUTPATIENT)
Dept: FAMILY MEDICINE CLINIC | Age: 85
End: 2020-04-24

## 2020-04-24 NOTE — TELEPHONE ENCOUNTER
[phone] spoke to [address] patient was seen virtual visit 4/22/2020 and Mom supposed to have lab work done but no lab order [address] wants us to fax lab order to hospice.  I advise will send message to Dr. Hernan Waldrop

## 2020-04-24 NOTE — TELEPHONE ENCOUNTER
----- Message from Doreen Wade sent at 4/24/2020  1:03 PM EDT -----  Regarding: Dr. Renae Harding: [phone]  Caller's first and last name: Tristen Carrasco, Daughter  Reason for call: The lab work for her mother was not ordered and she wants it sent to her mother's [address], daughter did not know the nurses last name.   Callback required yes/no and why: no  Best contact number(s): [phone]  Details to clarify the request: [facility] Taveras [facility]  can be reached at [phone]

## 2020-04-27 DIAGNOSIS — E03.8 OTHER SPECIFIED HYPOTHYROIDISM: ICD-10-CM

## 2020-04-27 DIAGNOSIS — E78.2 MIXED HYPERLIPIDEMIA: ICD-10-CM

## 2020-04-27 DIAGNOSIS — I10 ESSENTIAL HYPERTENSION: Primary | ICD-10-CM

## 2020-04-27 DIAGNOSIS — N18.30 CKD (CHRONIC KIDNEY DISEASE) STAGE 3, GFR 30-59 ML/MIN (HCC): ICD-10-CM

## 2020-04-27 DIAGNOSIS — E55.9 VITAMIN D DEFICIENCY: ICD-10-CM

## 2020-04-27 NOTE — TELEPHONE ENCOUNTER
----- Message from Ben Steele sent at 4/27/2020  1:56 PM EDT -----  Regarding: Dr. Sara Juan General Message/Vendor Calls    Caller's first and last name: Sean Clayton (daughter)      Reason for call:  Regarding her mom's nurse [address] ([phone]) hasn't received a lab order for her mom.        Call back required yes/no and why: Yes       Best contact number(s): (h) [phone]       Details to clarify the request:      Ben Steele

## 2021-09-03 NOTE — ACP (ADVANCE CARE PLANNING)
09/02/21 2204   Provider Notification   Reason for Communication Other (Comment)  (BP)   Provider Name Dr. Nassar   Notification Route Phone call   Response No new orders      Advance Care Plan or Surrogate Decision Maker documented in medical record.

## 2022-03-18 PROBLEM — R41.82 ALTERED MENTAL STATUS: Status: ACTIVE | Noted: 2019-05-20

## 2022-03-18 PROBLEM — R27.0 ATAXIA: Status: ACTIVE | Noted: 2019-05-20

## 2022-03-18 PROBLEM — F32.A MILD DEPRESSION: Status: ACTIVE | Noted: 2018-10-29

## 2022-03-19 PROBLEM — S32.10XA SACRAL FRACTURE (HCC): Status: ACTIVE | Noted: 2019-10-04

## 2022-03-19 PROBLEM — E55.9 VITAMIN D DEFICIENCY: Status: ACTIVE | Noted: 2017-06-06

## 2022-03-19 PROBLEM — F32.A DEPRESSION: Status: ACTIVE | Noted: 2017-09-20

## 2022-03-19 PROBLEM — G30.1 LATE ONSET ALZHEIMER'S DISEASE WITHOUT BEHAVIORAL DISTURBANCE (HCC): Status: ACTIVE | Noted: 2017-01-11

## 2022-03-19 PROBLEM — F02.80 LATE ONSET ALZHEIMER'S DISEASE WITHOUT BEHAVIORAL DISTURBANCE (HCC): Status: ACTIVE | Noted: 2017-01-11

## 2022-03-19 PROBLEM — Z51.5 HOSPICE CARE PATIENT: Status: ACTIVE | Noted: 2020-02-20

## 2022-03-19 PROBLEM — K42.9 UMBILICAL HERNIA WITHOUT OBSTRUCTION AND WITHOUT GANGRENE: Status: ACTIVE | Noted: 2017-10-11

## 2023-05-12 RX ORDER — ERGOCALCIFEROL 1.25 MG/1
CAPSULE ORAL
COMMUNITY
Start: 2019-12-02

## 2023-05-12 RX ORDER — BUDESONIDE 0.5 MG/2ML
INHALANT ORAL 2 TIMES DAILY
COMMUNITY
Start: 2019-06-06

## 2023-05-12 RX ORDER — DONEPEZIL HYDROCHLORIDE 10 MG/1
1 TABLET, FILM COATED ORAL NIGHTLY
COMMUNITY
Start: 2020-04-09

## 2023-05-12 RX ORDER — ATORVASTATIN CALCIUM 40 MG/1
1 TABLET, FILM COATED ORAL DAILY
COMMUNITY
Start: 2020-04-09

## 2023-05-12 RX ORDER — LEVOTHYROXINE SODIUM 0.1 MG/1
TABLET ORAL
COMMUNITY
Start: 2019-09-16

## 2023-05-12 RX ORDER — IPRATROPIUM BROMIDE AND ALBUTEROL SULFATE 2.5; .5 MG/3ML; MG/3ML
SOLUTION RESPIRATORY (INHALATION) EVERY 4 HOURS PRN
COMMUNITY
Start: 2018-07-05

## 2023-05-12 RX ORDER — ARFORMOTEROL TARTRATE 15 UG/2ML
SOLUTION RESPIRATORY (INHALATION) 2 TIMES DAILY
COMMUNITY

## 2023-05-12 RX ORDER — AMLODIPINE BESYLATE 5 MG/1
TABLET ORAL
COMMUNITY
Start: 2020-03-12

## 2023-05-12 RX ORDER — MEMANTINE HYDROCHLORIDE 10 MG/1
TABLET ORAL
COMMUNITY
Start: 2019-11-04

## 2023-05-12 RX ORDER — MONTELUKAST SODIUM 10 MG/1
10 TABLET ORAL DAILY
COMMUNITY

## 2023-05-12 RX ORDER — ASPIRIN 81 MG/1
TABLET ORAL
COMMUNITY

## 2023-05-12 RX ORDER — LEVOTHYROXINE SODIUM 0.12 MG/1
TABLET ORAL
COMMUNITY
Start: 2020-04-09